# Patient Record
Sex: FEMALE | Race: WHITE | NOT HISPANIC OR LATINO | Employment: UNEMPLOYED | URBAN - METROPOLITAN AREA
[De-identification: names, ages, dates, MRNs, and addresses within clinical notes are randomized per-mention and may not be internally consistent; named-entity substitution may affect disease eponyms.]

---

## 2017-01-17 ENCOUNTER — HOSPITAL ENCOUNTER (OUTPATIENT)
Dept: MAMMOGRAPHY | Facility: CLINIC | Age: 52
Discharge: HOME | End: 2017-01-17
Payer: COMMERCIAL

## 2017-01-17 DIAGNOSIS — Z12.31 ENCOUNTER FOR SCREENING MAMMOGRAM FOR MALIGNANT NEOPLASM OF BREAST: ICD-10-CM

## 2017-01-17 PROCEDURE — G0202 SCR MAMMO BI INCL CAD: HCPCS

## 2017-03-07 ENCOUNTER — HOSPITAL ENCOUNTER (OUTPATIENT)
Dept: RADIOLOGY | Facility: HOSPITAL | Age: 52
Discharge: HOME | End: 2017-03-07
Attending: FAMILY MEDICINE
Payer: COMMERCIAL

## 2017-03-07 ENCOUNTER — ALLSCRIPTS OFFICE VISIT (OUTPATIENT)
Dept: OTHER | Facility: OTHER | Age: 52
End: 2017-03-07

## 2017-03-07 DIAGNOSIS — S93.402A SPRAIN OF LIGAMENT OF LEFT ANKLE: ICD-10-CM

## 2017-03-07 PROCEDURE — 73610 X-RAY EXAM OF ANKLE: CPT

## 2017-03-09 ENCOUNTER — GENERIC CONVERSION - ENCOUNTER (OUTPATIENT)
Dept: OTHER | Facility: OTHER | Age: 52
End: 2017-03-09

## 2017-05-01 ENCOUNTER — ALLSCRIPTS OFFICE VISIT (OUTPATIENT)
Dept: OTHER | Facility: OTHER | Age: 52
End: 2017-05-01

## 2017-05-04 ENCOUNTER — GENERIC CONVERSION - ENCOUNTER (OUTPATIENT)
Dept: OTHER | Facility: OTHER | Age: 52
End: 2017-05-04

## 2017-05-04 LAB
A/G RATIO (HISTORICAL): 1.8 (CALC) (ref 1–2.5)
ALBUMIN SERPL BCP-MCNC: 4.1 G/DL (ref 3.6–5.1)
ALP SERPL-CCNC: 64 U/L (ref 33–130)
ALT SERPL W P-5'-P-CCNC: 15 U/L (ref 6–29)
AST SERPL W P-5'-P-CCNC: 18 U/L (ref 10–35)
BACTERIA UR QL AUTO: NORMAL /HPF
BASOPHILS # BLD AUTO: 0.8 %
BASOPHILS # BLD AUTO: 42 CELLS/UL (ref 0–200)
BILIRUB SERPL-MCNC: 0.6 MG/DL (ref 0.2–1.2)
BUN SERPL-MCNC: 14 MG/DL (ref 7–25)
BUN/CREA RATIO (HISTORICAL): NORMAL (CALC) (ref 6–22)
CALCIUM SERPL-MCNC: 8.9 MG/DL (ref 8.6–10.4)
CHLORIDE SERPL-SCNC: 106 MMOL/L (ref 98–110)
CHOLEST SERPL-MCNC: 182 MG/DL (ref 125–200)
CHOLEST/HDLC SERPL: 3.6 (CALC)
CO2 SERPL-SCNC: 25 MMOL/L (ref 20–31)
CREAT SERPL-MCNC: 0.71 MG/DL (ref 0.5–1.05)
DEPRECATED RDW RBC AUTO: 14.7 % (ref 11–15)
EOSINOPHIL # BLD AUTO: 186 CELLS/UL (ref 15–500)
EOSINOPHIL # BLD AUTO: 3.5 %
EST. AVERAGE GLUCOSE BLD GHB EST-MCNC: 105 (CALC)
EST. AVERAGE GLUCOSE BLD GHB EST-MCNC: 5.8 (CALC)
GAMMA GLOBULIN (HISTORICAL): 2.3 G/DL (CALC) (ref 1.9–3.7)
GLUCOSE (HISTORICAL): 88 MG/DL (ref 65–99)
HBA1C MFR BLD HPLC: 5.3 % OF TOTAL HGB
HCT VFR BLD AUTO: 37.8 % (ref 35–45)
HDLC SERPL-MCNC: 51 MG/DL
HGB BLD-MCNC: 12.6 G/DL (ref 11.7–15.5)
HYALINE CASTS #/AREA URNS LPF: NORMAL /LPF
LDL CHOLESTEROL DIRECT (HISTORICAL): 105 MG/DL
LYMPHOCYTES # BLD AUTO: 2041 CELLS/UL (ref 850–3900)
LYMPHOCYTES # BLD AUTO: 38.5 %
MCH RBC QN AUTO: 29.3 PG (ref 27–33)
MCHC RBC AUTO-ENTMCNC: 33.2 G/DL (ref 32–36)
MCV RBC AUTO: 88.1 FL (ref 80–100)
MONOCYTES # BLD AUTO: 260 CELLS/UL (ref 200–950)
MONOCYTES (HISTORICAL): 4.9 %
NEUTROPHILS # BLD AUTO: 2772 CELLS/UL (ref 1500–7800)
NEUTROPHILS # BLD AUTO: 52.3 %
NON-HDL-CHOL (CHOL-HDL) (HISTORICAL): 131 MG/DL (CALC)
NOTE (HISTORICAL): NORMAL
PLATELET # BLD AUTO: 210 THOUSAND/UL (ref 140–400)
PMV BLD AUTO: 10.7 FL (ref 7.5–12.5)
POTASSIUM SERPL-SCNC: 4.6 MMOL/L (ref 3.5–5.3)
RBC # BLD AUTO: 4.3 MILLION/UL (ref 3.8–5.1)
RBC (HISTORICAL): NORMAL /HPF
SODIUM SERPL-SCNC: 140 MMOL/L (ref 135–146)
SQUAMOUS EPITHELIAL CELLS (HISTORICAL): NORMAL /HPF
TOTAL PROTEIN (HISTORICAL): 6.4 G/DL (ref 6.1–8.1)
TRIGL SERPL-MCNC: 149 MG/DL
WBC # BLD AUTO: 5.3 THOUSAND/UL (ref 3.8–10.8)
WBC # BLD AUTO: NORMAL /HPF

## 2017-08-11 ENCOUNTER — ALLSCRIPTS OFFICE VISIT (OUTPATIENT)
Dept: OTHER | Facility: OTHER | Age: 52
End: 2017-08-11

## 2017-09-25 ENCOUNTER — ALLSCRIPTS OFFICE VISIT (OUTPATIENT)
Dept: OTHER | Facility: OTHER | Age: 52
End: 2017-09-25

## 2017-10-24 ENCOUNTER — APPOINTMENT (OUTPATIENT)
Dept: RADIOLOGY | Facility: CLINIC | Age: 52
End: 2017-10-24
Payer: COMMERCIAL

## 2017-10-24 ENCOUNTER — TRANSCRIBE ORDERS (OUTPATIENT)
Dept: ADMINISTRATIVE | Facility: HOSPITAL | Age: 52
End: 2017-10-24

## 2017-10-24 ENCOUNTER — ALLSCRIPTS OFFICE VISIT (OUTPATIENT)
Dept: OTHER | Facility: OTHER | Age: 52
End: 2017-10-24

## 2017-10-24 DIAGNOSIS — M25.519 PAIN IN SHOULDER: ICD-10-CM

## 2017-10-24 DIAGNOSIS — S49.91XA INJURY OF RIGHT UPPER ARM, INITIAL ENCOUNTER: ICD-10-CM

## 2017-10-24 DIAGNOSIS — R29.898 DEFICIENCIES OF LIMBS: Primary | ICD-10-CM

## 2017-10-24 DIAGNOSIS — R29.898 OTHER SYMPTOMS AND SIGNS INVOLVING THE MUSCULOSKELETAL SYSTEM: ICD-10-CM

## 2017-10-24 DIAGNOSIS — M75.121 COMPLETE TEAR OF RIGHT ROTATOR CUFF: ICD-10-CM

## 2017-10-24 DIAGNOSIS — S49.91XA UNSPECIFIED INJURY OF RIGHT SHOULDER AND UPPER ARM, INITIAL ENCOUNTER: ICD-10-CM

## 2017-10-24 DIAGNOSIS — M25.511 PAIN IN RIGHT SHOULDER: ICD-10-CM

## 2017-10-24 PROCEDURE — 73030 X-RAY EXAM OF SHOULDER: CPT

## 2017-10-25 NOTE — PROGRESS NOTES
Assessment  1  Right shoulder pain (719 41) (M25 511)   2  Shoulder weakness (719 61) (R29 898)   3  Injury of right shoulder, initial encounter (959 2) (S49 91XA)    Plan  Injury of right shoulder, initial encounter, Right shoulder pain, Shoulder weakness    · Follow-up After MRI Evaluation and Treatment  Follow-up with Dr Julio Cesar Abdi,  orthopaedics  Status: Hold For - Scheduling  Requested for: 63CWM7863   · * MRI SHOULDER RIGHT WO CONTRAST; Status:Need Information - Financial  Authorization; Requested JOSUE:[id number];   Right shoulder pain    · *1 - [facility]  45year old female with  Status: Active -  Retrospective Authorization  Requested for: [id number]  Care Summary provided  : Yes  Shoulder pain    · * XR SHOULDER 2+ VIEW RIGHT; Status:Active; Requested [id number];     Discussion/Summary    Given her cuff weakness, significant nighttime symptoms, and likely injury during her daily activities, there is high concern for a cuff tear  We have recommended going for MRI to evaluate and will f/u following this is completed  She is claustrophobic and is requesting medication for anxiety control during the procedure given difficulties tolerating it in the past  Dr Julio Cesar Abdi is agreeable to prescription of anxiolytic for control  Chief Complaint  1  Shoulder Pain    History of Present Illness  Ms Samantha Elizabeth is a 45year old right hand dominant female presenting for right shoulder pain of approximately 2 months duration without any specific trauma to it  Her work does include part time cleaning and describes being a very active person now remember events such as helping her girlfriend move about the time this started and lifting a different friend's washing machine, so she is aware that there may have been a movement that set her pain off  She now experiences an ache at her right lateral shoulder with radiation down the lateral arm not passing her elbow   This will wake her up from sleep 3-4 nights per week  She has begun avoiding use with her right arm to help with the pain as her nighttime symptoms are heavily affected by the activity level from the day  She will also occasionally notice a clicking at her shoulder as well during ROM  She has been using ibuprofen with moderate symptoms relief  Fernando Hank presents with complaints of shoulder pain  Associated symptoms include clicking, but-- no swelling,-- no shoulder bruising,-- no decreased range of motion,-- no instability-- and-- no numbness in the arm  Review of Systems    Constitutional: No fever, no chills, feels well, no tiredness, no recent weight gain or loss  Eyes: No complaints of eyesight problems, no red eyes  ENT: no loss of hearing, no nosebleeds, no sore throat  Cardiovascular: No complaints of chest pain, no palpitations, no leg claudication or lower extremity edema  Respiratory: no compliants of shortness of breath, no wheezing, no cough  Gastrointestinal: no complaints of abdominal pain, no constipation, no nausea or diarrhea, no vomiting, no bloody stools  Genitourinary: no complaints of dysuria, no incontinence  Musculoskeletal: as noted in HPI  Integumentary: no complaints of skin rash or lesion, no itching or dry skin, no skin wounds  Neurological: no complaints of headache, no confusion, no numbness or tingling, no dizziness  Endocrine: No complaints of muscle weakness, no feelings of weakness, no frequent urination, no excessive thirst    Psychiatric: No suicidal thoughts, no anxiety, no feelings of depression  Active Problems  1  Acid reflux disease (530 81) (K21 9)   2  History of Bronchitis, asthmatic (493 90) (J45 909)   3  Carpal tunnel syndrome, unspecified laterality (354 0) (G56 00)   4  Colon polyp (211 3) (K63 5)   5  Depression (311) (F32 9)   6  Disc degeneration, lumbar (722 52) (M51 36)   7  Flu vaccine need (V04 81) (Z23)   8  Herpes labialis (054 9) (B00 1)   9   History of urinary tract infection (V13 02) (Z87 440)   10  Hypercholesterolemia (272 0) (E78 00)   11  Low back pain (724 2) (M54 5)   12  Migraine headache (346 90) (G43 909)   13  Osteoarthritis (715 90) (M19 90)   14  Pap smear abnormality of cervix with ASCUS favoring benign (795 01) (R87 610)   15  Right rotator cuff tendinitis (726 10) (M75 81)   16  Shoulder pain (719 41) (M25 519)   17  Symptomatic menopausal or female climacteric states (627 2) (N95 1)    Past Medical History   · History of Age At First Period 13 Years Old (Menarche)   · History of Anxiety disorder (300 00) (F41 9)   · History of Arthritis (V13 4)   · History of Asthma (493 90) (J45 909)   · History of Bronchitis, asthmatic (493 90) (J45 909)   · History of Depression (311) (F32 9)   · History of Diverticulosis (562 10) (K57 90)   · History of breast lump (V13 89) (P55 966)   · History of heartburn (V12 79) (K70 843)   · History of hemorrhoids (V13 89) (Z87 19)   · History of hidradenitis suppurativa (V13 3) (Z87 2)   · History of hyperglycemia (V12 29) (Z86 39)   · History of lipoma (V13 89) (Z86 018)   · History of migraine (V12 49) (Z86 69)   · History of urinary incontinence (V13 09) (S67 932)   · History of urinary tract infection (V13 02) (Z87 440)   · History of Menopause (627 2) (Z78 0)   · History of Miscarriage (634 90) (O03 9)   · History of Postpartum Depression (648 40)   · History of Sprain of left ankle, unspecified ligament, initial encounter (845 00) (H80 060Z)   · History of Summary Of Previous Pregnancies  4  (Total No )   · History of  (spontaneous vaginal delivery) (650) (O80)   · History of Urinary incontinence (788 30) (R32)   · History of Vaginal candidiasis (112 1) (B37 3)   · History of Varicose Veins Of Lower Extremities (454 9)    The active problems and past medical history were reviewed and updated today        Surgical History   · History of Appendectomy   · History of Arthroscopy Knee Left   · History of Biopsy Breast Open   · History of Biopsy Breast Percutaneous Needle Core   · History of Colonoscopy   · History of Removal Of Lesion   · History of Surgical Treatment Of Missed    · History of Tubal Ligation    The surgical history was reviewed and updated today  Family History  Mother    · Family history of Benign Essential Hypertension   · Family history of Diabetes Mellitus (V18 0)   · Family history of cerebrovascular accident (CVA) (V17 1) (Z82 3)   · Family history of Osteoporosis   · Family history of Stomach cancer  Father    · Family history of Anxiety   · Family history of dementia (V17 2) (Z81 8)  Sister    · Family history of Anxiety  Maternal Grandmother    · Family history of Benign Essential Hypertension   · Family history of Diabetes Mellitus (V18 0)   · Family history of Heart disease  Uncle    · Family history of Kidney disease  Maternal Cousin    · Family history of Breast cancer  Paternal Cousin    · Family history of Breast cancer    The family history was reviewed and updated today  Social History   · Alcohol Use (History)   · CONSUMED RARELY   · Being A Social Drinker   · Daily Coffee Consumption (1  Cups/Day)   · Drinks wine (V49 89) (Z78 9)   · Denied: History of Drug Use   · Former smoker (V15 82) (I54 435)   · QUIT    · Recreational Activities Walking   · Sexual Activity Denied  The social history was reviewed and updated today  The social history was reviewed and is unchanged  Current Meds   1  [employer]S Ibuprofen TABS; Therapy: (Recorded:2014) to Recorded   2  LORazepam 0 5 MG Oral Tablet; TAKE 1/2 to 1 pill every 4-6 hrs as needed  ANXIETY; Last Rx:2017 Ordered   3  Montelukast Sodium 10 MG Oral Tablet; take 1 tablet by mouth daily; Therapy: 08TXX8564 to [phone])  Requested for: 07RLN5977; Last   Rx:2017 Ordered   4  Omeprazole 20 MG Oral Capsule Delayed Release; TAKE 1 CAPSULE DAILY as   needed for stomach acid;    Therapy: 78FGP3675 to (RVNJXAOE:88AAB5594)  Requested for: 18ZPQ3242; Last   NV:68YRZ9085 Ordered   5  Pulmicort Flexhaler 180 MCG/ACT Inhalation Aerosol Powder Breath Activated; Use 1    puff Twice daily as directed -   if increased wheezing, increase to 2   puffs twice a day; Therapy: 05KMJ3729 to (Evaluate:33Qqe6278)  Requested for: 71OUK4931; Last   Rx:05Vfa3153 Ordered   6  Sertraline HCl - 100 MG Oral Tablet; take 1 tablet by mouth daily; Therapy: 10Krv9726 to (Evaluate:76Ssc8437)  Requested for: 68FLY8151; Last   Rx:79Aam0815 Ordered   7  ValACYclovir HCl - 1 GM Oral Tablet; 2 BID x 1 day for cold sores; Therapy: 12WIS9025 to (Last Rx:47Pgt2608)  Requested for: 00Mkx6218 Ordered    The medication list was reviewed and updated today  Allergies  1  Effexor TABS   2  Prempro TABS   3  Wellbutrin TABS    Vitals   Recorded: 64OKX1965 11:34AM   Heart Rate 73   Systolic 291   Diastolic 84   Height 5 ft 6 3 in   Weight 228 lb    BMI Calculated 36 47   BSA Calculated 2 12     Physical Exam    Right Shoulder: Appearance: Normal except  (no swelling, ecchymosis  Mild TTP at [facility] joint and anterior shoulder  No tenderness at posterior shoulder, clavicle, or over subacromial bursa  normal AROM throughout shoulder - flexion, extension, adduction, abduction, IR, and ER  +painful arc, 4/5 strength with ER with arm 90 degrees flexion, + empty can, +hawkin's, +speed's, -yergason's, equiv  cross body adduction, -hornblower's, -anterior and posterior axial loading, -apprehension, -sulcus sign, -obriens)      Constitutional - General appearance: Normal    Cardiovascular - Pulses: Normal  Radial pulse was 2+ on the right  Neurologic - Sensation: Normal light touch intact throughout RUE     Psychiatric - Orientation to person, place, and time: Normal -- Mood and affect: Normal    Eyes   Conjunctiva and lids: Normal        Attending Note  Attending Note [facility]: Attending Note: I interviewed, took the history and examined the patient,-- I discussed the case with the Resident and reviewed the Resident's note-- and-- I agree with the Resident management plan as it was presented to me  Level of Participation: I was present in clinic and examined the patient  I agree with the Resident's note  Future Appointments    Date/Time Provider Specialty Site   11/07/2017 03:40 PM MILTON Sharma   Orthopedic Surgery [facility]     Signatures   Electronically signed by : Favio Michelle DO; Oct 24 2017 12:30PM EST                       (Author)    Electronically signed by : MILTON Howell ; Oct 24 2017  2:14PM EST                       (Author)

## 2017-11-01 ENCOUNTER — APPOINTMENT (OUTPATIENT)
Dept: PHYSICAL THERAPY | Facility: CLINIC | Age: 52
End: 2017-11-01
Payer: COMMERCIAL

## 2017-11-01 DIAGNOSIS — M25.511 PAIN IN RIGHT SHOULDER: ICD-10-CM

## 2017-11-01 PROCEDURE — G8991 OTHER PT/OT GOAL STATUS: HCPCS

## 2017-11-01 PROCEDURE — 97110 THERAPEUTIC EXERCISES: CPT

## 2017-11-01 PROCEDURE — G8990 OTHER PT/OT CURRENT STATUS: HCPCS

## 2017-11-01 PROCEDURE — 97162 PT EVAL MOD COMPLEX 30 MIN: CPT

## 2017-11-03 ENCOUNTER — GENERIC CONVERSION - ENCOUNTER (OUTPATIENT)
Dept: OTHER | Facility: OTHER | Age: 52
End: 2017-11-03

## 2017-11-05 ENCOUNTER — HOSPITAL ENCOUNTER (OUTPATIENT)
Dept: RADIOLOGY | Facility: HOSPITAL | Age: 52
Discharge: HOME | End: 2017-11-05
Attending: ORTHOPAEDIC SURGERY
Payer: COMMERCIAL

## 2017-11-05 DIAGNOSIS — M25.511 PAIN IN RIGHT SHOULDER: ICD-10-CM

## 2017-11-05 DIAGNOSIS — S49.91XA UNSPECIFIED INJURY OF RIGHT SHOULDER AND UPPER ARM, INITIAL ENCOUNTER: ICD-10-CM

## 2017-11-05 DIAGNOSIS — R29.898 OTHER SYMPTOMS AND SIGNS INVOLVING THE MUSCULOSKELETAL SYSTEM: ICD-10-CM

## 2017-11-05 PROCEDURE — 73221 MRI JOINT UPR EXTREM W/O DYE: CPT

## 2017-11-07 ENCOUNTER — ALLSCRIPTS OFFICE VISIT (OUTPATIENT)
Dept: OTHER | Facility: OTHER | Age: 52
End: 2017-11-07

## 2017-11-08 ENCOUNTER — APPOINTMENT (OUTPATIENT)
Dept: PHYSICAL THERAPY | Facility: CLINIC | Age: 52
End: 2017-11-08
Payer: COMMERCIAL

## 2017-11-08 PROCEDURE — 97140 MANUAL THERAPY 1/> REGIONS: CPT

## 2017-11-08 PROCEDURE — 97110 THERAPEUTIC EXERCISES: CPT

## 2017-11-09 ENCOUNTER — APPOINTMENT (OUTPATIENT)
Dept: PHYSICAL THERAPY | Facility: CLINIC | Age: 52
End: 2017-11-09
Payer: COMMERCIAL

## 2017-11-10 ENCOUNTER — APPOINTMENT (OUTPATIENT)
Dept: PHYSICAL THERAPY | Facility: CLINIC | Age: 52
End: 2017-11-10
Payer: COMMERCIAL

## 2017-11-10 PROCEDURE — 97110 THERAPEUTIC EXERCISES: CPT

## 2017-11-10 PROCEDURE — 97140 MANUAL THERAPY 1/> REGIONS: CPT

## 2017-11-15 ENCOUNTER — APPOINTMENT (OUTPATIENT)
Dept: PHYSICAL THERAPY | Facility: CLINIC | Age: 52
End: 2017-11-15
Payer: COMMERCIAL

## 2017-11-15 PROCEDURE — 97112 NEUROMUSCULAR REEDUCATION: CPT

## 2017-11-15 PROCEDURE — 97110 THERAPEUTIC EXERCISES: CPT

## 2017-11-15 PROCEDURE — 97140 MANUAL THERAPY 1/> REGIONS: CPT

## 2017-11-17 ENCOUNTER — APPOINTMENT (OUTPATIENT)
Dept: PHYSICAL THERAPY | Facility: CLINIC | Age: 52
End: 2017-11-17
Payer: COMMERCIAL

## 2017-11-17 PROCEDURE — 97140 MANUAL THERAPY 1/> REGIONS: CPT

## 2017-11-17 PROCEDURE — 97112 NEUROMUSCULAR REEDUCATION: CPT

## 2017-11-20 ENCOUNTER — APPOINTMENT (OUTPATIENT)
Dept: PHYSICAL THERAPY | Facility: CLINIC | Age: 52
End: 2017-11-20
Payer: COMMERCIAL

## 2017-11-20 PROCEDURE — 97140 MANUAL THERAPY 1/> REGIONS: CPT

## 2017-11-20 PROCEDURE — 97112 NEUROMUSCULAR REEDUCATION: CPT

## 2017-11-20 PROCEDURE — 97110 THERAPEUTIC EXERCISES: CPT

## 2017-11-22 ENCOUNTER — APPOINTMENT (OUTPATIENT)
Dept: PHYSICAL THERAPY | Facility: CLINIC | Age: 52
End: 2017-11-22
Payer: COMMERCIAL

## 2017-11-22 PROCEDURE — 97140 MANUAL THERAPY 1/> REGIONS: CPT

## 2017-11-22 PROCEDURE — 97112 NEUROMUSCULAR REEDUCATION: CPT

## 2017-11-22 PROCEDURE — 97110 THERAPEUTIC EXERCISES: CPT

## 2017-11-27 ENCOUNTER — APPOINTMENT (OUTPATIENT)
Dept: PHYSICAL THERAPY | Facility: CLINIC | Age: 52
End: 2017-11-27
Payer: COMMERCIAL

## 2017-11-27 PROCEDURE — 97112 NEUROMUSCULAR REEDUCATION: CPT

## 2017-11-27 PROCEDURE — 97110 THERAPEUTIC EXERCISES: CPT

## 2017-11-27 PROCEDURE — 97140 MANUAL THERAPY 1/> REGIONS: CPT

## 2017-11-27 PROCEDURE — G8991 OTHER PT/OT GOAL STATUS: HCPCS

## 2017-11-27 PROCEDURE — G8990 OTHER PT/OT CURRENT STATUS: HCPCS

## 2017-11-29 ENCOUNTER — APPOINTMENT (OUTPATIENT)
Dept: PHYSICAL THERAPY | Facility: CLINIC | Age: 52
End: 2017-11-29
Payer: COMMERCIAL

## 2017-11-29 ENCOUNTER — GENERIC CONVERSION - ENCOUNTER (OUTPATIENT)
Dept: OTHER | Facility: OTHER | Age: 52
End: 2017-11-29

## 2017-12-04 ENCOUNTER — APPOINTMENT (OUTPATIENT)
Dept: PHYSICAL THERAPY | Facility: CLINIC | Age: 52
End: 2017-12-04
Payer: COMMERCIAL

## 2017-12-04 PROCEDURE — 97140 MANUAL THERAPY 1/> REGIONS: CPT

## 2017-12-04 PROCEDURE — 97110 THERAPEUTIC EXERCISES: CPT

## 2017-12-06 ENCOUNTER — APPOINTMENT (OUTPATIENT)
Dept: PHYSICAL THERAPY | Facility: CLINIC | Age: 52
End: 2017-12-06
Payer: COMMERCIAL

## 2017-12-06 PROCEDURE — 97110 THERAPEUTIC EXERCISES: CPT

## 2017-12-06 PROCEDURE — 97140 MANUAL THERAPY 1/> REGIONS: CPT

## 2017-12-07 ENCOUNTER — GENERIC CONVERSION - ENCOUNTER (OUTPATIENT)
Dept: OBGYN CLINIC | Facility: OTHER | Age: 52
End: 2017-12-07

## 2017-12-08 ENCOUNTER — GENERIC CONVERSION - ENCOUNTER (OUTPATIENT)
Dept: OBGYN CLINIC | Facility: OTHER | Age: 52
End: 2017-12-08

## 2017-12-12 ENCOUNTER — APPOINTMENT (OUTPATIENT)
Dept: PHYSICAL THERAPY | Facility: CLINIC | Age: 52
End: 2017-12-12
Payer: COMMERCIAL

## 2017-12-13 ENCOUNTER — APPOINTMENT (OUTPATIENT)
Dept: PHYSICAL THERAPY | Facility: CLINIC | Age: 52
End: 2017-12-13
Payer: COMMERCIAL

## 2017-12-13 PROCEDURE — 97110 THERAPEUTIC EXERCISES: CPT

## 2017-12-13 PROCEDURE — 97140 MANUAL THERAPY 1/> REGIONS: CPT

## 2017-12-14 ENCOUNTER — APPOINTMENT (OUTPATIENT)
Dept: PHYSICAL THERAPY | Facility: CLINIC | Age: 52
End: 2017-12-14
Payer: COMMERCIAL

## 2017-12-18 ENCOUNTER — APPOINTMENT (OUTPATIENT)
Dept: PHYSICAL THERAPY | Facility: CLINIC | Age: 52
End: 2017-12-18
Payer: COMMERCIAL

## 2017-12-19 ENCOUNTER — APPOINTMENT (OUTPATIENT)
Dept: PHYSICAL THERAPY | Facility: CLINIC | Age: 52
End: 2017-12-19
Payer: COMMERCIAL

## 2017-12-19 PROCEDURE — 97112 NEUROMUSCULAR REEDUCATION: CPT

## 2017-12-19 PROCEDURE — 97140 MANUAL THERAPY 1/> REGIONS: CPT

## 2017-12-19 PROCEDURE — 97110 THERAPEUTIC EXERCISES: CPT

## 2017-12-21 ENCOUNTER — APPOINTMENT (OUTPATIENT)
Dept: PHYSICAL THERAPY | Facility: CLINIC | Age: 52
End: 2017-12-21
Payer: COMMERCIAL

## 2017-12-21 PROCEDURE — 97140 MANUAL THERAPY 1/> REGIONS: CPT

## 2017-12-21 PROCEDURE — 97110 THERAPEUTIC EXERCISES: CPT

## 2017-12-21 PROCEDURE — 97112 NEUROMUSCULAR REEDUCATION: CPT

## 2017-12-26 ENCOUNTER — APPOINTMENT (OUTPATIENT)
Dept: PHYSICAL THERAPY | Facility: CLINIC | Age: 52
End: 2017-12-26
Payer: COMMERCIAL

## 2017-12-27 ENCOUNTER — APPOINTMENT (OUTPATIENT)
Dept: PHYSICAL THERAPY | Facility: CLINIC | Age: 52
End: 2017-12-27
Payer: COMMERCIAL

## 2017-12-27 PROCEDURE — 97140 MANUAL THERAPY 1/> REGIONS: CPT

## 2017-12-27 PROCEDURE — G8991 OTHER PT/OT GOAL STATUS: HCPCS

## 2017-12-27 PROCEDURE — G8990 OTHER PT/OT CURRENT STATUS: HCPCS

## 2017-12-27 PROCEDURE — 97110 THERAPEUTIC EXERCISES: CPT

## 2017-12-28 ENCOUNTER — GENERIC CONVERSION - ENCOUNTER (OUTPATIENT)
Dept: OTHER | Facility: OTHER | Age: 52
End: 2017-12-28

## 2018-01-02 ENCOUNTER — ALLSCRIPTS OFFICE VISIT (OUTPATIENT)
Dept: OTHER | Facility: OTHER | Age: 53
End: 2018-01-02

## 2018-01-02 DIAGNOSIS — M75.121 COMPLETE TEAR OF RIGHT ROTATOR CUFF: ICD-10-CM

## 2018-01-03 NOTE — PROGRESS NOTES
Assessment   1  Complete tear of right rotator cuff (727 61) (M47 121)    Plan   Complete tear of right rotator cuff    · *1 - [facility]  Continue PT for range of motion,    strengthening, and modalities of right shoulder 1 to 2 times a week for 8-12 weeks  Diagnosis:  Right shoulder rotator cuff tear  Status: Active  Requested for: 02Jan2018  Care Summary provided  : Yes  Complete tear of right rotator cuff, Dense breast tissue    · Follow-up PRN Evaluation and Treatment  Follow-up  Status: Complete  Done:    49LGR4444   · Apply an ice pack as needed for pain twice a day for 20 minutes ; Status:Complete;      Done: 72HFT1299  Dense breast tissue, Encounter for screening mammogram for malignant neoplasm of    breast    · MAMMO SCREENING BILATERAL W 3D & CAD; Status:Active; Requested CPT:93HXE3870; Discussion/Summary      Right shoulder rotator cuff tear:   continues to show good improvement with physical therapy and would like to continue conservative treatment due to this at this time  did discuss surgical treatment if necessary in the future if conservative treatment does not improve  with ice and analgesics as needed  as needed in the future  History of Present Illness   49-year-old female with right shoulder for approximately 4 months  Since last visit November 7, 2017 she has been doing physical therapy which she feels has improved her symptoms dramatically  She feels she is back to about 80% of what her original function was  Last visit she did discuss surgical treatment of for financial reasons would rather pursue conservative treatment at this time  Currently she is able sleep at night without much pain and does take Advil as needed  Review of Systems        Constitutional: No fever, no chills, feels well, no tiredness, no recent weight gain or loss  Eyes: No complaints of eyesight problems, no red eyes        ENT: no loss of hearing, no nosebleeds, no sore throat  Cardiovascular: No complaints of chest pain, no palpitations, no leg claudication or lower extremity edema  Respiratory: no compliants of shortness of breath, no wheezing, no cough  Gastrointestinal: no complaints of abdominal pain, no constipation, no nausea or diarrhea, no vomiting, no bloody stools  Genitourinary: no complaints of dysuria, no incontinence  Musculoskeletal: as noted in HPI  Integumentary: no complaints of skin rash or lesion, no itching or dry skin, no skin wounds  Neurological: no complaints of headache, no confusion, no numbness or tingling, no dizziness  Endocrine: No complaints of muscle weakness, no feelings of weakness, no frequent urination, no excessive thirst       Psychiatric: No suicidal thoughts, no anxiety, no feelings of depression  ROS reviewed  Active Problems   1  Acid reflux disease (530 81) (K21 9)   2  Carpal tunnel syndrome, unspecified laterality (354 0) (G56 00)   3  Complete tear of right rotator cuff (727 61) (M75 121)   4  Dense breast tissue (793 82) (R92 2)   5  Depression (311) (F32 9)   6  Disc degeneration, lumbar (722 52) (M51 36)   7  Encounter for gynecological examination without abnormal finding (V72 31) (Z01 419)   8  Encounter for screening mammogram for malignant neoplasm of breast (V76 12)     (Z12 31)   9  Herpes labialis (054 9) (B00 1)   10  Hypercholesterolemia (272 0) (E78 00)   11  Low back pain (724 2) (M54 5)   12  Migraine headache (346 90) (G43 909)   13  Osteoarthritis (715 90) (M19 90)   14  Right rotator cuff tendinitis (726 10) (M75 81)   15  Right shoulder pain (719 41) (M25 511)   16   Symptomatic menopausal or female climacteric states (627 2) (N95 1)    Past Medical History    · History of Age At First Period 13 Years Old (Menarche)   · History of Anxiety disorder (300 00) (F41 9)   · History of Arthritis (V13 4)   · History of Asthma (493 90) (J45 909)   · History of Depression (311) (F32 9)   · History of Diverticulosis (562 10) (K57 90)   · History of breast lump (V13 89) (Y93 333)   · History of heartburn (V12 79) (L47 768)   · History of hemorrhoids (V13 89) (Z87 19)   · History of hidradenitis suppurativa (V13 3) (Z87 2)   · History of hyperglycemia (V12 29) (Z86 39)   · History of lipoma (V13 89) (Z86 018)   · History of migraine (V12 49) (Z86 69)   · History of urinary incontinence (V13 09) (Z87 898)   · History of Menopause (627 2) (Z78 0)   · History of Miscarriage (634 90) (O03 9)   · History of Pap smear abnormality of cervix with ASCUS favoring benign (795 01)    (R87 610)   · History of Sprain of left ankle, unspecified ligament, initial encounter (845 00) (G09 481N)   · History of Summary Of Previous Pregnancies  4  (Total No )   · History of  (spontaneous vaginal delivery) (650) (O80)   · History of Urinary incontinence (788 30) (R32)   · History of Varicose Veins Of Lower Extremities (454 9)     The active problems and past medical history were reviewed and updated today  Surgical History    · History of Appendectomy   · History of Arthroscopy Knee Left   · History of Biopsy Breast Open   · History of Biopsy Breast Percutaneous Needle Core   · History of Colonoscopy   · History of Removal Of Lesion   · History of Surgical Treatment Of Missed    · History of Tubal Ligation     The surgical history was reviewed and updated today         Family History   Mother    · Family history of Benign Essential Hypertension   · Family history of Diabetes Mellitus (V18 0)   · Family history of cerebrovascular accident (CVA) (V17 1) (Z82 3)   · Family history of Osteoporosis   · Family history of Stomach cancer  Father    · Family history of Anxiety   · Family history of dementia (V17 2) (Z81 8)  Sister    · Family history of Anxiety  Maternal Grandmother    · Family history of Benign Essential Hypertension   · Family history of Diabetes Mellitus (V18 0) · Family history of Heart disease  Uncle    · Family history of Kidney disease  Maternal Cousin    · Family history of Breast cancer  Paternal Cousin    · Family history of Breast cancer     The family history was reviewed and updated today  Social History    · Alcohol Use (History)   · CONSUMED RARELY   · Being A Social Drinker   · Daily Coffee Consumption (1  Cups/Day)   · Drinks wine (V49 89) (Z78 9)   · Denied: History of Drug Use   · Former smoker (V15 82) (R04 117)   · QUIT 2007   · Recreational Activities Walking   · Sexual Activity Denied  The social history was reviewed and updated today  Current Meds    1  Ibuprofen 800 MG Oral Tablet; Therapy: (Recorded:10Kvw4803) to Recorded   2  LORazepam 0 5 MG Oral Tablet; TAKE 1/2 to 1 pill every 4-6 hrs as needed  ANXIETY; Last Rx:11Aug2017 Ordered   3  Montelukast Sodium 10 MG Oral Tablet; take 1 tablet by mouth daily; Therapy: 53OUL8550 to (745 685 453)  Requested for: 43MTT7873; Last     Rx:07Mar2017 Ordered   4  Omeprazole 20 MG Oral Capsule Delayed Release; TAKE 1 CAPSULE DAILY as     needed for stomach acid; Therapy: 94CJG9430 to (Sarah Jovel)  Requested for: 14BYK6658; Last     PW:82EVY3311 Ordered   5  Pulmicort Flexhaler 180 MCG/ACT Inhalation Aerosol Powder Breath Activated; Use 1      puff Twice daily as directed -   if increased wheezing, increase to 2     puffs twice a day; Therapy: 53ZYN5419 to (Evaluate:06Xke8336)  Requested for: 12XWL9626; Last     Rx:88Lgk8390 Ordered   6  Sertraline HCl - 100 MG Oral Tablet; take 1 tablet by mouth daily; Therapy: 31Zkt9393 to (Evaluate:74Hvu2076)  Requested for: 61Lel3532; Last     Rx:21Fst9272 Ordered   7  ValACYclovir HCl - 1 GM Oral Tablet; 2 BID x 1 day for cold sores; Therapy: 99QFT2191 to (Last Rx:11Aug2017)  Requested for: 11Aug2017 Ordered     The medication list was reviewed and updated today  Allergies   1  Effexor TABS   2  Prempro TABS   3  Wellbutrin TABS    Vitals    Recorded: 02Jan2018 02:59PM   Heart Rate 85   Systolic 513, LUE, Sitting   Diastolic 85, LUE, Sitting   Weight 230 lb    BMI Calculated 37 12   BSA Calculated 2 12     Physical Exam      Right Shoulder: Appearance: Normal except  (Right shoulder:  No swelling, forward flexion/abduction / internal external rotation intact and symmetric compared to contralateral side,  intact supra spinatus test and infraspinatus test, some pain with resisted forward elevation but strength 5/5 in the rotator cuff)           Constitutional - General appearance: Normal       Musculoskeletal - Gait and station: Normal -- Digits and nails: Normal -- Muscle strength/tone: Normal       Cardiovascular - Pulses: Normal -- Examination of extremities for edema and/or varicosities: Normal -- Heart - RRR, no murmurs, no rubs, no gallops  Respiratory - Lungs - Clear to auscultation bilaterally, no rales, no rhonci, no wheezes  Skin - Skin and subcutaneous tissue: Normal       Neurologic - Sensation: Normal       Psychiatric - Orientation to person, place, and time: Normal -- Mood and affect: Normal       Eyes      Conjunctiva and lids: Normal        Pupils and irises: Normal        Results/Data   Exam description: right shoulder MRI  I personally reviewed the films/images/results in the office today  My interpretation follows  MRI Review Right shoulder MRI 11/5/17: supraspinatus tear with retraction, biceps tendon w increased signal       Attending Note   Collaborating Physician Note: Collaborating Physician: I interviewed and examined the patient,-- I supervised the Advanced Practitioner-- and-- I agree with the Advanced Practitioner note  Future Appointments      Date/Time Provider Specialty Site   01/02/2019 03:00 PM MILTON Donahue   Obstetrics/Gynecology [facility] OB     Signatures    Electronically signed by : Jose Miguel Akins, [facility]; Jan 2 2018  4:29PM EST (Author)     Electronically signed by : MILTON Armenta ; Jan 2 2018  6:42PM EST                       (Author)

## 2018-01-09 NOTE — MISCELLANEOUS
Message   Recorded as Task   Date: 05/20/2016 01:56 PM, Created By: Trudi Jensen   Task Name: Follow Up   [facility]   Regarding Patient: Vee Bui, Status: Active   CommentMaarnie Patel - 20 May 2016 1:56 PM     TASK CREATED    Routine post op excision of lipoma left shoulder call placed to patient  Procedure done on 05/19/2016  Patient had no questions or concerns  Path pending  Trudi Jensen - 24 May 2016 10:41 AM     TASK EDITED  Call placed to patient regarding pathology results  Voicemail received and message left for patient to return call  Eneida Cassidy - 25 May 2016 1:12 PM     TASK EDITED  Message left for patient to call the office for results  The findings were consistent with a lipoma and were negative for malignancy  Eneida Cassidy - 25 May 2016 2:27 PM     TASK EDITED  Patient called the office and informed of the pathology results  She had no questions at the tme of the call  Active Problems    1  Acid reflux disease (530 81) (K21 9)   2  Back pain (724 5) (M54 9)   3  Breast pain (611 71) (N64 4)   4  Bronchitis, asthmatic (493 90) (J45 909)   5  Carpal tunnel syndrome, unspecified laterality (354 0) (G56 00)   6  Colon cancer screening (V76 51) (Z12 11)   7  Depression (311) (F32 9)   8  Disc degeneration, lumbar (722 52) (M51 36)   9  Encounter for gynecological examination without abnormal finding (V72 31) (Z01 419)   10  Encounter for screening mammogram for malignant neoplasm of breast (V76 12)    (Z12 31)   11  Heavy Bleeding Between Periods (Metrorrhagia) (626 6)   12  History of urinary tract infection (V13 02) (Z87 440)   13  Hypercholesterolemia (272 0) (E78 0)   14  Hyperglycemia (790 29) (R73 9)   15  Lipoma (214 9) (D17 9)   16  Migraine headache (346 90) (G43 909)   17  Osteoarthritis (715 90) (M19 90)   18  Symptomatic menopausal or female climacteric states (627 2) (N95 1)   19   Visit for screening mammogram (V76 12) (Z12 31)    Current Meds   1  Albuterol Sulfate (2 5 MG/3ML) 0 083% Inhalation Nebulization Solution; USE 1 UNIT   DOSE EVERY 4-6 HOURS AS NEEDED FOR WHEEZING ; Therapy: 39RPE4999 to (Evaluate:06Jun2015)  Requested for: 737 219 628; Last   Rx:07Apr2015 Ordered   2  [employer]S Ibuprofen TABS; Therapy: (Recorded:14Apr2014) to Recorded   3  LORazepam 0 5 MG Oral Tablet; TAKE 1/2 to 1 pill every 4-6 hrs as needed  ANXIETY; Last Rx:01Apr2016 Ordered   4  Montelukast Sodium 10 MG Oral Tablet (Singulair); TAKE 1 TABLET DAILY; Therapy: 87CSV8598 to (Tone Eunice)  Requested for: 51FXD3128; Last   Rx:83Fxh3349 Ordered   5  Omeprazole 20 MG Oral Capsule Delayed Release; take 1 capsule daily; Therapy: 32ILV0568 to (Last Rx:58Yhu7967)  Requested for: 83NWL1703 Ordered   6  ProAir  (90 Base) MCG/ACT Inhalation Aerosol Solution; INHALE 1 TO 2 PUFFS   EVERY 4 TO 6 HOURS AS NEEDED; Therapy: (Recorded:07Apr2015) to Recorded   7  Pulmicort Flexhaler 180 MCG/ACT Inhalation Aerosol Powder Breath Activated; Use 1    puff Twice daily as directed -   if increased wheezing, increase to 2   puffs twice a day; Therapy: 56TIY9692 to (Evaluate:13Apr2016)  Requested for: 52YHC7615; Last   Rx:08Kpr3926 Ordered   8  Sertraline HCl - 100 MG Oral Tablet (Zoloft); take 1 tablet by mouth daily; Therapy: 87Flw8068 to (Louis Chaudhry)  Requested for: 79EGJ1739; Last   Rx:09Nov2015 Ordered    Allergies    1  Effexor TABS   2  Prempro TABS   3   Wellbutrin TABS    Signatures   Electronically signed by : Maricel Siegel RN; May 25 2016  2:27PM EST                       (Author)

## 2018-01-11 NOTE — RESULT NOTES
Verified Results  (Q) COMPREHENSIVE METABOLIC PANEL W/O eGFR [id number] 08:03AM [facility]     Test Name Result Flag Reference   GLUCOSE 88 mg/dL  65-99   Fasting reference interval   UREA NITROGEN (BUN) 14 mg/dL  7-25   CREATININE 0 71 mg/dL  0 50-1 05   For patients >52years of age, the reference limit  for Creatinine is approximately 13% higher for people  identified as -American  BUN/CREATININE RATIO   6-50   NOT APPLICABLE (calc)   SODIUM 140 mmol/L  135-146   POTASSIUM 4 6 mmol/L  3 5-5 3   CHLORIDE 106 mmol/L     CARBON DIOXIDE 25 mmol/L  20-31   CALCIUM 8 9 mg/dL  8 6-10 4   PROTEIN, TOTAL 6 4 g/dL  6 1-8 1   ALBUMIN 4 1 g/dL  3 6-5 1   GLOBULIN 2 3 g/dL (calc)  1 9-3 7   ALBUMIN/GLOBULIN RATIO 1 8 (calc)  1 0-2 5   BILIRUBIN, TOTAL 0 6 mg/dL  0 2-1 2   ALKALINE PHOSPHATASE 64 U/L     AST 18 U/L  10-35   ALT 15 U/L  6-29     (Q) HEMOGLOBIN A1c WITH eAG [id number] 08:03AM [facility]     Test Name Result Flag Reference   HEMOGLOBIN A1c 5 3 % of total Hgb  <5 7   For the purpose of screening for the presence of  diabetes:     <5 7%       Consistent with the absence of diabetes  5 7-6 4%    Consistent with increased risk for diabetes              (prediabetes)  > or =6 5%  Consistent with diabetes     This assay result is consistent with a decreased risk  of diabetes  Currently, no consensus exists regarding use of  hemoglobin A1c for diagnosis of diabetes in children  According to American Diabetes Association (ADA)  guidelines, hemoglobin A1c <7 0% represents optimal  control in non-pregnant diabetic patients  Different  metrics may apply to specific patient populations  Standards of Medical Care in Diabetes(ADA)     eAG (mg/dL) 105 (calc)     eAG (mmol/L) 5 8 (calc)       (Q) URINALYSIS MICROSCOPIC [id number] 08:03AM [facility]     Test Name Result Flag Reference   WBC NONE SEEN /HPF  < OR = 5   RBC NONE SEEN /HPF  < OR = 2   SQUAMOUS EPITHELIAL CELLS 0-5 /HPF  < OR = 5 BACTERIA NONE SEEN /HPF  NONE SEEN   HYALINE CAST NONE SEEN /LPF  NONE SEEN   NOTE See Below     This urine was analyzed for the presence of WBC,   RBC, bacteria, casts, and other formed elements  Only those elements seen were reported  (Q) LIPID PANEL WITH DIRECT LDL [id number] 08:03AM [facility]     Test Name Result Flag Reference   CHOLESTEROL, TOTAL 182 mg/dL  125-200   HDL CHOLESTEROL 51 mg/dL  > OR = 46   TRIGLICERIDES 716 mg/dL  <150   DIRECT  mg/dL  <130   Desirable range <100 mg/dL for patients with CHD or  diabetes and <70 mg/dL for diabetic patients with  known heart disease  CHOL/HDLC RATIO 3 6 (calc)  < OR = 5 0   NON HDL CHOLESTEROL 131 mg/dL (calc)     Target for non-HDL cholesterol is 30 mg/dL higher than   LDL cholesterol target       (Q) CBC (INCLUDES DIFF/PLT) (REFL) [id number] 08:03AM [facility]     Test Name Result Flag Reference   WHITE BLOOD CELL COUNT 5 3 Thousand/uL  3 8-10 8   RED BLOOD CELL COUNT 4 30 Million/uL  3 80-5 10   HEMOGLOBIN 12 6 g/dL  11 7-15 5   HEMATOCRIT 37 8 %  35 0-45 0   MCV 88 1 fL  80 0-100 0   MCH 29 3 pg  27 0-33 0   MCHC 33 2 g/dL  32 0-36 0   RDW 14 7 %  11 0-15 0   PLATELET COUNT 122 Thousand/uL  140-400   MPV 10 7 fL  7 5-12 5   ABSOLUTE NEUTROPHILS 2772 cells/uL  5913-8247   ABSOLUTE LYMPHOCYTES 2041 cells/uL  850-3900   ABSOLUTE MONOCYTES 260 cells/uL  200-950   ABSOLUTE EOSINOPHILS 186 cells/uL     ABSOLUTE BASOPHILS 42 cells/uL  0-200   NEUTROPHILS 52 3 %     LYMPHOCYTES 38 5 %     MONOCYTES 4 9 %     EOSINOPHILS 3 5 %     BASOPHILS 0 8 %

## 2018-01-11 NOTE — MISCELLANEOUS
Message   Recorded as Task   Date: 12/01/2016 12:04 PM, Created By: Amparo Bridges   Task Name: Med Renewal Request   Assigned To: Gaetano Nava   Regarding Patient: Alvarez Jha, Status: Active   CommentLenise Huy - 01 Dec 2016 12:04 PM     TASK CREATED  Caller: Self; [phone] (Home)  sent 1 refill apt in dec w/ ed        Active Problems    1  Acid reflux disease (530 81) (K21 9)   2  Back pain (724 5) (M54 9)   3  Breast pain (611 71) (N64 4)   4  Bronchitis, asthmatic (493 90) (J45 909)   5  Carpal tunnel syndrome, unspecified laterality (354 0) (G56 00)   6  Colon cancer screening (V76 51) (Z12 11)   7  Depression (311) (F32 9)   8  Disc degeneration, lumbar (722 52) (M51 36)   9  Encounter for gynecological examination without abnormal finding (V72 31) (Z01 419)   10  Encounter for screening mammogram for malignant neoplasm of breast (V76 12)    (Z12 31)   11  Heavy Bleeding Between Periods (Metrorrhagia) (626 6)   12  History of urinary tract infection (V13 02) (Z87 440)   13  Hypercholesterolemia (272 0) (E78 00)   14  Hyperglycemia (790 29) (R73 9)   15  Migraine headache (346 90) (G43 909)   16  Osteoarthritis (715 90) (M19 90)   17  Symptomatic menopausal or female climacteric states (627 2) (N95 1)   18  Visit for screening mammogram (V76 12) (Z12 31)    Current Meds   1  Albuterol Sulfate (2 5 MG/3ML) 0 083% Inhalation Nebulization Solution; USE 1 UNIT   DOSE EVERY 4-6 HOURS AS NEEDED FOR WHEEZING ; Therapy: 75DAE8235 to (Evaluate:06Jun2015)  Requested for: 412 668 628; Last   Rx:07Apr2015 Ordered   2  [facility] Ibuprofen TABS; Therapy: (Recorded:14Apr2014) to Recorded   3  LORazepam 0 5 MG Oral Tablet; TAKE 1/2 to 1 pill every 4-6 hrs as needed  ANXIETY; Last Rx:01Apr2016 Ordered   4  Montelukast Sodium 10 MG Oral Tablet (Singulair); TAKE 1 TABLET DAILY; Therapy: 02MBL2755 to (Evaluate:12Ast6544)  Requested for: 80TEO3519; Last   Rx:40Mdi0762 Ordered   5   Omeprazole 20 MG Oral Capsule Delayed Release; TAKE 1 CAPSULE DAILY as   needed for stomach acid; Therapy: 81UPX9537 to (Last UM:03DAD3009)  Requested for: 56TDK0168 Ordered   6  ProAir  (90 Base) MCG/ACT Inhalation Aerosol Solution; INHALE 1 TO 2 PUFFS   EVERY 4 TO 6 HOURS AS NEEDED; Therapy: (Recorded:07Apr2015) to Recorded   7  Pulmicort Flexhaler 180 MCG/ACT Inhalation Aerosol Powder Breath Activated; Use 1    puff Twice daily as directed -   if increased wheezing, increase to 2   puffs twice a day; Therapy: 18RTK3795 to (Evaluate:13Apr2016)  Requested for: 96FNP3878; Last   Rx:98Kpu0935 Ordered   8  Sertraline HCl - 100 MG Oral Tablet; take 1 tablet by mouth daily; Therapy: 15Apr2014 to (Gisela Gomez)  Requested for: 00ZLI8779; Last   Rx:98Ulz0642 Ordered    Allergies    1  Effexor TABS   2  Prempro TABS   3   Wellbutrin TABS    Plan  Depression, Symptomatic menopausal or female climacteric states    · Sertraline HCl - 100 MG Oral Tablet (Zoloft); take 1 tablet by mouth daily    Signatures   Electronically signed by : Delonte Melvin, ; Dec  1 2016 12:04PM EST                       (Author)

## 2018-01-11 NOTE — MISCELLANEOUS
Message   Recorded as Task   Date: 05/04/2017 09:13 AM, Created By: Ness Lawson   Task Name: Medical Complaint Callback   Assigned To: Ree Orozco   Regarding Patient: Dereck Verde, Status: Active   Comment:    Ree Orozco - 04 May 2017 9:13 AM     TASK CREATED  Caller: Self; [phone] (Home)  Still having breakthrough pain while on meloxicam   Said you discussed giving her a muscle relaxant  She would like to try this  [facility] Meño Lares - 27 May 2017 12:40 PM     TASK REPLIED TO: Previously Assigned To Meño Coffey  I sent to use only at night   Ree Orozco - 04 May 2017 1:27 PM     TASK EDITED  lmovm for pt        Active Problems    1  Acid reflux disease (530 81) (K21 9)   2  Bronchitis, asthmatic (493 90) (J45 909)   3  Carpal tunnel syndrome, unspecified laterality (354 0) (G56 00)   4  Colon polyp (211 3) (K63 5)   5  Depression (311) (F32 9)   6  Disc degeneration, lumbar (722 52) (M51 36)   7  Herpes labialis (054 9) (B00 1)   8  History of urinary tract infection (V13 02) (Z87 440)   9  Hypercholesterolemia (272 0) (E78 00)   10  Hyperglycemia (790 29) (R73 9)   11  Low back pain (724 2) (M54 5)   12  Migraine headache (346 90) (G43 909)   13  Osteoarthritis (715 90) (M19 90)   14  Pap smear abnormality of cervix with ASCUS favoring benign (795 01) (R87 610)   15  Symptomatic menopausal or female climacteric states (627 2) (N95 1)    Current Meds   1  [facility] Ibuprofen TABS; Therapy: (Recorded:14Apr2014) to Recorded   2  Cyclobenzaprine HCl - 10 MG Oral Tablet; TAKE 1 TABLET AT BEDTIME for muscle   spasm; Therapy: 66FDA6864 to (Evaluate:86Mwl0111)  Requested for: 20JBF9741; Last   MW:91XJB7461 Ordered   3  LORazepam 0 5 MG Oral Tablet; TAKE 1/2 to 1 pill every 4-6 hrs as needed  ANXIETY; Last Rx:01Apr2016 Ordered   4  Meloxicam 15 MG Oral Tablet; TAKE 1 TABLET Daily PRN knee pain;    Therapy: 70VXB8815 to (Evaluate:22Hbn2854)  Requested for: 59CTT0355; Last   PO:45SRE4531 Ordered   5  Montelukast Sodium 10 MG Oral Tablet (Singulair); take 1 tablet by mouth daily; Therapy: 18JLD8732 to (03 17 74 30 53)  Requested for: 71TYQ7703; Last   Rx:07Mar2017 Ordered   6  Omeprazole 20 MG Oral Capsule Delayed Release; TAKE 1 CAPSULE DAILY as   needed for stomach acid; Therapy: 96WBL3707 to (0731 40 44 23)  Requested for: 02QPA9693; Last   QE:87VEP0952 Ordered   7  Pulmicort Flexhaler 180 MCG/ACT Inhalation Aerosol Powder Breath Activated; Use 1    puff Twice daily as directed -   if increased wheezing, increase to 2   puffs twice a day; Therapy: 17QZU1639 to (Evaluate:20Kgm6472)  Requested for: 75WKB0564; Last   Rx:64Xvh8988 Ordered   8  Sertraline HCl - 100 MG Oral Tablet (Zoloft); take 1 tablet by mouth daily; Therapy: 42Wwz7803 to (Evaluate:53Vdk0922)  Requested for: 32Maj8859; Last   Rx:27Ynx6107 Ordered   9  ValACYclovir HCl - 1 GM Oral Tablet; 2 BID x 1 day for cold sores; Therapy: 65RXS3948 to (Last Rx:09Mar2017)  Requested for: 11ONX3897 Ordered    Allergies    1  Effexor TABS   2  Prempro TABS   3   Wellbutrin TABS    Signatures   Electronically signed by : Sharona Stanley, ; May  4 2017  1:28PM EST                       (Author)

## 2018-01-13 VITALS
HEART RATE: 73 BPM | BODY MASS INDEX: 36.64 KG/M2 | DIASTOLIC BLOOD PRESSURE: 84 MMHG | SYSTOLIC BLOOD PRESSURE: 131 MMHG | WEIGHT: 228 LBS | HEIGHT: 66 IN

## 2018-01-13 VITALS
HEART RATE: 81 BPM | WEIGHT: 228.25 LBS | HEIGHT: 66 IN | BODY MASS INDEX: 36.68 KG/M2 | DIASTOLIC BLOOD PRESSURE: 83 MMHG | SYSTOLIC BLOOD PRESSURE: 128 MMHG

## 2018-01-13 VITALS
WEIGHT: 229 LBS | TEMPERATURE: 97.2 F | DIASTOLIC BLOOD PRESSURE: 88 MMHG | BODY MASS INDEX: 36.8 KG/M2 | HEART RATE: 72 BPM | HEIGHT: 66 IN | SYSTOLIC BLOOD PRESSURE: 152 MMHG

## 2018-01-13 NOTE — CONSULTS
Therapy  Rehabilitation Services Referral:   Patient Status: routine  Diagnosis: Right rotator cuff tear  Rehabilitation Services: evaluate and treat patient as needed and initiate a home exercise program    Frequency: 1-3 times per week, for 8 weeks  Please send progress report  I hereby certify that the services indicated above are medically necessary        Future Appointments    Signatures   Electronically signed by : Brianna Grace MD; Nov 7 2017  4:31PM EST                       (Author)    Electronically signed by : MILTON Castrejon ; Nov 7 2017  7:09PM EST                       (Author)

## 2018-01-13 NOTE — MISCELLANEOUS
Message   Recorded as Task   Date: 04/11/2016 08:58 AM, Created By: Angelique Corona   Task Name: Follow Up   [facility]   Regarding Patient: Tawnya Bailon, Status: Active   CommentLudie Ore - 11 Apr 2016 8:58 AM     TASK CREATED  Caller: Ana Amaro  Routine post colonoscopy call placed to patient  Procedure date 04/08/2016  Patient answered and had no questions or concerns  Pathology pending  Eneida Cassidy - 14 Apr 2016 10:43 AM     TASK EDITED  Message left for patient to call the office for pathology results  Three polyps removed and all hyperplastic  Eneida Cassidy - 14 Apr 2016 3:24 PM     TASK EDITED  Patient called the office and informed of the pathology findings  I stressed the importance of follow up in 3 years  She verbalized understanding  Active Problems    1  Acid reflux disease (530 81) (K21 9)   2  Back pain (724 5) (M54 9)   3  Breast pain (611 71) (N64 4)   4  Bronchitis, asthmatic (493 90) (J45 909)   5  Carpal tunnel syndrome, unspecified laterality (354 0) (G56 00)   6  Colon cancer screening (V76 51) (Z12 11)   7  Depression (311) (F32 9)   8  Disc degeneration, lumbar (722 52) (M51 36)   9  Encounter for gynecological examination without abnormal finding (V72 31) (Z01 419)   10  Encounter for screening mammogram for malignant neoplasm of breast (V76 12)    (Z12 31)   11  Heavy Bleeding Between Periods (Metrorrhagia) (626 6)   12  History of urinary tract infection (V13 02) (Z87 440)   13  Hypercholesterolemia (272 0) (E78 0)   14  Hyperglycemia (790 29) (R73 9)   15  Lipoma (214 9) (D17 9)   16  Migraine headache (346 90) (G43 909)   17  Osteoarthritis (715 90) (M19 90)   18  Symptomatic menopausal or female climacteric states (627 2) (N95 1)   19  Visit for screening mammogram (V76 12) (Z12 31)    Current Meds   1   Albuterol Sulfate (2 5 MG/3ML) 0 083% Inhalation Nebulization Solution; USE 1 UNIT   DOSE EVERY 4-6 HOURS AS NEEDED FOR WHEEZING ; Therapy: 11TKX1161 to (Evaluate:06Jun2015)  Requested for: 737 219 628; Last   Rx:07Apr2015 Ordered   2  [facility] Ibuprofen TABS; Therapy: (Recorded:14Apr2014) to Recorded   3  LORazepam 0 5 MG Oral Tablet; TAKE 1/2 to 1 pill every 4-6 hrs as needed  ANXIETY; Last Rx:01Apr2016 Ordered   4  Montelukast Sodium 10 MG Oral Tablet (Singulair); TAKE 1 TABLET DAILY; Therapy: 51VPZ1611 to (Elsy Carrera)  Requested for: 03XVE5794; Last   Rx:17Vik1193 Ordered   5  Omeprazole 20 MG Oral Capsule Delayed Release; take 1 capsule daily; Therapy: 06EFC9221 to (Hermann Lyon)  Requested for: 74BFQ8694; Last   Rx:26Iaw8945 Ordered   6  ProAir  (90 Base) MCG/ACT Inhalation Aerosol Solution; INHALE 1 TO 2 PUFFS   EVERY 4 TO 6 HOURS AS NEEDED; Therapy: (Recorded:07Apr2015) to Recorded   7  Pulmicort Flexhaler 180 MCG/ACT Inhalation Aerosol Powder Breath Activated; Use 1    puff Twice daily as directed -   if increased wheezing, increase to 2   puffs twice a day; Therapy: 17UIC7978 to (Evaluate:13Apr2016)  Requested for: 88YCK7182; Last   Rx:16Oct2015 Ordered   8  Sertraline HCl - 100 MG Oral Tablet (Zoloft); take 1 tablet by mouth daily; Therapy: 15Apr2014 to (Fuad Laguna)  Requested for: 02YZC8565; Last   Rx:09Nov2015 Ordered    Allergies    1  Effexor TABS   2  Prempro TABS   3   Wellbutrin TABS    Signatures   Electronically signed by : Suzanne Gomes, ; Apr 14 2016  3:24PM EST                       (Author)

## 2018-01-14 VITALS
DIASTOLIC BLOOD PRESSURE: 78 MMHG | SYSTOLIC BLOOD PRESSURE: 116 MMHG | HEIGHT: 66 IN | BODY MASS INDEX: 35.12 KG/M2 | WEIGHT: 218.5 LBS

## 2018-01-14 VITALS
WEIGHT: 222.38 LBS | SYSTOLIC BLOOD PRESSURE: 144 MMHG | BODY MASS INDEX: 35.74 KG/M2 | HEART RATE: 76 BPM | DIASTOLIC BLOOD PRESSURE: 76 MMHG | HEIGHT: 66 IN

## 2018-01-15 NOTE — MISCELLANEOUS
Message  Mailed colono letter to patients home address  Tasked PCPs office  {Updated pre-visit planning to reflect 3 year follow up  }      Active Problems    1  Acid reflux disease (530 81) (K21 9)   2  Back pain (724 5) (M54 9)   3  Breast pain (611 71) (N64 4)   4  Bronchitis, asthmatic (493 90) (J45 909)   5  Carpal tunnel syndrome, unspecified laterality (354 0) (G56 00)   6  Colon cancer screening (V76 51) (Z12 11)   7  Depression (311) (F32 9)   8  Disc degeneration, lumbar (722 52) (M51 36)   9  Encounter for gynecological examination without abnormal finding (V72 31) (Z01 419)   10  Encounter for screening mammogram for malignant neoplasm of breast (V76 12)    (Z12 31)   11  Heavy Bleeding Between Periods (Metrorrhagia) (626 6)   12  History of urinary tract infection (V13 02) (Z87 440)   13  Hypercholesterolemia (272 0) (E78 0)   14  Hyperglycemia (790 29) (R73 9)   15  Lipoma (214 9) (D17 9)   16  Migraine headache (346 90) (G43 909)   17  Osteoarthritis (715 90) (M19 90)   18  Symptomatic menopausal or female climacteric states (627 2) (N95 1)   19  Visit for screening mammogram (V76 12) (Z12 31)    Current Meds   1  Albuterol Sulfate (2 5 MG/3ML) 0 083% Inhalation Nebulization Solution; USE 1 UNIT   DOSE EVERY 4-6 HOURS AS NEEDED FOR WHEEZING ; Therapy: 82HHE7326 to (Evaluate:06Jun2015)  Requested for: 737 219 628; Last   Rx:07Apr2015 Ordered   2  [facility] Ibuprofen TABS; Therapy: (Recorded:14Apr2014) to Recorded   3  LORazepam 0 5 MG Oral Tablet; TAKE 1/2 to 1 pill every 4-6 hrs as needed  ANXIETY; Last Rx:01Apr2016 Ordered   4  Montelukast Sodium 10 MG Oral Tablet (Singulair); TAKE 1 TABLET DAILY; Therapy: 31PMJ0002 to (Wood Ban)  Requested for: 96ZHU6222; Last   Rx:20Lyq0897 Ordered   5  Omeprazole 20 MG Oral Capsule Delayed Release; take 1 capsule daily; Therapy: 17NYQ6645 to (Lisle Deloris)  Requested for: 09AKW5846; Last   Rx:74Thm2239 Ordered   6   ProAir  (90 Base) MCG/ACT Inhalation Aerosol Solution; INHALE 1 TO 2 PUFFS   EVERY 4 TO 6 HOURS AS NEEDED; Therapy: (Recorded:07Apr2015) to Recorded   7  Pulmicort Flexhaler 180 MCG/ACT Inhalation Aerosol Powder Breath Activated; Use 1    puff Twice daily as directed -   if increased wheezing, increase to 2   puffs twice a day; Therapy: 59DJJ4503 to (Evaluate:13Apr2016)  Requested for: 60OOC4997; Last   Rx:16Oct2015 Ordered   8  Sertraline HCl - 100 MG Oral Tablet (Zoloft); take 1 tablet by mouth daily; Therapy: 15Apr2014 to (Will Andrade)  Requested for: 30SXF3426; Last   Rx:09Nov2015 Ordered    Allergies    1  Effexor TABS   2  Prempro TABS   3  Wellbutrin TABS    Plan  Colon cancer screening    · COLONOSCOPY ; every 10 years; Last 08Apr2016; Status:Discontinued   · COLONOSCOPY ; every 3 years;  Last 08Apr2016; Next 08Apr2019; Status:Active    Signatures   Electronically signed by : Gunnar Sen, ; Apr 20 2016  4:01PM EST                       (Author)

## 2018-01-16 ENCOUNTER — GENERIC CONVERSION - ENCOUNTER (OUTPATIENT)
Dept: OBGYN CLINIC | Facility: OTHER | Age: 53
End: 2018-01-16

## 2018-01-16 NOTE — PROGRESS NOTES
Assessment    1  Encounter for preventive health examination (V70 0) (Z00 00)   2  Flu vaccine need (V04 81) (Z23)    Plan  Flu vaccine need    · Fluzone Quadrivalent 0 5 ML Intramuscular Suspension Prefilled Syringe  Right rotator cuff tendinitis    · *1 - [facility]  *  Status: Active  Requested for:  [id number]  Care Summary provided  : Yes    Discussion/Summary  health maintenance visit cervical cancer screening is current sees Gyn Breast cancer screening: mammogram is current  Colorectal cancer screening: colorectal cancer screening is current  The had Tdap 2017  She was advised to be evaluated by an optometrist and a dentist      She has ongoing right shoulder pain w good ROM - will set up w Ortho - also has CTS  Watch Nsaid use- BP upper normal  BW back in May was fine  Re left ear- observe tinnitus - consider nasal steroid if increased congestion  Off Pulmicort w/out resp issues- Singulair works  Do Flu shot  Use PPI/ SSRI as is - doing well w/ those  See Gyn routinely  Recheck BP/ re-eval 6 mo - try to walk/ watch healthy diet  Chief Complaint  c/o Right shoulder pain/ left ear discomfort/ check up  Flu shot given today      History of Present Illness  HPI: has ongoing right shoulder pain ashtyn at night - overuses arms/ hands w cleaning work - no neck pain Does have b/l CTS she is living with  Uses Ibuprofen 400 at least BID  Prev no benefit Meloxicam     3 weeks humming left ear, occ closed- mild congestion    Otherwise doing ok uses Sertraline rare Loraz use  GERD stable w using PPI      Review of Systems    Constitutional: as noted in HPI, no fever, no recent weight gain, no chills and no recent weight loss  Eyes: no eyesight problems  ENT: as noted in HPI  Cardiovascular: No complaints of slow heart rate, no fast heart rate, no chest pain, no palpitations, no leg claudication, no lower extremity edema     Respiratory: No complaints of shortness of breath, no wheezing, no cough, no SOB on exertion, no orthopnea, no PND  Gastrointestinal: no abdominal pain, no nausea, no vomiting and no blood in stools  Genitourinary: no dysuria  Neurological: no headache, no confusion, no dizziness, no convulsions and no fainting  Psychiatric: stable, but no sleep disturbances  Hematologic/Lymphatic: No complaints of swollen glands, no swollen glands in the neck, does not bleed easily, does not bruise easily  Active Problems    1  Acid reflux disease (530 81) (K21 9)   2  History of Bronchitis, asthmatic (493 90) (J45 909)   3  Carpal tunnel syndrome, unspecified laterality (354 0) (G56 00)   4  Colon polyp (211 3) (K63 5)   5  Depression (311) (F32 9)   6  Disc degeneration, lumbar (722 52) (M51 36)   7  Herpes labialis (054 9) (B00 1)   8  History of urinary tract infection (V13 02) (Z87 440)   9  Hypercholesterolemia (272 0) (E78 00)   10  Low back pain (724 2) (M54 5)   11  Migraine headache (346 90) (G43 909)   12  Osteoarthritis (715 90) (M19 90)   13  Pap smear abnormality of cervix with ASCUS favoring benign (795 01) (R87 610)   14  Right rotator cuff tendinitis (726 10) (M75 81)   15   Symptomatic menopausal or female climacteric states (627 2) (N95 1)    Past Medical History    · History of Age At First Period 13 Years Old (Menarche)   · History of Anxiety disorder (300 00) (F41 9)   · History of Arthritis (V13 4)   · History of Asthma (493 90) (J45 909)   · History of Bronchitis, asthmatic (493 90) (J45 909)   · History of Depression (311) (F32 9)   · History of Diverticulosis (562 10) (K57 90)   · History of breast lump (V13 89) (P67 083)   · History of heartburn (V12 79) (S66 272)   · History of hemorrhoids (V13 89) (Z87 19)   · History of hidradenitis suppurativa (V13 3) (Z87 2)   · History of hyperglycemia (V12 29) (Z86 39)   · History of lipoma (V13 89) (Z86 018)   · History of migraine (V12 49) (Z86 69)   · History of urinary incontinence (V13 09) (Z87 898)   · History of urinary tract infection (V13 02) (Z87 440)   · History of Menopause (627 2) (Z78 0)   · History of Miscarriage (634 90) (O03 9)   · History of Postpartum Depression (648 40)   · History of Sprain of left ankle, unspecified ligament, initial encounter (845 00) (Y42 799G)   · History of Summary Of Previous Pregnancies  4  (Total No )   · History of  (spontaneous vaginal delivery) (650) (O80)   · History of Urinary incontinence (788 30) (R32)   · History of Vaginal candidiasis (112 1) (B37 3)   · History of Varicose Veins Of Lower Extremities (454 9)    Surgical History    · History of Appendectomy   · History of Arthroscopy Knee Left   · History of Biopsy Breast Open   · History of Biopsy Breast Percutaneous Needle Core   · History of Colonoscopy   · History of Removal Of Lesion   · History of Surgical Treatment Of Missed    · History of Tubal Ligation    Family History  Mother    · Family history of Benign Essential Hypertension   · Family history of Diabetes Mellitus (V18 0)   · Family history of cerebrovascular accident (CVA) (V17 1) (Z82 3)   · Family history of Osteoporosis   · Family history of Stomach cancer  Father    · Family history of Anxiety   · Family history of dementia (V17 2) (Z81 8)  Sister    · Family history of Anxiety  Maternal Grandmother    · Family history of Benign Essential Hypertension   · Family history of Diabetes Mellitus (V18 0)   · Family history of Heart disease  Uncle    · Family history of Kidney disease  Maternal Cousin    · Family history of Breast cancer  Paternal Cousin    · Family history of Breast cancer    Social History    · Alcohol Use (History)   · CONSUMED RARELY   · Being A Social Drinker   · Daily Coffee Consumption (1  Cups/Day)   · Drinks wine (V49 89) (Z78 9)   · Denied: History of Drug Use   · Former smoker (J18 53) (J97 622)   · QUIT    · Recreational Activities Walking   · Sexual Activity Denied    Current Meds   1  [facility] Ibuprofen TABS; Therapy: (Recorded:77Gxr5732) to Recorded   2  LORazepam 0 5 MG Oral Tablet; TAKE 1/2 to 1 pill every 4-6 hrs as needed  ANXIETY; Last Rx:11Aug2017 Ordered   3  Montelukast Sodium 10 MG Oral Tablet; take 1 tablet by mouth daily; Therapy: 09UIL1332 to (05 12 73 93 30)  Requested for: 85DCX2534; Last   Rx:07Mar2017 Ordered   4  Omeprazole 20 MG Oral Capsule Delayed Release; TAKE 1 CAPSULE DAILY as   needed for stomach acid; Therapy: 86TPU6994 to (Kayleen Plane)  Requested for: 84PSM9189; Last   CZ:68DAJ3362 Ordered   5  Pulmicort Flexhaler 180 MCG/ACT Inhalation Aerosol Powder Breath Activated; Use 1    puff Twice daily as directed -   if increased wheezing, increase to 2   puffs twice a day; Therapy: 64KWE2197 to (Evaluate:99Lrz4703)  Requested for: 09BYQ2364; Last   Rx:79Ofa5746 Ordered   6  Sertraline HCl - 100 MG Oral Tablet; take 1 tablet by mouth daily; Therapy: 26Wis3629 to (Evaluate:33Nzb5654)  Requested for: 81Bog4346; Last   Rx:02Qhn0638 Ordered   7  ValACYclovir HCl - 1 GM Oral Tablet; 2 BID x 1 day for cold sores; Therapy: 95HSG9701 to (Last Rx:11Aug2017)  Requested for: 11Aug2017 Ordered    Allergies    1  Effexor TABS   2  Prempro TABS   3  Wellbutrin TABS    Vitals   Recorded: 67PPK7218 08:11AM Recorded: 03PGJ0451 07:51AM   Heart Rate  64   Respiration  18   Systolic 594 478   Diastolic 88 88   Height  5 ft 6 3 in   Weight  230 lb    BMI Calculated  36 79   BSA Calculated  2 13     Physical Exam    Constitutional   General appearance: No acute distress, well appearing and well nourished  overweight  Head and Face   Head and face: Normal     Eyes   Conjunctiva and lids: No swelling, erythema or discharge  Ears, Nose, Mouth, and Throat   Otoscopic examination: Tympanic membranes translucent with normal light reflex  Canals patent without erythema  Neck   Neck: Supple, symmetric, trachea midline, no masses  Thyroid: Normal, no thyromegaly  Pulmonary   Auscultation of lungs: Clear to auscultation  Cardiovascular   Auscultation of heart: Normal rate and rhythm, normal S1 and S2, no murmurs  Carotid pulses: 2+ bilaterally  no edema  Lymphatic   Palpation of lymph nodes in neck: No lymphadenopathy  Musculoskeletal   Gait and station: Normal   mil mdecrease ROM right shoulder sl tender outer upper arm CSpine njeg  Neurologic   Cortical function: Normal mental status  Coordination: Normal finger to nose and heel to shin  Psychiatric   Judgment and insight: Normal     Orientation to person, place, and time: Normal     Recent and remote memory: Intact  Mood and affect: Normal        Health Management  History of Colon cancer screening   COLONOSCOPY; every 3 years; Last 08Apr2016; Next Due: 08Apr2019;  Active    Signatures   Electronically signed by : Loli Lacy DO; Sep 25 2017  9:26AM EST                       (Author)

## 2018-01-16 NOTE — MISCELLANEOUS
Message  I spoke with patient about x-ray results  There is no acute fracture but evidence of an old injury  She is feeling a little bit better wearing an ankle brace that she got from the surgical supply which is similar to the Aircast   She has been stressed and developed a large cold sore and requests a prescription for Valtrex which I will phone in for her        Signatures   Electronically signed by : Lizet Diggs MD; Mar  9 2017  5:07PM EST                       (Author)

## 2018-01-17 DIAGNOSIS — Z12.31 ENCOUNTER FOR SCREENING MAMMOGRAM FOR MALIGNANT NEOPLASM OF BREAST: ICD-10-CM

## 2018-01-17 DIAGNOSIS — R92.2 INCONCLUSIVE MAMMOGRAM: ICD-10-CM

## 2018-01-22 VITALS
BODY MASS INDEX: 36.96 KG/M2 | HEIGHT: 66 IN | DIASTOLIC BLOOD PRESSURE: 88 MMHG | HEART RATE: 64 BPM | SYSTOLIC BLOOD PRESSURE: 134 MMHG | RESPIRATION RATE: 18 BRPM | WEIGHT: 230 LBS

## 2018-01-23 VITALS
HEART RATE: 85 BPM | WEIGHT: 230 LBS | DIASTOLIC BLOOD PRESSURE: 85 MMHG | BODY MASS INDEX: 36.79 KG/M2 | SYSTOLIC BLOOD PRESSURE: 130 MMHG

## 2018-01-24 VITALS
WEIGHT: 232 LBS | SYSTOLIC BLOOD PRESSURE: 130 MMHG | DIASTOLIC BLOOD PRESSURE: 70 MMHG | HEIGHT: 66 IN | BODY MASS INDEX: 37.28 KG/M2

## 2018-01-26 ENCOUNTER — EVALUATION (OUTPATIENT)
Dept: PHYSICAL THERAPY | Facility: CLINIC | Age: 53
End: 2018-01-26
Payer: COMMERCIAL

## 2018-01-26 DIAGNOSIS — M75.121 COMPLETE TEAR OF RIGHT ROTATOR CUFF: ICD-10-CM

## 2018-01-26 PROCEDURE — G8990 OTHER PT/OT CURRENT STATUS: HCPCS | Performed by: PHYSICAL THERAPIST

## 2018-01-26 PROCEDURE — 97162 PT EVAL MOD COMPLEX 30 MIN: CPT | Performed by: PHYSICAL THERAPIST

## 2018-01-26 PROCEDURE — G8991 OTHER PT/OT GOAL STATUS: HCPCS | Performed by: PHYSICAL THERAPIST

## 2018-01-26 NOTE — LETTER
2018    Amy Shen PA-C  [address] [place] [address]  [address]    Patient: Taran Jones   YOB: 1965   Date of Visit: 2018       Dear Dr Andrew Rocha:    Please review the attached summary of Eliud Mcmullen's progress and our plan for continued therapy, and verify that you agree therapy should continue by signing the attached document and sending it back to our office  If you have any questions or concerns, please don't hesitate to call  Sincerely,        Sissy Barboza, PT          CC: No Recipients            PT Evaluation     Today's date: 2018  Patient name: Taran Jones  : 1965  MRN: [id number]  Referring provider: Chata Ellsworth PA-C  Dx:   Encounter Diagnosis   Name Primary?  Complete tear of right rotator cuff                   Assessment  Impairments: abnormal or restricted ROM, impaired physical strength and pain with function    Assessment details: Taran Jones is a 45 y o  female who presents to physical therapy with Complete tear of right rotator cuff  Pt had most recent fall 2 days ago and notes increased irritability throughout R upper extremity  Pt has difficulty with reaching, pushing a vacuum for her job as a , lifting heavy objects, and donning her winter coat  Taran Jones would benefit from formal physical therapy to address impairments as detailed, decrease pain, and restore maximal level of function for all home, work, and mobility tasks  Understanding of Dx/Px/POC: good   Prognosis: good    Goals  Short-term goals:  1  Pt will decrease pain by 1-2 points within 4 weeks  2  Pt will improve ROM by 5-10 degrees within 4 weeks  3  Pt will improve strength by 1/2 grade within 4 weeks  4  Pt will improve physical FS score by 10 points by discharge  Long-term goals  1  Pt will reach in all planes without scapular compensation and with pain <3/10 by discharge    2  Pt will report compliance and demonstrate understanding of home exercise program by discharge  3  Pt will push a vacuum for work with pain <3/10 by discharge  Plan  Patient would benefit from: PT eval and skilled PT  Planned modality interventions: cryotherapy and TENS  Planned therapy interventions: joint mobilization, manual therapy, activity modification, neuromuscular re-education, patient education, strengthening, stretching, therapeutic exercise, flexibility, functional ROM exercises, postural training and home exercise program  Frequency: 2x week  Duration in weeks: 4  Treatment plan discussed with: patient        Subjective Evaluation    History of Present Illness  Mechanism of injury: Pt reports 2 falls over the last 2 weeks, one after tripping over a low wall and one after tripping over vacuum cord  Pt landed on R side both times and continues to have ecchymosis over R upper arm  Pt notes increased pain since falls, denies numbness and tingling in upper extremities  Pt reports some popping and clicking in R shoulder  Pt has no follow up scheduled with MD at this time  Pt has been compliant with HEP  Pt applies ice as needed for pain relief  Pain  Current pain ratin  At best pain ratin  At worst pain ratin  Location: R anterior shoulder and biceps  Quality: dull ache, knife-like and burning  Relieving factors: ice, medications and rest  Aggravating factors: lifting    Social Support    Employment status: working ()  Hand dominance: right      Diagnostic Tests  MRI studies: abnormal (R rotator cuff tear)  Treatments  Previous treatment: physical therapy  Patient Goals  Patient goal: regain function to assist with work tasks        Objective     Postural Observations    Additional Postural Observation Details  [facility] [facility], R scap protracted and depressed    Observations     Additional Observation Details  Pt denies hitting her head with fall but states that she pulled muscles    Ecchymosis over R anterior upper arm, R elbow olecranon process    Palpation     Right Tenderness of the biceps, triceps and upper trapezius  Cervical/Thoracic Screen   Cervical range of motion within normal limits  Cervical range of motion within normal limits with the following exceptions: Pulling in R UT with L SB    Neurological Testing     Sensation     Shoulder   Left Shoulder   Intact: light touch    Right Shoulder   Intact: light touch    Active Range of Motion   Left Shoulder   Flexion: 172 degrees   Abduction: 174 degrees   External rotation BTH: T4   Internal rotation BTB: T10     Right Shoulder   Flexion: 157 degrees   Abduction: 135 degrees with pain  External rotation BTH: T1   Internal rotation BTB: L4 (pulling through biceps tendon)     Passive Range of Motion     Right Shoulder   Flexion: 178 degrees with pain  Abduction: 172 degrees   External rotation 90°:  84 degrees   Internal rotation 90°:  62 degrees     Strength/Myotome Testing     Left Shoulder     Planes of Motion   Flexion: 5   Abduction: 5   External rotation at 0°:  5   External rotation at 90°:  5   Internal rotation at 0°:  5   Internal rotation at 90°:  5     Right Shoulder     Planes of Motion   Flexion: 4- (minimal pain and weakness)   Abduction: 4-   External rotation at 0°:  4+ (pain over biceps long head)   External rotation at 90°:  4-   Internal rotation at 0°:  5   Internal rotation at 90°:  4     Left Elbow   Flexion: 5  Extension: 5  Forearm supination: 5  Forearm pronation: 5    Right Elbow   Flexion: 4  Extension: 4- (pain)  Forearm supination: 4+  Forearm pronation: 4+    Left Wrist/Hand   Wrist extension: 5  Wrist flexion: 5    Right Wrist/Hand   Wrist extension: 5  Wrist flexion: 5 (pain)    Tests     Right Shoulder   Positive painful arc  Negative drop arm and Yergason's         Precautions: none    Daily Treatment Diary     Manual  1/26/18            R shoulder PROM np            GH mobs PRN np                                                       Exercise Diary 1/26/18            UBE nv            pulleys nv            Tb shoulder extension nv            TB low rows nv            TB IR/ER nv            TB biceps curl nv            TB triceps press nv            Therabar- pronation/supination/twisting nv            Shoulder flexion painfree nv            Shoulder scaption painfree nv            PNF D1 and D2 nv            Ball circles at wall nv            UE wall slides nv                                                                                                           Modalities  1/26/18            CP 10 min                                                            Based upon review of the patient's progress and continued therapy plan, it is my medical opinion that Ryley Dempsey should continue physical therapy treatment at the Physical Therapy at [address] [place] [address] Drive:

## 2018-01-26 NOTE — PROGRESS NOTES
PT Evaluation     Today's date: 2018  Patient name: Von Nava  : 1965  MRN: [id number]  Referring provider: Shirin Purvis PA-C  Dx:   Encounter Diagnosis   Name Primary?  Complete tear of right rotator cuff                   Assessment  Impairments: abnormal or restricted ROM, impaired physical strength and pain with function    Assessment details: Von Nava is a 45 y o  female who presents to physical therapy with Complete tear of right rotator cuff  Pt had most recent fall 2 days ago and notes increased irritability throughout R upper extremity  Pt has difficulty with reaching, pushing a vacuum for her job as a , lifting heavy objects, and donning her winter coat  Von Nava would benefit from formal physical therapy to address impairments as detailed, decrease pain, and restore maximal level of function for all home, work, and mobility tasks  Understanding of Dx/Px/POC: good   Prognosis: good    Goals  Short-term goals:  1  Pt will decrease pain by 1-2 points within 4 weeks  2  Pt will improve ROM by 5-10 degrees within 4 weeks  3  Pt will improve strength by 1/2 grade within 4 weeks  4  Pt will improve physical FS score by 10 points by discharge  Long-term goals  1  Pt will reach in all planes without scapular compensation and with pain <3/10 by discharge  2  Pt will report compliance and demonstrate understanding of home exercise program by discharge  3  Pt will push a vacuum for work with pain <3/10 by discharge        Plan  Patient would benefit from: PT eval and skilled PT  Planned modality interventions: cryotherapy and TENS  Planned therapy interventions: joint mobilization, manual therapy, activity modification, neuromuscular re-education, patient education, strengthening, stretching, therapeutic exercise, flexibility, functional ROM exercises, postural training and home exercise program  Frequency: 2x week  Duration in weeks: 4  Treatment plan discussed with: patient        Subjective Evaluation    History of Present Illness  Mechanism of injury: Pt reports 2 falls over the last 2 weeks, one after tripping over a low wall and one after tripping over vacuum cord  Pt landed on R side both times and continues to have ecchymosis over R upper arm  Pt notes increased pain since falls, denies numbness and tingling in upper extremities  Pt reports some popping and clicking in R shoulder  Pt has no follow up scheduled with MD at this time  Pt has been compliant with HEP  Pt applies ice as needed for pain relief  Pain  Current pain ratin  At best pain ratin  At worst pain ratin  Location: R anterior shoulder and biceps  Quality: dull ache, knife-like and burning  Relieving factors: ice, medications and rest  Aggravating factors: lifting    Social Support    Employment status: working ()  Hand dominance: right      Diagnostic Tests  MRI studies: abnormal (R rotator cuff tear)  Treatments  Previous treatment: physical therapy  Patient Goals  Patient goal: regain function to assist with work tasks        Objective     Postural Observations    Additional Postural Observation Details  [address] [facility], R scap protracted and depressed    Observations     Additional Observation Details  Pt denies hitting her head with fall but states that she pulled muscles  Ecchymosis over R anterior upper arm, R elbow olecranon process    Palpation     Right Tenderness of the biceps, triceps and upper trapezius       Cervical/Thoracic Screen   Cervical range of motion within normal limits  Cervical range of motion within normal limits with the following exceptions: Pulling in R UT with L SB    Neurological Testing     Sensation     Shoulder   Left Shoulder   Intact: light touch    Right Shoulder   Intact: light touch    Active Range of Motion   Left Shoulder   Flexion: 172 degrees   Abduction: 174 degrees   External rotation BTH: T4   Internal rotation BTB: T10     Right Shoulder Flexion: 157 degrees   Abduction: 135 degrees with pain  External rotation BTH: T1   Internal rotation BTB: L4 (pulling through biceps tendon)     Passive Range of Motion     Right Shoulder   Flexion: 178 degrees with pain  Abduction: 172 degrees   External rotation 90°: 84 degrees   Internal rotation 90°: 62 degrees     Strength/Myotome Testing     Left Shoulder     Planes of Motion   Flexion: 5   Abduction: 5   External rotation at 0°: 5   External rotation at 90°: 5   Internal rotation at 0°: 5   Internal rotation at 90°: 5     Right Shoulder     Planes of Motion   Flexion: 4- (minimal pain and weakness)   Abduction: 4-   External rotation at 0°: 4+ (pain over biceps long head)   External rotation at 90°: 4-   Internal rotation at 0°: 5   Internal rotation at 90°: 4     Left Elbow   Flexion: 5  Extension: 5  Forearm supination: 5  Forearm pronation: 5    Right Elbow   Flexion: 4  Extension: 4- (pain)  Forearm supination: 4+  Forearm pronation: 4+    Left Wrist/Hand   Wrist extension: 5  Wrist flexion: 5    Right Wrist/Hand   Wrist extension: 5  Wrist flexion: 5 (pain)    Tests     Right Shoulder   Positive painful arc  Negative drop arm and Yergason's         Precautions: none    Daily Treatment Diary     Manual  1/26/18            R shoulder PROM np            GH mobs PRN np                                                       Exercise Diary  1/26/18            UBE nv            pulleys nv            Tb shoulder extension nv            TB low rows nv            TB IR/ER nv            TB biceps curl nv            TB triceps press nv            Therabar- pronation/supination/twisting nv            Shoulder flexion painfree nv            Shoulder scaption painfree nv            PNF D1 and D2 nv            Ball circles at wall nv            UE wall slides nv                                                                                                           Modalities  1/26/18            CP 10 min

## 2018-01-30 ENCOUNTER — OFFICE VISIT (OUTPATIENT)
Dept: PHYSICAL THERAPY | Facility: CLINIC | Age: 53
End: 2018-01-30
Payer: COMMERCIAL

## 2018-01-30 DIAGNOSIS — M75.121 COMPLETE TEAR OF RIGHT ROTATOR CUFF: Primary | ICD-10-CM

## 2018-01-30 PROCEDURE — 97140 MANUAL THERAPY 1/> REGIONS: CPT

## 2018-01-30 PROCEDURE — 97150 GROUP THERAPEUTIC PROCEDURES: CPT

## 2018-01-30 PROCEDURE — 97110 THERAPEUTIC EXERCISES: CPT

## 2018-01-30 NOTE — PROGRESS NOTES
PT Evaluation     Today's date:   Patient name: Tano Servin  : 1965  MRN: [id number]  Referring provider: Morales Granger PA-C  Dx:   Encounter Diagnosis   Name Primary?  Complete tear of right rotator cuff Yes                  Assessment  Impairments: abnormal or restricted ROM, impaired physical strength and pain with function    Assessment details: Tano Servin is a 45 y o  female who presents to physical therapy with Complete tear of right rotator cuff  Pt had most recent fall 2 days ago and notes increased irritability throughout R upper extremity  Pt has difficulty with reaching, pushing a vacuum for her job as a , lifting heavy objects, and donning her winter coat  Tano Servin would benefit from formal physical therapy to address impairments as detailed, decrease pain, and restore maximal level of function for all home, work, and mobility tasks  Understanding of Dx/Px/POC: good   Prognosis: good    Goals  Short-term goals:  1  Pt will decrease pain by 1-2 points within 4 weeks  2  Pt will improve ROM by 5-10 degrees within 4 weeks  3  Pt will improve strength by 1/2 grade within 4 weeks  4  Pt will improve physical FS score by 10 points by discharge  Long-term goals  1  Pt will reach in all planes without scapular compensation and with pain <3/10 by discharge  2  Pt will report compliance and demonstrate understanding of home exercise program by discharge  3  Pt will push a vacuum for work with pain <3/10 by discharge        Plan  Patient would benefit from: PT eval and skilled PT  Planned modality interventions: cryotherapy and TENS  Planned therapy interventions: joint mobilization, manual therapy, activity modification, neuromuscular re-education, patient education, strengthening, stretching, therapeutic exercise, flexibility, functional ROM exercises, postural training and home exercise program  Frequency: 2x week  Duration in weeks: 4  Treatment plan discussed with: patient        Subjective Evaluation    History of Present Illness  Mechanism of injury: Pt reports 2 falls over the last 2 weeks, one after tripping over a low wall and one after tripping over vacuum cord  Pt landed on R side both times and continues to have ecchymosis over R upper arm  Pt notes increased pain since falls, denies numbness and tingling in upper extremities  Pt reports some popping and clicking in R shoulder  Pt has no follow up scheduled with MD at this time  Pt has been compliant with HEP  Pt applies ice as needed for pain relief  Pain  Current pain ratin  At best pain ratin  At worst pain ratin  Location: R anterior shoulder and biceps  Quality: dull ache, knife-like and burning  Relieving factors: ice, medications and rest  Aggravating factors: lifting    Social Support    Employment status: working ()  Hand dominance: right      Diagnostic Tests  MRI studies: abnormal (R rotator cuff tear)  Treatments  Previous treatment: physical therapy  Patient Goals  Patient goal: regain function to assist with work tasks        Objective     Postural Observations    Additional Postural Observation Details  [address] [facility], R scap protracted and depressed    Observations     Additional Observation Details  Pt denies hitting her head with fall but states that she pulled muscles  Ecchymosis over R anterior upper arm, R elbow olecranon process    Palpation     Right Tenderness of the biceps, triceps and upper trapezius       Cervical/Thoracic Screen   Cervical range of motion within normal limits  Cervical range of motion within normal limits with the following exceptions: Pulling in R UT with L SB    Neurological Testing     Sensation     Shoulder   Left Shoulder   Intact: light touch    Right Shoulder   Intact: light touch    Active Range of Motion   Left Shoulder   Flexion: 172 degrees   Abduction: 174 degrees   External rotation BTH: T4   Internal rotation BTB: T10     Right Shoulder   Flexion: 157 degrees   Abduction: 135 degrees with pain  External rotation BTH: T1   Internal rotation BTB: L4 (pulling through biceps tendon)     Passive Range of Motion     Right Shoulder   Flexion: 178 degrees with pain  Abduction: 172 degrees   External rotation 90°: 84 degrees   Internal rotation 90°: 62 degrees     Strength/Myotome Testing     Left Shoulder     Planes of Motion   Flexion: 5   Abduction: 5   External rotation at 0°: 5   External rotation at 90°: 5   Internal rotation at 0°: 5   Internal rotation at 90°: 5     Right Shoulder     Planes of Motion   Flexion: 4- (minimal pain and weakness)   Abduction: 4-   External rotation at 0°: 4+ (pain over biceps long head)   External rotation at 90°: 4-   Internal rotation at 0°: 5   Internal rotation at 90°: 4     Left Elbow   Flexion: 5  Extension: 5  Forearm supination: 5  Forearm pronation: 5    Right Elbow   Flexion: 4  Extension: 4- (pain)  Forearm supination: 4+  Forearm pronation: 4+    Left Wrist/Hand   Wrist extension: 5  Wrist flexion: 5    Right Wrist/Hand   Wrist extension: 5  Wrist flexion: 5 (pain)    Tests     Right Shoulder   Positive painful arc  Negative drop arm and Yergason's         Precautions: none    Daily Treatment Diary     Manual  1/26/18            R shoulder PROM np            GH mobs PRN np                                                       Exercise Diary  1/26/18            UBE nv            pulleys nv            Tb shoulder extension nv            TB low rows nv            TB IR/ER nv            TB biceps curl nv            TB triceps press nv            Therabar- pronation/supination/twisting nv            Shoulder flexion painfree nv            Shoulder scaption painfree nv            PNF D1 and D2 nv            Ball circles at wall nv            UE wall slides nv                                                                                                           Modalities  1/26/18            CP 10 min

## 2018-01-30 NOTE — PROGRESS NOTES
Daily Note     Today's date: 2018  Patient name: Lucrecia Romo  : 1965  MRN: [id number]  Referring provider: Dede Bruner PA-C  Dx:   Encounter Diagnosis   Name Primary?  Complete tear of right rotator cuff Yes              Subjective: Pt presents to PT reporting mild pain at rest grading the pain a 3/10  Pt  Reports + response to manual therapy stating "stretch feel good" while moving into flexion  Pt reports most difficulty with IR secondary to pain  Pt denies increased pain post PT session  Objective: See treatment diary below    Precautions: none    Daily Treatment Diary     Manual  18           R shoulder PROM np 12'           [facility] mobs PRN np np                                                      Exercise Diary  18           UBE: forward nv 4'           pulleys nv 5'           Tb shoulder extension nv grn 3" x 15           TB low rows nv grn 3" x 15           TB IR/ER nv org 3" x 15           TB biceps curl nv org 3" x 15           TB triceps press nv grn 3" x 15           Therabar- pronation/supination/twisting nv Red 1' ea           Shoulder flexion painfree nv X 15           Shoulder scaption painfree nv X 15           PNF D1 and D2 nv nv           Ball circles at wall: cw/ccw nv Blue x 15ea            UE wall slides nv 3" x10                                                                                                          Modalities  18           CP 10 min 15 mins                                           Assessment: Tolerated treatment well  Patient would benefit from continued PT      Plan: Continue per plan of care

## 2018-02-01 ENCOUNTER — OFFICE VISIT (OUTPATIENT)
Dept: PHYSICAL THERAPY | Facility: CLINIC | Age: 53
End: 2018-02-01
Payer: COMMERCIAL

## 2018-02-01 DIAGNOSIS — M75.121 COMPLETE TEAR OF RIGHT ROTATOR CUFF: Primary | ICD-10-CM

## 2018-02-01 PROCEDURE — 97110 THERAPEUTIC EXERCISES: CPT

## 2018-02-01 PROCEDURE — 97010 HOT OR COLD PACKS THERAPY: CPT

## 2018-02-01 PROCEDURE — 97140 MANUAL THERAPY 1/> REGIONS: CPT

## 2018-02-01 NOTE — PROGRESS NOTES
Daily Note     Today's date: 2018  Patient name: Jose Cota  : 1965  MRN: [id number]  Referring provider: Jign Mukherjee PA-C  Dx:   Encounter Diagnosis   Name Primary?  Complete tear of right rotator cuff Yes              Subjective: Pt presents to PT reporting mild pain at rest grading the pain a 1/10 and 4-5/10 with activity such as washing her hair  Pt  Reports increased pain t/o session today for unknown reasons  Pt was advised she may have more pain some days  Advised pt to apply CP t/o the day to help decrease any pain  Pt continues to report most difficulty with IR secondary to pain  Pt reporting 3-4/10 pain in anterior aspect of R shoulder and posterior proximal Ue  Objective: See treatment diary below    Precautions: none    Daily Treatment Diary     Manual  18          R shoulder PROM np 12' 10'          [facility] mobs PRN np np np                                                     Exercise Diary  18          UBE: forward nv 4' 4'          pulleys nv 5' 2'          Tb shoulder extension nv grn 3" x 15 grn 3" x 15          TB low rows nv grn 3" x 15 grn 3" x 15          TB IR/ER nv org 3" x 15 org 3" x 15          TB biceps curl nv org 3" x 15 org 3" x 15          TB triceps press nv grn 3" x 15 grn 3" x 15          Therabar- pronation/supination/twisting nv Red 1' ea Red 1' ea          Shoulder flexion painfree nv X 15 x15          Shoulder scaption painfree nv X 15 x15          PNF D1 and D2 nv nv nv          Ball circles at wall: cw/ccw nv Blue x 15ea  np/pn          UE wall slides nv 3" x10 3" x10                                                                                                         Modalities  18          CP 10 min 15 mins 15'                                          Assessment: Pt  Demonstrated increased difficulty with some TE today secondary to pain; therefore some TE held or modified    Patient would benefit from continued PT      Plan: Continue per plan of care

## 2018-02-06 ENCOUNTER — OFFICE VISIT (OUTPATIENT)
Dept: PHYSICAL THERAPY | Facility: CLINIC | Age: 53
End: 2018-02-06
Payer: COMMERCIAL

## 2018-02-06 DIAGNOSIS — M75.121 COMPLETE TEAR OF RIGHT ROTATOR CUFF: Primary | ICD-10-CM

## 2018-02-06 PROCEDURE — 97150 GROUP THERAPEUTIC PROCEDURES: CPT | Performed by: PHYSICAL THERAPIST

## 2018-02-06 PROCEDURE — 97110 THERAPEUTIC EXERCISES: CPT | Performed by: PHYSICAL THERAPIST

## 2018-02-06 NOTE — PROGRESS NOTES
Daily Note     Today's date: 2018  Patient name: Minerva Forbes  : 1965  MRN: [id number]  Referring provider: Coty Beltran MD  Dx:   Encounter Diagnosis   Name Primary?  Complete tear of right rotator cuff Yes       Start Time: 1430  Stop Time: 1540  Total time in clinic (min): 70 minutes    Subjective: Pt comes to therapy reporting increased soreness across anterior right shoulder, attributing symptoms to cold weather and impending snow  States she typically comes to therapy before going to work, but today comes to therapy after work  Therefore, notes this may also be reason for increased soreness  Objective: See treatment diary below      Assessment: Tolerated treatment well  Patient exhibited good technique with therapeutic exercises and would benefit from continued PT      Plan: Progress treatment as tolerated        Precautions: none     Daily Treatment Diary      Manual  18--18               R shoulder PROM np 12' 10'                 GH mobs PRN np np np                                                                                               Exercise Diary  18--18               UBE: forward nv 4' 4'  4'               pulleys nv 5' 2'  5'               Tb shoulder extension nv grn 3" x 15 grn 3" x 15  grn 3" 2x10               TB low rows nv grn 3" x 15 grn 3" x 15  grn 3" 2x10               TB IR/ER nv org 3" x 15 org 3" x 15  grn 3" 2x10               TB biceps curl nv org 3" x 15 org 3" x 15  grn 3" x 15               TB triceps press nv grn 3" x 15 grn 3" x 15  grn 3" 2x10               Therabar- pronation/supination/twisting nv Red 1' ea Red 1' ea  red 1' ea               Shoulder flexion painfree nv X 15 x15  x15               Shoulder scaption painfree nv X 15 x15  x15               PNF D1 and D2 nv nv nv  x10 ea               Ball circles at wall: cw/ccw nv Blue x 15ea  np/pn                 UE wall slides nv 3" x10 3" x10  3" x10                                                                                                                                                                                             Modalities  1/26/18 1/30/18 2/1/18 2/6/18               CP 10 min 15 mins 15' 15'

## 2018-02-08 ENCOUNTER — HOSPITAL ENCOUNTER (OUTPATIENT)
Dept: MAMMOGRAPHY | Facility: MEDICAL CENTER | Age: 53
Discharge: HOME | End: 2018-02-08
Payer: COMMERCIAL

## 2018-02-08 DIAGNOSIS — R92.2 INCONCLUSIVE MAMMOGRAM: ICD-10-CM

## 2018-02-08 DIAGNOSIS — Z12.31 ENCOUNTER FOR SCREENING MAMMOGRAM FOR MALIGNANT NEOPLASM OF BREAST: ICD-10-CM

## 2018-02-08 PROCEDURE — 77063 BREAST TOMOSYNTHESIS BI: CPT

## 2018-02-08 PROCEDURE — 77067 SCR MAMMO BI INCL CAD: CPT

## 2018-02-09 ENCOUNTER — OFFICE VISIT (OUTPATIENT)
Dept: PHYSICAL THERAPY | Facility: CLINIC | Age: 53
End: 2018-02-09
Payer: COMMERCIAL

## 2018-02-09 DIAGNOSIS — R92.8 ABNORMAL MAMMOGRAM OF RIGHT BREAST: Primary | ICD-10-CM

## 2018-02-09 DIAGNOSIS — M75.121 COMPLETE TEAR OF RIGHT ROTATOR CUFF: Primary | ICD-10-CM

## 2018-02-09 PROCEDURE — 97110 THERAPEUTIC EXERCISES: CPT

## 2018-02-09 PROCEDURE — 97140 MANUAL THERAPY 1/> REGIONS: CPT

## 2018-02-09 NOTE — PROGRESS NOTES
Daily Note     Today's date: 2018  Patient name: Catalino Perdomo  : 1965  MRN: [id number]  Referring provider: David Hamilton MD  Dx:   Encounter Diagnosis   Name Primary?  Complete tear of right rotator cuff Yes                  Subjective: Pt  Reports soreness in R shoulder post treatment  Objective: See treatment diary below    Precautions: none     Daily Treatment Diary      Manual  18-18             R shoulder PROM np 12' 10'    10'             GH mobs PRN np np np                                                                                               Exercise Diary  18-18             UBE: forward nv 4' 4'  4'  5'             pulleys nv 5' 2'  5'  5'             Tb shoulder extension nv grn 3" x 15 grn 3" x 15  grn 3" 2x10  grn 3" 2x10             TB low rows nv grn 3" x 15 grn 3" x 15  grn 3" 2x10  grn 3" 2x10             TB IR/ER nv org 3" x 15 org 3" x 15  grn 3" 2x10  grn 3" 2x10             TB biceps curl nv org 3" x 15 org 3" x 15  grn 3" x 15  grn 3" x 20             TB triceps press nv grn 3" x 15 grn 3" x 15  grn 3" 2x10  grn 3" 2x10             Therabar- pronation/supination/twisting nv Red 1' ea Red 1' ea  red 1' ea  red 1' ea             Shoulder flexion painfree nv X 15 x15  x15  x15             Shoulder scaption painfree nv X 15 x15  x15  x15             PNF D1 and D2 nv nv nv  x10 ea  x10ea             Ball circles at wall: cw/ccw nv Blue x 15ea  np/pn                 UE wall slides nv 3" x10 3" x10  3" x10  3" x10                                                                                                                                                                                           Modalities  18             CP 10 min 15 mins 15' 15'  15'                                                                       Assessment: Tolerated treatment well   Patient noted fatigue in R shoulder post exercises  Pt  Was able to perform TB exercises with no UT compensation observed  Patient would benefit from continued PT      Plan: Continue per plan of care

## 2018-02-13 ENCOUNTER — OFFICE VISIT (OUTPATIENT)
Dept: PHYSICAL THERAPY | Facility: CLINIC | Age: 53
End: 2018-02-13
Payer: COMMERCIAL

## 2018-02-13 DIAGNOSIS — M75.121 COMPLETE TEAR OF RIGHT ROTATOR CUFF: Primary | ICD-10-CM

## 2018-02-13 PROCEDURE — 97110 THERAPEUTIC EXERCISES: CPT | Performed by: PHYSICAL THERAPY ASSISTANT

## 2018-02-13 PROCEDURE — 97140 MANUAL THERAPY 1/> REGIONS: CPT | Performed by: PHYSICAL THERAPY ASSISTANT

## 2018-02-13 NOTE — PROGRESS NOTES
Daily Note     Today's date: 2018  Patient name: Zackery Nissen  : 1965  MRN: [id number]  Referring provider: Vilma Ahuja MD  Dx:   Encounter Diagnosis   Name Primary?  Complete tear of right rotator cuff Yes                  Subjective: Pt reports episode in bed this morning where she reached across to move her dog and experienced sharp pain in posterior shoulder extending down into triceps on R   Pt states pain lasted about 10' and then started to subside and now feels like muscle soreness    Pt reports she took ibuprofen and feels better the more she is moving her shoulder      Objective: See treatment diary below    Precautions: none     Daily Treatment Diary      Manual  18--18  18           R shoulder PROM np 12' 10'    10'  10'           GH mobs PRN np np np                                                                                               Exercise Diary  18 2-1-18  18           UBE: forward nv 4' 4'  4'  5'  5'           pulleys nv 5' 2'  5'  5'  5'           Tb shoulder extension nv grn 3" x 15 grn 3" x 15  grn 3" 2x10  grn 3" 2x10  gtb 3'  2x10             TB low rows nv grn 3" x 15 grn 3" x 15  grn 3" 2x10  grn 3" 2x10  grn 3"  2x10           TB IR/ER nv org 3" x 15 org 3" x 15  grn 3" 2x10  grn 3" 2x10  grn 3"  2x10           TB biceps curl nv org 3" x 15 org 3" x 15  grn 3" x 15  grn 3" x 20  grn 3"  x20           TB triceps press nv grn 3" x 15 grn 3" x 15  grn 3" 2x10  grn 3" 2x10  grn 3"  x20           Therabar- pronation/supination/twisting nv Red 1' ea Red 1' ea  red 1' ea  red 1' ea  red 1'  each           Shoulder flexion painfree nv X 15 x15  x15  x15  x15           Shoulder scaption painfree nv X 15 x15  x15  x15  x15           PNF D1 and D2 nv nv nv  x10 ea  x10ea  x10ea           Ball circles at wall: cw/ccw nv Blue x 15ea  np/pn                 UE wall slides nv 3" x10 3" x10  3" x10  3" x10  3"x15                                                                                                                                                                                         Modalities  1/26/18 1/30/18 2/1/18 2/6/18 2/9 2/13           CP 10 min 15 mins 15' 15'  15'  10'                                                                     Assessment: Tolerated treatment well  Patient noted fatigue in R shoulder post exercises  No increase in pain with activity  Pt  Was able to perform TB exercises with no UT compensation observed  Patient would benefit from continued PT      Plan: Continue per plan of care

## 2018-02-15 ENCOUNTER — EVALUATION (OUTPATIENT)
Dept: PHYSICAL THERAPY | Facility: CLINIC | Age: 53
End: 2018-02-15
Payer: COMMERCIAL

## 2018-02-15 DIAGNOSIS — M75.121 COMPLETE TEAR OF RIGHT ROTATOR CUFF: Primary | ICD-10-CM

## 2018-02-15 PROCEDURE — G8990 OTHER PT/OT CURRENT STATUS: HCPCS | Performed by: PHYSICAL THERAPIST

## 2018-02-15 PROCEDURE — 97140 MANUAL THERAPY 1/> REGIONS: CPT | Performed by: PHYSICAL THERAPIST

## 2018-02-15 PROCEDURE — G8991 OTHER PT/OT GOAL STATUS: HCPCS | Performed by: PHYSICAL THERAPIST

## 2018-02-15 PROCEDURE — 97110 THERAPEUTIC EXERCISES: CPT | Performed by: PHYSICAL THERAPIST

## 2018-02-15 NOTE — PROGRESS NOTES
PT Re-Evaluation     Today's date: 2/15/2018  Patient name: Mikayla Torres  : 1965  MRN: [id number]  Referring provider: Analia Ponce MD  Dx:   Encounter Diagnosis   Name Primary?  Complete tear of right rotator cuff Yes                  Assessment  Impairments: abnormal or restricted ROM, impaired physical strength and pain with function    Assessment details: Mikayla Torres is a 45 y o  female who presents to physical therapy with Complete tear of right rotator cuff  Pt continues to have difficulty with brushing and shampooing her hair  Pt has greater ease with reaching forward  Pt reports relief of stiffness following movement and stretching in the morning  Pt has improved ability to don her coat  Pt has difficulty with turning knobs, opening jars, and plugging in an outlet  Pt has some difficulty with turning her steering wheel to drive  Pt has greater ease with folding laundry  Pt has difficulty with lifting greater than 10 lbs  Pt feels that she has improved since starting therapy but continues to have deficits in reaching, lifting, and performing household and work tasks  Mikayla Torres would benefit from formal physical therapy to address impairments as detailed, decrease pain, and restore maximal level of function for all home, work, and mobility tasks  Understanding of Dx/Px/POC: good   Prognosis: good    Goals  Short-term goals:  1  Pt will decrease pain by 1-2 points within 4 weeks  -met  2  Pt will improve ROM by 5-10 degrees within 4 weeks  - partially met  3  Pt will improve strength by 1/2 grade within 4 weeks  - met  4  Pt will improve physical FS score by 10 points by discharge  - partially met  Long-term goals  1  Pt will reach in all planes without scapular compensation and with pain <3/10 by discharge  - partially met  2  Pt will report compliance and demonstrate understanding of home exercise program by discharge  - met  3   Pt will push a vacuum for work with pain <3/10 by discharge  - partially met      Plan  Patient would benefit from: skilled PT  Planned modality interventions: cryotherapy and TENS  Planned therapy interventions: joint mobilization, manual therapy, activity modification, neuromuscular re-education, patient education, strengthening, stretching, therapeutic exercise, flexibility, functional ROM exercises, postural training and home exercise program  Frequency: 2x week  Duration in weeks: 4  Treatment plan discussed with: patient        Subjective Evaluation    History of Present Illness  Mechanism of injury: Pt denies falls since starting therapy  Pt reports that ecchymosis has resolved  Pt reports most pain over anterior R shoulder  Pt reports less popping and clicking in R shoulder since starting therapy  Pt has no follow up scheduled with MD at this time  Pt has been compliant with HEP and therapy attendance  Pt applies ice as needed for pain relief  Pt feels about 35% improved since starting this episode of care  Pain  Current pain rating: 3  At best pain ratin  At worst pain ratin  Location: R anterior shoulder and biceps  Quality: dull ache and knife-like  Relieving factors: ice, medications and rest  Aggravating factors: lifting    Social Support    Employment status: working ()  Hand dominance: right      Diagnostic Tests  MRI studies: abnormal (R rotator cuff tear)  Treatments  Previous treatment: physical therapy  Patient Goals  Patient goal: regain function to assist with work tasks        Objective     Postural Observations    Additional Postural Observation Details  [address], R scap protracted and depressed; improved head posture, scapular posture unchanged    Observations     Additional Observation Details  Ecchymosis over R anterior upper arm, R elbow olecranon process- resolved at re-eval    Palpation     Right Tenderness of the pectoralis major, pectoralis minor and upper trapezius       Cervical/Thoracic Screen   Cervical range of motion within normal limits  Cervical range of motion within normal limits with the following exceptions: Pulling in R UT with L Sb; at re-eval pt has some muscle pulling with L rotation and L sidebending    Neurological Testing     Sensation     Shoulder   Left Shoulder   Intact: light touch    Right Shoulder   Intact: light touch    Active Range of Motion   Left Shoulder   Flexion: 175 degrees   Abduction: 174 degrees   External rotation BTH: T4   Internal rotation BTB: T10     Right Shoulder   Flexion: 155 degrees with pain  Abduction: 148 degrees with pain  External rotation BTH: T3   Internal rotation BTB: T12 (pulling through biceps tendon)     Passive Range of Motion     Right Shoulder   Flexion: 180 degrees with pain  Abduction: 178 degrees   External rotation 90°: 90 degrees with pain  Internal rotation 90°: 77 degrees     Strength/Myotome Testing     Left Shoulder     Planes of Motion   Flexion: 5   Abduction: 5   External rotation at 0°: 5   External rotation at 90°: 5   Internal rotation at 0°: 5   Internal rotation at 90°: 5     Right Shoulder     Planes of Motion   Flexion: 4- (minimal pain and weakness)   Abduction: 4   External rotation at 0°: 4+ (pain over biceps long head)   External rotation at 90°: 4+   Internal rotation at 0°: 5   Internal rotation at 90°: 4+     Left Elbow   Flexion: 5  Extension: 5  Forearm supination: 5  Forearm pronation: 5    Right Elbow   Flexion: 4+  Extension: 4+ (pain)  Forearm supination: 4+  Forearm pronation: 4+ (painful)    Left Wrist/Hand   Wrist extension: 5  Wrist flexion: 5    Right Wrist/Hand   Wrist extension: 5  Wrist flexion: 5 (pain)    Tests     Right Shoulder   Positive painful arc  Negative drop arm and Yergason's         Precautions: none     Daily Treatment Diary      Manual  1/26/18 1/30/18 2-1-18  2/6/18  2/9  2/13  2/15         R shoulder PROM np 12' 10'    10'  10'  8 min         GH mobs PRN np np np        2 min          new measuremensts             20 min                                                               Exercise Diary  1/26/18 1/30/18 2-1-18  2/6/18 2/9 2/13  2/15         UBE: forward nv 4' 4'  4'  5'  5'  5 min         pulleys nv 5' 2'  5'  5'  5'  5 min         Tb shoulder extension nv grn 3" x 15 grn 3" x 15  grn 3" 2x10  grn 3" 2x10  gtb 3'  2x10    nv         TB low rows nv grn 3" x 15 grn 3" x 15  grn 3" 2x10  grn 3" 2x10  grn 3"  2x10  nv         TB IR/ER nv org 3" x 15 org 3" x 15  grn 3" 2x10  grn 3" 2x10  grn 3"  2x10  nv         TB biceps curl nv org 3" x 15 org 3" x 15  grn 3" x 15  grn 3" x 20  grn 3"  x20  nv         TB triceps press nv grn 3" x 15 grn 3" x 15  grn 3" 2x10  grn 3" 2x10  grn 3"  x20  nv         Therabar- pronation/supination/twisting nv Red 1' ea Red 1' ea  red 1' ea  red 1' ea  red 1'  each  red 1 min ea         Shoulder flexion painfree nv X 15 x15  x15  x15  x15  nv         Shoulder scaption painfree nv X 15 x15  x15  x15  x15  nv         PNF D1 and D2 nv nv nv  x10 ea  x10ea  x10ea  nv         Ball circles at wall: cw/ccw nv Blue x 15ea  np/pn        nv         UE wall slides nv 3" x10 3" x10  3" x10  3" x10  3"x15 5"x10          wand flexion supine             nv          serratus punch             nv                                                                                                                                       Modalities  1/26/18 1/30/18 2/1/18 2/6/18 2/9 2/13 2/15         CP 10 min 15 mins 15' 15'  15'  10'  10 min                                                                       Flowsheet Rows    Flowsheet Row Most Recent Value   PT/OT G-Codes   Current Score  57   Projected Score  63   FOTO information reviewed  Yes   Assessment Type  Re-evaluation   G code set  Other PT/OT Primary   Other PT Primary Current Status ()  CK   Other PT Primary Goal Status ()  CJ

## 2018-03-01 ENCOUNTER — HOSPITAL ENCOUNTER (OUTPATIENT)
Dept: MAMMOGRAPHY | Facility: CLINIC | Age: 53
Discharge: HOME | End: 2018-03-01
Payer: COMMERCIAL

## 2018-03-01 ENCOUNTER — OFFICE VISIT (OUTPATIENT)
Dept: PHYSICAL THERAPY | Facility: CLINIC | Age: 53
End: 2018-03-01
Payer: COMMERCIAL

## 2018-03-01 ENCOUNTER — HOSPITAL ENCOUNTER (OUTPATIENT)
Dept: ULTRASOUND IMAGING | Facility: CLINIC | Age: 53
Discharge: HOME | End: 2018-03-01
Payer: COMMERCIAL

## 2018-03-01 DIAGNOSIS — M75.121 COMPLETE TEAR OF RIGHT ROTATOR CUFF: Primary | ICD-10-CM

## 2018-03-01 DIAGNOSIS — R92.8 ABNORMAL MAMMOGRAM OF RIGHT BREAST: ICD-10-CM

## 2018-03-01 PROCEDURE — 97150 GROUP THERAPEUTIC PROCEDURES: CPT | Performed by: PHYSICAL THERAPIST

## 2018-03-01 PROCEDURE — G8992 OTHER PT/OT  D/C STATUS: HCPCS | Performed by: PHYSICAL THERAPIST

## 2018-03-01 PROCEDURE — 97140 MANUAL THERAPY 1/> REGIONS: CPT | Performed by: PHYSICAL THERAPIST

## 2018-03-01 PROCEDURE — G8991 OTHER PT/OT GOAL STATUS: HCPCS | Performed by: PHYSICAL THERAPIST

## 2018-03-01 PROCEDURE — 77065 DX MAMMO INCL CAD UNI: CPT

## 2018-03-01 NOTE — PROGRESS NOTES
PT Re-Evaluation  and PT Discharge    Today's date: 3/1/2018  Patient name: Von Nava  : 1965  MRN: [id number]  Referring provider: Feliciano Carranza MD  Dx:   Encounter Diagnosis   Name Primary?  Complete tear of right rotator cuff Yes       Start Time: 803  Stop Time: 910  Total time in clinic (min): 67 minutes    Assessment  Impairments: abnormal or restricted ROM, impaired physical strength and pain with function    Assessment details: Von Nava is a 45 y o  female who presents to physical therapy with Complete tear of right rotator cuff  Pt continues to have difficulty with brushing and shampooing her hair  Pt has greater ease with reaching forward  Pt reports relief of stiffness following movement and stretching in the morning  Pt has improved ability to don her coat  Pt has difficulty with turning knobs, opening jars, and plugging in an outlet  Pt has some difficulty with turning her steering wheel to drive  Pt has greater ease with folding laundry  Pt has difficulty with lifting greater than 10 lbs  Pt feels that she has improved since starting therapy but continues to have deficits in reaching, lifting, and performing household and work tasks  Pt feels that she has reached maximal level of benefit and will be discharged at this time  Understanding of Dx/Px/POC: good   Prognosis: good    Goals  Short-term goals:  1  Pt will decrease pain by 1-2 points within 4 weeks  -met  2  Pt will improve ROM by 5-10 degrees within 4 weeks  - partially met  3  Pt will improve strength by 1/2 grade within 4 weeks  - met  4  Pt will improve physical FS score by 10 points by discharge  - partially met  Long-term goals  1  Pt will reach in all planes without scapular compensation and with pain <3/10 by discharge  - partially met  2  Pt will report compliance and demonstrate understanding of home exercise program by discharge  - met  3  Pt will push a vacuum for work with pain <3/10 by discharge  - partially met      Plan  Discharge to comprehensive [facility]  Subjective Evaluation    History of Present Illness  Mechanism of injury: Pt denies falls since starting therapy  Pt reports that ecchymosis has resolved  Pt reports most pain over anterior R shoulder  Pt reports less popping and clicking in R shoulder since starting therapy  Pt has no follow up scheduled with MD at this time  Pt has been compliant with HEP and therapy attendance  Pt applies ice as needed for pain relief  Pt now feels about 70% improved since starting this episode of care  Pain  Current pain rating: 3  At best pain ratin  At worst pain ratin  Location: R anterior shoulder and biceps  Quality: dull ache and knife-like  Relieving factors: ice, medications and rest  Aggravating factors: lifting    Social Support    Employment status: working ()  Hand dominance: right      Diagnostic Tests  MRI studies: abnormal (R rotator cuff tear)  Treatments  Previous treatment: physical therapy  Patient Goals  Patient goal: regain function to assist with work tasks        Objective     Postural Observations    Additional Postural Observation Details  [address] [facility], R scap protracted and depressed; improved head posture, scapular posture unchanged    Observations     Additional Observation Details  Ecchymosis over R anterior upper arm, R elbow olecranon process- resolved at re-eval    Palpation     Right Tenderness of the pectoralis major, pectoralis minor and upper trapezius       Cervical/Thoracic Screen   Cervical range of motion within normal limits  Cervical range of motion within normal limits with the following exceptions: Pulling in R UT with L Sb; at re-eval pt has some muscle pulling with L rotation and L sidebending    Neurological Testing     Sensation     Shoulder   Left Shoulder   Intact: light touch    Right Shoulder   Intact: light touch    Active Range of Motion   Left Shoulder   Flexion: 175 degrees   Abduction: 174 degrees   External rotation BTH: T4   Internal rotation BTB: T10     Right Shoulder   Flexion: 155 degrees with pain  Abduction: 148 degrees with pain  External rotation BTH: T3   Internal rotation BTB: T12 (pulling through biceps tendon)     Passive Range of Motion     Right Shoulder   Flexion: 180 degrees with pain  Abduction: 178 degrees   External rotation 90°: 90 degrees with pain  Internal rotation 90°: 77 degrees     Strength/Myotome Testing     Left Shoulder     Planes of Motion   Flexion: 5   Abduction: 5   External rotation at 0°: 5   External rotation at 90°: 5   Internal rotation at 0°: 5   Internal rotation at 90°: 5     Right Shoulder     Planes of Motion   Flexion: 4- (minimal pain and weakness)   Abduction: 4   External rotation at 0°: 4+ (pain over biceps long head)   External rotation at 90°: 4+   Internal rotation at 0°: 5   Internal rotation at 90°: 4+     Left Elbow   Flexion: 5  Extension: 5  Forearm supination: 5  Forearm pronation: 5    Right Elbow   Flexion: 4+  Extension: 4+ (pain)  Forearm supination: 4+  Forearm pronation: 4+ (painful)    Left Wrist/Hand   Wrist extension: 5  Wrist flexion: 5    Right Wrist/Hand   Wrist extension: 5  Wrist flexion: 5 (pain)    Tests     Right Shoulder   Positive painful arc  Negative drop arm and Yergason's  Flowsheet Rows    Flowsheet Row Most Recent Value   PT/OT G-Codes   Current Score  57   Projected Score  63   FOTO information reviewed  Yes   Assessment Type  Discharge   G code set  Other PT/OT Primary   Other PT Primary Goal Status ()  CJ   Other PT Primary Discharge Status ()  CK      Daily Note     Today's date: 3/1/2018  Patient name: Jayy Brownlee  : 1965  MRN: [id number]  Referring provider: Gucci Donaldson MD  Dx:   Encounter Diagnosis     ICD-10-CM    1   Complete tear of right rotator cuff M75 121        Start Time: 803  Stop Time: 910  Total time in clinic (min): 67 minutes    Subjective: Pt returns to therapy following 2 week hiatus  Pt states "I feel pretty good " Pt denies significant pain at start of session        Objective: See treatment diary below      Precautions: none     Daily Treatment Diary      Manual  1/26/18 1/30/18 2-1-18  2/6/18  2/9  2/13  2/15  3/1/18       R shoulder PROM np 12' 10'    10'  10'  8 min  8 min       GH mobs PRN np np np        2 min  2 min        new measuremensts             20 min                                                               Exercise Diary  1/26/18 1/30/18 2-1-18  2/6/18  2/9  2/13  2/15  3/1       UBE: forward nv 4' 4'  4'  5'  5'  5 min  5 min       pulleys nv 5' 2'  5'  5'  5'  5 min 5 min       Tb shoulder extension nv grn 3" x 15 grn 3" x 15  grn 3" 2x10  grn 3" 2x10  gtb 3'  2x10    nv  blue 3"x15       TB low rows nv grn 3" x 15 grn 3" x 15  grn 3" 2x10  grn 3" 2x10  grn 3"  2x10  nv  blue 3"x15       TB IR/ER nv org 3" x 15 org 3" x 15  grn 3" 2x10  grn 3" 2x10  grn 3"  2x10  nv  grn 3"x20       TB biceps curl nv org 3" x 15 org 3" x 15  grn 3" x 15  grn 3" x 20  grn 3"  x20  nv  blue 3"x15       TB triceps press nv grn 3" x 15 grn 3" x 15  grn 3" 2x10  grn 3" 2x10  grn 3"  x20  nv  blue 3"x15       Therabar- pronation/supination/twisting nv Red 1' ea Red 1' ea  red 1' ea  red 1' ea  red 1'  each  red 1 min ea  red 1 min ea       Shoulder flexion painfree nv X 15 x15  x15  x15  x15  nv  20x       Shoulder scaption painfree nv X 15 x15  x15  x15  x15  nv  20x       PNF D1 and D2 nv nv nv  x10 ea  x10ea  x10ea  nv  10x ea       Ball circles at wall: cw/ccw nv Blue x 15ea  np/pn        nv  red 20x ea       UE wall slides nv 3" x10 3" x10  3" x10  3" x10  3"x15 5"x10  np        wand flexion supine             nv  5"x15        serratus punch             nv  20x                                                                                                                                     Modalities  1/26/18 1/30/18 2/1/18 2/6/18  2/9  2/13  2/15  3/1/18       CP 10 min 15 mins 15' 15'  15'  10'  10 min  10 min                                                                 Assessment: Tolerated treatment well  Patient exhibited good technique with therapeutic exercises Pt feels that she is back up to 80% and can effectively manage remaining symptoms independently  Pt has good understanding of [facility]  Pt reports fatigue only at end of session, no increase in pain  Pt will be discharged at this time secondary to goals have been met  Pt will contact office with any additional concerns  Plan: Pt is discharged to [facility] secondary to goals have been reached

## 2018-03-19 DIAGNOSIS — J30.9 ALLERGIC RHINITIS, UNSPECIFIED CHRONICITY, UNSPECIFIED SEASONALITY, UNSPECIFIED TRIGGER: Primary | ICD-10-CM

## 2018-03-19 RX ORDER — MONTELUKAST SODIUM 10 MG/1
TABLET ORAL
Qty: 30 TABLET | Refills: 5 | Status: SHIPPED | OUTPATIENT
Start: 2018-03-19 | End: 2018-07-12 | Stop reason: SDUPTHER

## 2018-05-07 DIAGNOSIS — K21.9 GASTROESOPHAGEAL REFLUX DISEASE WITHOUT ESOPHAGITIS: Primary | ICD-10-CM

## 2018-05-07 RX ORDER — OMEPRAZOLE 20 MG/1
CAPSULE, DELAYED RELEASE ORAL
Qty: 90 CAPSULE | Refills: 3 | Status: SHIPPED | OUTPATIENT
Start: 2018-05-07 | End: 2019-04-29 | Stop reason: SDUPTHER

## 2018-05-25 DIAGNOSIS — B00.1 HERPES LABIALIS: Primary | ICD-10-CM

## 2018-05-25 RX ORDER — VALACYCLOVIR HYDROCHLORIDE 1 G/1
TABLET, FILM COATED ORAL
Qty: 20 TABLET | Refills: 0 | Status: SHIPPED | OUTPATIENT
Start: 2018-05-25 | End: 2019-12-04 | Stop reason: ALTCHOICE

## 2018-05-25 RX ORDER — VALACYCLOVIR HYDROCHLORIDE 1 G/1
2 TABLET, FILM COATED ORAL 2 TIMES DAILY
COMMUNITY
Start: 2017-03-09 | End: 2018-05-25 | Stop reason: SDUPTHER

## 2018-07-12 DIAGNOSIS — J30.9 ALLERGIC RHINITIS: ICD-10-CM

## 2018-07-12 RX ORDER — MONTELUKAST SODIUM 10 MG/1
TABLET ORAL
Qty: 30 TABLET | Refills: 1 | Status: SHIPPED | OUTPATIENT
Start: 2018-07-12 | End: 2018-07-30 | Stop reason: SDUPTHER

## 2018-07-19 ENCOUNTER — TELEPHONE (OUTPATIENT)
Dept: FAMILY MEDICINE CLINIC | Facility: CLINIC | Age: 53
End: 2018-07-19

## 2018-07-19 DIAGNOSIS — F41.9 ANXIETY: Primary | ICD-10-CM

## 2018-07-19 RX ORDER — LORAZEPAM 0.5 MG/1
0.5 TABLET ORAL EVERY 6 HOURS PRN
Qty: 15 TABLET | Refills: 0 | Status: SHIPPED | OUTPATIENT
Start: 2018-07-19 | End: 2020-01-17 | Stop reason: SDUPTHER

## 2018-07-27 ENCOUNTER — OFFICE VISIT (OUTPATIENT)
Dept: FAMILY MEDICINE CLINIC | Facility: CLINIC | Age: 53
End: 2018-07-27
Payer: COMMERCIAL

## 2018-07-27 VITALS
HEIGHT: 67 IN | DIASTOLIC BLOOD PRESSURE: 80 MMHG | SYSTOLIC BLOOD PRESSURE: 146 MMHG | HEART RATE: 69 BPM | OXYGEN SATURATION: 97 % | BODY MASS INDEX: 35.82 KG/M2 | WEIGHT: 228.2 LBS

## 2018-07-27 DIAGNOSIS — F41.8 DEPRESSION WITH ANXIETY: Primary | ICD-10-CM

## 2018-07-27 DIAGNOSIS — K21.9 GASTROESOPHAGEAL REFLUX DISEASE WITHOUT ESOPHAGITIS: ICD-10-CM

## 2018-07-27 DIAGNOSIS — F41.8 SITUATIONAL ANXIETY: ICD-10-CM

## 2018-07-27 PROBLEM — B00.1 HERPES LABIALIS: Status: ACTIVE | Noted: 2017-03-09

## 2018-07-27 PROBLEM — K63.5 COLON POLYP: Status: ACTIVE | Noted: 2017-05-01

## 2018-07-27 PROBLEM — B00.9 HSV-1 (HERPES SIMPLEX VIRUS 1) INFECTION: Status: ACTIVE | Noted: 2017-03-09

## 2018-07-27 PROBLEM — M54.50 LOW BACK PAIN: Status: ACTIVE | Noted: 2017-05-01

## 2018-07-27 PROBLEM — M75.121 COMPLETE TEAR OF RIGHT ROTATOR CUFF: Status: ACTIVE | Noted: 2017-11-07

## 2018-07-27 PROCEDURE — 99214 OFFICE O/P EST MOD 30 MIN: CPT | Performed by: FAMILY MEDICINE

## 2018-07-27 PROCEDURE — 3008F BODY MASS INDEX DOCD: CPT | Performed by: FAMILY MEDICINE

## 2018-07-27 RX ORDER — DIPHENOXYLATE HYDROCHLORIDE AND ATROPINE SULFATE 2.5; .025 MG/1; MG/1
1 TABLET ORAL DAILY
COMMUNITY
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
## 2018-07-27 NOTE — PATIENT INSTRUCTIONS
Do fasting BW ( A1C 1 yr ago 5 3, lipids ok then)    Can use Lorazepam sparingly ( rare use)  Walk for exercise/ take time for self/ look at individual counseling/ marital counseling  Stay w Zoloft   Has not needed rescue txs, SBP sl high here today -  Set up reg PE / recheck next few months    As before uses Ibuprofen daily -watch renal function/ GI ( GERD ok as long as uses PPI daily- should do EGD -she will consider-  Had Colonoscopy 2016

## 2018-07-30 DIAGNOSIS — J45.909 ASTHMATIC BRONCHITIS WITHOUT COMPLICATION, UNSPECIFIED ASTHMA SEVERITY, UNSPECIFIED WHETHER PERSISTENT: Primary | ICD-10-CM

## 2018-07-30 RX ORDER — MONTELUKAST SODIUM 10 MG/1
10 TABLET ORAL DAILY
Qty: 90 TABLET | Refills: 3 | Status: SHIPPED | OUTPATIENT
Start: 2018-07-30 | End: 2019-07-22 | Stop reason: SDUPTHER

## 2018-09-08 LAB
ALBUMIN SERPL-MCNC: 4.3 G/DL (ref 3.6–5.1)
ALBUMIN/GLOB SERPL: 1.8 (CALC) (ref 1–2.5)
ALP SERPL-CCNC: 61 U/L (ref 33–130)
ALT SERPL-CCNC: 18 U/L (ref 6–29)
AST SERPL-CCNC: 20 U/L (ref 10–35)
BASOPHILS # BLD AUTO: 80 CELLS/UL (ref 0–200)
BASOPHILS NFR BLD AUTO: 1.9 %
BILIRUB SERPL-MCNC: 0.7 MG/DL (ref 0.2–1.2)
BUN SERPL-MCNC: 15 MG/DL (ref 7–25)
BUN/CREAT SERPL: NORMAL (CALC) (ref 6–22)
CALCIUM SERPL-MCNC: 9.2 MG/DL (ref 8.6–10.4)
CHLORIDE SERPL-SCNC: 104 MMOL/L (ref 98–110)
CO2 SERPL-SCNC: 27 MMOL/L (ref 20–32)
CREAT SERPL-MCNC: 0.68 MG/DL (ref 0.5–1.05)
EOSINOPHIL # BLD AUTO: 101 CELLS/UL (ref 15–500)
EOSINOPHIL NFR BLD AUTO: 2.4 %
ERYTHROCYTE [DISTWIDTH] IN BLOOD BY AUTOMATED COUNT: 13.3 % (ref 11–15)
GLOBULIN SER CALC-MCNC: 2.4 G/DL (CALC) (ref 1.9–3.7)
GLUCOSE SERPL-MCNC: 96 MG/DL (ref 65–99)
HCT VFR BLD AUTO: 37.7 % (ref 35–45)
HGB BLD-MCNC: 12.3 G/DL (ref 11.7–15.5)
LYMPHOCYTES # BLD AUTO: 1722 CELLS/UL (ref 850–3900)
LYMPHOCYTES NFR BLD AUTO: 41 %
MCH RBC QN AUTO: 29.5 PG (ref 27–33)
MCHC RBC AUTO-ENTMCNC: 32.6 G/DL (ref 32–36)
MCV RBC AUTO: 90.4 FL (ref 80–100)
MONOCYTES # BLD AUTO: 277 CELLS/UL (ref 200–950)
MONOCYTES NFR BLD AUTO: 6.6 %
NEUTROPHILS # BLD AUTO: 2020 CELLS/UL (ref 1500–7800)
NEUTROPHILS NFR BLD AUTO: 48.1 %
PLATELET # BLD AUTO: 240 THOUSAND/UL (ref 140–400)
PMV BLD REES-ECKER: 11.8 FL (ref 7.5–12.5)
POTASSIUM SERPL-SCNC: 4.9 MMOL/L (ref 3.5–5.3)
PROT SERPL-MCNC: 6.7 G/DL (ref 6.1–8.1)
RBC # BLD AUTO: 4.17 MILLION/UL (ref 3.8–5.1)
SL AMB EGFR AFRICAN AMERICAN: 114 ML/MIN/1.73M2
SL AMB EGFR NON AFRICAN AMERICAN: 98 ML/MIN/1.73M2
SODIUM SERPL-SCNC: 139 MMOL/L (ref 135–146)
TSH SERPL-ACNC: 3.12 MIU/L
WBC # BLD AUTO: 4.2 THOUSAND/UL (ref 3.8–10.8)

## 2018-11-09 ENCOUNTER — OFFICE VISIT (OUTPATIENT)
Dept: FAMILY MEDICINE CLINIC | Facility: CLINIC | Age: 53
End: 2018-11-09
Payer: COMMERCIAL

## 2018-11-09 VITALS
DIASTOLIC BLOOD PRESSURE: 84 MMHG | OXYGEN SATURATION: 98 % | WEIGHT: 229 LBS | HEART RATE: 80 BPM | BODY MASS INDEX: 35.94 KG/M2 | HEIGHT: 67 IN | SYSTOLIC BLOOD PRESSURE: 128 MMHG

## 2018-11-09 DIAGNOSIS — M25.50 ARTHRALGIA, UNSPECIFIED JOINT: ICD-10-CM

## 2018-11-09 DIAGNOSIS — Z00.00 WELL ADULT HEALTH CHECK: Primary | ICD-10-CM

## 2018-11-09 DIAGNOSIS — K21.9 GASTROESOPHAGEAL REFLUX DISEASE WITHOUT ESOPHAGITIS: ICD-10-CM

## 2018-11-09 DIAGNOSIS — Z23 NEED FOR INFLUENZA VACCINATION: ICD-10-CM

## 2018-11-09 DIAGNOSIS — Z11.59 ENCOUNTER FOR HEPATITIS C SCREENING TEST FOR LOW RISK PATIENT: ICD-10-CM

## 2018-11-09 DIAGNOSIS — F41.8 DEPRESSION WITH ANXIETY: ICD-10-CM

## 2018-11-09 PROBLEM — H91.92 HEARING LOSS, LEFT: Status: ACTIVE | Noted: 2018-11-09

## 2018-11-09 PROBLEM — E66.9 OBESITY (BMI 35.0-39.9 WITHOUT COMORBIDITY): Status: ACTIVE | Noted: 2018-11-09

## 2018-11-09 PROCEDURE — 90682 RIV4 VACC RECOMBINANT DNA IM: CPT

## 2018-11-09 PROCEDURE — 99396 PREV VISIT EST AGE 40-64: CPT | Performed by: FAMILY MEDICINE

## 2018-11-09 PROCEDURE — 90471 IMMUNIZATION ADMIN: CPT

## 2018-11-09 RX ORDER — FAMOTIDINE 20 MG/1
20 TABLET, FILM COATED ORAL 2 TIMES DAILY
Qty: 60 TABLET | Refills: 3 | Status: SHIPPED | OUTPATIENT
Start: 2018-11-09 | End: 2019-02-25

## 2018-11-09 NOTE — PROGRESS NOTES
FAMILY PRACTICE OFFICE VISIT  [address]  [address], [address]      NAME: Anita Mcmullen  AGE: 52 y o  SEX: female  : 1965   MRN: [id number]    DATE: 2018  TIME: 12:16 PM    Assessment and Plan     Problem List Items Addressed This Visit        Unprioritized    Acid reflux disease    Relevant Medications    famotidine (PEPCID) 20 mg tablet    Arthralgia    Relevant Orders    Urinalysis with microscopic    C-reactive protein    JUANJO Screen w/ Reflex to Titer/Pattern    RF Screen w/ Reflex to Titer    Depression with anxiety      Other Visit Diagnoses     Well adult health check    -  Primary    Need for influenza vaccination        Relevant Orders    influenza vaccine, [id number], quadrivalent, recombinant, PF, 0 5 mL, for patients 18 yr+ (FLUBLOK) (Completed)    Encounter for hepatitis C screening test for low risk patient        Relevant Orders    Hepatitis C antibody          Patient Instructions     She is in today for a routine physical/ check up    Immunization History   Administered Date(s) Administered    Influenza 2007, 10/15/2008, 2009, 2010    Influenza Quadrivalent Preservative Free 3 years and older IM 10/09/2014, 2017    Influenza TIV (IM) 2010    Td (adult), adsorbed 2017     She does do yearly Flu shot  Tdap/tetanus shot is up to date  (done every 10 yrs for superficial cuts, every 5 yrs for deep wounds)   Can also look into coverage for new shingles shot, Shingrix (indicated if over 48years of age ) Can do that at pharmacy  quit smoking early 45s   Was < 1 PPD x < 20yrs  Has not reqd inhalers -   On Singulair  If sx worsen, restart Pulmicort  Use Albuterol as needed      Regarding Colon Cancer screening, we discussed Colonoscopy vs ColoGuard is indicated starting at age 48     Screening is up to date   2016  Has used long term Omeprazole -   She can try Pepcid to replace this -   Use every third day to start ( in place of PPI )  If sx worsen -  Refer to GI for EGD  Does use Ibuprofen re degenerative arthritis -  Discussed risks -  Check inflammatory markers for completeness  She will f/up w OAA  She does  see her Gynecologist routinely  up-to-date  Discussed screening Mammogram, this is up-to-date     She will stay on Sertraline 100 mg daily, uses Lorazepam sparingly  Hepatis C Screening indicated for 'baby boomers' -  after discussion she will do Hepatitis C screen  low risk    We did review previous blood work, recent CBC, CMP, TSH were fine  She is up to date with Lipid screening  2017 cholesterol 182 with HDL 51,   She is up to date with Diabetes screening  Discussed importance of routine exercise, healthy diet  Set up for nutritional consultation with bariatric group  Does not wish to proceed with surgical  ( BMI 36 )    Regarding 1 year or so left-sided hearing loss I would like her to see ENT  No wax on exam here today  We will see her again in 6 months, sooner as needed               Chief Complaint     Chief Complaint   Patient presents with    Physical Exam       History of Present Illness   Kika Son is a 52y o -year-old female who is in today for a physical, overall she has been feeling about the same as at baseline  Frustrated she is not losing weight, requests nutritional consultation, does not want to pursue bariatric surgery at present  Has been off inhalers, no shortness of breath or wheezing  Stressors yet with , is using Zoloft daily, uses lorazepam very sparingly  Does have longstanding issues with back pain, known rotator cuff tear, also now with bilateral wrist pain, knee pains also persisting  Does have about 1 year muffled hearing on left  Not associated with increased congestion, no ear pain    Does have ringing in ear      Review of Systems   Review of Systems Constitutional: Positive for fatigue (Chronic)  Negative for activity change, appetite change, chills, fever and unexpected weight change  HENT: Negative for congestion, mouth sores, nosebleeds, sinus pressure, sore throat and trouble swallowing  Eyes: Negative for visual disturbance  Respiratory: Negative for cough, choking, chest tightness and shortness of breath  Cardiovascular: Negative for chest pain, palpitations and leg swelling  Gastrointestinal: Negative for abdominal pain, blood in stool, constipation, diarrhea, nausea and vomiting  No acid reflux     No change in bowel   Genitourinary: Negative for dysuria and hematuria  Musculoskeletal: Positive for arthralgias and back pain  Neurological: Negative for dizziness, syncope, speech difficulty, weakness, light-headedness and headaches  Hematological: Does not bruise/bleed easily  Psychiatric/Behavioral: Negative for confusion and sleep disturbance          See HPI         Active Problem List     Patient Active Problem List   Diagnosis    Abnormal mammogram of right breast    Acid reflux disease    Carpal tunnel syndrome    Colon polyp    Complete tear of right rotator cuff    Depression with anxiety    Disc degeneration, lumbar    HSV-1 (herpes simplex virus 1) infection    Low back pain    Migraine headache    Osteoarthritis    Pap smear abnormality of cervix with ASCUS favoring benign    Symptomatic menopausal or female climacteric states    Obesity (BMI 35 0-39 9 without comorbidity)    Hearing loss, left    Arthralgia       Past Medical History:  Past Medical History:   Diagnosis Date    Acid reflux     Asthmatic bronchitis     Back pain     Carpal tunnel syndrome     Depression     Hyperglycemia     Lipoma     Migraine     Osteoarthritis     PONV (postoperative nausea and vomiting)     UTI (urinary tract infection)        Past Surgical History:  Past Surgical History:   Procedure Laterality Date    APPENDECTOMY      BREAST BIOPSY      COLONOSCOPY N/A 4/8/2016    Procedure: COLONOSCOPY;  Surgeon: Ayo Mancilla MD;  Location: [facility]; Service:     KNEE ARTHROSCOPY      LA EXC SKIN BENIG <0 5 CM TRUNK,ARM,LEG Left 5/19/2016    Procedure: EXCISION LIPOMA SHOULDER ;  Surgeon: Ayo Mancilla MD;  [facility];  Service: General    TUBAL LIGATION         Family History:  No family history on file  Social History:  Social History     Social History    Marital status: /Civil Union     Spouse name: N/A    Number of children: N/A    Years of education: N/A     Occupational History    Not on file  Social History Main Topics    Smoking status: Former Smoker     Quit date: 5/17/2006    Smokeless tobacco: Never Used    Alcohol use No    Drug use: No    Sexual activity: Not on file     Other Topics Concern    Not on file     Social History Narrative    No narrative on file       Objective     Vitals:    11/09/18 0840   BP: 128/84   BP Location: Left arm   Patient Position: Sitting   Cuff Size: Large   Pulse: 80   SpO2: 98%   Weight: 104 kg (229 lb)   Height: 5' 6 5" (1 689 m)     Body mass index is 36 41 kg/m²  BP Readings from Last 3 Encounters:   11/09/18 128/84   07/27/18 146/80   01/02/18 130/85       Wt Readings from Last 3 Encounters:   11/09/18 104 kg (229 lb)   07/27/18 104 kg (228 lb 3 2 oz)   01/02/18 104 kg (230 lb)       Physical Exam   Constitutional: She is oriented to person, place, and time  She appears well-developed and well-nourished  No distress  Pleasant overweight female seated no acute distress   HENT:   Right Ear: External ear normal    Left Ear: External ear normal    Mouth/Throat: Oropharynx is clear and moist    TM clear b/l   Eyes: Conjunctivae are normal  Right eye exhibits no discharge  Left eye exhibits no discharge  No scleral icterus  Neck: Normal range of motion  Neck supple  Carotid bruit is not present     Soft/fluctuance lump upper sternal, slight prominence left clavicular   no thyroid nodule palpable, no swollen cervical glands   Cardiovascular: Normal rate, regular rhythm and normal heart sounds  No murmur heard  No carotid bruit   Pulmonary/Chest: Effort normal and breath sounds normal  No respiratory distress  She has no wheezes  She has no rales  Musculoskeletal: She exhibits no edema  Upper sternal soft lipomatous lump midline  Lymphadenopathy:     She has no cervical adenopathy  Neurological: She is alert and oriented to person, place, and time  No cranial nerve deficit  Coordination normal    Skin: She is not diaphoretic  Psychiatric: She has a normal mood and affect  Her behavior is normal    Nursing note and vitals reviewed  ALLERGIES:  Allergies   Allergen Reactions    Effexor [Venlafaxine] Other (See Comments)     Hot and sweaty    Wellbutrin [Bupropion] Other (See Comments)     Hot and sweaty    Prempro [Conj Estrog-Medroxyprogest Ace] Rash       Current Medications     Current Outpatient Prescriptions   Medication Sig Dispense Refill    b complex vitamins tablet Take 1 tablet by mouth daily      famotidine (PEPCID) 20 mg tablet Take 1 tablet (20 mg total) by mouth 2 (two) times a day 60 tablet 3    ibuprofen (ADVIL,MOTRIN) 100 MG chewable tablet Chew every 8 (eight) hours as needed for mild pain   LYSINE PO Take 1 capsule by mouth daily      montelukast (SINGULAIR) 10 mg tablet Take 1 tablet (10 mg total) by mouth daily 90 tablet 3    multivitamin (THERAGRAN) TABS Take 1 tablet by mouth daily      omeprazole (PriLOSEC) 20 mg delayed release capsule TAKE 1 CAPSULE DAILY AS NEEDED FOR STOMACH AND ACID 90 capsule 3    sertraline (ZOLOFT) 100 mg tablet Take 100 mg by mouth daily   albuterol (PROVENTIL HFA,VENTOLIN HFA) 90 mcg/act inhaler Inhale 2 puffs every 6 (six) hours as needed for wheezing   budesonide (PULMICORT) 0 25 mg/2 mL nebulizer solution Take 0 25 mg by nebulization as needed   LORazepam (ATIVAN) 0 5 mg tablet Take 1 tablet (0 5 mg total) by mouth every 6 (six) hours as needed for anxiety 15 tablet 0    valACYclovir (VALTREX) 1,000 mg tablet Use 2 pills every 12 hr for 2 doses, repeat as needed 20 tablet 0     No current facility-administered medications for this visit                Most recent labs available from [address] [place] [address]   ( others may be available in [facility] / Media sections)  Lab Results   Component Value Date    WBC 4 2 09/07/2018    HGB 12 3 09/07/2018    HCT 37 7 09/07/2018     09/07/2018    CHOL 182 05/03/2017    TRIG 149 05/03/2017    HDL 51 05/03/2017    LDLDIRECT 105 05/03/2017    ALT 15 05/03/2017    AST 18 05/03/2017     05/03/2017    K 4 9 09/07/2018     09/07/2018    CREATININE 0 71 05/03/2017    BUN 15 09/07/2018    CO2 27 09/07/2018    HGBA1C 5 3 05/03/2017     No results found for: LDLCALC  No results found for: [id number], TSH      Orders Placed This Encounter   Procedures    influenza vaccine, [id number], quadrivalent, recombinant, PF, 0 5 mL, for patients 18 yr+ (FLUBLOK)    Urinalysis with microscopic    C-reactive protein    JUANJO Screen w/ Reflex to Titer/Pattern    RF Screen w/ Reflex to Titer    Hepatitis C antibody         Abbie Bridges DO

## 2018-11-09 NOTE — PATIENT INSTRUCTIONS
She is in today for a routine physical/ check up    Immunization History   Administered Date(s) Administered    Influenza 11/16/2007, 10/15/2008, 09/24/2009, 09/23/2010    Influenza Quadrivalent Preservative Free 3 years and older IM 10/09/2014, 09/25/2017    Influenza TIV (IM) 11/01/2010    Td (adult), adsorbed 06/12/2017     She does do yearly Flu shot  Tdap/tetanus shot is up to date  (done every 10 yrs for superficial cuts, every 5 yrs for deep wounds)   Can also look into coverage for new shingles shot, Shingrix (indicated if over 48years of age ) Can do that at pharmacy  quit smoking early 45s   Was < 1 PPD x < 20yrs  Has not reqd inhalers -   On Singulair  If sx worsen, restart Pulmicort  Use Albuterol as needed      Regarding Colon Cancer screening, we discussed Colonoscopy vs ColoGuard is indicated starting at age 48  Screening is up to date   2016  Has used long term Omeprazole -   She can try Pepcid to replace this -   Use every third day to start ( in place of PPI )  If sx worsen -  Refer to GI for EGD  Does use Ibuprofen re degenerative arthritis -  Discussed risks -  Check inflammatory markers for completeness  She will f/up w OAA  She does  see her Gynecologist routinely  up-to-date  Discussed screening Mammogram, this is up-to-date     She will stay on Sertraline 100 mg daily, uses Lorazepam sparingly  Hepatis C Screening indicated for 'baby boomers' -  after discussion she will do Hepatitis C screen  low risk    We did review previous blood work, recent CBC, CMP, TSH were fine  She is up to date with Lipid screening  2017 cholesterol 182 with HDL 51,   She is up to date with Diabetes screening  Discussed importance of routine exercise, healthy diet  Set up for nutritional consultation with bariatric group  Does not wish to proceed with surgical  ( BMI 36 )    Regarding 1 year or so left-sided hearing loss I would like her to see ENT    No wax on exam here today      We will see her again in 6 months, sooner as needed

## 2018-11-30 LAB
ANA SER QL IF: NEGATIVE
CRP SERPL-MCNC: 3.4 MG/L
HCV AB S/CO SERPL IA: 0.01
HCV AB SERPL QL IA: NORMAL
RHEUMATOID FACT SERPL-ACNC: <14 IU/ML

## 2018-12-14 DIAGNOSIS — J45.909 ASTHMATIC BRONCHITIS WITHOUT COMPLICATION, UNSPECIFIED ASTHMA SEVERITY, UNSPECIFIED WHETHER PERSISTENT: Primary | ICD-10-CM

## 2018-12-14 RX ORDER — ALBUTEROL SULFATE 90 UG/1
2 AEROSOL, METERED RESPIRATORY (INHALATION) EVERY 4 HOURS PRN
Qty: 1 INHALER | Refills: 0 | Status: SHIPPED | OUTPATIENT
Start: 2018-12-14 | End: 2021-12-19 | Stop reason: SDUPTHER

## 2018-12-28 ENCOUNTER — TELEPHONE (OUTPATIENT)
Dept: FAMILY MEDICINE CLINIC | Facility: CLINIC | Age: 53
End: 2018-12-28

## 2018-12-28 DIAGNOSIS — J45.21 MILD INTERMITTENT REACTIVE AIRWAY DISEASE WITH ACUTE EXACERBATION: Primary | ICD-10-CM

## 2018-12-28 PROBLEM — J45.901 REACTIVE AIRWAY DISEASE WITH ACUTE EXACERBATION: Status: ACTIVE | Noted: 2018-12-28

## 2018-12-28 RX ORDER — ALBUTEROL SULFATE 2.5 MG/3ML
2.5 SOLUTION RESPIRATORY (INHALATION) EVERY 4 HOURS PRN
Qty: 25 VIAL | Refills: 1 | Status: SHIPPED | OUTPATIENT
Start: 2018-12-28 | End: 2019-02-25 | Stop reason: SDUPTHER

## 2018-12-28 NOTE — TELEPHONE ENCOUNTER
PT W[facility] TO [facility]  ON 12/27 WAS TOLD THAT SHE HAS BRONCHITIS AND GIVEN MEDS FOR HER NEBULIZER WHICH IS NO GOOD SO NOW SHE NEED'S A SCRIPT FOR A NEW NEB SHE ALSO NEEDS A MED FOR THE NEBULIZER

## 2019-01-02 ENCOUNTER — OFFICE VISIT (OUTPATIENT)
Dept: FAMILY MEDICINE CLINIC | Facility: CLINIC | Age: 54
End: 2019-01-02
Payer: COMMERCIAL

## 2019-01-02 VITALS
BODY MASS INDEX: 36.63 KG/M2 | HEART RATE: 84 BPM | HEIGHT: 67 IN | OXYGEN SATURATION: 96 % | SYSTOLIC BLOOD PRESSURE: 134 MMHG | WEIGHT: 233.4 LBS | DIASTOLIC BLOOD PRESSURE: 86 MMHG | TEMPERATURE: 98.6 F | RESPIRATION RATE: 18 BRPM

## 2019-01-02 DIAGNOSIS — J45.21 MILD INTERMITTENT REACTIVE AIRWAY DISEASE WITH ACUTE EXACERBATION: Primary | ICD-10-CM

## 2019-01-02 PROCEDURE — 99213 OFFICE O/P EST LOW 20 MIN: CPT | Performed by: INTERNAL MEDICINE

## 2019-01-02 RX ORDER — PREDNISONE 10 MG/1
TABLET ORAL
Qty: 18 TABLET | Refills: 0 | Status: SHIPPED | OUTPATIENT
Start: 2019-01-02 | End: 2019-01-08 | Stop reason: ALTCHOICE

## 2019-01-02 NOTE — PROGRESS NOTES
Assessment/Plan:    No problem-specific Assessment & Plan notes found for this encounter  Diagnoses and all orders for this visit:    Mild intermittent reactive airway disease with acute exacerbation  -     predniSONE 10 mg tablet; Take 3 tablets for 3 days, then 2 tablets for 3 days, and then 1 tablet daily for 3 days  With her symptoms and exam, I am going to have her complete a prednisone taper course  I also advised that she continue with her nebulizer 3 times daily for the next 5 days  She will also use the Pulmicort twice daily for 5 days as well  Subjective:      Patient ID: Maine Nicholson is a 52 y o  female  Any Russ is here today for a follow up from a [facility] visit  She reports being diagnosed with pneumonia  She reports symptoms started about 3 weeks ago  She thought it was possibly related to bronchitis  She reports cough seemed to worsen and was seen at [facility]  She was given an antibiotic and also has been doing the nebulizer as well  She reports the cough is not improving  She denies any recent travel  She reports her niece was diagnosed with pneumonia as well  Pneumonia   She complains of cough, hoarse voice and sputum production  There is no chest tightness, difficulty breathing, frequent throat clearing, hemoptysis, shortness of breath or wheezing  This is a new problem  The current episode started 1 to 4 weeks ago  The problem occurs constantly  The cough is productive of sputum and productive  Her symptoms are aggravated by climbing stairs  Her symptoms are alleviated by beta-agonist, OTC cough suppressant and prescription cough suppressant  She reports minimal improvement on treatment  Her past medical history is significant for bronchitis  Cough   Pertinent negatives include no hemoptysis, shortness of breath or wheezing  Her past medical history is significant for bronchitis         The following portions of the patient's history were reviewed and updated as appropriate: allergies, current medications, past family history, past medical history, past social history, past surgical history and problem list     Review of Systems   HENT: Positive for hoarse voice  Respiratory: Positive for cough and sputum production  Negative for hemoptysis, shortness of breath and wheezing  Objective:      /86   Pulse 84   Temp 98 6 °F (37 °C)   Resp 18   Ht 5' 6 5" (1 689 m)   Wt 106 kg (233 lb 6 4 oz)   SpO2 96%   BMI 37 11 kg/m²          Physical Exam   Constitutional: She is oriented to person, place, and time  She appears well-developed and well-nourished  No distress  HENT:   Head: Normocephalic and atraumatic  Eyes: Conjunctivae and EOM are normal  Right eye exhibits no discharge  Left eye exhibits no discharge  No scleral icterus  Neck: Normal range of motion  Cardiovascular: Normal rate, regular rhythm and normal heart sounds  No murmur heard  Pulmonary/Chest: Effort normal  No respiratory distress  She has wheezes  Coarse breath sounds   Musculoskeletal: Normal range of motion  She exhibits no edema  Lymphadenopathy:     She has no cervical adenopathy  Neurological: She is alert and oriented to person, place, and time  Skin: Skin is warm and dry  She is not diaphoretic  Psychiatric: She has a normal mood and affect  Her speech is normal and behavior is normal  Judgment and thought content normal    Vitals reviewed

## 2019-01-04 ENCOUNTER — TELEPHONE (OUTPATIENT)
Dept: FAMILY MEDICINE CLINIC | Facility: CLINIC | Age: 54
End: 2019-01-04

## 2019-01-04 DIAGNOSIS — J45.21 MILD INTERMITTENT REACTIVE AIRWAY DISEASE WITH ACUTE EXACERBATION: Primary | ICD-10-CM

## 2019-01-04 RX ORDER — PROMETHAZINE HYDROCHLORIDE AND CODEINE PHOSPHATE 6.25; 1 MG/5ML; MG/5ML
5 SYRUP ORAL EVERY 4 HOURS PRN
Qty: 120 ML | Refills: 0 | Status: SHIPPED | OUTPATIENT
Start: 2019-01-04 | End: 2019-02-25 | Stop reason: SDUPTHER

## 2019-01-04 NOTE — TELEPHONE ENCOUNTER
PT WAS SEEN ON 01/02 BY DR HARO WAS GIVEN A SCRIPT FOR STEROID WHICH PT STATES IS NOT WORKING PT IS STILL COUGHING REALLY BAD TO THE POINT THAT SHE IS VOMITING PT WOULD LIKE A SCRIPT FOR A COUGH MED OR SOMETHING TO HELP WITH THE COUGH PLEASE ADVISE

## 2019-01-04 NOTE — TELEPHONE ENCOUNTER
I sent in prescription for promethazine with codeine to use only at night, if not improved 3 days or if worsens she needs to notify us, would need re-evaluation

## 2019-01-08 ENCOUNTER — OFFICE VISIT (OUTPATIENT)
Dept: FAMILY MEDICINE CLINIC | Facility: CLINIC | Age: 54
End: 2019-01-08
Payer: COMMERCIAL

## 2019-01-08 VITALS
HEART RATE: 79 BPM | OXYGEN SATURATION: 96 % | TEMPERATURE: 98.7 F | RESPIRATION RATE: 14 BRPM | DIASTOLIC BLOOD PRESSURE: 80 MMHG | SYSTOLIC BLOOD PRESSURE: 126 MMHG

## 2019-01-08 DIAGNOSIS — J18.9 PNEUMONIA OF RIGHT LOWER LOBE DUE TO INFECTIOUS ORGANISM: Primary | ICD-10-CM

## 2019-01-08 DIAGNOSIS — J45.21 MILD INTERMITTENT REACTIVE AIRWAY DISEASE WITH ACUTE EXACERBATION: ICD-10-CM

## 2019-01-08 PROCEDURE — 99213 OFFICE O/P EST LOW 20 MIN: CPT | Performed by: FAMILY MEDICINE

## 2019-01-08 RX ORDER — AMOXICILLIN AND CLAVULANATE POTASSIUM 875; 125 MG/1; MG/1
1 TABLET, FILM COATED ORAL EVERY 12 HOURS SCHEDULED
Qty: 14 TABLET | Refills: 0 | Status: SHIPPED | OUTPATIENT
Start: 2019-01-08 | End: 2019-01-15

## 2019-01-08 RX ORDER — PREDNISONE 10 MG/1
TABLET ORAL
Qty: 40 TABLET | Refills: 0 | Status: SHIPPED | OUTPATIENT
Start: 2019-01-08 | End: 2019-03-08

## 2019-01-08 NOTE — PROGRESS NOTES
FAMILY PRACTICE OFFICE VISIT  Benoit FARIA  Erlinda JACKSON  [address]  [address], [facility]      NAME: Tiara Mcmullen  AGE: 52 y o  SEX: female  : 1965   MRN: [id number]    DATE: 2019  TIME: 9:23 AM    Assessment and Plan     Problem List Items Addressed This Visit        Unprioritized    Reactive airway disease with acute exacerbation    Relevant Medications    predniSONE 10 mg tablet      Other Visit Diagnoses     Pneumonia of right lower lobe due to infectious organism [facility])    -  Primary    Relevant Medications    predniSONE 10 mg tablet    amoxicillin-clavulanate (AUGMENTIN) 875-125 mg per tablet          Patient Instructions   With ongoing symptoms, wheezing along with right lower rhonchi we will cover for pneumonia with Augmentin, use extended course prednisone, continue with every 4 to 6 hr albuterol nebulizer, Pulmicort twice daily  Increase fluids  Update us 2 to 3 days, call sooner if needed  May require redo chest x-ray  Chief Complaint     No chief complaint on file  History of Present Illness   Lucrecia Romo is a 52y o -year-old female who has noted about 4 weeks of congestion with wheezing, [facility]  [facility], chest x-ray showed right lower lobe haziness, she received IM Rocephin along with Zithromax, symptoms persisted, she was seen here , started prednisone 10 mg 30 x 3, 20 x 3, 10 x 3  Has been using Pulmicort inhaler twice daily, nebulizer with albuterol every 4 hr, continues with symptoms  No fever  Also uses codeine cough medicine at night  She relates she has not been this bad in about 3 years or so  Review of Systems   Review of Systems   Constitutional: Negative for chills and fever  HENT: Positive for congestion  Negative for ear pain and sore throat  Respiratory: Positive for cough, shortness of breath and wheezing  Cardiovascular: Negative for chest pain, palpitations and leg swelling  Gastrointestinal: Negative for abdominal pain, blood in stool and diarrhea  Genitourinary: Negative for dysuria and hematuria  Neurological: Negative for dizziness and light-headedness  Psychiatric/Behavioral: Negative for behavioral problems  Active Problem List     Patient Active Problem List   Diagnosis    Abnormal mammogram of right breast    Acid reflux disease    Carpal tunnel syndrome    Colon polyp    Complete tear of right rotator cuff    Depression with anxiety    Disc degeneration, lumbar    HSV-1 (herpes simplex virus 1) infection    Low back pain    Migraine headache    Osteoarthritis    Pap smear abnormality of cervix with ASCUS favoring benign    Symptomatic menopausal or female climacteric states    Obesity (BMI 35 0-39 9 without comorbidity)    Hearing loss, left    Arthralgia    Reactive airway disease with acute exacerbation       Past Medical History:  Past Medical History:   Diagnosis Date    Acid reflux     Asthmatic bronchitis     Back pain     Carpal tunnel syndrome     Depression     Hyperglycemia     Lipoma     Migraine     Osteoarthritis     PONV (postoperative nausea and vomiting)     UTI (urinary tract infection)        Past Surgical History:  Past Surgical History:   Procedure Laterality Date    APPENDECTOMY      BREAST BIOPSY      COLONOSCOPY N/A 4/8/2016    Procedure: COLONOSCOPY;  Surgeon: Salvatore Lake MD;  Location: [facility] GI LAB; Service:     KNEE ARTHROSCOPY      [place] EXC SKIN BENIG <0 5 CM TRUNK,ARM,LEG Left 5/19/2016    Procedure: EXCISION LIPOMA SHOULDER ;  Surgeon: Salvatore Lake MD;  Location: [facility] OR;  Service: General    TUBAL LIGATION         Family History:  No family history on file      Social History:  Social History     Social History    Marital status: /Civil Union     Spouse name: N/A    Number of children: N/A    Years of education: N/A     Occupational History    Not on file  Social History Main Topics    Smoking status: Former Smoker     Quit date: 5/17/2006    Smokeless tobacco: Never Used    Alcohol use No    Drug use: No    Sexual activity: Not on file     Other Topics Concern    Not on file     Social History Narrative    No narrative on file       Objective     Vitals:    01/08/19 0922   BP: 126/80   Pulse: 79   Resp: 14   Temp: 98 7 °F (37 1 °C)   SpO2: 96%     There is no height or weight on file to calculate BMI  BP Readings from Last 3 Encounters:   01/08/19 126/80   01/02/19 134/86   11/09/18 128/84       Wt Readings from Last 3 Encounters:   01/02/19 106 kg (233 lb 6 4 oz)   11/09/18 104 kg (229 lb)   07/27/18 104 kg (228 lb 3 2 oz)       Physical Exam   Constitutional: She is oriented to person, place, and time  She appears well-developed and well-nourished  Occasional harsh  barky cough, afebrile, oxygenating well   HENT:   Right Ear: External ear normal    Left Ear: External ear normal    Mouth/Throat: Oropharynx is clear and moist    Eyes: Conjunctivae are normal  No scleral icterus  Neck: Neck supple  Cardiovascular: Normal rate, regular rhythm and normal heart sounds  No murmur heard  Pulmonary/Chest: Effort normal  She has no rales  Diffuse wheezing with rhonchi right mid lower, some clearing after cough  Abdominal: Soft  There is no tenderness  Musculoskeletal: She exhibits no edema  Lymphadenopathy:     She has no cervical adenopathy  Neurological: She is alert and oriented to person, place, and time  Psychiatric: She has a normal mood and affect   Her behavior is normal        ALLERGIES:  Allergies   Allergen Reactions    Effexor [Venlafaxine] Other (See Comments)     Hot and sweaty    Wellbutrin [Bupropion] Other (See Comments)     Hot and sweaty    Prempro [Conj Estrog-Medroxyprogest Ace] Rash       Current Medications     Current Outpatient Prescriptions   Medication Sig Dispense Refill    albuterol (2 5 mg/3 mL) 0 083 % nebulizer solution Take 1 vial (2 5 mg total) by nebulization every 4 (four) hours as needed for wheezing (via nebulizer) 25 vial 1    albuterol (PROVENTIL HFA,VENTOLIN HFA) 90 mcg/act inhaler Inhale 2 puffs every 4 (four) hours as needed for wheezing 1 Inhaler 0    amoxicillin-clavulanate (AUGMENTIN) 875-125 mg per tablet Take 1 tablet by mouth every 12 (twelve) hours for 7 days 14 tablet 0    b complex vitamins tablet Take 1 tablet by mouth daily      budesonide (PULMICORT) 180 MCG/ACT inhaler Inhale 1 puff 2 (two) times a day 1 Inhaler 3    famotidine (PEPCID) 20 mg tablet Take 1 tablet (20 mg total) by mouth 2 (two) times a day (Patient not taking: Reported on 1/2/2019 ) 60 tablet 3    ibuprofen (ADVIL,MOTRIN) 100 MG chewable tablet Chew every 8 (eight) hours as needed for mild pain   LORazepam (ATIVAN) 0 5 mg tablet Take 1 tablet (0 5 mg total) by mouth every 6 (six) hours as needed for anxiety 15 tablet 0    LYSINE PO Take 1 capsule by mouth daily      montelukast (SINGULAIR) 10 mg tablet Take 1 tablet (10 mg total) by mouth daily 90 tablet 3    multivitamin (THERAGRAN) TABS Take 1 tablet by mouth daily      omeprazole (PriLOSEC) 20 mg delayed release capsule TAKE 1 CAPSULE DAILY AS NEEDED FOR STOMACH AND ACID 90 capsule 3    predniSONE 10 mg tablet Use 6/6/5/5/4/4/4/3/2/1 on successive days as directed 40 tablet 0    promethazine-codeine (PHENERGAN WITH CODEINE) 6 25-10 mg/5 mL syrup Take 5 mL by mouth every 4 (four) hours as needed for cough (at night) 120 mL 0    sertraline (ZOLOFT) 100 mg tablet Take 100 mg by mouth daily   valACYclovir (VALTREX) 1,000 mg tablet Use 2 pills every 12 hr for 2 doses, repeat as needed (Patient not taking: Reported on 1/2/2019 ) 20 tablet 0     No current facility-administered medications for this visit                Most recent labs available from [address] [place] [address]   ( others may be available in [place] / Media sections)  Lab Results   Component Value Date    WBC 4 2 09/07/2018    HGB 12 3 09/07/2018    HCT 37 7 09/07/2018     09/07/2018    CHOL 182 05/03/2017    TRIG 149 05/03/2017    HDL 51 05/03/2017    LDLDIRECT 105 05/03/2017    ALT 15 05/03/2017    AST 18 05/03/2017     05/03/2017    K 4 9 09/07/2018     09/07/2018    CREATININE 0 71 05/03/2017    BUN 15 09/07/2018    CO2 27 09/07/2018    HGBA1C 5 3 05/03/2017     No results found for: LDLCALC  No results found for: JAJ2OYYJBNOG, TSH      No orders of the defined types were placed in this encounter          Nancy Mccrary DO

## 2019-01-08 NOTE — PATIENT INSTRUCTIONS
With ongoing symptoms, wheezing along with right lower rhonchi we will cover for pneumonia with Augmentin, use extended course prednisone, continue with every 4 to 6 hr albuterol nebulizer, Pulmicort twice daily  Increase fluids  Update us 2 to 3 days, call sooner if needed  May require redo chest x-ray

## 2019-02-25 ENCOUNTER — APPOINTMENT (OUTPATIENT)
Dept: RADIOLOGY | Facility: MEDICAL CENTER | Age: 54
End: 2019-02-25
Payer: COMMERCIAL

## 2019-02-25 ENCOUNTER — OFFICE VISIT (OUTPATIENT)
Dept: FAMILY MEDICINE CLINIC | Facility: CLINIC | Age: 54
End: 2019-02-25
Payer: COMMERCIAL

## 2019-02-25 VITALS
DIASTOLIC BLOOD PRESSURE: 80 MMHG | HEART RATE: 88 BPM | WEIGHT: 232.5 LBS | SYSTOLIC BLOOD PRESSURE: 134 MMHG | TEMPERATURE: 98.1 F | OXYGEN SATURATION: 94 % | RESPIRATION RATE: 20 BRPM | HEIGHT: 67 IN | BODY MASS INDEX: 36.49 KG/M2

## 2019-02-25 DIAGNOSIS — J18.9 PNEUMONIA OF RIGHT LOWER LOBE DUE TO INFECTIOUS ORGANISM: Primary | ICD-10-CM

## 2019-02-25 DIAGNOSIS — J18.9 PNEUMONIA OF RIGHT LOWER LOBE DUE TO INFECTIOUS ORGANISM: ICD-10-CM

## 2019-02-25 DIAGNOSIS — N95.1 SYMPTOMATIC MENOPAUSAL OR FEMALE CLIMACTERIC STATES: ICD-10-CM

## 2019-02-25 DIAGNOSIS — J45.909 ASTHMATIC BRONCHITIS WITHOUT COMPLICATION, UNSPECIFIED ASTHMA SEVERITY, UNSPECIFIED WHETHER PERSISTENT: ICD-10-CM

## 2019-02-25 DIAGNOSIS — J45.21 MILD INTERMITTENT REACTIVE AIRWAY DISEASE WITH ACUTE EXACERBATION: ICD-10-CM

## 2019-02-25 PROCEDURE — 71046 X-RAY EXAM CHEST 2 VIEWS: CPT

## 2019-02-25 PROCEDURE — 99213 OFFICE O/P EST LOW 20 MIN: CPT | Performed by: PHYSICIAN ASSISTANT

## 2019-02-25 RX ORDER — SERTRALINE HYDROCHLORIDE 100 MG/1
100 TABLET, FILM COATED ORAL DAILY
Qty: 30 TABLET | Refills: 0 | Status: SHIPPED | OUTPATIENT
Start: 2019-02-25 | End: 2019-03-24 | Stop reason: SDUPTHER

## 2019-02-25 RX ORDER — PROMETHAZINE HYDROCHLORIDE AND CODEINE PHOSPHATE 6.25; 1 MG/5ML; MG/5ML
5 SYRUP ORAL EVERY 4 HOURS PRN
Qty: 120 ML | Refills: 0 | Status: SHIPPED | OUTPATIENT
Start: 2019-02-25 | End: 2019-03-29

## 2019-02-25 RX ORDER — ALBUTEROL SULFATE 2.5 MG/3ML
2.5 SOLUTION RESPIRATORY (INHALATION) EVERY 4 HOURS PRN
Qty: 25 VIAL | Refills: 1 | Status: SHIPPED | OUTPATIENT
Start: 2019-02-25 | End: 2019-11-05 | Stop reason: SDUPTHER

## 2019-02-25 RX ORDER — LEVOFLOXACIN 500 MG/1
500 TABLET, FILM COATED ORAL EVERY 24 HOURS
Qty: 10 TABLET | Refills: 0 | Status: SHIPPED | OUTPATIENT
Start: 2019-02-25 | End: 2019-03-07

## 2019-02-25 NOTE — PROGRESS NOTES
[place] [facility]  FAMILY PRACTICE ACUTE OFFICE VISIT       NAME: Dee Mcmullen  AGE: 52 y o  SEX: female       : 1965        MRN: [id number]    DATE: 2019  TIME: 8:06 PM    Assessment and Plan     Problem List Items Addressed This Visit        Respiratory    Reactive airway disease with acute exacerbation     Possible persistent RLL pneumonia - will check CXR and start Levaquin  Promethazine with codeine cough syrup added for relief at nighttime  Continue Pulmicort twice daily and albuterol as needed  Follow-up in 10-14 days for recheck consideration CT of the chest not significantly improved due to long-term persistence of this cough  Relevant Medications    promethazine-codeine (PHENERGAN WITH CODEINE) 6 25-10 mg/5 mL syrup    albuterol (2 5 mg/3 mL) 0 083 % nebulizer solution       Other    Symptomatic menopausal or female climacteric states    Relevant Medications    sertraline (ZOLOFT) 100 mg tablet      Other Visit Diagnoses     Pneumonia of right lower lobe due to infectious organism ([address] )    -  Primary    Relevant Medications    levofloxacin (LEVAQUIN) 500 mg tablet    promethazine-codeine (PHENERGAN WITH CODEINE) 6 25-10 mg/5 mL syrup    albuterol (2 5 mg/3 mL) 0 083 % nebulizer solution    budesonide (PULMICORT) 180 MCG/ACT inhaler    Other Relevant Orders    XR chest pa & lateral (Completed)    Asthmatic bronchitis without complication, unspecified asthma severity, unspecified whether persistent        Relevant Medications    promethazine-codeine (PHENERGAN WITH CODEINE) 6 25-10 mg/5 mL syrup    albuterol (2 5 mg/3 mL) 0 083 % nebulizer solution    budesonide (PULMICORT) 180 MCG/ACT inhaler          Reactive airway disease with acute exacerbation  Possible persistent RLL pneumonia - will check CXR and start Levaquin  Promethazine with codeine cough syrup added for relief at nighttime  Continue Pulmicort twice daily and albuterol as needed    Follow-up in 10-14 days for recheck consideration CT of the chest not significantly improved due to long-term persistence of this cough  Chief Complaint     Chief Complaint   Patient presents with    Cough    Shortness of Breath       History of Present Illness   Brittni Weaver is a 52y o -year-old female who presents for follow-up on her persistent cold symptoms  Patient presents today evaluation her cough cold symptoms have been present for about 3 months  [facility] and was diagnosed with bronchitis on 12/27/19  She was placed on an antibiotic nebulizing treatments  She was seen in the office about 1 5 months ago (on 1/2/19 with Dr Rosaura Minor) and was felt to an asthma exacerbation  She was given a prednisone taper  She was then seen again in the office and diagnosed with pneumonia about a week later by Dr Carlotta Yanez on 1/8/19  She was placed on another prednisone taper along with Augmentin  She notes that over the last month and a half, she has been worse again after some improvement from the last course of meds  She is concerned that she has cancer since the cough is not resolving  Cough   This is a chronic problem  Episode onset: about 3 months  The problem has been waxing and waning  The cough is productive of purulent sputum (milky to green)  Associated symptoms include postnasal drip, a sore throat (a few days ago but resolved), shortness of breath (with rest and exertion) and wheezing  Pertinent negatives include no chills, ear pain, fever, headaches or rhinorrhea  Treatments tried: Pulmicort BID and albuterol PRN up to 2 times a day as well as  Mucinex DM  Her past medical history is significant for bronchitis and pneumonia  quit 12-13 years ago         Review of Systems   Review of Systems   Constitutional: Negative for chills and fever  HENT: Positive for postnasal drip and sore throat (a few days ago but resolved)   Negative for congestion, ear pain, rhinorrhea and sinus pressure  Respiratory: Positive for cough, shortness of breath (with rest and exertion) and wheezing  Gastrointestinal: Negative for diarrhea, nausea and vomiting  Neurological: Positive for light-headedness (besides when got up from having head back to get washed for haircut)  Negative for dizziness and headaches         Active Problem List     Patient Active Problem List   Diagnosis    Abnormal mammogram of right breast    Acid reflux disease    Carpal tunnel syndrome    Colon polyp    Complete tear of right rotator cuff    Depression with anxiety    Disc degeneration, lumbar    HSV-1 (herpes simplex virus 1) infection    Low back pain    Migraine headache    Osteoarthritis    Pap smear abnormality of cervix with ASCUS favoring benign    Symptomatic menopausal or female climacteric states    Obesity (BMI 35 0-39 9 without comorbidity)    Hearing loss, left    Arthralgia    Reactive airway disease with acute exacerbation         Social History:  Social History     Socioeconomic History    Marital status: /Civil Union     Spouse name: Not on file    Number of children: Not on file    Years of education: Not on file    Highest education level: Not on file   Occupational History    Not on file   Social Needs    Financial resource strain: Not on file    Food insecurity:     Worry: Not on file     Inability: Not on file    Transportation needs:     Medical: Not on file     Non-medical: Not on file   Tobacco Use    Smoking status: Former Smoker     Last attempt to quit: 2006     Years since quittin 7    Smokeless tobacco: Never Used   Substance and Sexual Activity    Alcohol use: No    Drug use: No    Sexual activity: Not on file   Lifestyle    Physical activity:     Days per week: Not on file     Minutes per session: Not on file    Stress: Not on file   Relationships    Social connections:     Talks on phone: Not on file     Gets together: Not on file     Attends Restorationist service: Not on file     Active member of club or organization: Not on file     Attends meetings of clubs or organizations: Not on file     Relationship status: Not on file    Intimate partner violence:     Fear of current or ex partner: Not on file     Emotionally abused: Not on file     Physically abused: Not on file     Forced sexual activity: Not on file   Other Topics Concern    Not on file   Social History Narrative    Not on file       Objective     Vitals:    02/25/19 1515   BP: 134/80   BP Location: Left arm   Patient Position: Sitting   Cuff Size: Standard   Pulse: 88   Resp: 20   Temp: 98 1 °F (36 7 °C)   SpO2: 94%   Weight: 105 kg (232 lb 8 oz)   Height: 5' 6 5" (1 689 m)     Wt Readings from Last 3 Encounters:   02/25/19 105 kg (232 lb 8 oz)   01/02/19 106 kg (233 lb 6 4 oz)   11/09/18 104 kg (229 lb)       Physical Exam   Constitutional: She appears well-developed and well-nourished  HENT:   Head: Normocephalic and atraumatic  Right Ear: Tympanic membrane, external ear and ear canal normal    Left Ear: Tympanic membrane, external ear and ear canal normal    Nose: Nose normal  Right sinus exhibits no maxillary sinus tenderness and no frontal sinus tenderness  Left sinus exhibits no maxillary sinus tenderness and no frontal sinus tenderness  Mouth/Throat: Oropharynx is clear and moist and mucous membranes are normal    Eyes: Pupils are equal, round, and reactive to light  Conjunctivae and lids are normal    Neck: Trachea normal and normal range of motion  Neck supple  No thyromegaly present  Cardiovascular: Normal rate, regular rhythm and normal heart sounds  No murmur heard  Pulses:       Radial pulses are 2+ on the right side, and 2+ on the left side  Pulmonary/Chest: Effort normal  She has no decreased breath sounds  She has no wheezes  She has no rhonchi  She has no rales  Coarse breath sounds in the right lower lung   Lymphadenopathy:     She has no cervical adenopathy  Neurological: She is alert  Skin: No rash noted  Psychiatric: She has a normal mood and affect  Orders Placed This Encounter   Procedures    XR chest pa & lateral       ALLERGIES:  Allergies   Allergen Reactions    Effexor [Venlafaxine] Other (See Comments)     Hot and sweaty    Wellbutrin [Bupropion] Other (See Comments)     Hot and sweaty    Prempro [Conj Estrog-Medroxyprogest Ace] Rash       Current Medications     Current Outpatient Medications   Medication Sig Dispense Refill    albuterol (2 5 mg/3 mL) 0 083 % nebulizer solution Take 1 vial (2 5 mg total) by nebulization every 4 (four) hours as needed for wheezing (via nebulizer) 25 vial 1    albuterol (PROVENTIL HFA,VENTOLIN HFA) 90 mcg/act inhaler Inhale 2 puffs every 4 (four) hours as needed for wheezing 1 Inhaler 0    b complex vitamins tablet Take 1 tablet by mouth daily      budesonide (PULMICORT) 180 MCG/ACT inhaler Inhale 1 puff 2 (two) times a day 1 Inhaler 3    ibuprofen (ADVIL,MOTRIN) 100 MG chewable tablet Chew every 8 (eight) hours as needed for mild pain        LORazepam (ATIVAN) 0 5 mg tablet Take 1 tablet (0 5 mg total) by mouth every 6 (six) hours as needed for anxiety 15 tablet 0    montelukast (SINGULAIR) 10 mg tablet Take 1 tablet (10 mg total) by mouth daily 90 tablet 3    multivitamin (THERAGRAN) TABS Take 1 tablet by mouth daily      omeprazole (PriLOSEC) 20 mg delayed release capsule TAKE 1 CAPSULE DAILY AS NEEDED FOR STOMACH AND ACID 90 capsule 3    sertraline (ZOLOFT) 100 mg tablet Take 1 tablet (100 mg total) by mouth daily 30 tablet 0    valACYclovir (VALTREX) 1,000 mg tablet Use 2 pills every 12 hr for 2 doses, repeat as needed 20 tablet 0    levofloxacin (LEVAQUIN) 500 mg tablet Take 1 tablet (500 mg total) by mouth every 24 hours for 10 days 10 tablet 0    LYSINE PO Take 1 capsule by mouth daily      predniSONE 10 mg tablet Use 6/6/5/5/4/4/4/3/2/1 on successive days as directed (Patient not taking: Reported on 2/25/2019) 40 tablet 0    promethazine-codeine (PHENERGAN WITH CODEINE) 6 25-10 mg/5 mL syrup Take 5 mL by mouth every 4 (four) hours as needed for cough (at night) 120 mL 0     No current facility-administered medications for this visit            Ayden Jones PA-C  2/25/2019 8:06 PM  [facility]

## 2019-02-26 NOTE — ASSESSMENT & PLAN NOTE
Possible persistent RLL pneumonia - will check CXR and start Levaquin  Promethazine with codeine cough syrup added for relief at nighttime  Continue Pulmicort twice daily and albuterol as needed  Follow-up in 10-14 days for recheck consideration CT of the chest not significantly improved due to long-term persistence of this cough

## 2019-03-01 ENCOUNTER — OFFICE VISIT (OUTPATIENT)
Dept: BARIATRICS | Facility: CLINIC | Age: 54
End: 2019-03-01
Payer: COMMERCIAL

## 2019-03-01 VITALS
BODY MASS INDEX: 36.44 KG/M2 | WEIGHT: 232.2 LBS | HEART RATE: 83 BPM | HEIGHT: 67 IN | SYSTOLIC BLOOD PRESSURE: 130 MMHG | TEMPERATURE: 98.1 F | DIASTOLIC BLOOD PRESSURE: 78 MMHG | RESPIRATION RATE: 20 BRPM

## 2019-03-01 DIAGNOSIS — F41.8 DEPRESSION WITH ANXIETY: ICD-10-CM

## 2019-03-01 DIAGNOSIS — M51.36 DISC DEGENERATION, LUMBAR: ICD-10-CM

## 2019-03-01 DIAGNOSIS — E66.9 OBESITY (BMI 35.0-39.9 WITHOUT COMORBIDITY): Primary | ICD-10-CM

## 2019-03-01 PROCEDURE — 99243 OFF/OP CNSLTJ NEW/EST LOW 30: CPT | Performed by: FAMILY MEDICINE

## 2019-03-01 NOTE — PROGRESS NOTES
Assessment/Plan:    No problem-specific Assessment & Plan notes found for this encounter  Diagnoses and all orders for this visit:    Obesity (BMI 35 0-39 9 without comorbidity)    Depression with anxiety    Disc degeneration, lumbar        -Discussed options of HealthyCORE-Intensive Lifestyle Intervention Program, Very Low Calorie Diet-VLCD and Conservative Program and the role of weight loss medications   -Initial weight loss goal of 5-10% weight loss for improved health  -Screening labs  -Patient is interested in pursuing Conservative Program    Goals:  Food log (ie ) www myfitnesspal com,sparkpeople  com,loseit com,calorieking  com,etc  baritastic  No sugary beverages  At least 64oz of water daily  Increase physical activity by 10 minutes daily  Gradually increase physical activity to a goal of 5 days per week for 30 minutes of MODERATE intensity PLUS 2 days per week of FULL BODY resistance training  Goal protein intake of 60-80 grams per day, 5-10 servings of fruits and vegetables per day, 25-35 grams of dietary fiber per day and 7325-3973 calories per day      45 minute visit, >50% face-to-face time spent counseling patient on surgical and nonsurgical interventions for the treatment of excess weight  Discussed in detail nonsurgical options including intensive lifestyle intervention program, very low-calorie diet program and conservative program   Discussed the role of weight loss medications  Counseled patient on diet behavior and exercise modification for weight loss  Follow up in approximately 6 weeks with Non-Surgical Physician/Advanced Practitioner  Subjective:   Chief Complaint   Patient presents with    Consult     MWM consult        Patient ID: Maine Nicholson  is a 52 y o  female with excess weight/obesity here to pursue weight management      Past Medical History:   Diagnosis Date    Acid reflux     Asthmatic bronchitis     Back pain     Carpal tunnel syndrome     Depression     Hyperglycemia     Lipoma     Migraine     Osteoarthritis     PONV (postoperative nausea and vomiting)     UTI (urinary tract infection)        HPI:    Obesity  Severity: class 2  Onset: For 5 years, menopause   Modifiers: Diet and Exercise and Commercial Weight Loss Programs-ie  Weight Watchers, Donnalee Rimes, Nutrisystem, etc   Contributing factors: Menopause  Associated symptoms: comorbid conditions, increased joint pain and decreased self esteem    Goals: 155lbs   Hydration: water- 50-60oz a day  Alcohol: ocasionally  Exercise: no, physically active at work  Sleep:  STOP ban/8    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  , 2 adult girls  High stress in live:  A close friend  recently  2 elderly parents    The following portions of the patient's history were reviewed and updated as appropriate: allergies, current medications, past family history, past medical history, past social history, past surgical history and problem list     Review of Systems   Constitutional: Negative for activity change and appetite change  HENT: Negative  Respiratory: Negative  Cardiovascular: Negative  Gastrointestinal: Negative  Genitourinary: Negative  Musculoskeletal: Positive for back pain  Negative for arthralgias  Skin: Negative for rash  Neurological: Negative  Psychiatric/Behavioral: Negative  Objective:    /78 (BP Location: Left arm, Patient Position: Sitting, Cuff Size: Adult)   Pulse 83   Temp 98 1 °F (36 7 °C) (Tympanic)   Resp 20   Ht 5' 6 75" (1 695 m)   Wt 105 kg (232 lb 3 2 oz)   BMI 36 64 kg/m²     Physical Exam     Constitutional   General appearance: Abnormal   well developed and obese  Eyes No conjunctival pallor  Ears, Nose, Mouth, and Throat Oral mucosa moist    Pulmonary   Respiratory effort: No increased work of breathing or signs of respiratory distress      Auscultation of lungs: Clear to auscultation, equal breath sounds bilaterally, no wheezes, no rales, no rhonci  Cardiovascular   Auscultation of heart: Normal rate and rhythm, normal S1 and S2, without murmurs  Examination of extremities for edema and/or varicosities: Normal   no edema  Abdomen   Abdomen: Abnormal   The abdomen was obese  Bowel sounds were normal  The abdomen was soft and nontender     Musculoskeletal   Gait and station: Normal     Psychiatric   Orientation to person, place and time: Normal     Affect: appropriate

## 2019-03-03 NOTE — PROGRESS NOTES
[place] [facility]  FAM[facility] PRACT[facility] OFFICE VISIT       NAME: Braden Mcmullen  AGE: 52 y o  SEX: female       : 1965        MRN: [id number]    DATE: 3/8/2019  TIME: 6:32 PM    Assessment and Plan     Problem List Items Addressed This Visit        Respiratory    Reactive airway disease with acute exacerbation     Appears persistently flared  Will change Pulmicort to Advair  Continue albuterol as needed  Continue Singulair 10 mg daily  Will reassess in about 3 weeks  Relevant Medications    fluticasone-salmeterol (ADVAIR) 250-50 mcg/dose inhaler       Other    Chronic coughing - Primary     With minimal improvement over the last 3 months  Will check chest CT  Change Pulmicort to Advair and add Flonase  Follow-up in 3 weeks  Relevant Orders    CT chest wo contrast      Other Visit Diagnoses     Nasal congestion        look for improvement with Flonase  Relevant Medications    fluticasone (FLONASE) 50 mcg/act nasal spray    Claustrophobia        given single Valium due to fear of CT scan  Relevant Medications    diazepam (VALIUM) 10 mg tablet              Chief Complaint     Chief Complaint   Patient presents with    Follow-up     chronic cough       History of Present Illness   Von Nava is a 52y o -year-old female who presents for follow-up on chronic cough  Patient presents today for follow-up on her chronic cough  She presented about ten days ago for a cough that she had had for about 3 months  She had noted the as he having symptoms that waxed and waned over that period of time but had a cough that was productive for purulence sputum in addition to postnasal drip, sore throat, shortness of breath, and wheezing  She had tried Pulmicort twice daily and albuterol on average about 2 times per day as well as Mucinex DM    She had been seen 3 times over the course of these symptoms and had initially been diagnosed bronchitis and given a Z-Jimmy and nebulizing treatments  She was then diagnosed with an asthma exacerbation and given a prednisone taper and subsequently diagnosed with pneumonia and treated with Augmentin and another prednisone taper  At her last visit it was felt that she had possible persistent right lower lobe pneumonia  Her chest x-ray was ordered at that visit did not reveal any evidence of pneumonia, but she was directed to continue the Levaquin that she had been started on at her visit  She is finished the Levaquin and has continued with Pulmicort twice daily and albuterol as needed  She notes that since her last visit, she has slightly improved - maybe 25% better since the worst point of this  She has still be bringing up mucus and feeling tight/wheezy  She notes that she continues with Pulmicort twice daily, albuterol neb twice daily, cough medication, and at times the albuterol inhaler in the middle of the day  We discussed that we would consider CT of the chest if not significantly improved  Review of Systems   Review of Systems   Constitutional: Negative for chills and fever  HENT: Positive for ear pain (and popped on the left), postnasal drip and sore throat (sometimes)  Negative for congestion, rhinorrhea and sinus pressure  Respiratory: Positive for cough (productive - slightly better), chest tightness, shortness of breath and wheezing  Gastrointestinal: Negative for diarrhea, nausea and vomiting  Neurological: Positive for headaches (especially the left side of the face when coughing fits occur)  Negative for dizziness         Active Problem List     Patient Active Problem List   Diagnosis    Abnormal mammogram of right breast    Acid reflux disease    Carpal tunnel syndrome    Colon polyp    Complete tear of right rotator cuff    Depression with anxiety    Disc degeneration, lumbar    HSV-1 (herpes simplex virus 1) infection    Low back pain    Migraine headache    Osteoarthritis    Pap smear abnormality of cervix with ASCUS favoring benign    Symptomatic menopausal or female climacteric states    Obesity (BMI 35 0-39 9 without comorbidity)    Hearing loss, left    Arthralgia    Reactive airway disease with acute exacerbation    Chronic coughing         Past Medical History:  Past Medical History:   Diagnosis Date    Acid reflux     Anxiety     Asthmatic bronchitis     Back pain     Carpal tunnel syndrome     Depression     Depression     Hyperglycemia     Hypertension     Lipoma     Migraine     Miscarriage     Osteoarthritis     PONV (postoperative nausea and vomiting)     Stress incontinence     UTI (urinary tract infection)        Past Surgical History:  Past Surgical History:   Procedure Laterality Date    APPENDECTOMY      BREAST BIOPSY      COLONOSCOPY N/A 2016    Procedure: COLONOSCOPY;  Surgeon: Carlos Peacock MD;  Location: [facility];   Service:     KNEE ARTHROSCOPY      AR EXC SKIN BENIG <0 5 CM TRUNK,ARM,LEG Left 2016    Procedure: EXCISION LIPOMA SHOULDER ;  Surgeon: Carlos Peacock MD;  Location: [facility];  Service: General    TUBAL LIGATION         Family History:  Family History   Problem Relation Age of Onset    Stroke Mother     Stomach cancer Mother     Skin cancer Father     Heart disease Brother        Social History:  Social History     Socioeconomic History    Marital status: /Civil Union     Spouse name: Not on file    Number of children: Not on file    Years of education: Not on file    Highest education level: Not on file   Occupational History    Not on file   Social Needs    Financial resource strain: Not on file    Food insecurity:     Worry: Not on file     Inability: Not on file    Transportation needs:     Medical: Not on file     Non-medical: Not on file   Tobacco Use    Smoking status: Former Smoker     Last attempt to quit: 2006     Years since quittin 8    Smokeless tobacco: Never Used   Substance and Sexual Activity    Alcohol use: No    Drug use: No    Sexual activity: Not on file   Lifestyle    Physical activity:     Days per week: Not on file     Minutes per session: Not on file    Stress: Not on file   Relationships    Social connections:     Talks on phone: Not on file     Gets together: Not on file     Attends [employer] service: Not on file     Active member of club or organization: Not on file     Attends meetings of clubs or organizations: Not on file     Relationship status: Not on file    Intimate partner violence:     Fear of current or ex partner: Not on file     Emotionally abused: Not on file     Physically abused: Not on file     Forced sexual activity: Not on file   Other Topics Concern    Not on file   Social History Narrative    Not on file       Objective     Vitals:    03/08/19 0800   BP: 132/80   BP Location: Left arm   Patient Position: Sitting   Cuff Size: Standard   Pulse: 72   Temp: 97 8 °F (36 6 °C)   SpO2: 97%   Weight: 105 kg (232 lb 6 oz)   Height: 5' 6 5" (1 689 m)     Wt Readings from Last 3 Encounters:   03/08/19 105 kg (232 lb 6 oz)   03/01/19 105 kg (232 lb 3 2 oz)   02/25/19 105 kg (232 lb 8 oz)       Physical Exam   Constitutional: She appears well-developed and well-nourished  No distress  HENT:   Head: Normocephalic and atraumatic  Right Ear: External ear normal    Left Ear: External ear normal    Nose: Mucosal edema (bilaterally) present  Right sinus exhibits no maxillary sinus tenderness and no frontal sinus tenderness  Left sinus exhibits no maxillary sinus tenderness and no frontal sinus tenderness  Mouth/Throat: Oropharynx is clear and moist    Postnasal drip noted   Cardiovascular: Normal rate, regular rhythm, normal heart sounds and intact distal pulses  No murmur heard  Pulmonary/Chest: Effort normal and breath sounds normal  She has no wheezes  She has no rales  Skin: Skin is warm and dry  No rash noted  Psychiatric: She has a normal mood and affect  Vitals reviewed        Pertinent Laboratory/Diagnostic Studies:  Lab Results   Component Value Date    BUN 15 09/07/2018    CREATININE 0 71 05/03/2017    CALCIUM 9 2 09/07/2018     05/03/2017    K 4 9 09/07/2018    CO2 27 09/07/2018     09/07/2018     Lab Results   Component Value Date    ALT 18 09/07/2018    AST 20 09/07/2018    ALKPHOS 61 09/07/2018    BILITOT 0 6 05/03/2017       Lab Results   Component Value Date    WBC 4 2 09/07/2018    HGB 12 3 09/07/2018    HCT 37 7 09/07/2018    MCV 90 4 09/07/2018     09/07/2018     Lab Results   Component Value Date    CHOL 182 05/03/2017     Lab Results   Component Value Date    TRIG 149 05/03/2017     Lab Results   Component Value Date    HDL 51 05/03/2017     Lab Results   Component Value Date    HGBA1C 5 3 05/03/2017       Results for orders placed or performed in visit on 11/28/18   JUANJO Screen w/ Reflex to Titer/Pattern   Result Value Ref Range    JUANJO Screen, IFA NEGATIVE NEGATIVE   Rheumatoid Factor   Result Value Ref Range    Rheumatoid Factor <14 <14 IU/mL   C-reactive protein   Result Value Ref Range    C-Reactive Protein, Quant 3 4 <8 0 mg/L   Hepatitis C Ab W/Refl To HCV RNA, Qn, PCR   Result Value Ref Range    HEP C AB NON-REACTIVE NON-REACTIVE    Signal to Cut-Off 0 01 <1 00       Orders Placed This Encounter   Procedures    CT chest wo contrast       ALLERGIES:  Allergies   Allergen Reactions    Effexor [Venlafaxine] Other (See Comments)     Hot and sweaty    Wellbutrin [Bupropion] Other (See Comments)     Hot and sweaty    Prempro [Conj Estrog-Medroxyprogest Ace] Rash       Current Medications     Current Outpatient Medications   Medication Sig Dispense Refill    albuterol (2 5 mg/3 mL) 0 083 % nebulizer solution Take 1 vial (2 5 mg total) by nebulization every 4 (four) hours as needed for wheezing (via nebulizer) 25 vial 1    albuterol (PROVENTIL HFA,VENTOLIN HFA) 90 mcg/act inhaler Inhale 2 puffs every 4 (four) hours as needed for wheezing 1 Inhaler 0    b complex vitamins tablet Take 1 tablet by mouth daily      ibuprofen (ADVIL,MOTRIN) 100 MG chewable tablet Chew every 8 (eight) hours as needed for mild pain   LORazepam (ATIVAN) 0 5 mg tablet Take 1 tablet (0 5 mg total) by mouth every 6 (six) hours as needed for anxiety 15 tablet 0    LYSINE PO Take 1 capsule by mouth daily      montelukast (SINGULAIR) 10 mg tablet Take 1 tablet (10 mg total) by mouth daily 90 tablet 3    multivitamin (THERAGRAN) TABS Take 1 tablet by mouth daily      omeprazole (PriLOSEC) 20 mg delayed release capsule TAKE 1 CAPSULE DAILY AS NEEDED FOR STOMACH AND ACID 90 capsule 3    promethazine-codeine (PHENERGAN WITH CODEINE) 6 25-10 mg/5 mL syrup Take 5 mL by mouth every 4 (four) hours as needed for cough (at night) 120 mL 0    sertraline (ZOLOFT) 100 mg tablet Take 1 tablet (100 mg total) by mouth daily 30 tablet 0    valACYclovir (VALTREX) 1,000 mg tablet Use 2 pills every 12 hr for 2 doses, repeat as needed 20 tablet 0    diazepam (VALIUM) 10 mg tablet Take 1 tablet 30-60 minutes prior to imaging test  1 tablet 0    fluticasone (FLONASE) 50 mcg/act nasal spray 2 sprays into each nostril daily 16 g 0    fluticasone-salmeterol (ADVAIR) 250-50 mcg/dose inhaler Inhale 1 puff 2 (two) times a day Rinse mouth after use  1 Inhaler 5     No current facility-administered medications for this visit            Health Maintenance     Health Maintenance   Topic Date Due    BMI: Followup Plan  03/13/1981    Pneumococcal PPSV23 Medium Risk Adult (1 of 1 - PPSV23) 03/13/1982    DTaP,Tdap,and Td Vaccines (1 - Tdap) 06/13/2017    Depression Screening PHQ  02/15/2019    MAMMOGRAM  03/01/2019    CRC Screening: Colonoscopy  04/08/2019    PAP SMEAR  12/21/2019    BMI: Adult  03/08/2020    Hepatitis C Screening  Completed    INFLUENZA VACCINE  Completed    HEPATITIS B VACCINES  Aged Out     Immunization History   Administered Date(s) Administered    INFLUENZA 11/16/2007, 10/15/2008, 09/24/2009, 09/23/2010, 11/09/2018    Influenza Quadrivalent Preservative Free 3 years and older IM 10/09/2014, 09/25/2017    Influenza TIV (IM) 11/01/2010    Influenza, recombinant, quadrivalent,injectable, preservative free 11/09/2018    Td (adult), adsorbed 06/12/2017       Cesia Peters PA-C  3/8/2019 6:32 PM  [facility]

## 2019-03-08 ENCOUNTER — OFFICE VISIT (OUTPATIENT)
Dept: FAMILY MEDICINE CLINIC | Facility: CLINIC | Age: 54
End: 2019-03-08
Payer: COMMERCIAL

## 2019-03-08 VITALS
SYSTOLIC BLOOD PRESSURE: 132 MMHG | BODY MASS INDEX: 36.47 KG/M2 | HEIGHT: 67 IN | OXYGEN SATURATION: 97 % | TEMPERATURE: 97.8 F | WEIGHT: 232.38 LBS | DIASTOLIC BLOOD PRESSURE: 80 MMHG | HEART RATE: 72 BPM

## 2019-03-08 DIAGNOSIS — R09.81 NASAL CONGESTION: ICD-10-CM

## 2019-03-08 DIAGNOSIS — R05.3 CHRONIC COUGHING: Primary | ICD-10-CM

## 2019-03-08 DIAGNOSIS — J45.901 REACTIVE AIRWAY DISEASE WITH ACUTE EXACERBATION, UNSPECIFIED ASTHMA SEVERITY, UNSPECIFIED WHETHER PERSISTENT: ICD-10-CM

## 2019-03-08 DIAGNOSIS — F40.240 CLAUSTROPHOBIA: ICD-10-CM

## 2019-03-08 PROCEDURE — 1036F TOBACCO NON-USER: CPT | Performed by: PHYSICIAN ASSISTANT

## 2019-03-08 PROCEDURE — 99213 OFFICE O/P EST LOW 20 MIN: CPT | Performed by: PHYSICIAN ASSISTANT

## 2019-03-08 RX ORDER — FLUTICASONE PROPIONATE 50 MCG
2 SPRAY, SUSPENSION (ML) NASAL DAILY
Qty: 16 G | Refills: 0 | Status: SHIPPED | OUTPATIENT
Start: 2019-03-08 | End: 2020-08-17 | Stop reason: ALTCHOICE

## 2019-03-08 RX ORDER — DIAZEPAM 10 MG/1
TABLET ORAL
Qty: 1 TABLET | Refills: 0 | Status: SHIPPED | OUTPATIENT
Start: 2019-03-08 | End: 2019-12-04 | Stop reason: ALTCHOICE

## 2019-03-08 NOTE — ASSESSMENT & PLAN NOTE
With minimal improvement over the last 3 months  Will check chest CT  Change Pulmicort to Advair and add Flonase  Follow-up in 3 weeks

## 2019-03-08 NOTE — ASSESSMENT & PLAN NOTE
Appears persistently flared  Will change Pulmicort to Advair  Continue albuterol as needed  Continue Singulair 10 mg daily  Will reassess in about 3 weeks

## 2019-03-18 ENCOUNTER — HOSPITAL ENCOUNTER (OUTPATIENT)
Dept: CT IMAGING | Facility: HOSPITAL | Age: 54
Discharge: HOME | End: 2019-03-18
Payer: COMMERCIAL

## 2019-03-18 DIAGNOSIS — R05.3 CHRONIC COUGHING: ICD-10-CM

## 2019-03-18 PROCEDURE — 71250 CT THORAX DX C-: CPT

## 2019-03-24 DIAGNOSIS — N95.1 SYMPTOMATIC MENOPAUSAL OR FEMALE CLIMACTERIC STATES: ICD-10-CM

## 2019-03-24 NOTE — PROGRESS NOTES
[facility]  FAMILY PRACTICE OFFICE VISIT       NAME: Komal Mcmullen  AGE: 62 y o  SEX: female       : 1965        MRN: [id number]    DATE: 3/29/2019  TIME: 10:26 AM    Assessment and Plan     Problem List Items Addressed This Visit        Other    Chronic coughing - Primary     Patient has continued to have a chronic cough over the last 4 months or so  She notes that outside of her recent flaring of symptoms with an additional infection that is now resolving, she thinks that she might of had some slight improvement in her symptoms with switching from Pulmicort Advair  She was encouraged to continue with Advair 250/51 puff twice daily as well as singular 10 mg daily and Flonase 2 sprays per nostril daily  She was encouraged to use her albuterol (either inhaler or nebulizer) as needed for any significant wheezing, coughing, or shortness of breath  She was referred to pulmonology for further assistance in determining the cause of her cough as she has not had any explanation determinable with chest x-ray or chest CT and has had minimal improvement despite changing medication  Relevant Orders    Ambulatory referral to Pulmonology          Chronic coughing  Patient has continued to have a chronic cough over the last 4 months or so  She notes that outside of her recent flaring of symptoms with an additional infection that is now resolving, she thinks that she might of had some slight improvement in her symptoms with switching from Pulmicort Advair  She was encouraged to continue with Advair 250/51 puff twice daily as well as singular 10 mg daily and Flonase 2 sprays per nostril daily  She was encouraged to use her albuterol (either inhaler or nebulizer) as needed for any significant wheezing, coughing, or shortness of breath    She was referred to pulmonology for further assistance in determining the cause of her cough as she has not had any explanation determinable with chest x-ray or chest CT and has had minimal improvement despite changing medication  Chief Complaint     Chief Complaint   Patient presents with    Follow-up     Chronic cough       History of Present Illness   Von Nava is a 62y o -year-old female who presents for 3 week follow-up on persistent cough  Patient presented about 3 weeks ago for concern of a persistent cough  She had noted waxing and waning of a cough along with production of purulent sputum, postnasal drip, wheezing, shortness of breath  She had been diagnosed with bronchitis  She had been on a Z-Jimmy, nebulizing treatments, prednisone taper, Augmentin with another prednisone taper when she was felt to have pneumonia  She had a chest x-ray that did not reveal any pneumonia  She was also using Pulmicort plus albuterol as needed  At the time of her last visit she felt that she was still only about 25% better than when her symptoms were at its worst   She was sent for CT of the chest given the prolonged course of this cough  The CT did not find any explanation for her persistent cough  It noted a few scattered nodules the did appear concerning, but she should follow up with another CT in 12 months due to her smoking  At her visit last month, she was also changed from Pulmicort to Advair and Flonase was added due to the postnasal drip  She reports that her symptoms have continued  She notes that it did not make a difference with changing to Advair and using the Flonase  She notes that she thinks that she additionally got sick since last here but that is improving  She had green mucus that she was blowing out and coughing up but now this is returning to a milky color and she is feeling close to baseline again  Review of Systems   Review of Systems   Constitutional: Negative for chills and fever  HENT: Positive for postnasal drip  Negative for congestion and sinus pressure  Respiratory: Positive for cough   Negative for chest tightness, shortness of breath and wheezing  Active Problem List     Patient Active Problem List   Diagnosis    Abnormal mammogram of right breast    Acid reflux disease    Carpal tunnel syndrome    Colon polyp    Complete tear of right rotator cuff    Depression with anxiety    Disc degeneration, lumbar    HSV-1 (herpes simplex virus 1) infection    Low back pain    Migraine headache    Osteoarthritis    Pap smear abnormality of cervix with ASCUS favoring benign    Symptomatic menopausal or female climacteric states    Obesity (BMI 35 0-39 9 without comorbidity)    Hearing loss, left    Arthralgia    Reactive airway disease with acute exacerbation    Chronic coughing         Past Medical History:  Past Medical History:   Diagnosis Date    Acid reflux     Anxiety     Asthmatic bronchitis     Back pain     Carpal tunnel syndrome     Depression     Depression     Hyperglycemia     Hypertension     Lipoma     Migraine     Miscarriage     Osteoarthritis     PONV (postoperative nausea and vomiting)     Stress incontinence     UTI (urinary tract infection)        Past Surgical History:  Past Surgical History:   Procedure Laterality Date    APPENDECTOMY      BREAST BIOPSY      COLONOSCOPY N/A 4/8/2016    Procedure: COLONOSCOPY;  Surgeon: Salome Ramirez MD;  Location: [facility] GI LAB;   Service:     KNEE ARTHROSCOPY      MN EXC SKIN BENIG <0 5 CM TRUNK,ARM,LEG Left 5/19/2016    Procedure: EXCISION LIPOMA SHOULDER ;  Surgeon: Salome Ramirez MD;  Location: [facility] OR;  Service: General    TUBAL LIGATION         Family History:  Family History   Problem Relation Age of Onset    Stroke Mother     Stomach cancer Mother     Skin cancer Father     Heart disease Brother        Social History:  Social History     Socioeconomic History    Marital status: /Civil Union     Spouse name: Not on file    Number of children: Not on file    Years of education: Not on file    Highest education level: Not on file   Occupational History    Not on file   Social Needs    Financial resource strain: Not on file    Food insecurity:     Worry: Not on file     Inability: Not on file    Transportation needs:     Medical: Not on file     Non-medical: Not on file   Tobacco Use    Smoking status: Former Smoker     Last attempt to quit: 2006     Years since quittin 8    Smokeless tobacco: Never Used   Substance and Sexual Activity    Alcohol use: No    Drug use: No    Sexual activity: Not on file   Lifestyle    Physical activity:     Days per week: Not on file     Minutes per session: Not on file    Stress: Not on file   Relationships    Social connections:     Talks on phone: Not on file     Gets together: Not on file     Attends Synagogue service: Not on file     Active member of club or organization: Not on file     Attends meetings of clubs or organizations: Not on file     Relationship status: Not on file    Intimate partner violence:     Fear of current or ex partner: Not on file     Emotionally abused: Not on file     Physically abused: Not on file     Forced sexual activity: Not on file   Other Topics Concern    Not on file   Social History Narrative    Not on file       Objective     Vitals:    19 0933   BP: 120/80   BP Location: Left arm   Patient Position: Sitting   Cuff Size: Large   Pulse: 68   Resp: 16   Temp: 97 8 °F (36 6 °C)   SpO2: 96%   Weight: 103 kg (228 lb 2 oz)   Height: 5' 6 5" (1 689 m)     Wt Readings from Last 3 Encounters:   19 103 kg (228 lb 2 oz)   19 105 kg (232 lb 6 oz)   19 105 kg (232 lb 3 2 oz)       Physical Exam   Constitutional: She appears well-developed and well-nourished  No distress  HENT:   Head: Normocephalic and atraumatic  Right Ear: External ear normal    Left Ear: External ear normal    Nose: Mucosal edema ( of the left nasal turbinates without erythema) present  No rhinorrhea   Right sinus exhibits no maxillary sinus tenderness and no frontal sinus tenderness  Left sinus exhibits no maxillary sinus tenderness and no frontal sinus tenderness  Mouth/Throat: Oropharynx is clear and moist  No oropharyngeal exudate  Eyes: Conjunctivae are normal    Neck: Neck supple  No thyromegaly present  Cardiovascular: Normal rate, regular rhythm, normal heart sounds and intact distal pulses  No murmur heard  Pulmonary/Chest: Effort normal  She has no decreased breath sounds  She has no wheezes  She has rhonchi (Diffusely in the posterior lung fields, clear anteriorly)  She has no rales  Lymphadenopathy:     She has no cervical adenopathy  Psychiatric: She has a normal mood and affect  Vitals reviewed        Pertinent Laboratory/Diagnostic Studies:  Lab Results   Component Value Date    BUN 15 09/07/2018    CREATININE 0 71 05/03/2017    CALCIUM 9 2 09/07/2018     05/03/2017    K 4 9 09/07/2018    CO2 27 09/07/2018     09/07/2018     Lab Results   Component Value Date    ALT 18 09/07/2018    AST 20 09/07/2018    ALKPHOS 61 09/07/2018    BILITOT 0 6 05/03/2017       Lab Results   Component Value Date    WBC 4 2 09/07/2018    HGB 12 3 09/07/2018    HCT 37 7 09/07/2018    MCV 90 4 09/07/2018     09/07/2018     Lab Results   Component Value Date    CHOL 182 05/03/2017     Lab Results   Component Value Date    TRIG 149 05/03/2017     Lab Results   Component Value Date    HDL 51 05/03/2017     Lab Results   Component Value Date    HGBA1C 5 3 05/03/2017       Results for orders placed or performed in visit on 11/28/18   JUANJO Screen w/ Reflex to Titer/Pattern   Result Value Ref Range    JUANJO Screen, IFA NEGATIVE NEGATIVE   Rheumatoid Factor   Result Value Ref Range    Rheumatoid Factor <14 <14 IU/mL   C-reactive protein   Result Value Ref Range    C-Reactive Protein, Quant 3 4 <8 0 mg/L   Hepatitis C Ab W/Refl To HCV RNA, Qn, PCR   Result Value Ref Range    HEP C AB NON-REACTIVE NON-REACTIVE Signal to Cut-Off 0 01 <1 00       Orders Placed This Encounter   Procedures    Ambulatory referral to Pulmonology       ALLERGIES:  Allergies   Allergen Reactions    Effexor [Venlafaxine] Other (See Comments)     Hot and sweaty    Wellbutrin [Bupropion] Other (See Comments)     Hot and sweaty    Prempro [Conj Estrog-Medroxyprogest Ace] Rash       Current Medications     Current Outpatient Medications   Medication Sig Dispense Refill    albuterol (2 5 mg/3 mL) 0 083 % nebulizer solution Take 1 vial (2 5 mg total) by nebulization every 4 (four) hours as needed for wheezing (via nebulizer) 25 vial 1    albuterol (PROVENTIL HFA,VENTOLIN HFA) 90 mcg/act inhaler Inhale 2 puffs every 4 (four) hours as needed for wheezing 1 Inhaler 0    b complex vitamins tablet Take 1 tablet by mouth daily      diazepam (VALIUM) 10 mg tablet Take 1 tablet 30-60 minutes prior to imaging test  1 tablet 0    fluticasone (FLONASE) 50 mcg/act nasal spray 2 sprays into each nostril daily 16 g 0    fluticasone-salmeterol (ADVAIR) 250-50 mcg/dose inhaler Inhale 1 puff 2 (two) times a day Rinse mouth after use  1 Inhaler 5    ibuprofen (ADVIL,MOTRIN) 100 MG chewable tablet Chew every 8 (eight) hours as needed for mild pain        LORazepam (ATIVAN) 0 5 mg tablet Take 1 tablet (0 5 mg total) by mouth every 6 (six) hours as needed for anxiety 15 tablet 0    LYSINE PO Take 1 capsule by mouth daily as needed (for cold sore)       montelukast (SINGULAIR) 10 mg tablet Take 1 tablet (10 mg total) by mouth daily 90 tablet 3    multivitamin (THERAGRAN) TABS Take 1 tablet by mouth daily      omeprazole (PriLOSEC) 20 mg delayed release capsule TAKE 1 CAPSULE DAILY AS NEEDED FOR STOMACH AND ACID 90 capsule 3    sertraline (ZOLOFT) 100 mg tablet TAKE 1 TABLET BY MOUTH EVERY DAY 30 tablet 5    valACYclovir (VALTREX) 1,000 mg tablet Use 2 pills every 12 hr for 2 doses, repeat as needed 20 tablet 0    promethazine-codeine (PHENERGAN WITH CODEINE) 6 25-10 mg/5 mL syrup Take 5 mL by mouth every 4 (four) hours as needed for cough (at night) (Patient not taking: Reported on 3/29/2019) 120 mL 0     No current facility-administered medications for this visit            Health Maintenance     Health Maintenance   Topic Date Due    BMI: Followup Plan  03/13/1981    Pneumococcal PPSV23 Medium Risk Adult (1 of 1 - PPSV23) 03/13/1982    DTaP,Tdap,and Td Vaccines (1 - Tdap) 06/13/2017    Depression Screening PHQ  02/15/2019    MAMMOGRAM  03/01/2019    CRC Screening: Colonoscopy  04/08/2019    PAP SMEAR  12/21/2019    BMI: Adult  03/08/2020    Hepatitis C Screening  Completed    INFLUENZA VACCINE  Completed    HEPATITIS B VACCINES  Aged Out     Immunization History   Administered Date(s) Administered    INFLUENZA 11/16/2007, 10/15/2008, 09/24/2009, 09/23/2010, 11/09/2018    Influenza Quadrivalent Preservative Free 3 years and older IM 10/09/2014, 09/25/2017    Influenza TIV (IM) 11/01/2010    Influenza, recombinant, quadrivalent,injectable, preservative free 11/09/2018    Td (adult), adsorbed 06/12/2017       Joy Bynum PA-C  3/29/2019 10:26 AM  [facility]

## 2019-03-25 RX ORDER — SERTRALINE HYDROCHLORIDE 100 MG/1
TABLET, FILM COATED ORAL
Qty: 30 TABLET | Refills: 5 | Status: SHIPPED | OUTPATIENT
Start: 2019-03-25 | End: 2020-02-07

## 2019-03-29 ENCOUNTER — OFFICE VISIT (OUTPATIENT)
Dept: FAMILY MEDICINE CLINIC | Facility: CLINIC | Age: 54
End: 2019-03-29
Payer: COMMERCIAL

## 2019-03-29 VITALS
RESPIRATION RATE: 16 BRPM | WEIGHT: 228.13 LBS | TEMPERATURE: 97.8 F | DIASTOLIC BLOOD PRESSURE: 80 MMHG | SYSTOLIC BLOOD PRESSURE: 120 MMHG | BODY MASS INDEX: 35.81 KG/M2 | HEART RATE: 68 BPM | OXYGEN SATURATION: 96 % | HEIGHT: 67 IN

## 2019-03-29 DIAGNOSIS — R05.3 CHRONIC COUGHING: Primary | ICD-10-CM

## 2019-03-29 PROCEDURE — 3008F BODY MASS INDEX DOCD: CPT | Performed by: PHYSICIAN ASSISTANT

## 2019-03-29 PROCEDURE — 99213 OFFICE O/P EST LOW 20 MIN: CPT | Performed by: PHYSICIAN ASSISTANT

## 2019-03-29 NOTE — ASSESSMENT & PLAN NOTE
Patient has continued to have a chronic cough over the last 4 months or so  She notes that outside of her recent flaring of symptoms with an additional infection that is now resolving, she thinks that she might of had some slight improvement in her symptoms with switching from Pulmicort Advair  She was encouraged to continue with Advair 250/51 puff twice daily as well as singular 10 mg daily and Flonase 2 sprays per nostril daily  She was encouraged to use her albuterol (either inhaler or nebulizer) as needed for any significant wheezing, coughing, or shortness of breath  She was referred to pulmonology for further assistance in determining the cause of her cough as she has not had any explanation determinable with chest x-ray or chest CT and has had minimal improvement despite changing medication

## 2019-04-29 DIAGNOSIS — K21.9 GASTROESOPHAGEAL REFLUX DISEASE WITHOUT ESOPHAGITIS: ICD-10-CM

## 2019-04-29 RX ORDER — OMEPRAZOLE 20 MG/1
CAPSULE, DELAYED RELEASE ORAL
Qty: 90 CAPSULE | Refills: 3 | Status: ON HOLD | OUTPATIENT
Start: 2019-04-29 | End: 2020-01-16 | Stop reason: SDUPTHER

## 2019-05-17 ENCOUNTER — OFFICE VISIT (OUTPATIENT)
Dept: PULMONOLOGY | Facility: CLINIC | Age: 54
End: 2019-05-17
Payer: COMMERCIAL

## 2019-05-17 VITALS
OXYGEN SATURATION: 96 % | HEIGHT: 67 IN | RESPIRATION RATE: 17 BRPM | SYSTOLIC BLOOD PRESSURE: 130 MMHG | DIASTOLIC BLOOD PRESSURE: 78 MMHG | BODY MASS INDEX: 35.94 KG/M2 | WEIGHT: 229 LBS | TEMPERATURE: 97.7 F | HEART RATE: 74 BPM

## 2019-05-17 DIAGNOSIS — J30.1 ALLERGIC RHINITIS DUE TO POLLEN, UNSPECIFIED SEASONALITY: ICD-10-CM

## 2019-05-17 DIAGNOSIS — R05.3 CHRONIC COUGHING: Primary | ICD-10-CM

## 2019-05-17 DIAGNOSIS — R06.83 SNORINGS: ICD-10-CM

## 2019-05-17 DIAGNOSIS — R40.0 DAYTIME SOMNOLENCE: ICD-10-CM

## 2019-05-17 DIAGNOSIS — J45.40 MODERATE PERSISTENT ASTHMA WITHOUT COMPLICATION: ICD-10-CM

## 2019-05-17 DIAGNOSIS — F17.201 SEVERE TOBACCO DEPENDENCE IN SUSTAINED REMISSION: ICD-10-CM

## 2019-05-17 DIAGNOSIS — R91.8 MULTIPLE PULMONARY NODULES: ICD-10-CM

## 2019-05-17 PROCEDURE — 99245 OFF/OP CONSLTJ NEW/EST HI 55: CPT | Performed by: INTERNAL MEDICINE

## 2019-05-17 RX ORDER — CETIRIZINE HYDROCHLORIDE 10 MG/1
10 TABLET, CHEWABLE ORAL DAILY
Qty: 30 TABLET | Refills: 5 | Status: SHIPPED | OUTPATIENT
Start: 2019-05-17 | End: 2019-07-22 | Stop reason: SDUPTHER

## 2019-05-24 ENCOUNTER — TELEPHONE (OUTPATIENT)
Dept: SLEEP CENTER | Facility: CLINIC | Age: 54
End: 2019-05-24

## 2019-05-28 ENCOUNTER — HOSPITAL ENCOUNTER (OUTPATIENT)
Dept: PULMONOLOGY | Facility: HOSPITAL | Age: 54
Discharge: HOME | End: 2019-05-28
Attending: INTERNAL MEDICINE
Payer: COMMERCIAL

## 2019-05-28 DIAGNOSIS — R05.3 CHRONIC COUGHING: ICD-10-CM

## 2019-05-28 PROCEDURE — 94726 PLETHYSMOGRAPHY LUNG VOLUMES: CPT

## 2019-05-28 PROCEDURE — 94729 DIFFUSING CAPACITY: CPT | Performed by: INTERNAL MEDICINE

## 2019-05-28 PROCEDURE — 94726 PLETHYSMOGRAPHY LUNG VOLUMES: CPT | Performed by: INTERNAL MEDICINE

## 2019-05-28 PROCEDURE — 94060 EVALUATION OF WHEEZING: CPT | Performed by: INTERNAL MEDICINE

## 2019-05-28 PROCEDURE — 94729 DIFFUSING CAPACITY: CPT

## 2019-05-28 PROCEDURE — 94760 N-INVAS EAR/PLS OXIMETRY 1: CPT

## 2019-05-28 PROCEDURE — 94060 EVALUATION OF WHEEZING: CPT

## 2019-05-28 RX ORDER — ALBUTEROL SULFATE 2.5 MG/3ML
2.5 SOLUTION RESPIRATORY (INHALATION) ONCE AS NEEDED
Status: COMPLETED | OUTPATIENT
Start: 2019-05-28 | End: 2019-05-28

## 2019-05-28 RX ADMIN — ALBUTEROL SULFATE 2.5 MG: 2.5 SOLUTION RESPIRATORY (INHALATION) at 08:31

## 2019-07-09 ENCOUNTER — TELEPHONE (OUTPATIENT)
Dept: OBGYN CLINIC | Facility: CLINIC | Age: 54
End: 2019-07-09

## 2019-07-09 NOTE — TELEPHONE ENCOUNTER
Pt needs a Mammo script  Put in her chart so she can schedule her appt and a refill on sertraline (ZOLOFT) 100 mg tablet  Please call when it is completed

## 2019-07-22 DIAGNOSIS — J45.909 ASTHMATIC BRONCHITIS WITHOUT COMPLICATION, UNSPECIFIED ASTHMA SEVERITY, UNSPECIFIED WHETHER PERSISTENT: ICD-10-CM

## 2019-07-22 RX ORDER — MONTELUKAST SODIUM 10 MG/1
TABLET ORAL
Qty: 90 TABLET | Refills: 3 | Status: SHIPPED | OUTPATIENT
Start: 2019-07-22 | End: 2019-12-19 | Stop reason: ALTCHOICE

## 2019-07-22 RX ORDER — CETIRIZINE HYDROCHLORIDE 10 MG/1
1 TABLET ORAL DAILY PRN
COMMUNITY
Start: 2019-07-17 | End: 2019-12-19 | Stop reason: ALTCHOICE

## 2019-08-06 ENCOUNTER — HOSPITAL ENCOUNTER (OUTPATIENT)
Dept: SLEEP CENTER | Facility: CLINIC | Age: 54
Discharge: HOME | End: 2019-08-06
Payer: COMMERCIAL

## 2019-08-06 DIAGNOSIS — R06.83 SNORINGS: ICD-10-CM

## 2019-08-06 PROCEDURE — 95810 POLYSOM 6/> YRS 4/> PARAM: CPT | Performed by: INTERNAL MEDICINE

## 2019-08-06 PROCEDURE — 95810 POLYSOM 6/> YRS 4/> PARAM: CPT

## 2019-08-07 NOTE — PROGRESS NOTES
Sleep Study Documentation    Pre-Sleep Study       Sleep testing procedure explained to patient:YES    Patient napped prior to study:NO    Caffeine:Dayshift worker after 12PM   Caffeine use:YES- ice tea  12 ounces coffee-6 to 18 ounces  Alcohol:Dayshift workers after 5PM: Alcohol use:NO    Typical day for patient:YES       Study Documentation    Sleep Study Indications:  Snoring, EDS, BMI>30, unrefreshing sleep  Sleep Study: Diagnostic   Snore: Moderate  Supplemental O2: no    O2 flow rate (L/min) range 0  O2 flow rate (L/min) final 0  Minimum SaO2  85 %  Baseline SaO2  96 2 %            EKG abnormalities: no     EEG abnormalities: no    Sleep Study Recorded < 2 hours: N/A    Sleep Study Recorded > 2 hours but incomplete study: N/A    Sleep Study Recorded 6 hours but no sleep obtained: NO        Post-Sleep Study    Medication used at bedtime or during sleep study:YES other prescription medications    Patient reports time it took to fall asleep:30 to 60 minutes    Patient reports waking up during study:1 to 2 times  Patient reports returning to sleep in 10 to 30 minutes  Patient reports sleeping 4 to 6 hours with dreaming  Patient reports sleep during study:typical    Patient rated sleepiness: Very sleepy or tired    PAP treatment:no

## 2019-08-12 DIAGNOSIS — G47.33 OBSTRUCTIVE SLEEP APNEA (ADULT) (PEDIATRIC): Primary | ICD-10-CM

## 2019-08-12 PROBLEM — R91.8 PULMONARY NODULES: Status: ACTIVE | Noted: 2019-08-12

## 2019-08-12 PROBLEM — J45.909 REACTIVE AIRWAY DISEASE WITHOUT COMPLICATION: Status: ACTIVE | Noted: 2018-12-28

## 2019-08-12 NOTE — ASSESSMENT & PLAN NOTE
· Suspected PRAVEENA, symptoms include: snoring-mainly in supine position, and daytime sleepiness  · Sleep study ordered: moderate PRAVEENA  · We had a long Discussion office today  Possible side effects obstructive sleep apnea including:   Depression, heart attack, CVA, and hypertension  At this point in time she reports her symptoms are very minimal,  She does not require daily naps and has minimal fatigue in the afternoon  Denies any nighttime awakening  Due to her significant claustrophobia she would like to attempt weight loss and to avoid using CPAP at this time  She plans to increase her activity while using stationary bike as well as keeping a food diary  If at any point time she changes her mind she will contact the office and we will order a CPAP titration study

## 2019-08-12 NOTE — PROGRESS NOTES
Pulmonary Follow Up Note   Amisha Goel 62 y o  female MRN: [id number]  8/13/2019      Assessment:    Reactive airway disease without complication  · History of reactive airway disease  · PFTs without significant restriction or obstruction  · Currently has not used Advair for > 2 months-if symptoms return she will plan to notify our office and start lower dose advair  Has not used her albuterol MDI or nebulizer in >2 months  · Suspect allergy component: continue singulair and zyrtec, flonase  · Denies: bronchospasm or chest tightness/wheezing  · Reports very minimal cough with intermittent congestion-denies significant sputum production or triggers      Acid reflux disease  · History of GERD, symptoms controlled on Prilosec 20mg   · Continue current regimen    Obstructive sleep apnea (adult) (pediatric)  · Suspected PRAVEENA, symptoms include: snoring-mainly in supine position, and daytime sleepiness  · Sleep study ordered: moderate PRAVEENA  · We had a long Discussion office today  Possible side effects obstructive sleep apnea including:   Depression, heart attack, CVA, and hypertension  At this point in time she reports her symptoms are very minimal,  She does not require daily naps and has minimal fatigue in the afternoon  Denies any nighttime awakening  Due to her significant claustrophobia she would like to attempt weight loss and to avoid using CPAP at this time  She plans to increase her activity while using stationary bike as well as keeping a food diary  If at any point time she changes her mind she will contact the office and we will order a CPAP titration study  Chronic coughing  ·   Very minimal mainly nonproductive cough     No further interventions at this time    Pulmonary nodules  · Chest CT with a few scattered pulmonary nodules measuring up to 4 mm  · Repeat chest CT in 1 year      Plan:    Diagnoses and all orders for this visit:    Obstructive sleep apnea (adult) (pediatric)    Chronic coughing    Gastroesophageal reflux disease without esophagitis    Mild intermittent reactive airway disease without complication    Pulmonary nodules  -     CT chest wo contrast; Future        Return in about 6 months (around 2/13/2020), or if symptoms worsen or fail to improve  History of Present Illness   HPI:  Laurie Mina is a 62 y o  female seen in follow up  She has a PMH significant for:   GERD,reactive airway disease, suspected PRAVEENA, obesity,  Tobacco abuse and pulmonary nodules  At the time of today's visit she reports her symptoms are very minimal including a very mild mainly nonproductive cough  She denies any significant triggers for congestion  She has not used her Advair, albuterol MDI or albuterol rescue inhaler greater than 2 months  She does note that her symptoms in the past   Were worse during the winter months as well as her in the fall with allergies  I have instructed her that if her symptoms do return to call the office  And  Will plan to  initiate the low-dose Advair if needed  She will continue with allergy control:  Zyrtec, Singulair and Flonase  She does report moderate postnasal drip  She denies any limitations due to her breathing at this time  Denies: Chest pain, pain inspiration, fevers, chills, night sweats, bronchospasm hemoptysis  No shortness of breath at rest or dyspnea on exertion  concern with her moderate obstructive sleep apnea at this time she would like to avoid using CPAP and will work on weight loss and increased activity  She plans acute a food diary as well as increasing activity was stationary bike  She does report significant family stressors as her mother is ill in the hospital  and her  Father is struggling with  Alzheimer's,  Thus making weight loss more difficult  At any time she decides to  Move forward with CPAP titration study would be happy to perform      She will follow up in March after obtaining chest CT or sooner if needed  Review of Systems   Constitutional: Negative for activity change, appetite change, chills, diaphoresis, fatigue, fever and unexpected weight change  HENT: Positive for postnasal drip  Negative for rhinorrhea, sinus pressure, sinus pain and trouble swallowing  Eyes: Negative for discharge  Respiratory: Negative for apnea, cough (mild), choking, chest tightness, shortness of breath, wheezing and stridor  Cardiovascular: Negative for chest pain, palpitations and leg swelling  Gastrointestinal: Negative for abdominal distention  Endocrine: Negative for cold intolerance and heat intolerance  Genitourinary: Negative for difficulty urinating  Musculoskeletal: Positive for arthralgias  Skin: Negative for color change, pallor, rash and wound  Allergic/Immunologic: Negative for environmental allergies and food allergies  Neurological: Negative for dizziness and light-headedness  Hematological: Negative for adenopathy  Psychiatric/Behavioral: Negative for agitation  All other systems reviewed and are negative  Historical Information   Past Medical History:   Diagnosis Date    Acid reflux     Anxiety     Asthmatic bronchitis     Back pain     Carpal tunnel syndrome     Depression     Depression     Hyperglycemia     Hypertension     Lipoma     Migraine     Miscarriage     Osteoarthritis     PONV (postoperative nausea and vomiting)     Stress incontinence     UTI (urinary tract infection)      Past Surgical History:   Procedure Laterality Date    APPENDECTOMY      BREAST BIOPSY      COLONOSCOPY N/A 4/8/2016    Procedure: COLONOSCOPY;  Surgeon: Santo Mccarthy MD;  Location: [facility];   Service:     KNEE ARTHROSCOPY      OH EXC SKIN BENIG <0 5 CM TRUNK,ARM,LEG Left 5/19/2016    Procedure: EXCISION LIPOMA SHOULDER ;  Surgeon: Santo Mcacrthy MD;  Location[facility];  Service: General    TUBAL LIGATION       Family History   Problem Relation Age of Onset    Stroke Mother     Stomach cancer Mother     Skin cancer Father     Heart disease Brother        Social History     Tobacco Use   Smoking Status Former Smoker    Packs/day: 0 50    Years: 15 00    Pack years: 7 50    Types: Cigarettes    Last attempt to quit: 2006    Years since quittin 2   Smokeless Tobacco Never Used         Meds/Allergies     Current Outpatient Medications:     albuterol (2 5 mg/3 mL) 0 083 % nebulizer solution, Take 1 vial (2 5 mg total) by nebulization every 4 (four) hours as needed for wheezing (via nebulizer), Disp: 25 vial, Rfl: 1    albuterol (PROVENTIL HFA,VENTOLIN HFA) 90 mcg/act inhaler, Inhale 2 puffs every 4 (four) hours as needed for wheezing, Disp: 1 Inhaler, Rfl: 0    b complex vitamins tablet, Take 1 tablet by mouth daily, Disp: , Rfl:     cetirizine (ZyrTEC) 10 mg tablet, 1 tablet daily as needed for allergies, Disp: , Rfl:     fluticasone (FLONASE) 50 mcg/act nasal spray, 2 sprays into each nostril daily, Disp: 16 g, Rfl: 0    fluticasone-salmeterol (ADVAIR DISKUS, WIXELA INHUB) 500-50 mcg/dose inhaler, Inhale 1 puff 2 (two) times a day Rinse mouth after use , Disp: 1 Inhaler, Rfl: 5    ibuprofen (ADVIL,MOTRIN) 100 MG chewable tablet, Chew every 8 (eight) hours as needed for mild pain , Disp: , Rfl:     LORazepam (ATIVAN) 0 5 mg tablet, Take 1 tablet (0 5 mg total) by mouth every 6 (six) hours as needed for anxiety, Disp: 15 tablet, Rfl: 0    LYSINE PO, Take 1 capsule by mouth daily as needed (for cold sore) , Disp: , Rfl:     montelukast (SINGULAIR) 10 mg tablet, TAKE 1 TABLET BY MOUTH EVERY DAY, Disp: 90 tablet, Rfl: 3    multivitamin (THERAGRAN) TABS, Take 1 tablet by mouth daily, Disp: , Rfl:     omeprazole (PriLOSEC) 20 mg delayed release capsule, TAKE 1 CAPSULE DAILY AS NEEDED FOR STOMACH AND ACID, Disp: 90 capsule, Rfl: 3    sertraline (ZOLOFT) 100 mg tablet, TAKE 1 TABLET BY MOUTH EVERY DAY, Disp: 30 tablet, Rfl: 5    diazepam (VALIUM) 10 mg tablet, Take 1 tablet 30-60 minutes prior to imaging test  (Patient not taking: Reported on 5/17/2019), Disp: 1 tablet, Rfl: 0    valACYclovir (VALTREX) 1,000 mg tablet, Use 2 pills every 12 hr for 2 doses, repeat as needed, Disp: 20 tablet, Rfl: 0  Allergies   Allergen Reactions    Effexor [Venlafaxine] Other (See Comments)     Hot and sweaty    Wellbutrin [Bupropion] Other (See Comments)     Hot and sweaty    Prempro [Conj Estrog-Medroxyprogest Ace] Rash       Vitals: Blood pressure 140/78, pulse 81, temperature (!) 97 3 °F (36 3 °C), resp  rate 18, height 5' 6 5" (1 689 m), weight 105 kg (232 lb), SpO2 96 %  Body mass index is 36 88 kg/m²  Oxygen Therapy  SpO2: 96 %  Oxygen Therapy: None (Room air)    Physical Exam  Physical Exam   Constitutional: She is oriented to person, place, and time  She appears well-developed and well-nourished  HENT:   Head: Normocephalic and atraumatic  Eyes: Pupils are equal, round, and reactive to light  EOM are normal    Neck: Normal range of motion  Neck supple  No JVD present  Cardiovascular: Normal rate, regular rhythm, normal heart sounds and intact distal pulses  Exam reveals no gallop and no friction rub  No murmur heard  Pulmonary/Chest: Effort normal and breath sounds normal  No stridor  No respiratory distress  She has no wheezes  She has no rales  She exhibits no tenderness  Abdominal: Soft  Bowel sounds are normal    Musculoskeletal: Normal range of motion  She exhibits no edema  Neurological: She is alert and oriented to person, place, and time  Skin: Skin is warm and dry  No rash noted  Psychiatric: She has a normal mood and affect  Her behavior is normal  Judgment and thought content normal    Vitals reviewed  Labs: I have personally reviewed pertinent lab results    Lab Results   Component Value Date    WBC 4 2 09/07/2018    HGB 12 3 09/07/2018    HCT 37 7 09/07/2018    MCV 90 4 09/07/2018     09/07/2018     Lab Results   Component Value Date    CALCIUM 9 2 09/07/2018     05/03/2017    K 4 9 09/07/2018    CO2 27 09/07/2018     09/07/2018    BUN 15 09/07/2018    CREATININE 0 68 09/07/2018     No results found for: IGE  Lab Results   Component Value Date    ALT 18 09/07/2018    AST 20 09/07/2018    ALKPHOS 61 09/07/2018    BILITOT 0 6 05/03/2017       Imaging and other studies: I have personally reviewed pertinent reports   , I have personally reviewed pertinent films in PACS and chest CT 3/2019     IMPRESSION:  1  No acute findings in the chest   No findings to explain persistent cough  2   Few scattered pulmonary nodules measuring up to 4 mm    Pulmonary function testing:  Performed 5/2019      Results:  Patient gave good effort and cooperation  The testing met ATS Standards for Acceptability and Repeatability  Baseline oxygen saturation was 98% on room air      Spirometry:  FEV1/FVC Ratio is 77  FEV1 is 3 09L which is 107% predicted  FVC is 4  02L which is 110% predicted  There is no significant postbronchodilator improvement in FEV1 or FVC      Flow volume loop:  Obstructive pattern     Lung volumes: Total lung capacity is 6 59L which is 119% predicted  Residual volume is 136% predicted      Diffusing capacity:  83% predicted     IMPRESSION:  1  Normal spirometry, total lung capacity, diffusion capacity without bronchodilator responsiveness  2   Mild increase in residual volume which may represent mild air trapping

## 2019-08-13 ENCOUNTER — OFFICE VISIT (OUTPATIENT)
Dept: PULMONOLOGY | Facility: CLINIC | Age: 54
End: 2019-08-13
Payer: COMMERCIAL

## 2019-08-13 VITALS
WEIGHT: 232 LBS | RESPIRATION RATE: 18 BRPM | TEMPERATURE: 97.3 F | HEART RATE: 81 BPM | SYSTOLIC BLOOD PRESSURE: 140 MMHG | BODY MASS INDEX: 36.41 KG/M2 | DIASTOLIC BLOOD PRESSURE: 78 MMHG | OXYGEN SATURATION: 96 % | HEIGHT: 67 IN

## 2019-08-13 DIAGNOSIS — G47.33 OBSTRUCTIVE SLEEP APNEA (ADULT) (PEDIATRIC): Primary | ICD-10-CM

## 2019-08-13 DIAGNOSIS — R91.8 PULMONARY NODULES: ICD-10-CM

## 2019-08-13 DIAGNOSIS — J45.20 MILD INTERMITTENT REACTIVE AIRWAY DISEASE WITHOUT COMPLICATION: ICD-10-CM

## 2019-08-13 DIAGNOSIS — R05.3 CHRONIC COUGHING: ICD-10-CM

## 2019-08-13 DIAGNOSIS — K21.9 GASTROESOPHAGEAL REFLUX DISEASE WITHOUT ESOPHAGITIS: ICD-10-CM

## 2019-08-13 PROCEDURE — 99214 OFFICE O/P EST MOD 30 MIN: CPT | Performed by: NURSE PRACTITIONER

## 2019-09-19 ENCOUNTER — ANNUAL EXAM (OUTPATIENT)
Dept: OBGYN CLINIC | Facility: CLINIC | Age: 54
End: 2019-09-19
Payer: COMMERCIAL

## 2019-09-19 VITALS
HEIGHT: 66 IN | DIASTOLIC BLOOD PRESSURE: 80 MMHG | WEIGHT: 232 LBS | SYSTOLIC BLOOD PRESSURE: 132 MMHG | BODY MASS INDEX: 37.28 KG/M2

## 2019-09-19 DIAGNOSIS — Z12.31 ENCOUNTER FOR SCREENING MAMMOGRAM FOR MALIGNANT NEOPLASM OF BREAST: ICD-10-CM

## 2019-09-19 DIAGNOSIS — Z01.419 ENCOUNTER FOR GYNECOLOGICAL EXAMINATION WITHOUT ABNORMAL FINDING: Primary | ICD-10-CM

## 2019-09-19 PROCEDURE — S0612 ANNUAL GYNECOLOGICAL EXAMINA: HCPCS | Performed by: OBSTETRICS & GYNECOLOGY

## 2019-10-02 ENCOUNTER — HOSPITAL ENCOUNTER (OUTPATIENT)
Dept: MAMMOGRAPHY | Facility: MEDICAL CENTER | Age: 54
Discharge: HOME | End: 2019-10-02
Payer: COMMERCIAL

## 2019-10-02 VITALS — HEIGHT: 66 IN | BODY MASS INDEX: 36.48 KG/M2 | WEIGHT: 227 LBS

## 2019-10-02 DIAGNOSIS — Z12.31 ENCOUNTER FOR SCREENING MAMMOGRAM FOR MALIGNANT NEOPLASM OF BREAST: ICD-10-CM

## 2019-10-02 PROCEDURE — 77063 BREAST TOMOSYNTHESIS BI: CPT

## 2019-10-02 PROCEDURE — 77067 SCR MAMMO BI INCL CAD: CPT

## 2019-10-08 ENCOUNTER — HOSPITAL ENCOUNTER (OUTPATIENT)
Dept: MAMMOGRAPHY | Facility: CLINIC | Age: 54
Discharge: HOME | End: 2019-10-08
Payer: COMMERCIAL

## 2019-10-08 ENCOUNTER — HOSPITAL ENCOUNTER (OUTPATIENT)
Dept: ULTRASOUND IMAGING | Facility: CLINIC | Age: 54
Discharge: HOME | End: 2019-10-08
Payer: COMMERCIAL

## 2019-10-08 ENCOUNTER — TRANSCRIBE ORDERS (OUTPATIENT)
Dept: ADMINISTRATIVE | Facility: HOSPITAL | Age: 54
End: 2019-10-08

## 2019-10-08 VITALS — BODY MASS INDEX: 36.48 KG/M2 | WEIGHT: 227 LBS | HEIGHT: 66 IN

## 2019-10-08 DIAGNOSIS — R92.8 ABNORMAL SCREENING MAMMOGRAM: ICD-10-CM

## 2019-10-08 DIAGNOSIS — R92.8 ABNORMAL MAMMOGRAM: Primary | ICD-10-CM

## 2019-10-08 PROCEDURE — G0279 TOMOSYNTHESIS, MAMMO: HCPCS

## 2019-10-08 PROCEDURE — 76642 ULTRASOUND BREAST LIMITED: CPT

## 2019-10-08 PROCEDURE — 77065 DX MAMMO INCL CAD UNI: CPT

## 2019-10-29 NOTE — PROGRESS NOTES
Lisa Garza A Reppert   1965    CC:  Yearly exam    S:  62 y o  female here for yearly exam  She is postmenopausal and has had no vaginal bleeding  She denies vaginal discharge, itching, odor or dryness  Sexual activity: She is sexually active without pain, bleeding or dryness  She is having a difficult day as she has been dealing with her parents' aging  Her father has Alzheimer's and she was expecting him to go first   However, her mother is now hospitalized and likely going to be put on hospice  Lisa Garza is understandably tearful but declines therapy  Last Pap: 12/21/2016 - normal/negative HPV  Last Mammo: 2/8/2018 - BIRAD-0; Diagnostic 3/1/18 - BIRAD-3  Last Colonoscopy: 4/8/2016 - polyps; 3-5 year interval    We reviewed ASCCP guidelines for Pap testing         Current Outpatient Medications:     ADVAIR DISKUS 500-50 MCG/DOSE inhaler, Inhale 1 puff 2 (two) times a day Rinse mouth after use, Disp: , Rfl: 5    albuterol (2 5 mg/3 mL) 0 083 % nebulizer solution, Take 1 vial (2 5 mg total) by nebulization every 4 (four) hours as needed for wheezing (via nebulizer), Disp: 25 vial, Rfl: 1    albuterol (PROVENTIL HFA,VENTOLIN HFA) 90 mcg/act inhaler, Inhale 2 puffs every 4 (four) hours as needed for wheezing, Disp: 1 Inhaler, Rfl: 0    b complex vitamins tablet, Take 1 tablet by mouth daily, Disp: , Rfl:     cetirizine (ZyrTEC) 10 mg tablet, 1 tablet daily as needed for allergies, Disp: , Rfl:     fluticasone (FLONASE) 50 mcg/act nasal spray, 2 sprays into each nostril daily, Disp: 16 g, Rfl: 0    ibuprofen (ADVIL,MOTRIN) 100 MG chewable tablet, Chew every 8 (eight) hours as needed for mild pain , Disp: , Rfl:     LORazepam (ATIVAN) 0 5 mg tablet, Take 1 tablet (0 5 mg total) by mouth every 6 (six) hours as needed for anxiety, Disp: 15 tablet, Rfl: 0    LYSINE PO, Take 1 capsule by mouth daily as needed (for cold sore) , Disp: , Rfl:     montelukast (SINGULAIR) 10 mg tablet, TAKE 1 TABLET BY MOUTH EVERY DAY, Disp: 90 tablet, Rfl: 3    multivitamin (THERAGRAN) TABS, Take 1 tablet by mouth daily, Disp: , Rfl:     omeprazole (PriLOSEC) 20 mg delayed release capsule, TAKE 1 CAPSULE DAILY AS NEEDED FOR STOMACH AND ACID, Disp: 90 capsule, Rfl: 3    sertraline (ZOLOFT) 100 mg tablet, TAKE 1 TABLET BY MOUTH EVERY DAY, Disp: 30 tablet, Rfl: 5    valACYclovir (VALTREX) 1,000 mg tablet, Use 2 pills every 12 hr for 2 doses, repeat as needed, Disp: 20 tablet, Rfl: 0    diazepam (VALIUM) 10 mg tablet, Take 1 tablet 30-60 minutes prior to imaging test  (Patient not taking: Reported on 2019), Disp: 1 tablet, Rfl: 0  Social History     Socioeconomic History    Marital status: /Civil Union     Spouse name: Not on file    Number of children: Not on file    Years of education: Not on file    Highest education level: Not on file   Occupational History    Not on file   Social Needs    Financial resource strain: Not on file    Food insecurity:     Worry: Not on file     Inability: Not on file    Transportation needs:     Medical: Not on file     Non-medical: Not on file   Tobacco Use    Smoking status: Former Smoker     Packs/day: 0 50     Years: 15 00     Pack years: 7 50     Types: Cigarettes     Last attempt to quit: 2006     Years since quittin 4    Smokeless tobacco: Never Used   Substance and Sexual Activity    Alcohol use: Yes     Comment: rarely/socially    Drug use: No    Sexual activity: Not Currently     Birth control/protection: Post-menopausal   Lifestyle    Physical activity:     Days per week: Not on file     Minutes per session: Not on file    Stress: Not on file   Relationships    Social connections:     Talks on phone: Not on file     Gets together: Not on file     Attends [employer] service: Not on file     Active member of club or organization: Not on file     Attends meetings of clubs or organizations: Not on file     Relationship status: Not on file    Intimate partner violence:     Fear of current or ex partner: Not on file     Emotionally abused: Not on file     Physically abused: Not on file     Forced sexual activity: Not on file   Other Topics Concern    Not on file   Social History Narrative    Not on file     Family History   Problem Relation Age of Onset    Stroke Mother     Stomach cancer Mother 68    Skin cancer Father     No Known Problems Sister     Heart disease Brother     No Known Problems Daughter     No Known Problems Maternal Grandmother     No Known Problems Maternal Grandfather     Breast cancer Paternal Grandmother 48    No Known Problems Paternal Grandfather     No Known Problems Sister     No Known Problems Daughter     Breast cancer Cousin     Breast cancer Cousin     Breast cancer Cousin     No Known Problems Maternal Aunt     No Known Problems Maternal Aunt     No Known Problems Maternal Aunt     No Known Problems Maternal Aunt     No Known Problems Paternal Aunt     No Known Problems Paternal Aunt     No Known Problems Paternal Aunt     No Known Problems Paternal Aunt     No Known Problems Paternal Aunt     Lung cancer Paternal Uncle      Past Medical History:   Diagnosis Date    Acid reflux     Anxiety     Asthmatic bronchitis     Back pain     Carpal tunnel syndrome     Depression     Depression     Hyperglycemia     Hypertension     Lipoma     Migraine     Miscarriage     Osteoarthritis     PONV (postoperative nausea and vomiting)     Stress incontinence     UTI (urinary tract infection)         Review of Systems   Respiratory: Negative  Cardiovascular: Negative  Gastrointestinal: Negative for constipation and diarrhea  Genitourinary: Negative for difficulty urinating, pelvic pain, vaginal bleeding, vaginal discharge, itching or odor  O:  Blood pressure 132/80, height 5' 6" (1 676 m), weight 105 kg (232 lb)      Patient appears well and is not in distress  Neck is supple without masses  Breasts are symmetrical without mass, tenderness, nipple discharge, skin changes or adenopathy  Abdomen is soft and nontender without masses  External genitals are normal without lesions or rashes  Urethral meatus and urethra are normal  Bladder is normal to palpation  Vagina is normal without discharge or bleeding  Cervix is normal without discharge or lesion  Uterus is normal, mobile, nontender without palpable mass  Adnexa are normal, nontender, without palpable mass  A:  Yearly exam      P:   Pap due 2021  Mammo slip given   Colonoscopy due [id number]   DEXA due age 70 unless clinically indicated sooner    RTO one year for yearly exam or sooner as needed

## 2019-11-05 ENCOUNTER — OFFICE VISIT (OUTPATIENT)
Dept: FAMILY MEDICINE CLINIC | Facility: CLINIC | Age: 54
End: 2019-11-05
Payer: COMMERCIAL

## 2019-11-05 VITALS
BODY MASS INDEX: 37.18 KG/M2 | TEMPERATURE: 98.8 F | HEART RATE: 74 BPM | RESPIRATION RATE: 20 BRPM | DIASTOLIC BLOOD PRESSURE: 90 MMHG | WEIGHT: 231.38 LBS | HEIGHT: 66 IN | OXYGEN SATURATION: 96 % | SYSTOLIC BLOOD PRESSURE: 142 MMHG

## 2019-11-05 DIAGNOSIS — J45.21 MILD INTERMITTENT REACTIVE AIRWAY DISEASE WITH ACUTE EXACERBATION: Primary | ICD-10-CM

## 2019-11-05 DIAGNOSIS — R03.0 ELEVATED BP WITHOUT DIAGNOSIS OF HYPERTENSION: ICD-10-CM

## 2019-11-05 PROCEDURE — 99213 OFFICE O/P EST LOW 20 MIN: CPT | Performed by: PHYSICIAN ASSISTANT

## 2019-11-05 PROCEDURE — 3008F BODY MASS INDEX DOCD: CPT | Performed by: PHYSICIAN ASSISTANT

## 2019-11-05 RX ORDER — LEVOFLOXACIN 500 MG/1
500 TABLET, FILM COATED ORAL EVERY 24 HOURS
Qty: 10 TABLET | Refills: 0 | Status: SHIPPED | OUTPATIENT
Start: 2019-11-05 | End: 2019-11-15

## 2019-11-05 RX ORDER — ALBUTEROL SULFATE 2.5 MG/3ML
2.5 SOLUTION RESPIRATORY (INHALATION) EVERY 4 HOURS PRN
Qty: 25 VIAL | Refills: 1 | Status: SHIPPED | OUTPATIENT
Start: 2019-11-05

## 2019-11-05 NOTE — ASSESSMENT & PLAN NOTE
Possibly elevated due to her current illness and stress of recent passing of her mother  Encouraged to limit caffeine and salt intake  Also discussed benefit of weight loss  Return in 1 month for recheck

## 2019-11-05 NOTE — PROGRESS NOTES
FAMILY PRACTICE ACUTE OFFICE VISIT  [facility] - [facility]       NAME: Shandra Mcmullen  AGE: 62 y o  SEX: female       : 1965        MRN: [id number]    DATE: 2019  TIME: 8:57 AM    Assessment and Plan     Problem List Items Addressed This Visit        Respiratory    Reactive airway disease with acute exacerbation - Primary     Patient appears to have on going bronchitis symptoms  She was started on Levaquin 500 mg daily times 10 days since she improved somewhat with doxycycline but did not have resolution of her symptoms  She was encouraged to continue with Singulair 10 mg daily, Advair 500/5 1 puff twice daily, and albuterol as needed  She should call for symptoms are worsening or failing to improve  Relevant Medications    levofloxacin (LEVAQUIN) 500 mg tablet    albuterol (2 5 mg/3 mL) 0 083 % nebulizer solution       Other    Elevated BP without diagnosis of hypertension     Possibly elevated due to her current illness and stress of recent passing of her mother  Encouraged to limit caffeine and salt intake  Also discussed benefit of weight loss  Return in 1 month for recheck  Elevated BP without diagnosis of hypertension  Possibly elevated due to her current illness and stress of recent passing of her mother  Encouraged to limit caffeine and salt intake  Also discussed benefit of weight loss  Return in 1 month for recheck  Reactive airway disease with acute exacerbation  Patient appears to have on going bronchitis symptoms  She was started on Levaquin 500 mg daily times 10 days since she improved somewhat with doxycycline but did not have resolution of her symptoms  She was encouraged to continue with Singulair 10 mg daily, Advair 500/5 1 puff twice daily, and albuterol as needed  She should call for symptoms are worsening or failing to improve            Chief Complaint     Chief Complaint   Patient presents with   [facility] URI     coughin, wheezing,left era pain for a month        History of Present Illness   Reilly Liu is a 62y o -year-old female who presents for cold symptoms  URI    This is a new problem  Episode onset: over a month ago  The problem has been waxing and waning  There has been no fever  Associated symptoms include coughing (productive for cloudy to green mucus), ear pain (occasional in the left), rhinorrhea (clear ) and wheezing  Pertinent negatives include no congestion, diarrhea, nausea, sore throat or vomiting  Treatments tried: on Adviar, prednisone 60 mg x 1 week, and doxycycline a month ago and improved slightly but worse again - also using Robitussin and albuterol and day/night cold meds  The treatment provided mild relief  Review of Systems   Review of Systems   Constitutional: Negative for chills and fever  HENT: Positive for ear pain (occasional in the left) and rhinorrhea (clear )  Negative for congestion, postnasal drip, sinus pressure and sore throat  Respiratory: Positive for cough (productive for cloudy to green mucus), chest tightness (slight and hears rattle when lays down), shortness of breath (with exertion) and wheezing  Gastrointestinal: Negative for diarrhea, nausea and vomiting  Neurological: Negative for dizziness and light-headedness         Active Problem List     Patient Active Problem List   Diagnosis    Abnormal mammogram of right breast    Acid reflux disease    Carpal tunnel syndrome    Colon polyp    Complete tear of right rotator cuff    Depression with anxiety    Disc degeneration, lumbar    HSV-1 (herpes simplex virus 1) infection    Low back pain    Migraine headache    Osteoarthritis    Pap smear abnormality of cervix with ASCUS favoring benign    Symptomatic menopausal or female climacteric states    Obesity (BMI 35 0-39 9 without comorbidity)    Hearing loss, left    Arthralgia    Reactive airway disease with acute exacerbation    Chronic coughing    Obstructive sleep apnea (adult) (pediatric)    Pulmonary nodules    Snorings    Elevated BP without diagnosis of hypertension         Social History:  Social History     Socioeconomic History    Marital status: /Civil Union     Spouse name: Not on file    Number of children: Not on file    Years of education: Not on file    Highest education level: Not on file   Occupational History    Not on file   Social Needs    Financial resource strain: Not on file    Food insecurity:     Worry: Not on file     Inability: Not on file    Transportation needs:     Medical: Not on file     Non-medical: Not on file   Tobacco Use    Smoking status: Former Smoker     Packs/day: 0 50     Years: 15 00     Pack years: 7 50     Types: Cigarettes     Last attempt to quit: 2006     Years since quittin 4    Smokeless tobacco: Never Used   Substance and Sexual Activity    Alcohol use: Yes     Comment: rarely/socially    Drug use: No    Sexual activity: Not Currently     Birth control/protection: Post-menopausal   Lifestyle    Physical activity:     Days per week: Not on file     Minutes per session: Not on file    Stress: Not on file   Relationships    Social connections:     Talks on phone: Not on file     Gets together: Not on file     Attends [employer]ism service: Not on file     Active member of club or organization: Not on file     Attends meetings of clubs or organizations: Not on file     Relationship status: Not on file    Intimate partner violence:     Fear of current or ex partner: Not on file     Emotionally abused: Not on file     Physically abused: Not on file     Forced sexual activity: Not on file   Other Topics Concern    Not on file   Social History Narrative    Not on file       Objective     Vitals:    19 0827 19 0851   BP: 128/90 142/90   BP Location: Left arm    Patient Position: Sitting    Cuff Size: Large    Pulse: 74    Resp: 20    Temp: 98 8 °F (37 1 °C)    SpO2: 96%    Weight: 105 kg (231 lb 6 oz)    Height: 5' 6" (1 676 m)      Wt Readings from Last 3 Encounters:   11/05/19 105 kg (231 lb 6 oz)   10/08/19 103 kg (227 lb)   10/02/19 103 kg (227 lb)       Physical Exam   Constitutional: She appears well-developed and well-nourished  No distress  HENT:   Head: Normocephalic and atraumatic  Right Ear: Tympanic membrane, external ear and ear canal normal    Left Ear: Tympanic membrane, external ear and ear canal normal    Nose: Mucosal edema (bilaterally) present  Right sinus exhibits no maxillary sinus tenderness and no frontal sinus tenderness  Left sinus exhibits no maxillary sinus tenderness and no frontal sinus tenderness  Mouth/Throat: Oropharynx is clear and moist and mucous membranes are normal    Eyes: Pupils are equal, round, and reactive to light  Conjunctivae and lids are normal    Neck: Trachea normal and normal range of motion  Neck supple  No thyromegaly present  Cardiovascular: Normal rate, regular rhythm and normal heart sounds  No murmur heard  Pulses:       Radial pulses are 2+ on the right side, and 2+ on the left side  Pulmonary/Chest: Effort normal  She has no decreased breath sounds  She has wheezes (scattered bilaterally)  She has no rhonchi  She has no rales  Lymphadenopathy:     She has no cervical adenopathy  Neurological: She is alert  Skin: No rash noted  Psychiatric: She has a normal mood and affect  Vitals reviewed            ALLERGIES:  Allergies   Allergen Reactions    Effexor [Venlafaxine] Other (See Comments)     Hot and sweaty    Wellbutrin [Bupropion] Other (See Comments)     Hot and sweaty    Prempro [Conj Estrog-Medroxyprogest Ace] Rash       Current Medications     Current Outpatient Medications   Medication Sig Dispense Refill    ADVAIR DISKUS 500-50 MCG/DOSE inhaler Inhale 1 puff 2 (two) times a day Rinse mouth after use  5    albuterol (2 5 mg/3 mL) 0 083 % nebulizer solution Take 1 vial (2 5 mg total) by nebulization every 4 (four) hours as needed for wheezing (via nebulizer) 25 vial 1    albuterol (PROVENTIL HFA,VENTOLIN HFA) 90 mcg/act inhaler Inhale 2 puffs every 4 (four) hours as needed for wheezing 1 Inhaler 0    cetirizine (ZyrTEC) 10 mg tablet 1 tablet daily as needed for allergies      fluticasone (FLONASE) 50 mcg/act nasal spray 2 sprays into each nostril daily 16 g 0    ibuprofen (ADVIL,MOTRIN) 100 MG chewable tablet Chew every 8 (eight) hours as needed for mild pain   LORazepam (ATIVAN) 0 5 mg tablet Take 1 tablet (0 5 mg total) by mouth every 6 (six) hours as needed for anxiety 15 tablet 0    LYSINE PO Take 1 capsule by mouth daily as needed (for cold sore)       montelukast (SINGULAIR) 10 mg tablet TAKE 1 TABLET BY MOUTH EVERY DAY 90 tablet 3    multivitamin (THERAGRAN) TABS Take 1 tablet by mouth daily      omeprazole (PriLOSEC) 20 mg delayed release capsule TAKE 1 CAPSULE DAILY AS NEEDED FOR STOMACH AND ACID 90 capsule 3    sertraline (ZOLOFT) 100 mg tablet TAKE 1 TABLET BY MOUTH EVERY DAY 30 tablet 5    b complex vitamins tablet Take 1 tablet by mouth daily      diazepam (VALIUM) 10 mg tablet Take 1 tablet 30-60 minutes prior to imaging test  (Patient not taking: Reported on 5/17/2019) 1 tablet 0    levofloxacin (LEVAQUIN) 500 mg tablet Take 1 tablet (500 mg total) by mouth every 24 hours for 10 days 10 tablet 0    valACYclovir (VALTREX) 1,000 mg tablet Use 2 pills every 12 hr for 2 doses, repeat as needed 20 tablet 0     No current facility-administered medications for this visit            Horace Arias PA-C  11/5/2019 8:57 AM  [facility] [place]

## 2019-11-05 NOTE — ASSESSMENT & PLAN NOTE
Patient appears to have on going bronchitis symptoms  She was started on Levaquin 500 mg daily times 10 days since she improved somewhat with doxycycline but did not have resolution of her symptoms  She was encouraged to continue with Singulair 10 mg daily, Advair 500/5 1 puff twice daily, and albuterol as needed  She should call for symptoms are worsening or failing to improve

## 2019-11-18 DIAGNOSIS — J30.1 ALLERGIC RHINITIS DUE TO POLLEN, UNSPECIFIED SEASONALITY: ICD-10-CM

## 2019-11-18 RX ORDER — CETIRIZINE HYDROCHLORIDE 10 MG/1
TABLET ORAL
Qty: 30 TABLET | Refills: 5 | Status: SHIPPED | OUTPATIENT
Start: 2019-11-18 | End: 2019-12-04 | Stop reason: SDUPTHER

## 2019-12-05 ENCOUNTER — OFFICE VISIT (OUTPATIENT)
Dept: FAMILY MEDICINE CLINIC | Facility: CLINIC | Age: 54
End: 2019-12-05
Payer: COMMERCIAL

## 2019-12-05 VITALS
WEIGHT: 232.4 LBS | BODY MASS INDEX: 37.35 KG/M2 | OXYGEN SATURATION: 97 % | TEMPERATURE: 98.2 F | HEART RATE: 78 BPM | HEIGHT: 66 IN | DIASTOLIC BLOOD PRESSURE: 90 MMHG | SYSTOLIC BLOOD PRESSURE: 132 MMHG | RESPIRATION RATE: 18 BRPM

## 2019-12-05 DIAGNOSIS — Z00.00 WELL ADULT HEALTH CHECK: Primary | ICD-10-CM

## 2019-12-05 DIAGNOSIS — E66.9 OBESITY (BMI 35.0-39.9 WITHOUT COMORBIDITY): ICD-10-CM

## 2019-12-05 DIAGNOSIS — J45.30 MILD PERSISTENT ASTHMA WITHOUT COMPLICATION: ICD-10-CM

## 2019-12-05 DIAGNOSIS — Z23 NEED FOR INFLUENZA VACCINATION: ICD-10-CM

## 2019-12-05 DIAGNOSIS — Z23 NEED FOR PNEUMOCOCCAL VACCINATION: ICD-10-CM

## 2019-12-05 DIAGNOSIS — R05.3 CHRONIC COUGHING: ICD-10-CM

## 2019-12-05 DIAGNOSIS — R03.0 ELEVATED BP WITHOUT DIAGNOSIS OF HYPERTENSION: ICD-10-CM

## 2019-12-05 DIAGNOSIS — G47.33 OBSTRUCTIVE SLEEP APNEA SYNDROME: ICD-10-CM

## 2019-12-05 DIAGNOSIS — K21.9 GASTROESOPHAGEAL REFLUX DISEASE WITHOUT ESOPHAGITIS: ICD-10-CM

## 2019-12-05 DIAGNOSIS — F41.8 DEPRESSION WITH ANXIETY: ICD-10-CM

## 2019-12-05 PROBLEM — J45.901 REACTIVE AIRWAY DISEASE WITH ACUTE EXACERBATION: Status: RESOLVED | Noted: 2018-12-28 | Resolved: 2019-12-05

## 2019-12-05 PROCEDURE — 99396 PREV VISIT EST AGE 40-64: CPT | Performed by: FAMILY MEDICINE

## 2019-12-05 PROCEDURE — 90472 IMMUNIZATION ADMIN EACH ADD: CPT | Performed by: FAMILY MEDICINE

## 2019-12-05 PROCEDURE — 90471 IMMUNIZATION ADMIN: CPT | Performed by: FAMILY MEDICINE

## 2019-12-05 PROCEDURE — 90732 PPSV23 VACC 2 YRS+ SUBQ/IM: CPT | Performed by: FAMILY MEDICINE

## 2019-12-05 PROCEDURE — 90682 RIV4 VACC RECOMBINANT DNA IM: CPT | Performed by: FAMILY MEDICINE

## 2019-12-05 NOTE — PROGRESS NOTES
BMI Counseling: Body mass index is 37 51 kg/m²  The BMI is above normal  Nutrition recommendations include decreasing portion sizes, encouraging healthy choices of fruits and vegetables and moderation in carbohydrate intake  Exercise recommendations include exercising 3-5 times per week  Depression Screening and Follow-up Plan: Patient advised to follow-up with PCP for further management  FAMILY PRACTICE OFFICE VISIT  [facility]  [address] [facility] [facility], [address]      NAME: Celestine Mcmullen  AGE: 62 y o   SEX: female  : 1965   MRN: [id number]    DATE: 2019  TIME: 12:50 PM    Assessment and Plan     Problem List Items Addressed This Visit        Digestive    Acid reflux disease    Relevant Orders    CBC       Respiratory    Mild persistent asthma without complication    Relevant Orders    Ambulatory referral to Allergy    PNEUMOCOCCAL POLYSACCHARIDE VACCINE 23-VALENT =>1YO SQ IM (Completed)    Sleep apnea       Other    Chronic coughing    Relevant Orders    Ambulatory referral to Allergy    CBC    Depression with anxiety    Relevant Orders    TSH, 3rd generation with Free T4 reflex    Elevated BP without diagnosis of hypertension    Relevant Orders    Comprehensive metabolic panel    Urinalysis with microscopic    Obesity (BMI 35 0-39 9 without comorbidity)    Relevant Orders    TSH, 3rd generation with Free T4 reflex    Comprehensive metabolic panel      Other Visit Diagnoses     Well adult health check    -  Primary    Need for influenza vaccination        Relevant Orders    influenza vaccine, [id number], quadrivalent, recombinant, PF, 0 5 mL, for patients 18 yr+ (FLUBLOK) (Completed)    Need for pneumococcal vaccination        Relevant Orders    PNEUMOCOCCAL POLYSACCHARIDE VACCINE 23-VALENT =>1YO SQ IM (Completed)          Patient Instructions      She continues with chronic cough, we reviewed CT scanning which showed nodules, she will be re-doing CT in 2020  We reviewed pulmonary visit, I would like her to see allergist regarding ongoing cough despite using Flonase nasal, Advair 500 b i d , singular 10 mg daily, Zyrtec 10 mg  PFT ok other than mild obstructive finding  She denies acid reflux symptoms, continue on omeprazole 20 mg daily  She will be undergoing CPAP study in January, hopefully treating mild-to-moderate apnea will help  Her blood pressure here today is 132/90 -   Should improve w tx apnea, avoid salt, work at wt loss  Recheck BP w me in 4-6 mo  Her last blood testing was 1 year ago, slip given to redo blood work along with urinalysis  1 year ago TSH 3 12  She is up to date with Lipid screening  Cholesterol 2 years ago 182 with HDL 51,   She is up to date with Diabetes screening  Immunization History   Administered Date(s) Administered    INFLUENZA 11/16/2007, 10/15/2008, 09/24/2009, 09/23/2010, 11/09/2018    Influenza Quadrivalent Preservative Free 3 years and older IM 10/09/2014, 09/25/2017    Influenza TIV (IM) 11/01/2010    Influenza, recombinant, quadrivalent,injectable, preservative free 11/09/2018    Td (adult), adsorbed 06/12/2017    Tuberculin Skin Test-PPD Intradermal 08/27/2001     She does do yearly Flu shot  Flu shot along with Pneumovax given here today  Tdap/tetanus shot is up to date  (done every 10 yrs for superficial cuts, every 5 yrs for deep wounds)     Is a former smoker, quit many years ago ( sporadic/ social smoker in past)     Regarding Colon Cancer screening, past history of polyps, last colonoscopy April 2016  She does see her Gynecologist routinely  Just saw them  They have continued sertraline 100 mg daily  Discussed screening Mammogram, this is up-to-date  ( 10/19)    Continue to try to exercise routinely  We will see her again in 4-6 months, sooner as needed           Chief Complaint     Chief Complaint   Patient presents with    Annual Exam       History of Present Illness   Chris Moreland is a 62y o -year-old female who  Is in for a regular follow-up /physical   I had last seen her with clinical pneumonia back in January, she persisted with cough, ended up seeing Pulmonary back in August, had undergone CT scan showing pulmonary nodules felt to be benign  PFTs unremarkable  She was seen here again 1 month ago by TB -   Received Levaquin, continues on Singulair / Advair  She felt the Levaquin caused tendonitis especially in wrist, she feels she cannot use that  Since that time she relates her cough / wheezing has been  off and on, recently with increased green mucus  No fevers  Uses nebulizer recently once daily  Denies acid reflux      She is planning on doing sleep study with CPAP titration in January  Review of Systems   Review of Systems   Constitutional: Negative for appetite change, fatigue, fever and unexpected weight change  HENT: Negative for sore throat and trouble swallowing  Respiratory: Positive for cough and shortness of breath (Occasional)  Negative for chest tightness  Cardiovascular: Negative for chest pain, palpitations and leg swelling  Gastrointestinal: Negative for abdominal pain, blood in stool, nausea and vomiting  No acid reflux   -continues on PPI  No change in bowel   Genitourinary: Negative for dysuria and hematuria  Neurological: Negative for dizziness, syncope, light-headedness and headaches  Hematological: Does not bruise/bleed easily  Psychiatric/Behavioral:        Longstanding degree anxiety with occasional depression, is on sertraline via Gynecology         Active Problem List     Patient Active Problem List   Diagnosis    Abnormal mammogram of right breast    Acid reflux disease    Carpal tunnel syndrome    Colon polyp    Complete tear of right rotator cuff    Depression with anxiety    Disc degeneration, lumbar    HSV-1 (herpes simplex virus 1) infection    Low back pain    Migraine headache    Osteoarthritis    Pap smear abnormality of cervix with ASCUS favoring benign -  f/up normal    Symptomatic menopausal or female climacteric states    Obesity (BMI 35 0-39 9 without comorbidity)    Hearing loss, left    Arthralgia    Chronic coughing    Sleep apnea    Pulmonary nodules    Snoring    Elevated BP without diagnosis of hypertension    Mild persistent asthma without complication       Past Medical History:  Reviewed    Past Surgical History:  Reviewed    Family History:  Reviewed    Social History:  Reviewed    Objective     Vitals:    12/05/19 0805   BP: 132/90   Pulse: 78   Resp: 18   Temp: 98 2 °F (36 8 °C)   SpO2: 97%   Weight: 105 kg (232 lb 6 4 oz)   Height: 5' 6" (1 676 m)     Body mass index is 37 51 kg/m²  BP Readings from Last 3 Encounters:   12/05/19 132/90   11/05/19 142/90   09/19/19 132/80       Wt Readings from Last 3 Encounters:   12/05/19 105 kg (232 lb 6 4 oz)   11/05/19 105 kg (231 lb 6 oz)   10/08/19 103 kg (227 lb)       Physical Exam   Constitutional: She is oriented to person, place, and time  She appears well-developed and well-nourished  No distress  HENT:   Mouth/Throat: Oropharynx is clear and moist  No oropharyngeal exudate  TM clear   Eyes: Conjunctivae are normal  No scleral icterus  Cardiovascular: Normal rate, regular rhythm and normal heart sounds  No murmur heard  No carotid bruit   Pulmonary/Chest: Effort normal and breath sounds normal  No respiratory distress  Abdominal: Soft  There is no tenderness  Musculoskeletal: She exhibits no edema  Lymphadenopathy:     She has no cervical adenopathy  Neurological: She is alert and oriented to person, place, and time  Psychiatric: She has a normal mood and affect   Her behavior is normal        ALLERGIES:  Allergies   Allergen Reactions    Effexor [Venlafaxine] Other (See Comments)     Hot and sweaty    Levaquin [Levofloxacin] Myalgia Tendonitis     Wellbutrin [Bupropion] Other (See Comments)     Hot and sweaty    Prempro [Conj Estrog-Medroxyprogest Ace] Rash       Current Medications     Current Outpatient Medications   Medication Sig Dispense Refill    ADVAIR DISKUS 500-50 MCG/DOSE inhaler Inhale 1 puff 2 (two) times a day Rinse mouth after use  5    b complex vitamins tablet Take 1 tablet by mouth daily      cetirizine (ZyrTEC) 10 mg tablet 1 tablet daily as needed for allergies      fluticasone (FLONASE) 50 mcg/act nasal spray 2 sprays into each nostril daily 16 g 0    LYSINE PO Take 1 capsule by mouth daily as needed (for cold sore)       montelukast (SINGULAIR) 10 mg tablet TAKE 1 TABLET BY MOUTH EVERY DAY 90 tablet 3    multivitamin (THERAGRAN) TABS Take 1 tablet by mouth daily      omeprazole (PriLOSEC) 20 mg delayed release capsule TAKE 1 CAPSULE DAILY AS NEEDED FOR STOMACH AND ACID 90 capsule 3    sertraline (ZOLOFT) 100 mg tablet TAKE 1 TABLET BY MOUTH EVERY DAY (Patient taking differently: Take 100 mg by mouth daily ) 30 tablet 5    albuterol (2 5 mg/3 mL) 0 083 % nebulizer solution Take 1 vial (2 5 mg total) by nebulization every 4 (four) hours as needed for wheezing (via nebulizer) 25 vial 1    albuterol (PROVENTIL HFA,VENTOLIN HFA) 90 mcg/act inhaler Inhale 2 puffs every 4 (four) hours as needed for wheezing 1 Inhaler 0    ibuprofen (ADVIL,MOTRIN) 100 MG chewable tablet Chew every 8 (eight) hours as needed for mild pain   LORazepam (ATIVAN) 0 5 mg tablet Take 1 tablet (0 5 mg total) by mouth every 6 (six) hours as needed for anxiety 15 tablet 0     No current facility-administered medications for this visit               Orders Placed This Encounter   Procedures    influenza vaccine, [id number], quadrivalent, recombinant, PF, 0 5 mL, for patients 18 yr+ (FLUBLOK)    PNEUMOCOCCAL POLYSACCHARIDE VACCINE 23-VALENT =>3YO SQ IM    TSH, 3rd generation with Free T4 reflex    Comprehensive metabolic panel    CBC    Urinalysis with microscopic    Ambulatory referral to [address] [place] [address], DO

## 2019-12-05 NOTE — PATIENT INSTRUCTIONS
She continues with chronic cough, we reviewed CT scanning which showed nodules, she will be re-doing CT in 2020  We reviewed pulmonary visit, I would like her to see allergist regarding ongoing cough despite using Flonase nasal, Advair 500 b i d , singular 10 mg daily, Zyrtec 10 mg  PFT ok other than mild obstructive finding  She denies acid reflux symptoms, continue on omeprazole 20 mg daily  She will be undergoing CPAP study in January, hopefully treating mild-to-moderate apnea will help  Her blood pressure here today is 132/90 -   Should improve w tx apnea, avoid salt, work at wt loss  Recheck BP w me in 4-6 mo  Her last blood testing was 1 year ago, slip given to redo blood work along with urinalysis  1 year ago TSH 3 12  She is up to date with Lipid screening  Cholesterol 2 years ago 182 with HDL 51,   She is up to date with Diabetes screening  Immunization History   Administered Date(s) Administered    INFLUENZA 11/16/2007, 10/15/2008, 09/24/2009, 09/23/2010, 11/09/2018    Influenza Quadrivalent Preservative Free 3 years and older IM 10/09/2014, 09/25/2017    Influenza TIV (IM) 11/01/2010    Influenza, recombinant, quadrivalent,injectable, preservative free 11/09/2018    Td (adult), adsorbed 06/12/2017    Tuberculin Skin Test-PPD Intradermal 08/27/2001     She does do yearly Flu shot  Flu shot along with Pneumovax given here today  Tdap/tetanus shot is up to date  (done every 10 yrs for superficial cuts, every 5 yrs for deep wounds)     Is a former smoker, quit many years ago ( sporadic/ social smoker in past)     Regarding Colon Cancer screening, past history of polyps, last colonoscopy April 2016  She does see her Gynecologist routinely  Just saw them  They have continued sertraline 100 mg daily  Discussed screening Mammogram, this is up-to-date  ( 10/19)    Continue to try to exercise routinely  We will see her again in 4-6 months, sooner as needed

## 2019-12-20 ENCOUNTER — TELEPHONE (OUTPATIENT)
Dept: SLEEP CENTER | Facility: CLINIC | Age: 54
End: 2019-12-20

## 2019-12-20 NOTE — TELEPHONE ENCOUNTER
Left message for patient to call back with updated address for sleep study paperwork to be sent to her

## 2019-12-21 LAB
ALBUMIN SERPL-MCNC: 4.1 G/DL (ref 3.6–5.1)
ALBUMIN/GLOB SERPL: 1.6 (CALC) (ref 1–2.5)
ALP SERPL-CCNC: 65 U/L (ref 33–130)
ALT SERPL-CCNC: 15 U/L (ref 6–29)
APPEARANCE UR: CLEAR
AST SERPL-CCNC: 17 U/L (ref 10–35)
BASOPHILS # BLD AUTO: 78 CELLS/UL (ref 0–200)
BASOPHILS NFR BLD AUTO: 1.2 %
BILIRUB SERPL-MCNC: 0.6 MG/DL (ref 0.2–1.2)
BILIRUB UR QL STRIP: NEGATIVE
BUN SERPL-MCNC: 11 MG/DL (ref 7–25)
BUN/CREAT SERPL: ABNORMAL (CALC) (ref 6–22)
CALCIUM SERPL-MCNC: 9.1 MG/DL (ref 8.6–10.4)
CHLORIDE SERPL-SCNC: 105 MMOL/L (ref 98–110)
CO2 SERPL-SCNC: 27 MMOL/L (ref 20–32)
COLOR UR: YELLOW
CREAT SERPL-MCNC: 0.63 MG/DL (ref 0.5–1.05)
EOSINOPHIL # BLD AUTO: 143 CELLS/UL (ref 15–500)
EOSINOPHIL NFR BLD AUTO: 2.2 %
ERYTHROCYTE [DISTWIDTH] IN BLOOD BY AUTOMATED COUNT: 13.2 % (ref 11–15)
GLOBULIN SER CALC-MCNC: 2.6 G/DL (CALC) (ref 1.9–3.7)
GLUCOSE SERPL-MCNC: 105 MG/DL (ref 65–99)
GLUCOSE UR QL STRIP: NEGATIVE
HCT VFR BLD AUTO: 37 % (ref 35–45)
HGB BLD-MCNC: 12.1 G/DL (ref 11.7–15.5)
HGB UR QL STRIP: NEGATIVE
KETONES UR QL STRIP: NEGATIVE
LEUKOCYTE ESTERASE UR QL STRIP: NEGATIVE
LYMPHOCYTES # BLD AUTO: 2041 CELLS/UL (ref 850–3900)
LYMPHOCYTES NFR BLD AUTO: 31.4 %
MCH RBC QN AUTO: 28.7 PG (ref 27–33)
MCHC RBC AUTO-ENTMCNC: 32.7 G/DL (ref 32–36)
MCV RBC AUTO: 87.7 FL (ref 80–100)
MONOCYTES # BLD AUTO: 312 CELLS/UL (ref 200–950)
MONOCYTES NFR BLD AUTO: 4.8 %
NEUTROPHILS # BLD AUTO: 3926 CELLS/UL (ref 1500–7800)
NEUTROPHILS NFR BLD AUTO: 60.4 %
NITRITE UR QL STRIP: NEGATIVE
PH UR STRIP: 5.5 [PH] (ref 5–8)
PLATELET # BLD AUTO: 275 THOUSAND/UL (ref 140–400)
PMV BLD REES-ECKER: 11.5 FL (ref 7.5–12.5)
POTASSIUM SERPL-SCNC: 4.1 MMOL/L (ref 3.5–5.3)
PROT SERPL-MCNC: 6.7 G/DL (ref 6.1–8.1)
PROT UR QL STRIP: NEGATIVE
RBC # BLD AUTO: 4.22 MILLION/UL (ref 3.8–5.1)
SL AMB EGFR AFRICAN AMERICAN: 116 ML/MIN/1.73M2
SL AMB EGFR NON AFRICAN AMERICAN: 100 ML/MIN/1.73M2
SODIUM SERPL-SCNC: 139 MMOL/L (ref 135–146)
SP GR UR STRIP: 1.02 (ref 1–1.03)
TSH SERPL-ACNC: 2.34 MIU/L (ref 0.4–4.5)
WBC # BLD AUTO: 6.5 THOUSAND/UL (ref 3.8–10.8)

## 2019-12-23 ENCOUNTER — OFFICE VISIT (OUTPATIENT)
Dept: FAMILY MEDICINE CLINIC | Facility: CLINIC | Age: 54
End: 2019-12-23
Payer: COMMERCIAL

## 2019-12-23 VITALS
BODY MASS INDEX: 37.61 KG/M2 | DIASTOLIC BLOOD PRESSURE: 88 MMHG | TEMPERATURE: 99.1 F | HEART RATE: 76 BPM | SYSTOLIC BLOOD PRESSURE: 132 MMHG | RESPIRATION RATE: 18 BRPM | OXYGEN SATURATION: 97 % | HEIGHT: 66 IN | WEIGHT: 234 LBS

## 2019-12-23 DIAGNOSIS — H66.92 LEFT OTITIS MEDIA, UNSPECIFIED OTITIS MEDIA TYPE: Primary | ICD-10-CM

## 2019-12-23 DIAGNOSIS — R05.3 CHRONIC COUGHING: ICD-10-CM

## 2019-12-23 DIAGNOSIS — K21.9 GASTROESOPHAGEAL REFLUX DISEASE WITHOUT ESOPHAGITIS: ICD-10-CM

## 2019-12-23 DIAGNOSIS — J01.90 ACUTE SINUSITIS, RECURRENCE NOT SPECIFIED, UNSPECIFIED LOCATION: ICD-10-CM

## 2019-12-23 PROCEDURE — 3008F BODY MASS INDEX DOCD: CPT | Performed by: FAMILY MEDICINE

## 2019-12-23 PROCEDURE — 1036F TOBACCO NON-USER: CPT | Performed by: FAMILY MEDICINE

## 2019-12-23 PROCEDURE — 99213 OFFICE O/P EST LOW 20 MIN: CPT | Performed by: FAMILY MEDICINE

## 2019-12-23 RX ORDER — CHLORPHENIRAMINE MALEATE 4 MG/1
4 TABLET ORAL EVERY 6 HOURS PRN
COMMUNITY
End: 2020-01-16 | Stop reason: HOSPADM

## 2019-12-23 RX ORDER — AMOXICILLIN AND CLAVULANATE POTASSIUM 875; 125 MG/1; MG/1
1 TABLET, FILM COATED ORAL EVERY 12 HOURS SCHEDULED
Qty: 20 TABLET | Refills: 0 | Status: SHIPPED | OUTPATIENT
Start: 2019-12-23 | End: 2021-10-04 | Stop reason: SDUPTHER

## 2019-12-23 NOTE — PROGRESS NOTES
FAMILY PRACTICE OFFICE VISIT  Cait KEBEDE O  [facility]  [id number]   [address] [place], [address]      NAME: Burnard Runner Reppert  AGE: 62 y o  SEX: female  : 1965   MRN: [id number]    DATE: 2019  TIME: 5:49 PM    Assessment and Plan     Problem List Items Addressed This Visit        Digestive    Acid reflux disease    Relevant Orders    Ambulatory referral to Gastroenterology       Other    Chronic coughing    Relevant Orders    Ambulatory referral to Gastroenterology      Other Visit Diagnoses     Left otitis media, unspecified otitis media type    -  Primary    Relevant Medications    amoxicillin-clavulanate (AUGMENTIN) 875-125 mg per tablet    Acute sinusitis, recurrence not specified, unspecified location        Relevant Medications    amoxicillin-clavulanate (AUGMENTIN) 875-125 mg per tablet          Patient Instructions   Reviewed Allergist Consult -  Chronic cough x 1 yr -   Now w lowgrade temp/ otitis left/ sinus pressure-    Use Augmentin x 10 days ( took in past w/out issue)    Use nebulizer as needed  Plan from Lynn FLORES  38  =        Chief Complaint     Chief Complaint   Patient presents with    Sore Throat       History of Present Illness   Lulu Avila is a 62y o -year-old female who has noted increased upper respiratory symptoms with cough, wheezing, left ear pain for the past 3 to 4 days, she just saw allergist the other day, has been dealing with a chronic cough for the past year at times  Has had low-grade temperature  Review of Systems   Review of Systems   Constitutional: Positive for fatigue and fever  Negative for chills and diaphoresis  HENT: Positive for congestion, ear pain, postnasal drip, rhinorrhea, sinus pressure and sore throat  Negative for facial swelling, hearing loss, mouth sores, nosebleeds and trouble swallowing  Eyes: Negative for pain and discharge     Respiratory: Positive for cough and wheezing  Negative for shortness of breath  Cardiovascular: Negative for chest pain  Gastrointestinal: Negative for abdominal pain, diarrhea, nausea and vomiting  Genitourinary: Negative for difficulty urinating  Skin: Negative for rash  Neurological: Negative for dizziness, syncope, weakness, light-headedness and headaches  Hematological: Negative for adenopathy  Does not bruise/bleed easily  Active Problem List     Patient Active Problem List   Diagnosis    Abnormal mammogram of right breast    Acid reflux disease    Carpal tunnel syndrome    Colon polyp    Complete tear of right rotator cuff    Depression with anxiety    Disc degeneration, lumbar    HSV-1 (herpes simplex virus 1) infection    Low back pain    Migraine headache    Osteoarthritis    Pap smear abnormality of cervix with ASCUS favoring benign -  f/up normal    Symptomatic menopausal or female climacteric states    Obesity (BMI 35 0-39 9 without comorbidity)    Hearing loss, left    Arthralgia    Chronic coughing    Sleep apnea    Pulmonary nodules    Snoring    Elevated BP without diagnosis of hypertension    Mild persistent asthma without complication       Past Medical History:  Reviewed    Past Surgical History:  Reviewed    Family History:  Reviewed    Social History:  Reviewed    Objective     Vitals:    12/23/19 1512   BP: 132/88   Pulse: 76   Resp: 18   Temp: 99 1 °F (37 3 °C)   SpO2: 97%   Weight: 106 kg (234 lb)   Height: 5' 6" (1 676 m)     Body mass index is 37 77 kg/m²  BP Readings from Last 3 Encounters:   12/23/19 132/88   12/05/19 132/90   11/05/19 142/90       Wt Readings from Last 3 Encounters:   12/23/19 106 kg (234 lb)   12/05/19 105 kg (232 lb 6 4 oz)   11/05/19 105 kg (231 lb 6 oz)       Physical Exam   Constitutional: She is oriented to person, place, and time  Pleasant overweight female, low-grade temp, complaining of left ear pain  HENT:   Fluid left TM, mild redness, TM intact  The right TM clear, pharynx clear, does have some tenderness submandibular glands, no rigidity of neck  Eyes: Conjunctivae are normal  No scleral icterus  Cardiovascular: Normal rate, regular rhythm and normal heart sounds  No murmur heard  Pulmonary/Chest:   Few scattered rhonchi  Musculoskeletal: She exhibits no edema  Neurological: She is alert and oriented to person, place, and time         ALLERGIES:  Allergies   Allergen Reactions    Effexor [Venlafaxine] Other (See Comments)     Hot and sweaty    Levaquin [Levofloxacin] Myalgia     Tendonitis     Wellbutrin [Bupropion] Other (See Comments)     Hot and sweaty    Prempro [Conj Estrog-Medroxyprogest Ace] Rash       Current Medications     Current Outpatient Medications   Medication Sig Dispense Refill    ADVAIR DISKUS 500-50 MCG/DOSE inhaler Inhale 1 puff 2 (two) times a day Rinse mouth after use  5    b complex vitamins tablet Take 1 tablet by mouth daily      chlorpheniramine (CHLOR-TRIMETON) 4 MG tablet Take 4 mg by mouth every 6 (six) hours as needed for allergies      fluticasone (FLONASE) 50 mcg/act nasal spray 2 sprays into each nostril daily 16 g 0    LYSINE PO Take 1 capsule by mouth daily as needed (for cold sore)       multivitamin (THERAGRAN) TABS Take 1 tablet by mouth daily      omeprazole (PriLOSEC) 20 mg delayed release capsule TAKE 1 CAPSULE DAILY AS NEEDED FOR STOMACH AND ACID 90 capsule 3    sertraline (ZOLOFT) 100 mg tablet TAKE 1 TABLET BY MOUTH EVERY DAY (Patient taking differently: Take 100 mg by mouth daily ) 30 tablet 5    tiotropium (SPIRIVA RESPIMAT) 1 25 MCG/ACT AERS inhaler Inhale 2 puffs daily      albuterol (2 5 mg/3 mL) 0 083 % nebulizer solution Take 1 vial (2 5 mg total) by nebulization every 4 (four) hours as needed for wheezing (via nebulizer) 25 vial 1    albuterol (PROVENTIL HFA,VENTOLIN HFA) 90 mcg/act inhaler Inhale 2 puffs every 4 (four) hours as needed for wheezing 1 Inhaler 0    amoxicillin-clavulanate (AUGMENTIN) 875-125 mg per tablet Take 1 tablet by mouth every 12 (twelve) hours for 10 days 20 tablet 0    ibuprofen (ADVIL,MOTRIN) 100 MG chewable tablet Chew every 8 (eight) hours as needed for mild pain   LORazepam (ATIVAN) 0 5 mg tablet Take 1 tablet (0 5 mg total) by mouth every 6 (six) hours as needed for anxiety 15 tablet 0     No current facility-administered medications for this visit               Orders Placed This Encounter   Procedures    Ambulatory referral to Gastroenterology         Taylor Umaña DO

## 2019-12-23 NOTE — PATIENT INSTRUCTIONS
Reviewed Allergist Consult -  Chronic cough x 1 yr -   Now w lowgrade temp/ otitis left/ sinus pressure-    Use Augmentin x 10 days ( took in past w/out issue)    Use nebulizer as needed      Plan from Lynn FLORES  38  =

## 2019-12-27 ENCOUNTER — TRANSCRIBE ORDERS (OUTPATIENT)
Dept: ADMINISTRATIVE | Facility: HOSPITAL | Age: 54
End: 2019-12-27

## 2019-12-27 DIAGNOSIS — R05.9 COUGH: Primary | ICD-10-CM

## 2019-12-30 ENCOUNTER — TELEPHONE (OUTPATIENT)
Dept: PULMONOLOGY | Facility: CLINIC | Age: 54
End: 2019-12-30

## 2019-12-30 NOTE — TELEPHONE ENCOUNTER
Patient needs a 6m f/u//ct prior in march after the 2nd with dr Rachid Walker in [place]  Reminder card sent

## 2020-01-02 ENCOUNTER — HOSPITAL ENCOUNTER (OUTPATIENT)
Dept: RADIOLOGY | Facility: HOSPITAL | Age: 55
Discharge: HOME | End: 2020-01-02
Payer: COMMERCIAL

## 2020-01-02 DIAGNOSIS — R05.9 COUGH: ICD-10-CM

## 2020-01-02 PROCEDURE — 70486 CT MAXILLOFACIAL W/O DYE: CPT

## 2020-01-15 ENCOUNTER — HOSPITAL ENCOUNTER (OUTPATIENT)
Facility: HOSPITAL | Age: 55
Setting detail: OBSERVATION
Discharge: HOME | End: 2020-01-16
Attending: EMERGENCY MEDICINE | Admitting: INTERNAL MEDICINE
Payer: COMMERCIAL

## 2020-01-15 ENCOUNTER — APPOINTMENT (EMERGENCY)
Dept: CT IMAGING | Facility: HOSPITAL | Age: 55
End: 2020-01-15
Payer: COMMERCIAL

## 2020-01-15 DIAGNOSIS — K59.00 CONSTIPATION: ICD-10-CM

## 2020-01-15 DIAGNOSIS — J04.30 SUPRAGLOTTITIS: Primary | ICD-10-CM

## 2020-01-15 DIAGNOSIS — R59.1 LYMPHADENOPATHY: ICD-10-CM

## 2020-01-15 DIAGNOSIS — R93.89 ABNORMAL COMPUTED TOMOGRAPHY OF SOFT TISSUE OF NECK: ICD-10-CM

## 2020-01-15 DIAGNOSIS — K21.9 GASTROESOPHAGEAL REFLUX DISEASE WITHOUT ESOPHAGITIS: ICD-10-CM

## 2020-01-15 DIAGNOSIS — R05.3 CHRONIC COUGHING: ICD-10-CM

## 2020-01-15 DIAGNOSIS — R13.19 OTHER DYSPHAGIA: ICD-10-CM

## 2020-01-15 DIAGNOSIS — J02.9 PHARYNGITIS, UNSPECIFIED ETIOLOGY: ICD-10-CM

## 2020-01-15 DIAGNOSIS — R91.8 PULMONARY NODULES: ICD-10-CM

## 2020-01-15 LAB
ALBUMIN SERPL BCP-MCNC: 3.6 G/DL (ref 3.5–5)
ALP SERPL-CCNC: 98 U/L (ref 46–116)
ALT SERPL W P-5'-P-CCNC: 19 U/L (ref 12–78)
ANION GAP SERPL CALCULATED.3IONS-SCNC: 9 MMOL/L (ref 4–13)
AST SERPL W P-5'-P-CCNC: 20 U/L (ref 5–45)
BASOPHILS # BLD AUTO: 0.09 THOUSANDS/ΜL (ref 0–0.1)
BASOPHILS NFR BLD AUTO: 1 % (ref 0–1)
BILIRUB SERPL-MCNC: 0.28 MG/DL (ref 0.2–1)
BUN SERPL-MCNC: 19 MG/DL (ref 5–25)
CALCIUM SERPL-MCNC: 8.7 MG/DL (ref 8.3–10.1)
CHLORIDE SERPL-SCNC: 104 MMOL/L (ref 100–108)
CO2 SERPL-SCNC: 27 MMOL/L (ref 21–32)
CREAT SERPL-MCNC: 0.8 MG/DL (ref 0.6–1.3)
EOSINOPHIL # BLD AUTO: 0.26 THOUSAND/ΜL (ref 0–0.61)
EOSINOPHIL NFR BLD AUTO: 3 % (ref 0–6)
ERYTHROCYTE [DISTWIDTH] IN BLOOD BY AUTOMATED COUNT: 14.5 % (ref 11.6–15.1)
GFR SERPL CREATININE-BSD FRML MDRD: 83 ML/MIN/1.73SQ M
GLUCOSE SERPL-MCNC: 125 MG/DL (ref 65–140)
HCT VFR BLD AUTO: 39.1 % (ref 34.8–46.1)
HGB BLD-MCNC: 12.1 G/DL (ref 11.5–15.4)
IMM GRANULOCYTES # BLD AUTO: 0.03 THOUSAND/UL (ref 0–0.2)
IMM GRANULOCYTES NFR BLD AUTO: 0 % (ref 0–2)
LYMPHOCYTES # BLD AUTO: 3.15 THOUSANDS/ΜL (ref 0.6–4.47)
LYMPHOCYTES NFR BLD AUTO: 35 % (ref 14–44)
MCH RBC QN AUTO: 28.9 PG (ref 26.8–34.3)
MCHC RBC AUTO-ENTMCNC: 30.9 G/DL (ref 31.4–37.4)
MCV RBC AUTO: 93 FL (ref 82–98)
MONOCYTES # BLD AUTO: 0.52 THOUSAND/ΜL (ref 0.17–1.22)
MONOCYTES NFR BLD AUTO: 6 % (ref 4–12)
NEUTROPHILS # BLD AUTO: 4.9 THOUSANDS/ΜL (ref 1.85–7.62)
NEUTS SEG NFR BLD AUTO: 55 % (ref 43–75)
NRBC BLD AUTO-RTO: 0 /100 WBCS
PLATELET # BLD AUTO: 293 THOUSANDS/UL (ref 149–390)
PMV BLD AUTO: 11.9 FL (ref 8.9–12.7)
POTASSIUM SERPL-SCNC: 3.8 MMOL/L (ref 3.5–5.3)
PROT SERPL-MCNC: 7.1 G/DL (ref 6.4–8.2)
RBC # BLD AUTO: 4.19 MILLION/UL (ref 3.81–5.12)
SODIUM SERPL-SCNC: 140 MMOL/L (ref 136–145)
WBC # BLD AUTO: 8.95 THOUSAND/UL (ref 4.31–10.16)

## 2020-01-15 PROCEDURE — 85025 COMPLETE CBC W/AUTO DIFF WBC: CPT | Performed by: NURSE PRACTITIONER

## 2020-01-15 PROCEDURE — 96374 THER/PROPH/DIAG INJ IV PUSH: CPT

## 2020-01-15 PROCEDURE — 71260 CT THORAX DX C+: CPT

## 2020-01-15 PROCEDURE — 99285 EMERGENCY DEPT VISIT HI MDM: CPT | Performed by: NURSE PRACTITIONER

## 2020-01-15 PROCEDURE — 80053 COMPREHEN METABOLIC PANEL: CPT | Performed by: NURSE PRACTITIONER

## 2020-01-15 PROCEDURE — 36415 COLL VENOUS BLD VENIPUNCTURE: CPT | Performed by: NURSE PRACTITIONER

## 2020-01-15 PROCEDURE — 96375 TX/PRO/DX INJ NEW DRUG ADDON: CPT

## 2020-01-15 PROCEDURE — 99284 EMERGENCY DEPT VISIT MOD MDM: CPT

## 2020-01-15 PROCEDURE — 99220 PR INITIAL OBSERVATION CARE/DAY 70 MINUTES: CPT | Performed by: INTERNAL MEDICINE

## 2020-01-15 PROCEDURE — 70491 CT SOFT TISSUE NECK W/DYE: CPT

## 2020-01-15 PROCEDURE — 96361 HYDRATE IV INFUSION ADD-ON: CPT

## 2020-01-15 RX ORDER — CLINDAMYCIN PHOSPHATE 600 MG/50ML
600 INJECTION INTRAVENOUS EVERY 8 HOURS
Status: DISCONTINUED | OUTPATIENT
Start: 2020-01-15 | End: 2020-01-16 | Stop reason: HOSPADM

## 2020-01-15 RX ORDER — PREDNISONE 20 MG/1
40 TABLET ORAL DAILY
Status: DISCONTINUED | OUTPATIENT
Start: 2020-01-16 | End: 2020-01-16 | Stop reason: HOSPADM

## 2020-01-15 RX ORDER — BENZONATATE 100 MG/1
100 CAPSULE ORAL 3 TIMES DAILY PRN
Status: DISCONTINUED | OUTPATIENT
Start: 2020-01-15 | End: 2020-01-16 | Stop reason: HOSPADM

## 2020-01-15 RX ORDER — LORAZEPAM 2 MG/ML
0.5 INJECTION INTRAMUSCULAR EVERY 6 HOURS PRN
Status: DISCONTINUED | OUTPATIENT
Start: 2020-01-15 | End: 2020-01-16 | Stop reason: HOSPADM

## 2020-01-15 RX ORDER — FLUTICASONE FUROATE AND VILANTEROL 200; 25 UG/1; UG/1
1 POWDER RESPIRATORY (INHALATION)
Status: DISCONTINUED | OUTPATIENT
Start: 2020-01-15 | End: 2020-01-16 | Stop reason: HOSPADM

## 2020-01-15 RX ORDER — ALBUTEROL SULFATE 2.5 MG/3ML
2.5 SOLUTION RESPIRATORY (INHALATION) EVERY 4 HOURS PRN
Status: DISCONTINUED | OUTPATIENT
Start: 2020-01-15 | End: 2020-01-16 | Stop reason: HOSPADM

## 2020-01-15 RX ORDER — ONDANSETRON 2 MG/ML
4 INJECTION INTRAMUSCULAR; INTRAVENOUS EVERY 6 HOURS PRN
Status: DISCONTINUED | OUTPATIENT
Start: 2020-01-15 | End: 2020-01-16 | Stop reason: HOSPADM

## 2020-01-15 RX ORDER — KETOROLAC TROMETHAMINE 30 MG/ML
15 INJECTION, SOLUTION INTRAMUSCULAR; INTRAVENOUS ONCE
Status: COMPLETED | OUTPATIENT
Start: 2020-01-15 | End: 2020-01-15

## 2020-01-15 RX ORDER — CHLORPHENIRAMINE MALEATE 4 MG/1
4 TABLET ORAL EVERY 6 HOURS PRN
Status: DISCONTINUED | OUTPATIENT
Start: 2020-01-15 | End: 2020-01-15 | Stop reason: RX

## 2020-01-15 RX ORDER — SODIUM CHLORIDE 9 MG/ML
100 INJECTION, SOLUTION INTRAVENOUS CONTINUOUS
Status: DISCONTINUED | OUTPATIENT
Start: 2020-01-15 | End: 2020-01-15

## 2020-01-15 RX ORDER — KETOROLAC TROMETHAMINE 30 MG/ML
15 INJECTION, SOLUTION INTRAMUSCULAR; INTRAVENOUS EVERY 6 HOURS PRN
Status: DISCONTINUED | OUTPATIENT
Start: 2020-01-15 | End: 2020-01-16 | Stop reason: HOSPADM

## 2020-01-15 RX ORDER — DEXAMETHASONE SODIUM PHOSPHATE 4 MG/ML
10 INJECTION, SOLUTION INTRA-ARTICULAR; INTRALESIONAL; INTRAMUSCULAR; INTRAVENOUS; SOFT TISSUE ONCE
Status: COMPLETED | OUTPATIENT
Start: 2020-01-15 | End: 2020-01-15

## 2020-01-15 RX ORDER — UMECLIDINIUM 62.5 UG/1
1 AEROSOL, POWDER ORAL DAILY
COMMUNITY
Start: 2019-12-30 | End: 2020-01-17 | Stop reason: SINTOL

## 2020-01-15 RX ORDER — GUAIFENESIN 600 MG
600 TABLET, EXTENDED RELEASE 12 HR ORAL EVERY 12 HOURS SCHEDULED
Status: DISCONTINUED | OUTPATIENT
Start: 2020-01-15 | End: 2020-01-16 | Stop reason: HOSPADM

## 2020-01-15 RX ORDER — CLINDAMYCIN PHOSPHATE 600 MG/50ML
600 INJECTION INTRAVENOUS ONCE
Status: COMPLETED | OUTPATIENT
Start: 2020-01-15 | End: 2020-01-15

## 2020-01-15 RX ADMIN — CLINDAMYCIN PHOSPHATE 600 MG: 600 INJECTION, SOLUTION INTRAVENOUS at 21:03

## 2020-01-15 RX ADMIN — IOHEXOL 100 ML: 350 INJECTION, SOLUTION INTRAVENOUS at 03:37

## 2020-01-15 RX ADMIN — BENZONATATE 100 MG: 100 CAPSULE ORAL at 21:03

## 2020-01-15 RX ADMIN — SODIUM CHLORIDE 1000 ML: 0.9 INJECTION, SOLUTION INTRAVENOUS at 02:32

## 2020-01-15 RX ADMIN — DEXAMETHASONE SODIUM PHOSPHATE 10 MG: 4 INJECTION, SOLUTION INTRAMUSCULAR; INTRAVENOUS at 04:39

## 2020-01-15 RX ADMIN — KETOROLAC TROMETHAMINE 15 MG: 30 INJECTION, SOLUTION INTRAMUSCULAR at 13:28

## 2020-01-15 RX ADMIN — CLINDAMYCIN PHOSPHATE 600 MG: 600 INJECTION, SOLUTION INTRAVENOUS at 04:39

## 2020-01-15 RX ADMIN — ENOXAPARIN SODIUM 40 MG: 40 INJECTION SUBCUTANEOUS at 09:01

## 2020-01-15 RX ADMIN — GUAIFENESIN 600 MG: 600 TABLET ORAL at 21:03

## 2020-01-15 RX ADMIN — KETOROLAC TROMETHAMINE 15 MG: 30 INJECTION, SOLUTION INTRAMUSCULAR at 21:19

## 2020-01-15 RX ADMIN — CLINDAMYCIN PHOSPHATE 600 MG: 600 INJECTION, SOLUTION INTRAVENOUS at 13:19

## 2020-01-15 RX ADMIN — SODIUM CHLORIDE 100 ML/HR: 0.9 INJECTION, SOLUTION INTRAVENOUS at 09:01

## 2020-01-15 RX ADMIN — KETOROLAC TROMETHAMINE 15 MG: 30 INJECTION, SOLUTION INTRAMUSCULAR at 04:39

## 2020-01-15 NOTE — ASSESSMENT & PLAN NOTE
Patient presents with 1 month of worsening sore throat  Found to have CT evidence of asymmetric soft tissue thickening at the left supraglottis, findings may be infectious, however underlying neoplastic process cannot be excluded  Inpatient ENT consult  Started on IV clindamycin in ED, will continue  Given IV Decadron in ED  NPO until seen by ENT  IV fluid hydration

## 2020-01-15 NOTE — APP STUDENT NOTE
[facility] STUDENT  Inpatient Progress Note for TRAINING ONLY  Not Part of Legal Medical Record       Progress Note - Real Veronica Mcmullen 62 y o  female MRN: [id number]    Unit/Bed#: [facility] 2 -02 Encounter: [id number]      Assessment/plan:  1  Abnormal computed tomography of soft tissue of neck  -1 month of worsening cough + CT evidence of asymmetric soft tissue thickening at the left supraglottis,   -Pt seen by ENT: Flexible laryngoscopy performed at the bedside which revealed mild thickening and edema overlying the left aryepiglottic fold  -Recommend 24-48hrs IV clindamycin  Followed by oral antibiotic therapy x 10 days  Follow up with outpatient ENT after d/c for chronic cough  -Currently tolerating dysphagia 3-dental soft; thin liquid diet with no difficulties     2  Acid reflux disease   Continue daily protonix    3  Depression with anxiety  Continue home sertraline    4  Elevate Blood pressure  - BP was 216/105 upon admission and ranged from 113/84- 12/83 during her admission   -Pt reports her PCP has been following her high BP for the past year, but she is not on any medication  -Follow up with PCP upon d/c      Subjective:   Patient was seen and examined  She reports that she still has a productive cough but her throat feels "so much better" after treatment with oral prednisone  She is eating and drinking without difficulty  She denies fevers, chills, difficulty breathing, difficulty swallowing, dizziness, abdominal pain, N/V  Objective:     Vitals: Blood pressure 112/84, pulse 64, temperature 98 1 °F (36 7 °C), resp  rate 16, weight 100 kg (221 lb 1 9 oz), SpO2 99 %  ,Body mass index is 35 69 kg/m²        Intake/Output Summary (Last 24 hours) at 1/15/2020 1543  Last data filed at 1/15/2020 0518  Gross per 24 hour   Intake 1050 ml   Output    Net 1050 ml       Physical Exam: /84   Pulse 64   Temp 98 1 °F (36 7 °C)   Resp 16   Wt 100 kg (221 lb 1 9 oz)   SpO2 99%   BMI 35 69 kg/m²   General appearance: alert and oriented, in no acute distress  Head: Normocephalic, without obvious abnormality, atraumatic  Throat: normal findings: lips normal without lesions, buccal mucosa normal, soft palate, uvula, and tonsils normal and oropharynx pink & moist without lesions or evidence of thrush  Neck: no adenopathy, no carotid bruit, no JVD, supple, symmetrical, trachea midline and thyroid not enlarged, symmetric, no tenderness/mass/nodules  Lungs: clear to auscultation bilaterally  Heart: regular rate and rhythm, S1, S2 normal, no murmur, click, rub or gallop  Abdomen: soft, non-tender; bowel sounds normal; no masses,  no organomegaly     Invasive Devices     Peripheral Intravenous Line            Peripheral IV 01/15/20 Left Antecubital less than 1 day                Lab, Imaging and other studies:  CT soft tissue neck with contrast   Final Result by Rex Galeas MD (01/15 0796)      Asymmetric soft tissue thickening at the left supraglottis as detailed above  Findings may be infectious, however underlying neoplastic process cannot be excluded  Follow-up with ENT is recommended  Scattered left cervical subcentimeter lymphadenopathy      The study was marked in EPIC for significant notification               Workstation performed: [id number]         CT chest with contrast   Final Result by Rex Galeas MD (01/15 1434)      No evidence of acute intrathoracic pathology        Stable sub-4 mm pulmonary nodules when comparing to CT examination of 3/18/19         Workstation performed: [id number]           Results Reviewed     Procedure Component Value Units Date/Time    Comprehensive metabolic panel [[id number]] Collected:  01/15/20 0230    Lab Status:  Final result Specimen:  Blood from Arm, Left Updated:  01/15/20 0308     Sodium 140 mmol/L      Potassium 3 8 mmol/L      Chloride 104 mmol/L      CO2 27 mmol/L      ANION GAP 9 mmol/L      BUN 19 mg/dL      Creatinine 0 80 mg/dL      Glucose 125 mg/dL Calcium 8 7 mg/dL      AST 20 U/L      ALT 19 U/L      Alkaline Phosphatase 98 U/L      Total Protein 7 1 g/dL      Albumin 3 6 g/dL      Total Bilirubin 0 28 mg/dL      eGFR 83 ml/min/1 73sq m     Narrative:       [place] guidelines for Chronic Kidney Disease (CKD):     Stage 1 with normal or high GFR (GFR > 90 mL/min/1 73 square meters)    Stage 2 Mild CKD (GFR = 60-89 mL/min/1 73 square meters)    Stage 3A Moderate CKD (GFR = 45-59 mL/min/1 73 square meters)    Stage 3B Moderate CKD (GFR = 30-44 mL/min/1 73 square meters)    Stage 4 Severe CKD (GFR = 15-29 mL/min/1 73 square meters)    Stage 5 End Stage CKD (GFR <15 mL/min/1 73 square meters)  Note: GFR calculation is accurate only with a steady state creatinine    CBC and differential [[id number]]  (Abnormal) Collected:  01/15/20 0230    Lab Status:  Final result Specimen:  Blood from Arm, Left Updated:  01/15/20 0245     WBC 8 95 Thousand/uL      RBC 4 19 Million/uL      Hemoglobin 12 1 g/dL      Hematocrit 39 1 %      MCV 93 fL      MCH 28 9 pg      MCHC 30 9 g/dL      RDW 14 5 %      MPV 11 9 fL      Platelets 376 Thousands/uL      nRBC 0 /100 WBCs      Neutrophils Relative 55 %      Immat GRANS % 0 %      Lymphocytes Relative 35 %      Monocytes Relative 6 %      Eosinophils Relative 3 %      Basophils Relative 1 %      Neutrophils Absolute 4 90 Thousands/µL      Immature Grans Absolute 0 03 Thousand/uL      Lymphocytes Absolute 3 15 Thousands/µL      Monocytes Absolute 0 52 Thousand/µL      Eosinophils Absolute 0 26 Thousand/µL      Basophils Absolute 0 09 Thousands/µL

## 2020-01-15 NOTE — H&P
[facility] Internal Medicine  H&P- Kurtis Islas 1965, 62 y o  female MRN: [id number]    [facility] [address] [address] [id number] Encounter: [id number]    Primary Care Provider: Vanna lFores DO   Date and time admitted to hospital: 1/15/2020  2:02 AM        * Abnormal computed tomography of soft tissue of neck  Assessment & Plan  Patient presents with 1 month of worsening sore throat  Found to have CT evidence of asymmetric soft tissue thickening at the left supraglottis, findings may be infectious, however underlying neoplastic process cannot be excluded  Inpatient ENT consult  Started on IV clindamycin in ED, will continue  Given IV Decadron in ED  NPO until seen by ENT  IV fluid hydration    Chronic coughing  Assessment & Plan  Patient reports approximately 1 year chronic cough  Patient reports she has seen multiple specialists regarding this cough without cause or improvement  Continue home inhalers  CT chest showing no acute pathology, stable 4 mm pulmonary nodule  Continue outpatient follow-up    Acid reflux disease  Assessment & Plan  Continue daily Protonix once tolerating oral    Depression with anxiety  Assessment & Plan  Continue home sertraline once tolerating oral  P r n  IV Ativan while NPO      VTE Prophylaxis: Enoxaparin (Lovenox)     Code Status:  Full code  POLST: POLST form is not discussed and not completed at this time  Discussion with family:  None    Anticipated Length of Stay:  Patient will be admitted on an Observation basis with an anticipated length of stay of  less than 2 midnights  Justification for Hospital Stay:  Abnormal CT of neck    Total Time for Visit, including Counseling / Coordination of Care: 1 hour  Greater than 50% of this total time spent on direct patient counseling and coordination of care      Chief Complaint:   Sore throat    History of Present Illness:    Kurtis Islas is a 62 y o  female with past medical history of chronic cough, depression, acid reflux who presents with sore throat  Patient reports she began noticing a sore throat that began approximately 1 month ago  Patient reports she was started on 10 days of Augmentin for sinusitis in December  Patient reports she began feeling slightly better, but reports following nose 10 days of antibiotics she began feeling worse  Patient reports significant pain with swallowing  Patient reports inability to sleep secondary to throat pain  Patient reports inability to swallow secondary to pain and is not a physical inability  Patient reports minimal associated congestion  Patient denies any difficulty breathing  Patient does report she has been losing her voice for the past few days  Patient does report chronic cough  Patient reports has been occurring for over 1 year  Patient reports she has seen multiple specialists in regard to her chronic cough without a diagnosis or cure  Patient reports the cough is intermittent and dry  Patient denies any recent fever chills  Patient denies sick contacts  Review of Systems:    Review of Systems   Constitutional: Negative for fatigue and fever  HENT: Positive for sinus pressure, sore throat and trouble swallowing  Eyes: Negative for visual disturbance  Respiratory: Positive for cough and shortness of breath  Negative for wheezing  Cardiovascular: Negative for chest pain, palpitations and leg swelling  Gastrointestinal: Negative for abdominal pain, nausea and vomiting  Endocrine: Negative  Genitourinary: Negative for difficulty urinating  Musculoskeletal: Negative for back pain, gait problem and neck stiffness  Skin: Negative for rash  Allergic/Immunologic: Negative  Neurological: Positive for speech difficulty  Negative for syncope, weakness and light-headedness  Hematological: Negative  Psychiatric/Behavioral: Negative for confusion  All other systems reviewed and are negative        Past Medical and Surgical History:     Past Medical History: Diagnosis Date    Acid reflux     Anxiety     Asthmatic bronchitis     Back pain     Carpal tunnel syndrome     Depression     Depression     Hyperglycemia     Hypertension     Lipoma     Migraine     Miscarriage     Osteoarthritis     PONV (postoperative nausea and vomiting)     Stress incontinence     UTI (urinary tract infection)        Past Surgical History:   Procedure Laterality Date    APPENDECTOMY      BREAST EXCISIONAL BIOPSY Left     benign    COLONOSCOPY N/A 4/8/2016    Procedure: COLONOSCOPY;  Surgeon: Sheryl Yancey MD;  Location: [facility] GI LAB; Service:     KNEE ARTHROSCOPY      ME EXC SKIN BENIG <0 5 CM TRUNK,ARM,LEG Left 5/19/2016    Procedure: EXCISION LIPOMA SHOULDER ;  Surgeon: Sheryl Yancey MD;  Location: [facility] OR;  Service: General    TUBAL LIGATION         Meds/Allergies:    Prior to Admission medications    Medication Sig Start Date End Date Taking?  Authorizing Provider   ADVAIR DISKUS 500-50 MCG/DOSE inhaler Inhale 1 puff 2 (two) times a day Rinse mouth after use 8/19/19  Yes Historical Provider, MD   albuterol (2 5 mg/3 mL) 0 083 % nebulizer solution Take 1 vial (2 5 mg total) by nebulization every 4 (four) hours as needed for wheezing (via nebulizer) 11/5/19  Yes Estefania Salinas PA-C   albuterol (PROVENTIL HFA,VENTOLIN HFA) 90 mcg/act inhaler Inhale 2 puffs every 4 (four) hours as needed for wheezing 12/14/18  Yes Francisca Kulkarni DO   b complex vitamins tablet Take 1 tablet by mouth daily   Yes Historical Provider, MD   chlorpheniramine (CHLOR-TRIMETON) 4 MG tablet Take 4 mg by mouth every 6 (six) hours as needed for allergies   Yes Historical Provider, MD   LYSINE PO Take 1 capsule by mouth daily as needed (for cold sore)    Yes Historical Provider, MD   multivitamin (THERAGRAN) TABS Take 1 tablet by mouth daily   Yes Historical Provider, MD   omeprazole (PriLOSEC) 20 mg delayed release capsule TAKE 1 CAPSULE DAILY AS NEEDED FOR STOMACH AND ACID 4/29/19  Yes Williams Zuniga DO   sertraline (ZOLOFT) 100 mg tablet TAKE 1 TABLET BY MOUTH EVERY DAY  Patient taking differently: Take 100 mg by mouth daily  3/25/19  Yes Marjan Paredes PA-C   tiotropium (SPIRIVA RESPIMAT) 1 25 MCG/ACT AERS inhaler Inhale 2 puffs daily   Yes Historical Provider, MD   fluticasone (FLONASE) 50 mcg/act nasal spray 2 sprays into each nostril daily  Patient not taking: Reported on 1/15/2020 3/8/19   Marjan Paredes PA-C   ibuprofen (ADVIL,MOTRIN) 100 MG chewable tablet Chew every 8 (eight) hours as needed for mild pain  Historical Provider, MD   INCRUSE ELLIPTA 62 5 MCG/INH AEPB inhaler Inhale 1 Inhaler daily 19   Historical Provider, MD   LORazepam (ATIVAN) 0 5 mg tablet Take 1 tablet (0 5 mg total) by mouth every 6 (six) hours as needed for anxiety  Patient not taking: Reported on 1/15/2020 7/19/18   Williams Zuniga DO     I have reviewed home medications with patient personally  Allergies:    Allergies   Allergen Reactions    Effexor [Venlafaxine] Other (See Comments)     Hot and sweaty    Levaquin [Levofloxacin] Myalgia     Tendonitis     Wellbutrin [Bupropion] Other (See Comments)     Hot and sweaty    Prempro [Conj Estrog-Medroxyprogest Ace] Rash       Social History:     Marital Status: /Civil Union   Occupation:  [employer]  Patient Pre-hospital Living Situation:  Home  Patient Pre-hospital Level of Mobility:  Ambulatory  Patient Pre-hospital Diet Restrictions:  None  Substance Use History:   Social History     Substance and Sexual Activity   Alcohol Use Yes    Comment: rarely/socially     Social History     Tobacco Use   Smoking Status Former Smoker    Packs/day: 0 50    Years: 15 00    Pack years: 7 50    Types: Cigarettes    Last attempt to quit: 2006    Years since quittin 6   Smokeless Tobacco Never Used     Social History     Substance and Sexual Activity   Drug Use No       Family History:    Family History   Problem Relation Age of Onset    Stroke Mother     Stomach cancer Mother 68    Skin cancer Father     No Known Problems Sister     Heart disease Brother     No Known Problems Daughter     No Known Problems Maternal Grandmother     No Known Problems Maternal Grandfather     Breast cancer Paternal Grandmother 48    No Known Problems Paternal Grandfather     No Known Problems Sister     No Known Problems Daughter     Breast cancer Cousin     Breast cancer Cousin     Breast cancer Cousin     No Known Problems Maternal Aunt     No Known Problems Maternal Aunt     No Known Problems Maternal Aunt     No Known Problems Maternal Aunt     No Known Problems Paternal Aunt     No Known Problems Paternal Aunt     No Known Problems Paternal Aunt     No Known Problems Paternal Aunt     No Known Problems Paternal Aunt     Lung cancer Paternal Uncle        Physical Exam:     Vitals:   Blood Pressure: 152/83 (01/15/20 0702)  Pulse: 68 (01/15/20 0553)  Temperature: 98 2 °F (36 8 °C) (01/15/20 0702)  Temp Source: Oral (01/15/20 0206)  Respirations: 18 (01/15/20 0702)  Weight - Scale: 100 kg (221 lb 1 9 oz) (01/15/20 0206)  SpO2: 95 % (01/15/20 0553)    Physical Exam   Constitutional: She is oriented to person, place, and time  She appears well-nourished  Tearful, hoarse voice   HENT:   Head: Normocephalic and atraumatic  Mouth/Throat: Oropharynx is clear and moist  No oropharyngeal exudate  Eyes: Conjunctivae and EOM are normal  No scleral icterus  Neck: Normal range of motion  Cardiovascular: Normal rate, regular rhythm and normal heart sounds  No murmur heard  Pulmonary/Chest: Effort normal and breath sounds normal  No respiratory distress  She has no wheezes  She has no rales  Abdominal: Soft  Bowel sounds are normal  She exhibits no distension  There is no tenderness  There is no guarding  Musculoskeletal: Normal range of motion  She exhibits no edema  Neurological: She is alert and oriented to person, place, and time     Skin: Skin is warm and dry  Psychiatric: She has a normal mood and affect  Her behavior is normal  Thought content normal    Vitals reviewed  Additional Data:     Lab Results: I have personally reviewed pertinent reports  Results from last 7 days   Lab Units 01/15/20  0230   WBC Thousand/uL 8 95   HEMOGLOBIN g/dL 12 1   HEMATOCRIT % 39 1   PLATELETS Thousands/uL 293   NEUTROS PCT % 55   LYMPHS PCT % 35   MONOS PCT % 6   EOS PCT % 3     Results from last 7 days   Lab Units 01/15/20  0230   SODIUM mmol/L 140   POTASSIUM mmol/L 3 8   CHLORIDE mmol/L 104   CO2 mmol/L 27   BUN mg/dL 19   CREATININE mg/dL 0 80   ANION GAP mmol/L 9   CALCIUM mg/dL 8 7   ALBUMIN g/dL 3 6   TOTAL BILIRUBIN mg/dL 0 28   ALK PHOS U/L 98   ALT U/L 19   AST U/L 20   GLUCOSE RANDOM mg/dL 125                       Imaging: I have personally reviewed pertinent reports  CT soft tissue neck with contrast   Final Result by Te Alvarez MD (01/15 0654)      Asymmetric soft tissue thickening at the left supraglottis as detailed above  Findings may be infectious, however underlying neoplastic process cannot be excluded  Follow-up with ENT is recommended  Scattered left cervical subcentimeter lymphadenopathy      The study was marked in EPIC for significant notification               Workstation performed: [id number]         CT chest with contrast   Final Result by Te Alvarez MD (01/15 1498)      No evidence of acute intrathoracic pathology  Stable sub-4 mm pulmonary nodules when comparing to CT examination of 3/18/19         Workstation performed: [id number]               Allscripts / Epic Records Reviewed: Yes     ** Please Note: This note has been constructed using a voice recognition system   **

## 2020-01-15 NOTE — ED PROVIDER NOTES
History  Chief Complaint   Patient presents with    Sore Throat     sore throat for two weeks, pt reports "it feels like there is a whole in the left side of my throat", pt tearful in triage, pt reports being evaluated at PCP for chronic cough x1 year     This is a 62year old female who states has had a sorethroat x 2 weeks and feels like there is an ulceration on the left side of her throat  She states it feels like it is in the left neck runs up her throat into her ear  She states she is hoarse and is having trouble swallowing her own saliva  She states she has been seeing many physicians for cough x 1 year with mucous production  She denies fevers, chest pain  She states she is occasionally SOB  She states she has seen GI, Allergists but has not seen ENT  She states she has been on multiple abx as well  Pt states she coughs continuously and spits up green phlegm  Post menopausal       History provided by:  Patient and medical records   used: No    Sore Throat   Location:  Left  Quality:  Burning and sharp  Onset quality:  Gradual  Duration:  2 weeks  Timing:  Constant  Progression:  Worsening  Chronicity:  New  Relieved by:  Nothing  Associated symptoms: cough, ear pain, postnasal drip, shortness of breath and trouble swallowing        Prior to Admission Medications   Prescriptions Last Dose Informant Patient Reported? Taking?    ADVAIR DISKUS 500-50 MCG/DOSE inhaler  Self Yes Yes   Sig: Inhale 1 puff 2 (two) times a day Rinse mouth after use   INCRUSE ELLIPTA 62 5 MCG/INH AEPB inhaler   Yes No   Sig: Inhale 1 Inhaler daily   LORazepam (ATIVAN) 0 5 mg tablet Not Taking at Unknown time Self No No   Sig: Take 1 tablet (0 5 mg total) by mouth every 6 (six) hours as needed for anxiety   Patient not taking: Reported on 1/15/2020   LYSINE PO  Self Yes Yes   Sig: Take 1 capsule by mouth daily as needed (for cold sore)    albuterol (2 5 mg/3 mL) 0 083 % nebulizer solution  Self No Yes Sig: Take 1 vial (2 5 mg total) by nebulization every 4 (four) hours as needed for wheezing (via nebulizer)   albuterol (PROVENTIL HFA,VENTOLIN HFA) 90 mcg/act inhaler  Self No Yes   Sig: Inhale 2 puffs every 4 (four) hours as needed for wheezing   b complex vitamins tablet  Self Yes Yes   Sig: Take 1 tablet by mouth daily   chlorpheniramine (CHLOR-TRIMETON) 4 MG tablet  Self Yes Yes   Sig: Take 4 mg by mouth every 6 (six) hours as needed for allergies   fluticasone (FLONASE) 50 mcg/act nasal spray Not Taking at Unknown time Self No No   Si sprays into each nostril daily   Patient not taking: Reported on 1/15/2020   ibuprofen (ADVIL,MOTRIN) 100 MG chewable tablet Not Taking at Unknown time Self Yes No   Sig: Chew every 8 (eight) hours as needed for mild pain  multivitamin (THERAGRAN) TABS  Self Yes Yes   Sig: Take 1 tablet by mouth daily   omeprazole (PriLOSEC) 20 mg delayed release capsule  Self No Yes   Sig: TAKE 1 CAPSULE DAILY AS NEEDED FOR STOMACH AND ACID   sertraline (ZOLOFT) 100 mg tablet  Self No Yes   Sig: TAKE 1 TABLET BY MOUTH EVERY DAY   Patient taking differently: Take 100 mg by mouth daily    tiotropium (SPIRIVA RESPIMAT) 1 25 MCG/ACT AERS inhaler  Self Yes Yes   Sig: Inhale 2 puffs daily      Facility-Administered Medications: None       Past Medical History:   Diagnosis Date    Acid reflux     Anxiety     Asthmatic bronchitis     Back pain     Carpal tunnel syndrome     Depression     Depression     Hyperglycemia     Hypertension     Lipoma     Migraine     Miscarriage     Osteoarthritis     PONV (postoperative nausea and vomiting)     Stress incontinence     UTI (urinary tract infection)        Past Surgical History:   Procedure Laterality Date    APPENDECTOMY      BREAST EXCISIONAL BIOPSY Left     benign    COLONOSCOPY N/A 2016    Procedure: COLONOSCOPY;  Surgeon: Johnathon Louie MD;  Location: [facility];   Service:     KNEE ARTHROSCOPY      AK EXC SKIN BENIG <0 5 CM TRUNK,ARM,LEG Left 2016    Procedure: EXCISION LIPOMA SHOULDER ;  Surgeon: Missael Almeida MD;  Location: [facility];  Service: General    TUBAL LIGATION         Family History   Problem Relation Age of Onset    Stroke Mother     Stomach cancer Mother 68    Skin cancer Father     No Known Problems Sister     Heart disease Brother     No Known Problems Daughter     No Known Problems Maternal Grandmother     No Known Problems Maternal Grandfather     Breast cancer Paternal Grandmother 48    No Known Problems Paternal Grandfather     No Known Problems Sister     No Known Problems Daughter     Breast cancer Cousin     Breast cancer Cousin     Breast cancer Cousin     No Known Problems Maternal Aunt     No Known Problems Maternal Aunt     No Known Problems Maternal Aunt     No Known Problems Maternal Aunt     No Known Problems Paternal Aunt     No Known Problems Paternal Aunt     No Known Problems Paternal Aunt     No Known Problems Paternal Aunt     No Known Problems Paternal Aunt     Lung cancer Paternal Uncle      I have reviewed and agree with the history as documented  Social History     Tobacco Use    Smoking status: Former Smoker     Packs/day: 0 50     Years: 15 00     Pack years: 7 50     Types: Cigarettes     Last attempt to quit: 2006     Years since quittin 6    Smokeless tobacco: Never Used   Substance Use Topics    Alcohol use: Yes     Comment: rarely/socially    Drug use: No        Review of Systems   Constitutional: Negative  HENT: Positive for ear pain, postnasal drip, sore throat and trouble swallowing  Eyes: Negative  Respiratory: Positive for cough and shortness of breath  Cardiovascular: Negative  Gastrointestinal: Negative  Endocrine: Negative  Genitourinary: Negative  Musculoskeletal: Negative  Skin: Negative  Allergic/Immunologic: Negative  Neurological: Negative  Hematological: Negative  Psychiatric/Behavioral: Negative  Physical Exam  Physical Exam   Constitutional: She is oriented to person, place, and time  She appears well-developed  Non-toxic appearance  She does not appear ill  She appears distressed  HENT:   Head: Normocephalic and atraumatic  Right Ear: Hearing, tympanic membrane and ear canal normal    Left Ear: Hearing, tympanic membrane and ear canal normal    Mouth/Throat: Uvula is midline, oropharynx is clear and moist and mucous membranes are normal  No oral lesions  No uvula swelling  No oropharyngeal exudate, posterior oropharyngeal edema, posterior oropharyngeal erythema or tonsillar abscesses  Tonsils are 0 on the right  Tonsils are 0 on the left  No tonsillar exudate  Hoarse voice  Spitting out saliva     Eyes: Pupils are equal, round, and reactive to light  EOM are normal    Neck: Normal range of motion  Neck supple  +left posterior cervical lymphadenopathy    Cardiovascular: Normal rate, regular rhythm and normal heart sounds  Pulmonary/Chest: Effort normal and breath sounds normal    Abdominal: Soft  Bowel sounds are normal    Lymphadenopathy:     She has cervical adenopathy  Neurological: She is alert and oriented to person, place, and time  Skin: Skin is warm and dry  Capillary refill takes less than 2 seconds  Psychiatric: She has a normal mood and affect  Her behavior is normal    Nursing note and vitals reviewed        Vital Signs  ED Triage Vitals [01/15/20 0206]   Temperature Pulse Respirations Blood Pressure SpO2   98 1 °F (36 7 °C) 83 22 (!) 216/105 90 %      Temp Source Heart Rate Source Patient Position - Orthostatic VS BP Location FiO2 (%)   Oral Monitor Sitting Right arm --      Pain Score       6           Vitals:    01/15/20 0206 01/15/20 0220 01/15/20 0345 01/15/20 0553   BP: (!) 216/105 164/81 139/75 148/93   Pulse: 83 81 65 68   Patient Position - Orthostatic VS: Sitting Lying  Lying         Visual Acuity      ED Medications  Medications   sodium chloride 0 9 % bolus 1,000 mL (0 mL Intravenous Stopped 1/15/20 0439)   iohexol (OMNIPAQUE) 350 MG/ML injection (MULTI-DOSE) 100 mL (100 mL Intravenous Given 1/15/20 0337)   dexamethasone (DECADRON) injection 10 mg (10 mg Intravenous Given 1/15/20 0439)   ketorolac (TORADOL) injection 15 mg (15 mg Intravenous Given 1/15/20 0439)   clindamycin (CLEOCIN) IVPB (premix) 600 mg (0 mg Intravenous Stopped 1/15/20 0518)       Diagnostic Studies  Results Reviewed     Procedure Component Value Units Date/Time    Comprehensive metabolic panel [[id number]] Collected:  01/15/20 0230    Lab Status:  Final result Specimen:  Blood from Arm, Left Updated:  01/15/20 0308     Sodium 140 mmol/L      Potassium 3 8 mmol/L      Chloride 104 mmol/L      CO2 27 mmol/L      ANION GAP 9 mmol/L      BUN 19 mg/dL      Creatinine 0 80 mg/dL      Glucose 125 mg/dL      Calcium 8 7 mg/dL      AST 20 U/L      ALT 19 U/L      Alkaline Phosphatase 98 U/L      Total Protein 7 1 g/dL      Albumin 3 6 g/dL      Total Bilirubin 0 28 mg/dL      eGFR 83 ml/min/1 73sq m     Narrative:       [place] guidelines for Chronic Kidney Disease (CKD):     Stage 1 with normal or high GFR (GFR > 90 mL/min/1 73 square meters)    Stage 2 Mild CKD (GFR = 60-89 mL/min/1 73 square meters)    Stage 3A Moderate CKD (GFR = 45-59 mL/min/1 73 square meters)    Stage 3B Moderate CKD (GFR = 30-44 mL/min/1 73 square meters)    Stage 4 Severe CKD (GFR = 15-29 mL/min/1 73 square meters)    Stage 5 End Stage CKD (GFR <15 mL/min/1 73 square meters)  Note: GFR calculation is accurate only with a steady state creatinine    CBC and differential [[id number]]  (Abnormal) Collected:  01/15/20 0230    Lab Status:  Final result Specimen:  Blood from Arm, Left Updated:  01/15/20 0245     WBC 8 95 Thousand/uL      RBC 4 19 Million/uL      Hemoglobin 12 1 g/dL      Hematocrit 39 1 %      MCV 93 fL      MCH 28 9 pg      MCHC 30 9 g/dL      RDW 14 5 %      MPV 11 9 fL      Platelets 515 Thousands/uL      nRBC 0 /100 WBCs      Neutrophils Relative 55 %      Immat GRANS % 0 %      Lymphocytes Relative 35 %      Monocytes Relative 6 %      Eosinophils Relative 3 %      Basophils Relative 1 %      Neutrophils Absolute 4 90 Thousands/µL      Immature Grans Absolute 0 03 Thousand/uL      Lymphocytes Absolute 3 15 Thousands/µL      Monocytes Absolute 0 52 Thousand/µL      Eosinophils Absolute 0 26 Thousand/µL      Basophils Absolute 0 09 Thousands/µL                  CT soft tissue neck with contrast   Final Result by Kirsten Denton MD (01/15 4569)      Asymmetric soft tissue thickening at the left supraglottis as detailed above  Findings may be infectious, however underlying neoplastic process cannot be excluded  Follow-up with ENT is recommended  Scattered left cervical subcentimeter lymphadenopathy      The study was marked in EPIC for significant notification               Workstation performed: [id number]         CT chest with contrast   Final Result by Kirsten Denton MD (01/15 3368)      No evidence of acute intrathoracic pathology  Stable sub-4 mm pulmonary nodules when comparing to CT examination of 3/18/19         Workstation performed: [id number]                    Procedures  Procedures         ED Course  ED Course as of Graeme 15 0619   Wed Graeme 15, 2020   0228 BP much improved  0234 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 and that she is concerned this could be related to her symptoms  Pt had abnormal CT 3/2019 with nodules and mild fibrosis  Since pt is having continuous cough and states spitting up green sputum - will do CT chest to eval        0327 Labs reviewed and are unremarkable  2312 IMPRESSION:     Asymmetric soft tissue thickening at the left supraglottis as detailed above  Findings may be infectious, however underlying neoplastic process cannot be excluded    Follow-up with ENT is recommended      Scattered left cervical subcentimeter lymphadenopathy         0430 IMPRESSION:     No evidence of acute intrathoracic pathology      Stable sub-4 mm pulmonary nodules when comparing to CT examination of 3/18/19      0441 All labs and radiology results reviewed and discussed with pt  Pt agrees with observation and ENT referral  [address] [place] [address] will accept for observation  0441 Labs unremarkable  9844 Pt admitted to [address] [place] [address]  Bed ready  MDM  Number of Diagnoses or Management Options  Diagnosis management comments: Hoarseness, sorethroat, dysphagia     Plan  Labs  CT ST Neck  IV  IVF         Amount and/or Complexity of Data Reviewed  Clinical lab tests: ordered and reviewed  Tests in the radiology section of CPT®: ordered and reviewed  Review and summarize past medical records: yes          Disposition  Final diagnoses:   Supraglottitis - thickening   Other dysphagia   Pharyngitis, unspecified etiology   Lymphadenopathy   Pulmonary nodules     Time reflects when diagnosis was documented in both MDM as applicable and the Disposition within this note     Time User Action Codes Description Comment    1/15/2020  4:31 AM Kaci Shorts Add [J04 30] Supraglottitis     1/15/2020  4:31 AM Kaci Shorts Modify [J04 30] Supraglottitis thickening    1/15/2020  4:33 AM Kaci Shorts Add [R13 19] Other dysphagia     1/15/2020  4:33 AM Kaci Shorts Add [J02 9] Pharyngitis, unspecified etiology     1/15/2020  4:33 AM Kaci Shorts Add [R59 1] Lymphadenopathy     1/15/2020  4:34 AM Kaci Shorts Add [R91 8] Pulmonary nodules       ED Disposition     ED Disposition Condition Date/Time Comment    Admit Stable Wed Graeme 15, 2020  4:40 AM Case was discussed with LUCAS and the patient's admission status was agreed to be Admission Status: observation status to the service of Dr Deven Martinez           Follow-up Information    None         Patient's Medications   Discharge Prescriptions    No medications on file     No discharge procedures on file      ED Provider  Electronically Signed by           Jenni Hobson  01/15/20 0546

## 2020-01-15 NOTE — ASSESSMENT & PLAN NOTE
Patient reports approximately 1 year chronic cough    Patient reports she has seen multiple specialists regarding this cough without cause or improvement  Continue home inhalers  CT chest showing no acute pathology, stable 4 mm pulmonary nodule  Continue outpatient follow-up

## 2020-01-15 NOTE — PLAN OF CARE
Problem: Nutrition/Hydration-ADULT  Goal: Nutrient/Hydration intake appropriate for improving, restoring or maintaining nutritional needs  Description  Monitor and assess patient's nutrition/hydration status for malnutrition  Collaborate with interdisciplinary team and initiate plan and interventions as ordered  Monitor patient's weight and dietary intake as ordered or per policy  Utilize nutrition screening tool and intervene as necessary  Determine patient's food preferences and provide high-protein, high-caloric foods as appropriate  INTERVENTIONS:  - Monitor oral intake, urinary output, labs, and treatment plans  - Assess nutrition and hydration status and recommend course of action  - Evaluate amount of meals eaten  - Assist patient with eating if necessary   - Allow adequate time for meals  - Recommend/ encourage appropriate diets, oral nutritional supplements, and vitamin/mineral supplements  - Order, calculate, and assess calorie counts as needed  - Recommend, monitor, and adjust tube feedings and TPN/PPN based on assessed needs  - Assess need for intravenous fluids  - Provide specific nutrition/hydration education as appropriate  - Include patient/family/caregiver in decisions related to nutrition  1/15/2020 1052 by Bandar Ordaz RN  Outcome: Progressing  1/15/2020 0748 by Bandar Ordaz RN  Outcome: Progressing     Problem: Potential for Falls  Goal: Patient will remain free of falls  Description  INTERVENTIONS:  - Assess patient frequently for physical needs  -  Identify cognitive and physical deficits and behaviors that affect risk of falls    -  [place] fall precautions as indicated by assessment   - Educate patient/family on patient safety including physical limitations  - Instruct patient to call for assistance with activity based on assessment  - Modify environment to reduce risk of injury  - Consider OT/PT consult to assist with strengthening/mobility  1/15/2020 1052 by Bandar Ordaz RN  Outcome: Progressing  1/15/2020 0748 by Joceline Limon RN  Outcome: Progressing     Problem: RESPIRATORY - ADULT  Goal: Achieves optimal ventilation and oxygenation  Description  INTERVENTIONS:  - Assess for changes in respiratory status  - Assess for changes in mentation and behavior  - Position to facilitate oxygenation and minimize respiratory effort  - Oxygen administered by appropriate delivery if ordered  - Initiate smoking cessation education as indicated  - Encourage broncho-pulmonary hygiene including cough, deep breathe, Incentive Spirometry  - Assess the need for suctioning and aspirate as needed  - Assess and instruct to report SOB or any respiratory difficulty  - Respiratory Therapy support as indicated  1/15/2020 1052 by Joceline Limon RN  Outcome: Progressing  1/15/2020 0748 by Joceline Limon RN  Outcome: Progressing     Problem: GASTROINTESTINAL - ADULT  Goal: Minimal or absence of nausea and/or vomiting  Description  INTERVENTIONS:  - Administer IV fluids if ordered to ensure adequate hydration  - Maintain NPO status until nausea and vomiting are resolved  - Nasogastric tube if ordered  - Administer ordered antiemetic medications as needed  - Provide nonpharmacologic comfort measures as appropriate  - Advance diet as tolerated, if ordered  - Consider nutrition services referral to assist patient with adequate nutrition and appropriate food choices  1/15/2020 1052 by Joceline Limon RN  Outcome: Progressing  1/15/2020 0748 by Jocleine Limon RN  Outcome: Progressing  Goal: Maintains or returns to baseline bowel function  Description  INTERVENTIONS:  - Assess bowel function  - Encourage oral fluids to ensure adequate hydration  - Administer IV fluids if ordered to ensure adequate hydration  - Administer ordered medications as needed  - Encourage mobilization and activity  - Consider nutritional services referral to assist patient with adequate nutrition and appropriate food choices  1/15/2020 1052 by Chanel Ema West RN  Outcome: Progressing  1/15/2020 0748 by Caren Ordonez RN  Outcome: Progressing  Goal: Maintains adequate nutritional intake  Description  INTERVENTIONS:  - Monitor percentage of each meal consumed  - Identify factors contributing to decreased intake, treat as appropriate  - Assist with meals as needed  - Monitor I&O, weight, and lab values if indicated  - Obtain nutrition services referral as needed  1/15/2020 1052 by Caren Ordonez RN  Outcome: Progressing  1/15/2020 0748 by Caren Ordonez RN  Outcome: Progressing     Problem: METABOLIC, FLUID AND ELECTROLYTES - ADULT  Goal: Electrolytes maintained within normal limits  Description  INTERVENTIONS:  - Monitor labs and assess patient for signs and symptoms of electrolyte imbalances  - Administer electrolyte replacement as ordered  - Monitor response to electrolyte replacements, including repeat lab results as appropriate  - Instruct patient on fluid and nutrition as appropriate  1/15/2020 1052 by Caren Ordonez RN  Outcome: Progressing  1/15/2020 0748 by Caren Ordonez RN  Outcome: Progressing  Goal: Fluid balance maintained  Description  INTERVENTIONS:  - Monitor labs   - Monitor I/O and WT  - Instruct patient on fluid and nutrition as appropriate  - Assess for signs & symptoms of volume excess or deficit  1/15/2020 1052 by Caren Ordonez RN  Outcome: Progressing  1/15/2020 0748 by Caren Ordonez RN  Outcome: Progressing     Problem: MUSCULOSKELETAL - ADULT  Goal: Maintain or return mobility to safest level of function  Description  INTERVENTIONS:  - Assess patient's ability to carry out ADLs; assess patient's baseline for ADL function and identify physical deficits which impact ability to perform ADLs (bathing, care of mouth/teeth, toileting, grooming, dressing, etc )  - Assess/evaluate cause of self-care deficits   - Assess range of motion  - Assess patient's mobility  - Assess patient's need for assistive devices and provide as appropriate  - Encourage maximum independence but intervene and supervise when necessary  - Involve family in performance of ADLs  - Assess for home care needs following discharge   - Consider OT consult to assist with ADL evaluation and planning for discharge  - Provide patient education as appropriate  1/15/2020 1052 by Arley Smith RN  Outcome: Progressing  1/15/2020 0748 by Arley Smith RN  Outcome: Progressing     Problem: PAIN - ADULT  Goal: Verbalizes/displays adequate comfort level or baseline comfort level  Description  Interventions:  - Encourage patient to monitor pain and request assistance  - Assess pain using appropriate pain scale  - Administer analgesics based on type and severity of pain and evaluate response  - Implement non-pharmacological measures as appropriate and evaluate response  - Consider cultural and social influences on pain and pain management  - Notify physician/advanced practitioner if interventions unsuccessful or patient reports new pain  1/15/2020 1052 by Arley Smith RN  Outcome: Progressing  1/15/2020 0748 by Arley Smith RN  Outcome: Progressing     Problem: INFECTION - ADULT  Goal: Absence or prevention of progression during hospitalization  Description  INTERVENTIONS:  - Assess and monitor for signs and symptoms of infection  - Monitor lab/diagnostic results  - Monitor all insertion sites, i e  indwelling lines, tubes, and drains  - Monitor endotracheal if appropriate and nasal secretions for changes in amount and color  - [place] appropriate cooling/warming therapies per order  - Administer medications as ordered  - Instruct and encourage patient and family to use good hand hygiene technique  - Identify and instruct in appropriate isolation precautions for identified infection/condition  1/15/2020 1052 by Arley Smith RN  Outcome: Progressing  1/15/2020 0748 by Arley Smith RN  Outcome: Progressing     Problem: SAFETY ADULT  Goal: Maintain or return to baseline ADL function  Description  INTERVENTIONS:  - Assess patient's ability to carry out ADLs; assess patient's baseline for ADL function and identify physical deficits which impact ability to perform ADLs (bathing, care of mouth/teeth, toileting, grooming, dressing, etc )  - Assess/evaluate cause of self-care deficits   - Assess range of motion  - Assess patient's mobility; develop plan if impaired  - Assess patient's need for assistive devices and provide as appropriate  - Encourage maximum independence but intervene and supervise when necessary  - Involve family in performance of ADLs  - Assess for home care needs following discharge   - Consider OT consult to assist with ADL evaluation and planning for discharge  - Provide patient education as appropriate  1/15/2020 1052 by Dharmesh Sanchez RN  Outcome: Progressing  1/15/2020 0748 by Dharmesh Sanchez RN  Outcome: Progressing  Goal: Maintain or return mobility status to optimal level  Description  INTERVENTIONS:  - Assess patient's baseline mobility status (ambulation, transfers, stairs, etc )    - Identify cognitive and physical deficits and behaviors that affect mobility  - Identify mobility aids required to assist with transfers and/or ambulation (gait belt, sit-to-stand, lift, walker, cane, etc )  - [place] fall precautions as indicated by assessment  - Record patient progress and toleration of activity level on Mobility SBAR; progress patient to next Phase/Stage  - Instruct patient to call for assistance with activity based on assessment  - Consider rehabilitation consult to assist with strengthening/weightbearing, etc   1/15/2020 1052 by Dharmesh Sanchez RN  Outcome: Progressing  1/15/2020 0748 by Dharmesh Sanchez RN  Outcome: Progressing     Problem: DISCHARGE PLANNING  Goal: Discharge to home or other facility with appropriate resources  Description  INTERVENTIONS:  - Identify barriers to discharge w/patient and caregiver  - Arrange for needed discharge resources and transportation as appropriate  - Identify discharge learning needs (meds, wound care, etc )  - Arrange for interpretive services to assist at discharge as needed  - Refer to Case Management Department for coordinating discharge planning if the patient needs post-hospital services based on physician/advanced practitioner order or complex needs related to functional status, cognitive ability, or social support system  1/15/2020 1052 by Jose Miguel Chang RN  Outcome: Progressing  1/15/2020 0748 by Jose Miguel Chang RN  Outcome: Progressing     Problem: Knowledge Deficit  Goal: Patient/family/caregiver demonstrates understanding of disease process, treatment plan, medications, and discharge instructions  Description  Complete learning assessment and assess knowledge base    Interventions:  - Provide teaching at level of understanding  - Provide teaching via preferred learning methods  1/15/2020 1052 by Jose Miguel Chang RN  Outcome: Progressing  1/15/2020 0748 by Jose Miguel Chang RN  Outcome: Progressing

## 2020-01-15 NOTE — NUTRITION
01/15/20 1629   Recommendations/Interventions   Nutrition Recommendations Other (specify)  (Consider liberalizing diet to regular and allowing pt to make soft food selections from menu in order to provide more options than what is available on dysphagia diets )

## 2020-01-15 NOTE — CONSULTS
Assessment/Plan:         [unfilled]        Yusuf Hooks has an acute sore throat in the setting of asymmetric thickening of the left supraglottis consistent with infection  CT scan imaging of the neck does not show any underlying mass lesions or abscess  She does admit to recent new oral inhaler medication including Spiriva, but I do not see any current evidence of candidiasis  Flexible laryngoscopy was performed at the bedside which she tolerated very well showing no sign of exudates or white coating and no mass lesions  There is mild thickening and edema overlying the left aryepiglottic fold  She is responding to IV antibiotic and steroid therapy  I discussed the case with her medical attending  We recommend 24-48 hours of IV antibiotic therapy prior to discharge  She would benefit from oral antibiotic therapy for approximately 10 days duration post discharge  She should follow up in our office in about 1 week for re-evaluation  Patient ID: Vickey Malik is a 62 y o  female  Yusuf Hooks presents to the ER with a 1 week history of sore throat  A CT scan of the neck was obtained showing asymmetric thickening of the left supraglottis without any underlying mass lesions  She states her sore throat has improved on the antibiotic and steroid therapy  Her voice has improved as well  She denies any difficulty breathing, difficulty swallowing, or hemoptysis  She has a history of a cough for 1 year duration  She was seen by pulmonology who obtained a CT scan of the chest showing some pulmonary nodules which are being observed  She also had a sleep study showing mild to moderate obstructive sleep apnea which has not yet been initiated as she is scheduled to be fitted for a mask  She saw her PCP in September due to feeling ill at that time and Levaquin was prescribed, but discontinued due to development of wrist pain    She saw an allergist, Dr Rc Limon very comment had allergy testing performed which was positive to cats, dogs, and cocklebur  She was not started on immunotherapy  A CT scan of the sinuses was obtained and was clear  She has a history of GERD and is on chronic reflux medication for this  She quit smoking approximately 16 years ago  She did receive a course of Augmentin for 10 days duration due to his sore throat in the end of December which subsided the sore throat at that time  The following portions of the patient's history were reviewed and updated as appropriate: allergies, current medications, past family history, past medical history, past social history, past surgical history and problem list       Past Medical History:   Diagnosis Date    Acid reflux     Anxiety     Asthmatic bronchitis     Back pain     Carpal tunnel syndrome     Depression     Depression     Hyperglycemia     Hypertension     Lipoma     Migraine     Miscarriage     Osteoarthritis     PONV (postoperative nausea and vomiting)     Stress incontinence     UTI (urinary tract infection)        Past Surgical History:   Procedure Laterality Date    APPENDECTOMY      BREAST EXCISIONAL BIOPSY Left     benign    COLONOSCOPY N/A 2016    Procedure: COLONOSCOPY;  Surgeon: Lidya Callejas MD;  Location: [facility] LAB; Service:     KNEE ARTHROSCOPY      MS EXC SKIN BENIG <0 5 CM TRUNK,ARM,LEG Left 2016    Procedure: EXCISION LIPOMA SHOULDER ;  Surgeon: Lidya Callejas MD;  Location: [facility] OR;  Service: General    TUBAL LIGATION         Social History     Tobacco Use    Smoking status: Former Smoker     Packs/day: 0 50     Years: 15 00     Pack years: 7 50     Types: Cigarettes     Last attempt to quit: 2006     Years since quittin 6    Smokeless tobacco: Never Used   Substance Use Topics    Alcohol use: Yes     Comment: rarely/socially    Drug use: No       No current facility-administered medications on file prior to encounter        Current Outpatient Medications on File Prior to Encounter Medication Sig Dispense Refill    ADVAIR DISKUS 500-50 MCG/DOSE inhaler Inhale 1 puff 2 (two) times a day Rinse mouth after use  5    albuterol (2 5 mg/3 mL) 0 083 % nebulizer solution Take 1 vial (2 5 mg total) by nebulization every 4 (four) hours as needed for wheezing (via nebulizer) 25 vial 1    albuterol (PROVENTIL HFA,VENTOLIN HFA) 90 mcg/act inhaler Inhale 2 puffs every 4 (four) hours as needed for wheezing 1 Inhaler 0    b complex vitamins tablet Take 1 tablet by mouth daily      chlorpheniramine (CHLOR-TRIMETON) 4 MG tablet Take 4 mg by mouth every 6 (six) hours as needed for allergies      LYSINE PO Take 1 capsule by mouth daily as needed (for cold sore)       multivitamin (THERAGRAN) TABS Take 1 tablet by mouth daily      omeprazole (PriLOSEC) 20 mg delayed release capsule TAKE 1 CAPSULE DAILY AS NEEDED FOR STOMACH AND ACID 90 capsule 3    sertraline (ZOLOFT) 100 mg tablet TAKE 1 TABLET BY MOUTH EVERY DAY (Patient taking differently: Take 100 mg by mouth daily ) 30 tablet 5    tiotropium (SPIRIVA RESPIMAT) 1 25 MCG/ACT AERS inhaler Inhale 2 puffs daily      fluticasone (FLONASE) 50 mcg/act nasal spray 2 sprays into each nostril daily (Patient not taking: Reported on 1/15/2020) 16 g 0    ibuprofen (ADVIL,MOTRIN) 100 MG chewable tablet Chew every 8 (eight) hours as needed for mild pain        INCRUSE ELLIPTA 62 5 MCG/INH AEPB inhaler Inhale 1 Inhaler daily      LORazepam (ATIVAN) 0 5 mg tablet Take 1 tablet (0 5 mg total) by mouth every 6 (six) hours as needed for anxiety (Patient not taking: Reported on 1/15/2020) 15 tablet 0       Allergies   Allergen Reactions    Effexor [Venlafaxine] Other (See Comments)     Hot and sweaty    Levaquin [Levofloxacin] Myalgia     Tendonitis     Wellbutrin [Bupropion] Other (See Comments)     Hot and sweaty    Prempro [Conj Estrog-Medroxyprogest Ace] Rash           Review of Systems   Constitutional: Negative for activity change, appetite change, fatigue, fever and unexpected weight change  HENT: Positive for sore throat  Negative for congestion, ear discharge, ear pain, hearing loss, nosebleeds, postnasal drip, rhinorrhea, sinus pressure, sinus pain, sneezing, tinnitus, trouble swallowing and voice change  Eyes: Negative  Negative for photophobia, pain, itching and visual disturbance  Respiratory: Negative  Negative for cough, chest tightness and shortness of breath  Cardiovascular: Negative  Negative for chest pain  Gastrointestinal: Negative  Negative for abdominal pain  Endocrine: Negative  Musculoskeletal: Negative  Negative for gait problem, neck pain and neck stiffness  Skin: Negative  Allergic/Immunologic: Negative  Negative for environmental allergies  Neurological: Negative  Negative for dizziness, speech difficulty, light-headedness and headaches  Hematological: Negative  Negative for adenopathy  Psychiatric/Behavioral: Negative  Negative for sleep disturbance  The patient is not nervous/anxious  /83 (BP Location: Left arm)   Pulse 64   Temp 98 2 °F (36 8 °C) (Oral)   Resp 18   Wt 100 kg (221 lb 1 9 oz)   SpO2 99%   BMI 35 69 kg/m²       PHYSICAL  EXAMINATION    CONSTITUTION:    Appears appropriate for age  No evidence of any acute distress  Communicates normally  Voice quality is clear  Alert and oriented  HEAD/FACE:    Atraumatic, normocephalic on inspection  No scars present  Salivary glands are normal in texture and size without any asymmetry  Facial nerve function is symmetric and normal     EYES:    Extraocular muscles intact in both eyes, normal gaze bilaterally and no evidence of nystagmus  Pupils equal, round, and accommodate to light bilaterally  EARS:    External ears normal     External canals are clear and dry  Tympanic membranes intact with normal mobility, no effusion, no retraction, no perforation      Post auricular area is normal    NOSE: External nose without deformity  Internal mucosa pink and moist     Septum midline  Inferior nasal turbinates normal in color and size bilaterally    ORAL CAVITY:    Lips normal and healthy in appearance  Dentition normal     Gums healthy, pink and moist     Tongue appears pink and moist with no lesions  Floor of mouth pink, moist, and smooth  Submandibular ducts patent with clear saliva  Parotid ducts patent with clear saliva  Oral mucosa pink and moist     Hard palate normal in appearance without any lesions  OROPHARYNX:    Soft palate pink and moist without any lesions  No erythema, edema, exudates, or lesions  Uvula midline without any lesions  Tonsils grade 0 bilaterally  Posterior pharynx pink and moist without any lesions  NECK:    Supple and symmetric  Nontender bilaterally without any acute swelling  No masses noted  Trachea midline  No thyromegaly or nodules noted  LYMPH:    No palpable adenopathy in left or right neck    SKIN:    No rashes  No lesions noted  Nasopharyngolaryngoscopy: The patient was placed in the upright position in the examination chair  The bilateral nasal cavity was sprayed with a solution of phenylephrine: lidocaine (1:1)  Exam was delayed five minutes to allow for topical decongestion and anesthetic effect  The scope was passed through the nasal cavity into the posterior nasopharynx  Evaluation of the pharynx and larynx was carried out with the following findings:    Eustachian Tube Orifice: Patent bilaterally without edema or obstruction  Nasopharynx: Smooth mucosa without adenoid bed or mass  Oropharynx: No masses or lesions present  Valecullae: No abnormalities, pink moist mucosa  Tongue Base: Normal without masses or asymmetry  Epiglottis: Normal mucosa on vallecular and laryngeal surfaces  Pyriform Aperture: Mucosa appears pink and moist without masses      Arytenoid Mucosa/Aryepiglottic Folds: Normal mucosa without evidence of edema, hyperemia, mass or ulcer on the right side; Asymmetric soft tissue thickening on the left Supraglottis over the left aryepiglottic fold  There is no sign erythema, exudate, white coating, or fungating lesions  False Vocal cords Normal mucosa, no evidence of mass, lesion or edema  True Vocal cords White in color, no masses, nodules, polyps, edema, paresis or paralysis of either cord  Subglottic Space The airway is patent and there are no lesions  After careful evaluation of the above structures, the scope was removed from the nasal cavity  The patient tolerated the procedure well

## 2020-01-15 NOTE — PLAN OF CARE
Problem: Nutrition/Hydration-ADULT  Goal: Nutrient/Hydration intake appropriate for improving, restoring or maintaining nutritional needs  Description  Monitor and assess patient's nutrition/hydration status for malnutrition  Collaborate with interdisciplinary team and initiate plan and interventions as ordered  Monitor patient's weight and dietary intake as ordered or per policy  Utilize nutrition screening tool and intervene as necessary  Determine patient's food preferences and provide high-protein, high-caloric foods as appropriate  INTERVENTIONS:  - Monitor oral intake, urinary output, labs, and treatment plans  - Assess nutrition and hydration status and recommend course of action  - Evaluate amount of meals eaten  - Assist patient with eating if necessary   - Allow adequate time for meals  - Recommend/ encourage appropriate diets, oral nutritional supplements, and vitamin/mineral supplements  - Order, calculate, and assess calorie counts as needed  - Recommend, monitor, and adjust tube feedings and TPN/PPN based on assessed needs  - Assess need for intravenous fluids  - Provide specific nutrition/hydration education as appropriate  - Include patient/family/caregiver in decisions related to nutrition  Outcome: Progressing     Problem: Potential for Falls  Goal: Patient will remain free of falls  Description  INTERVENTIONS:  - Assess patient frequently for physical needs  -  Identify cognitive and physical deficits and behaviors that affect risk of falls    -  [place] fall precautions as indicated by assessment   - Educate patient/family on patient safety including physical limitations  - Instruct patient to call for assistance with activity based on assessment  - Modify environment to reduce risk of injury  - Consider OT/PT consult to assist with strengthening/mobility  Outcome: Progressing     Problem: RESPIRATORY - ADULT  Goal: Achieves optimal ventilation and oxygenation  Description  INTERVENTIONS:  - Assess for changes in respiratory status  - Assess for changes in mentation and behavior  - Position to facilitate oxygenation and minimize respiratory effort  - Oxygen administered by appropriate delivery if ordered  - Initiate smoking cessation education as indicated  - Encourage broncho-pulmonary hygiene including cough, deep breathe, Incentive Spirometry  - Assess the need for suctioning and aspirate as needed  - Assess and instruct to report SOB or any respiratory difficulty  - Respiratory Therapy support as indicated  Outcome: Progressing     Problem: GASTROINTESTINAL - ADULT  Goal: Minimal or absence of nausea and/or vomiting  Description  INTERVENTIONS:  - Administer IV fluids if ordered to ensure adequate hydration  - Maintain NPO status until nausea and vomiting are resolved  - Nasogastric tube if ordered  - Administer ordered antiemetic medications as needed  - Provide nonpharmacologic comfort measures as appropriate  - Advance diet as tolerated, if ordered  - Consider nutrition services referral to assist patient with adequate nutrition and appropriate food choices  Outcome: Progressing  Goal: Maintains or returns to baseline bowel function  Description  INTERVENTIONS:  - Assess bowel function  - Encourage oral fluids to ensure adequate hydration  - Administer IV fluids if ordered to ensure adequate hydration  - Administer ordered medications as needed  - Encourage mobilization and activity  - Consider nutritional services referral to assist patient with adequate nutrition and appropriate food choices  Outcome: Progressing  Goal: Maintains adequate nutritional intake  Description  INTERVENTIONS:  - Monitor percentage of each meal consumed  - Identify factors contributing to decreased intake, treat as appropriate  - Assist with meals as needed  - Monitor I&O, weight, and lab values if indicated  - Obtain nutrition services referral as needed  Outcome: Progressing     Problem: METABOLIC, FLUID AND ELECTROLYTES - ADULT  Goal: Electrolytes maintained within normal limits  Description  INTERVENTIONS:  - Monitor labs and assess patient for signs and symptoms of electrolyte imbalances  - Administer electrolyte replacement as ordered  - Monitor response to electrolyte replacements, including repeat lab results as appropriate  - Instruct patient on fluid and nutrition as appropriate  Outcome: Progressing  Goal: Fluid balance maintained  Description  INTERVENTIONS:  - Monitor labs   - Monitor I/O and WT  - Instruct patient on fluid and nutrition as appropriate  - Assess for signs & symptoms of volume excess or deficit  Outcome: Progressing     Problem: MUSCULOSKELETAL - ADULT  Goal: Maintain or return mobility to safest level of function  Description  INTERVENTIONS:  - Assess patient's ability to carry out ADLs; assess patient's baseline for ADL function and identify physical deficits which impact ability to perform ADLs (bathing, care of mouth/teeth, toileting, grooming, dressing, etc )  - Assess/evaluate cause of self-care deficits   - Assess range of motion  - Assess patient's mobility  - Assess patient's need for assistive devices and provide as appropriate  - Encourage maximum independence but intervene and supervise when necessary  - Involve family in performance of ADLs  - Assess for home care needs following discharge   - Consider OT consult to assist with ADL evaluation and planning for discharge  - Provide patient education as appropriate  Outcome: Progressing     Problem: PAIN - ADULT  Goal: Verbalizes/displays adequate comfort level or baseline comfort level  Description  Interventions:  - Encourage patient to monitor pain and request assistance  - Assess pain using appropriate pain scale  - Administer analgesics based on type and severity of pain and evaluate response  - Implement non-pharmacological measures as appropriate and evaluate response  - Consider cultural and social influences on pain and pain management  - Notify physician/advanced practitioner if interventions unsuccessful or patient reports new pain  Outcome: Progressing     Problem: INFECTION - ADULT  Goal: Absence or prevention of progression during hospitalization  Description  INTERVENTIONS:  - Assess and monitor for signs and symptoms of infection  - Monitor lab/diagnostic results  - Monitor all insertion sites, i e  indwelling lines, tubes, and drains  - Monitor endotracheal if appropriate and nasal secretions for changes in amount and color  - [place] appropriate cooling/warming therapies per order  - Administer medications as ordered  - Instruct and encourage patient and family to use good hand hygiene technique  - Identify and instruct in appropriate isolation precautions for identified infection/condition  Outcome: Progressing     Problem: SAFETY ADULT  Goal: Maintain or return to baseline ADL function  Description  INTERVENTIONS:  -  Assess patient's ability to carry out ADLs; assess patient's baseline for ADL function and identify physical deficits which impact ability to perform ADLs (bathing, care of mouth/teeth, toileting, grooming, dressing, etc )  - Assess/evaluate cause of self-care deficits   - Assess range of motion  - Assess patient's mobility; develop plan if impaired  - Assess patient's need for assistive devices and provide as appropriate  - Encourage maximum independence but intervene and supervise when necessary  - Involve family in performance of ADLs  - Assess for home care needs following discharge   - Consider OT consult to assist with ADL evaluation and planning for discharge  - Provide patient education as appropriate  Outcome: Progressing  Goal: Maintain or return mobility status to optimal level  Description  INTERVENTIONS:  - Assess patient's baseline mobility status (ambulation, transfers, stairs, etc )    - Identify cognitive and physical deficits and behaviors that affect mobility  - Identify mobility aids required to assist with transfers and/or ambulation (gait belt, sit-to-stand, lift, walker, cane, etc )  - [place] fall precautions as indicated by assessment  - Record patient progress and toleration of activity level on Mobility SBAR; progress patient to next Phase/Stage  - Instruct patient to call for assistance with activity based on assessment  - Consider rehabilitation consult to assist with strengthening/weightbearing, etc   Outcome: Progressing     Problem: DISCHARGE PLANNING  Goal: Discharge to home or other facility with appropriate resources  Description  INTERVENTIONS:  - Identify barriers to discharge w/patient and caregiver  - Arrange for needed discharge resources and transportation as appropriate  - Identify discharge learning needs (meds, wound care, etc )  - Arrange for interpretive services to assist at discharge as needed  - Refer to Case Management Department for coordinating discharge planning if the patient needs post-hospital services based on physician/advanced practitioner order or complex needs related to functional status, cognitive ability, or social support system  Outcome: Progressing     Problem: Knowledge Deficit  Goal: Patient/family/caregiver demonstrates understanding of disease process, treatment plan, medications, and discharge instructions  Description  Complete learning assessment and assess knowledge base    Interventions:  - Provide teaching at level of understanding  - Provide teaching via preferred learning methods  Outcome: Progressing

## 2020-01-16 ENCOUNTER — TRANSITIONAL CARE MANAGEMENT (OUTPATIENT)
Dept: FAMILY MEDICINE CLINIC | Facility: CLINIC | Age: 55
End: 2020-01-16

## 2020-01-16 VITALS
RESPIRATION RATE: 18 BRPM | DIASTOLIC BLOOD PRESSURE: 88 MMHG | SYSTOLIC BLOOD PRESSURE: 146 MMHG | HEART RATE: 71 BPM | BODY MASS INDEX: 35.69 KG/M2 | TEMPERATURE: 97.9 F | WEIGHT: 221.12 LBS | OXYGEN SATURATION: 98 %

## 2020-01-16 PROCEDURE — 99217 PR OBSERVATION CARE DISCHARGE MANAGEMENT: CPT | Performed by: INTERNAL MEDICINE

## 2020-01-16 RX ORDER — PREDNISONE 20 MG/1
40 TABLET ORAL DAILY
Qty: 8 TABLET | Refills: 0 | Status: SHIPPED | OUTPATIENT
Start: 2020-01-16 | End: 2020-02-17 | Stop reason: ALTCHOICE

## 2020-01-16 RX ORDER — BENZONATATE 100 MG/1
100 CAPSULE ORAL 3 TIMES DAILY PRN
Qty: 20 CAPSULE | Refills: 0 | Status: SHIPPED | OUTPATIENT
Start: 2020-01-16 | End: 2020-02-17 | Stop reason: ALTCHOICE

## 2020-01-16 RX ORDER — OMEPRAZOLE 20 MG/1
20 CAPSULE, DELAYED RELEASE ORAL 2 TIMES DAILY
Qty: 90 CAPSULE | Refills: 0 | Status: SHIPPED | OUTPATIENT
Start: 2020-01-16 | End: 2020-04-23

## 2020-01-16 RX ORDER — CLINDAMYCIN HYDROCHLORIDE 300 MG/1
300 CAPSULE ORAL 4 TIMES DAILY
Qty: 36 CAPSULE | Refills: 0 | Status: SHIPPED | OUTPATIENT
Start: 2020-01-16 | End: 2020-01-25

## 2020-01-16 RX ORDER — PANTOPRAZOLE SODIUM 40 MG/1
40 TABLET, DELAYED RELEASE ORAL
Status: DISCONTINUED | OUTPATIENT
Start: 2020-01-16 | End: 2020-01-16 | Stop reason: HOSPADM

## 2020-01-16 RX ORDER — GUAIFENESIN 600 MG
600 TABLET, EXTENDED RELEASE 12 HR ORAL EVERY 12 HOURS SCHEDULED
Qty: 60 TABLET | Refills: 0 | Status: SHIPPED | OUTPATIENT
Start: 2020-01-16 | End: 2020-08-17 | Stop reason: ALTCHOICE

## 2020-01-16 RX ORDER — SERTRALINE HYDROCHLORIDE 100 MG/1
100 TABLET, FILM COATED ORAL DAILY
Status: DISCONTINUED | OUTPATIENT
Start: 2020-01-16 | End: 2020-01-16 | Stop reason: HOSPADM

## 2020-01-16 RX ORDER — DOCUSATE SODIUM 100 MG/1
100 CAPSULE, LIQUID FILLED ORAL 2 TIMES DAILY
Qty: 10 CAPSULE | Refills: 0 | Status: SHIPPED | OUTPATIENT
Start: 2020-01-16 | End: 2020-01-17

## 2020-01-16 RX ORDER — DOCUSATE SODIUM 100 MG/1
100 CAPSULE, LIQUID FILLED ORAL 2 TIMES DAILY
Status: DISCONTINUED | OUTPATIENT
Start: 2020-01-16 | End: 2020-01-16 | Stop reason: HOSPADM

## 2020-01-16 RX ADMIN — SERTRALINE HYDROCHLORIDE 100 MG: 100 TABLET ORAL at 09:44

## 2020-01-16 RX ADMIN — BENZONATATE 100 MG: 100 CAPSULE ORAL at 09:44

## 2020-01-16 RX ADMIN — CLINDAMYCIN PHOSPHATE 600 MG: 600 INJECTION, SOLUTION INTRAVENOUS at 05:18

## 2020-01-16 RX ADMIN — KETOROLAC TROMETHAMINE 15 MG: 30 INJECTION, SOLUTION INTRAMUSCULAR at 06:05

## 2020-01-16 RX ADMIN — CLINDAMYCIN PHOSPHATE 600 MG: 600 INJECTION, SOLUTION INTRAVENOUS at 12:30

## 2020-01-16 RX ADMIN — PREDNISONE 40 MG: 20 TABLET ORAL at 09:44

## 2020-01-16 RX ADMIN — DOCUSATE SODIUM 100 MG: 100 CAPSULE, LIQUID FILLED ORAL at 09:44

## 2020-01-16 RX ADMIN — PANTOPRAZOLE SODIUM 40 MG: 40 TABLET, DELAYED RELEASE ORAL at 09:45

## 2020-01-16 RX ADMIN — GUAIFENESIN 600 MG: 600 TABLET ORAL at 09:44

## 2020-01-16 NOTE — PROGRESS NOTES
[facility] Progress Note  Patient: Reilly Liu 62 y o  female   MRN: [id number]  PCP: Tonia Marshall DO  Unit/Bed#: [facility] 2 -02 Encounter: [id number]  Date Of Visit: 01/16/20      Assessment/plan  1  Asymmetric thiening of left supraglottis- appreciate ent eval this is consistended with infection  No masses or abscess were identified  No signs of thrush  She will continue with clindamycin for 10 days today  She is currently on day 2/10  She will continue prednisone taper  She will follow up with ent in 1 week     2  Chronic cough- pt states this has improved with tessalon and mucinex  She will continue this  She will follow up with pulmonary and allergy outpt    3  gerd- continue protonix daily  She can increase her omeprazole to bid dosing while on steroids    4  Depression/anxiety- continue sertraline  5  Headache- due to withdrawal from sertraline  Will restart    6  Constipation- start stool softener    Dispo- d/c to home  Subjective:   Pt seen and examined  Pt reports she slept a little better last night with the addition of mucinex and tessalon pearles  Her cough was improved  She reports though her thoughts were wondering and did keep her up a little later then usual  She states her voice is still hoarse at times  She reports she feels bloated and is constipated but she declined a laxative  She is swallowing okay  No f/c no cp no sob no n/v/d no abd pain  Objective:     Vitals: Blood pressure 146/88, pulse 71, temperature 97 9 °F (36 6 °C), resp  rate 18, weight 100 kg (221 lb 1 9 oz), SpO2 98 %  ,Body mass index is 35 69 kg/m²      Lab, Imaging and other studies:  Results from last 7 days   Lab Units 01/15/20  0230   WBC Thousand/uL 8 95   HEMOGLOBIN g/dL 12 1   HEMATOCRIT % 39 1   PLATELETS Thousands/uL 293     Results from last 7 days   Lab Units 01/15/20  0230   POTASSIUM mmol/L 3 8   CHLORIDE mmol/L 104   CO2 mmol/L 27   BUN mg/dL 19   CREATININE mg/dL 0 80   CALCIUM mg/dL 8 7   ALK PHOS U/L 98   ALT U/L 19   AST U/L 20         No results found for: Jung Wan SPUTUMCULTUR      Ct Soft Tissue Neck With Contrast    Result Date: 1/15/2020  Narrative: CT NECK WITH CONTRAST INDICATION:   sorethroat, left neck pain, drooling, trouble swalling  COMPARISON:  None  TECHNIQUE:  Axial, sagittal, and coronal 2D reformatted images were created from the axial source data and submitted for interpretation  Radiation dose length product (DLP) for this visit:  779 mGy-cm   This examination, like all CT scans performed in the [facility], was performed utilizing techniques to minimize radiation dose exposure, including the use of iterative reconstruction and automated exposure control  IV Contrast:  100 mL of iohexol (OMNIPAQUE) IMAGE QUALITY:  Diagnostic  FINDINGS: VISUALIZED BRAIN PARENCHYMA:  No acute intracranial pathology of the visualized brain parenchyma  VISUALIZED ORBITS AND PARANASAL SINUSES:  Normal  NASAL CAVITY AND NASOPHARYNX:  Normal  SUPRAHYOID NECK:  Normal oral cavity, tongue base, tonsillar fossa and epiglottis  INFRAHYOID NECK:  Soft tissue thickening along the left aryepiglottic fold with effacement of the left piriform sinus  Soft tissue haziness also seen within the left paraglottic space    Mucosal thickening along the left hypopharynx  THYROID GLAND:  Unremarkable  PAROTID AND SUBMANDIBULAR GLANDS:  Normal  LYMPH NODES:  Scattered mildly prominent left cervical levels 2, 3, 4 and 5 lymph nodes, likely reactive  Elijah Chaim VASCULAR STRUCTURES:  Normal enhancement of the cervical vasculature  THORACIC INLET:  Lung apices and upper mediastinum are unremarkable  BONY STRUCTURES: No acute fracture or destructive osseous lesion  Impression: Asymmetric soft tissue thickening at the left supraglottis as detailed above  Findings may be infectious, however underlying neoplastic process cannot be excluded  Follow-up with ENT is recommended   Scattered left cervical subcentimeter lymphadenopathy The study was marked in EPIC for significant notification    Workstation performed: [id number]     Ct Chest With Contrast    Result Date: 1/15/2020  Narrative: CT CHEST WITH IV CONTRAST INDICATION:   Cough abn CT 3/2019 with nodules and fibrosis  COMPARISON:  CT chest 3/18/2019  TECHNIQUE: CT examination of the chest was performed  Axial, sagittal, and coronal 2D reformatted images were created from the source data and submitted for interpretation  Radiation dose length product (DLP) for this visit:  865 mGy-cm   This examination, like all CT scans performed in the [facility], was performed utilizing techniques to minimize radiation dose exposure, including the use of iterative reconstruction and automated exposure control  IV Contrast:  100 mL of iohexol (OMNIPAQUE) FINDINGS: LUNGS:  2 mm subpleural nodule at the right lower lobe (series 5, image 80) and  2 mm subpleural nodule at the left upper lobe (series 5, image 30), stable from 3/18/2019  There is no tracheal or endobronchial lesion  PLEURA:  Unremarkable  HEART/GREAT VESSELS:  Unremarkable for patient's age  MEDIASTINUM AND FAITH:  Unremarkable  CHEST WALL AND LOWER NECK:   Unremarkable  VISUALIZED STRUCTURES IN THE UPPER ABDOMEN:  Unremarkable  OSSEOUS STRUCTURES:  No acute fracture or destructive osseous lesion  Impression: No evidence of acute intrathoracic pathology  Stable sub-4 mm pulmonary nodules when comparing to CT examination of 3/18/19 Workstation performed: [id number]     Ct Sinus Wo Contrast    Result Date: 1/3/2020  Narrative: CT SINUSES INDICATION:   R05: Cough  COMPARISON:  None  TECHNIQUE:  Axial CT imaging through the sinuses was performed  In addition, sagittal and coronal reformatted images were submitted for interpretation  Radiation dose length product (DLP) for this visit:  253 mGy-cm     This examination, like all CT scans performed in the [facility], was performed utilizing techniques to minimize radiation dose exposure, including the use of iterative reconstruction and automated exposure control  IMAGE QUALITY:  Diagnostic  FINDINGS: FRONTAL SINUSES:  Trace mucosal thickening at the medial base of the anterior left frontal sinus  Both frontal sinuses ETHMOID AIR CELLS:  Trace mucosal thickening and consolidation of the anterior two thirds of the left ethmoid labyrinth  MAXILLARY SINUSES:  Trace mucosal thickening along the perimeter both maxillary sinuses    SPHENOID SINUSES:  Minor mucosal thickening along the nondominant right sphenoid sinus  NASAL SEPTUM AND CAVITY:  Mild rightward deviation of the septum with prominent posterior rightward directed horizontal spur  Normal nasal cavity  ANTERIOR OSTEOMEATAL COMPLEX:  Soft tissue along the perimeter of the maxillary sinus on the left extends into the ethmoid infundibulum with apparent occlusion  Sphenoethmoidal recesses are patent  OSSEOUS STRUCTURES:  No bony erosion or destruction  Normal cribriform plate and planum sphenoidale  Visualized mastoid temporal bones are clear  Marked thinning of the bone which separates the cavernous carotid and posterior lateral right sphenoid sinus  SOFT TISSUES:  Punctate calcifications within the posterior vitreous body bilaterally, nonspecific  Impression: No acute sinusitis or bone destruction  Scattered mucosal thickening most significantly affecting the left anterior ethmoid labyrinth, left frontal, left greater than right maxillary and right sphenoid sinuses  No fluid level   Workstation performed: [id number]       Scheduled Meds:   Current Facility-Administered Medications:  albuterol 2 5 mg Nebulization Q4H PRN SONIYA Velasquez-BUTCH    benzonatate 100 mg Oral TID PRN Demetra Oar, DO    clindamycin 600 mg Intravenous Q8H Mili Baird PA-C Last Rate: 600 mg (01/16/20 0518)   docusate sodium 100 mg Oral BID Estefanía Ambron, DO    enoxaparin 40 mg Subcutaneous Daily Mili Smudde, PA-C    fluticasone-vilanterol 1 puff Inhalation Daily Mili Smudde, PA-C    guaiFENesin 600 mg Oral Q12H [facility] [place] YANDY Crump,     ketorolac 15 mg Intravenous Q6H PRN Mili Smudde, PA-C    LORazepam 0 5 mg Intravenous Q6H PRN Mili Smudde, PA-C    morphine injection 2 mg Intravenous Q4H PRN Mili Smudde, PA-C    ondansetron 4 mg Intravenous Q6H PRN Mili Smudde, PA-C    pantoprazole 40 mg Oral Early Morning Estefanía Ambron, DO    predniSONE 40 mg Oral Daily Estefanía Ambron, DO    sertraline 100 mg Oral Daily Estefanía Ambron, DO    tiotropium 18 mcg Inhalation Daily Mili Katde, PA-C      Continuous Infusions:    PRN Meds:   albuterol    benzonatate    ketorolac    LORazepam    morphine injection    ondansetron      Physical exam:  Physical Exam  General appearance: alert and oriented, in no acute distress  Head: Normocephalic, without obvious abnormality, atraumatic  Eyes: conjunctivae/corneas clear  PERRL, EOM's intact  Fundi benign    Neck: no adenopathy, no carotid bruit, no JVD, supple, symmetrical, trachea midline and thyroid not enlarged, symmetric, no tenderness/mass/nodules  Lungs: very minimal exp  wheeze greatest in upper lobees bilateral  Heart: regular rate and rhythm, S1, S2 normal, no murmur, click, rub or gallop  Abdomen: soft, non-tender; bowel sounds normal; no masses,  no organomegaly  Extremities: extremities normal, warm and well-perfused; no cyanosis, clubbing, or edema  Pulses: 2+ and symmetric  Skin: Skin color, texture, turgor normal  No rashes or lesions  Neurologic: Mental status: Alert, oriented, thought content appropriate

## 2020-01-16 NOTE — DISCHARGE SUMMARY
Discharge Summary - [facility] Internal Medicine    Patient Information: Justa Da Silva 62 y o  female MRN: [id number]  Unit/Bed#: [facility] 87 211-02 Encounter: [id number]    Discharging Physician / Practitioner: Tess Carrion DO  PCP: Stephanie Gregorio DO  Admission Date: 1/15/2020  Discharge Date: 01/16/20    Disposition:     Home     Reason for Admission: abnormal ct of neck    Discharge Diagnoses:     Principal Problem:    Abnormal computed tomography of soft tissue of neck  Active Problems:    Acid reflux disease    Depression with anxiety    Chronic coughing  Resolved Problems:    * No resolved hospital problems  *      Consultations During Hospital Stay:  · ent    Procedures Performed:     · none    Significant Findings / Test Results:   Ct Soft Tissue Neck With Contrast  Result Date: 1/15/2020  Impression: Asymmetric soft tissue thickening at the left supraglottis as detailed above  Findings may be infectious, however underlying neoplastic process cannot be excluded  Follow-up with ENT is recommended  Scattered left cervical subcentimeter lymphadenopathy     Ct Chest With Contrast  Result Date: 1/15/2020  Impression: No evidence of acute intrathoracic pathology  Stable sub-4 mm pulmonary nodules when comparing to CT examination of 3/18/19     Incidental Findings:   · none    Test Results Pending at Discharge (will require follow up):   · none     Outpatient Tests Requested:  None    Hospital Course:     Justa Da Silva is a 62 y o  female patient who originally presented to the hospital on 1/15/2020 due to Asymmetric thickening of left supraglottis  She was evaluated by ent who stated that this is consistended with infection  No masses or abscess were identified  No signs of thrush  She was treated with IV steroids and clindamycin  She will continue clinda for 10 days  She is currently on day 2/10  She will continue prednisone taper   She will follow up with ent in 1 week     Pt has a Chronic cough and her current medications were not working  pt states this has improved with tessalon and mucinex  She will continue this  She will follow up with pulmonary and allergy outpt    For her gerd, She can increase her omeprazole to bid dosing while on steroids   Pt was discharged to home and was tolerating a diet       Discharge Day Visit / Exam:     * Please refer to separate progress note for these details *    Discharge instructions/Information to patient and family:   See after visit summary for information provided to patient and family  Provisions for Follow-Up Care:  See after visit summary for information related to follow-up care and any pertinent home health orders  Discharge Medications:  See after visit summary for reconciled discharge medications provided to patient and family

## 2020-01-16 NOTE — NURSING NOTE
IV removed  Patient provided with avs/ RN reviewed and patient verbalized understanding  Patient picked up rx from [address]   Patient discharged to home with all belongings

## 2020-01-16 NOTE — PLAN OF CARE
Problem: Nutrition/Hydration-ADULT  Goal: Nutrient/Hydration intake appropriate for improving, restoring or maintaining nutritional needs  Description  Monitor and assess patient's nutrition/hydration status for malnutrition  Collaborate with interdisciplinary team and initiate plan and interventions as ordered  Monitor patient's weight and dietary intake as ordered or per policy  Utilize nutrition screening tool and intervene as necessary  Determine patient's food preferences and provide high-protein, high-caloric foods as appropriate  INTERVENTIONS:  - Monitor oral intake, urinary output, labs, and treatment plans  - Assess nutrition and hydration status and recommend course of action  - Evaluate amount of meals eaten  - Assist patient with eating if necessary   - Allow adequate time for meals  - Recommend/ encourage appropriate diets, oral nutritional supplements, and vitamin/mineral supplements  - Order, calculate, and assess calorie counts as needed  - Recommend, monitor, and adjust tube feedings and TPN/PPN based on assessed needs  - Assess need for intravenous fluids  - Provide specific nutrition/hydration education as appropriate  - Include patient/family/caregiver in decisions related to nutrition  Outcome: Adequate for Discharge     Problem: Potential for Falls  Goal: Patient will remain free of falls  Description  INTERVENTIONS:  - Assess patient frequently for physical needs  -  Identify cognitive and physical deficits and behaviors that affect risk of falls    -  [place] fall precautions as indicated by assessment   - Educate patient/family on patient safety including physical limitations  - Instruct patient to call for assistance with activity based on assessment  - Modify environment to reduce risk of injury  - Consider OT/PT consult to assist with strengthening/mobility  Outcome: Adequate for Discharge     Problem: RESPIRATORY - ADULT  Goal: Achieves optimal ventilation and oxygenation  Description  INTERVENTIONS:  - Assess for changes in respiratory status  - Assess for changes in mentation and behavior  - Position to facilitate oxygenation and minimize respiratory effort  - Oxygen administered by appropriate delivery if ordered  - Initiate smoking cessation education as indicated  - Encourage broncho-pulmonary hygiene including cough, deep breathe, Incentive Spirometry  - Assess the need for suctioning and aspirate as needed  - Assess and instruct to report SOB or any respiratory difficulty  - Respiratory Therapy support as indicated  Outcome: Adequate for Discharge     Problem: GASTROINTESTINAL - ADULT  Goal: Minimal or absence of nausea and/or vomiting  Description  INTERVENTIONS:  - Administer IV fluids if ordered to ensure adequate hydration  - Maintain NPO status until nausea and vomiting are resolved  - Nasogastric tube if ordered  - Administer ordered antiemetic medications as needed  - Provide nonpharmacologic comfort measures as appropriate  - Advance diet as tolerated, if ordered  - Consider nutrition services referral to assist patient with adequate nutrition and appropriate food choices  Outcome: Adequate for Discharge  Goal: Maintains or returns to baseline bowel function  Description  INTERVENTIONS:  - Assess bowel function  - Encourage oral fluids to ensure adequate hydration  - Administer IV fluids if ordered to ensure adequate hydration  - Administer ordered medications as needed  - Encourage mobilization and activity  - Consider nutritional services referral to assist patient with adequate nutrition and appropriate food choices  Outcome: Adequate for Discharge  Goal: Maintains adequate nutritional intake  Description  INTERVENTIONS:  - Monitor percentage of each meal consumed  - Identify factors contributing to decreased intake, treat as appropriate  - Assist with meals as needed  - Monitor I&O, weight, and lab values if indicated  - Obtain nutrition services referral as needed  Outcome: Adequate for Discharge     Problem: METABOLIC, FLUID AND ELECTROLYTES - ADULT  Goal: Electrolytes maintained within normal limits  Description  INTERVENTIONS:  - Monitor labs and assess patient for signs and symptoms of electrolyte imbalances  - Administer electrolyte replacement as ordered  - Monitor response to electrolyte replacements, including repeat lab results as appropriate  - Instruct patient on fluid and nutrition as appropriate  Outcome: Adequate for Discharge  Goal: Fluid balance maintained  Description  INTERVENTIONS:  - Monitor labs   - Monitor I/O and WT  - Instruct patient on fluid and nutrition as appropriate  - Assess for signs & symptoms of volume excess or deficit  Outcome: Adequate for Discharge     Problem: MUSCULOSKELETAL - ADULT  Goal: Maintain or return mobility to safest level of function  Description  INTERVENTIONS:  - Assess patient's ability to carry out ADLs; assess patient's baseline for ADL function and identify physical deficits which impact ability to perform ADLs (bathing, care of mouth/teeth, toileting, grooming, dressing, etc )  - Assess/evaluate cause of self-care deficits   - Assess range of motion  - Assess patient's mobility  - Assess patient's need for assistive devices and provide as appropriate  - Encourage maximum independence but intervene and supervise when necessary  - Involve family in performance of ADLs  - Assess for home care needs following discharge   - Consider OT consult to assist with ADL evaluation and planning for discharge  - Provide patient education as appropriate  Outcome: Adequate for Discharge     Problem: PAIN - ADULT  Goal: Verbalizes/displays adequate comfort level or baseline comfort level  Description  Interventions:  - Encourage patient to monitor pain and request assistance  - Assess pain using appropriate pain scale  - Administer analgesics based on type and severity of pain and evaluate response  - Implement non-pharmacological measures as appropriate and evaluate response  - Consider cultural and social influences on pain and pain management  - Notify physician/advanced practitioner if interventions unsuccessful or patient reports new pain  Outcome: Adequate for Discharge     Problem: INFECTION - ADULT  Goal: Absence or prevention of progression during hospitalization  Description  INTERVENTIONS:  - Assess and monitor for signs and symptoms of infection  - Monitor lab/diagnostic results  - Monitor all insertion sites, i e  indwelling lines, tubes, and drains  - Monitor endotracheal if appropriate and nasal secretions for changes in amount and color  - [place] appropriate cooling/warming therapies per order  - Administer medications as ordered  - Instruct and encourage patient and family to use good hand hygiene technique  - Identify and instruct in appropriate isolation precautions for identified infection/condition  Outcome: Adequate for Discharge     Problem: SAFETY ADULT  Goal: Maintain or return to baseline ADL function  Description  INTERVENTIONS:  -  Assess patient's ability to carry out ADLs; assess patient's baseline for ADL function and identify physical deficits which impact ability to perform ADLs (bathing, care of mouth/teeth, toileting, grooming, dressing, etc )  - Assess/evaluate cause of self-care deficits   - Assess range of motion  - Assess patient's mobility; develop plan if impaired  - Assess patient's need for assistive devices and provide as appropriate  - Encourage maximum independence but intervene and supervise when necessary  - Involve family in performance of ADLs  - Assess for home care needs following discharge   - Consider OT consult to assist with ADL evaluation and planning for discharge  - Provide patient education as appropriate  Outcome: Adequate for Discharge  Goal: Maintain or return mobility status to optimal level  Description  INTERVENTIONS:  - Assess patient's baseline mobility status (ambulation, transfers, stairs, etc )    - Identify cognitive and physical deficits and behaviors that affect mobility  - Identify mobility aids required to assist with transfers and/or ambulation (gait belt, sit-to-stand, lift, walker, cane, etc )  - [place] fall precautions as indicated by assessment  - Record patient progress and toleration of activity level on Mobility SBAR; progress patient to next Phase/Stage  - Instruct patient to call for assistance with activity based on assessment  - Consider rehabilitation consult to assist with strengthening/weightbearing, etc   Outcome: Adequate for Discharge     Problem: DISCHARGE PLANNING  Goal: Discharge to home or other facility with appropriate resources  Description  INTERVENTIONS:  - Identify barriers to discharge w/patient and caregiver  - Arrange for needed discharge resources and transportation as appropriate  - Identify discharge learning needs (meds, wound care, etc )  - Arrange for interpretive services to assist at discharge as needed  - Refer to Case Management Department for coordinating discharge planning if the patient needs post-hospital services based on physician/advanced practitioner order or complex needs related to functional status, cognitive ability, or social support system  Outcome: Adequate for Discharge     Problem: Knowledge Deficit  Goal: Patient/family/caregiver demonstrates understanding of disease process, treatment plan, medications, and discharge instructions  Description  Complete learning assessment and assess knowledge base    Interventions:  - Provide teaching at level of understanding  - Provide teaching via preferred learning methods  Outcome: Adequate for Discharge

## 2020-01-16 NOTE — PLAN OF CARE
Problem: Nutrition/Hydration-ADULT  Goal: Nutrient/Hydration intake appropriate for improving, restoring or maintaining nutritional needs  Description  Monitor and assess patient's nutrition/hydration status for malnutrition  Collaborate with interdisciplinary team and initiate plan and interventions as ordered  Monitor patient's weight and dietary intake as ordered or per policy  Utilize nutrition screening tool and intervene as necessary  Determine patient's food preferences and provide high-protein, high-caloric foods as appropriate  INTERVENTIONS:  - Monitor oral intake, urinary output, labs, and treatment plans  - Assess nutrition and hydration status and recommend course of action  - Evaluate amount of meals eaten  - Assist patient with eating if necessary   - Allow adequate time for meals  - Recommend/ encourage appropriate diets, oral nutritional supplements, and vitamin/mineral supplements  - Order, calculate, and assess calorie counts as needed  - Recommend, monitor, and adjust tube feedings and TPN/PPN based on assessed needs  - Assess need for intravenous fluids  - Provide specific nutrition/hydration education as appropriate  - Include patient/family/caregiver in decisions related to nutrition  Outcome: Progressing     Problem: Potential for Falls  Goal: Patient will remain free of falls  Description  INTERVENTIONS:  - Assess patient frequently for physical needs  -  Identify cognitive and physical deficits and behaviors that affect risk of falls    -  [place] fall precautions as indicated by assessment   - Educate patient/family on patient safety including physical limitations  - Instruct patient to call for assistance with activity based on assessment  - Modify environment to reduce risk of injury  - Consider OT/PT consult to assist with strengthening/mobility  Outcome: Progressing     Problem: RESPIRATORY - ADULT  Goal: Achieves optimal ventilation and oxygenation  Description  INTERVENTIONS:  - Assess for changes in respiratory status  - Assess for changes in mentation and behavior  - Position to facilitate oxygenation and minimize respiratory effort  - Oxygen administered by appropriate delivery if ordered  - Initiate smoking cessation education as indicated  - Encourage broncho-pulmonary hygiene including cough, deep breathe, Incentive Spirometry  - Assess the need for suctioning and aspirate as needed  - Assess and instruct to report SOB or any respiratory difficulty  - Respiratory Therapy support as indicated  Outcome: Progressing     Problem: GASTROINTESTINAL - ADULT  Goal: Minimal or absence of nausea and/or vomiting  Description  INTERVENTIONS:  - Administer IV fluids if ordered to ensure adequate hydration  - Maintain NPO status until nausea and vomiting are resolved  - Nasogastric tube if ordered  - Administer ordered antiemetic medications as needed  - Provide nonpharmacologic comfort measures as appropriate  - Advance diet as tolerated, if ordered  - Consider nutrition services referral to assist patient with adequate nutrition and appropriate food choices  Outcome: Progressing  Goal: Maintains or returns to baseline bowel function  Description  INTERVENTIONS:  - Assess bowel function  - Encourage oral fluids to ensure adequate hydration  - Administer IV fluids if ordered to ensure adequate hydration  - Administer ordered medications as needed  - Encourage mobilization and activity  - Consider nutritional services referral to assist patient with adequate nutrition and appropriate food choices  Outcome: Progressing  Goal: Maintains adequate nutritional intake  Description  INTERVENTIONS:  - Monitor percentage of each meal consumed  - Identify factors contributing to decreased intake, treat as appropriate  - Assist with meals as needed  - Monitor I&O, weight, and lab values if indicated  - Obtain nutrition services referral as needed  Outcome: Progressing     Problem: METABOLIC, FLUID AND ELECTROLYTES - ADULT  Goal: Electrolytes maintained within normal limits  Description  INTERVENTIONS:  - Monitor labs and assess patient for signs and symptoms of electrolyte imbalances  - Administer electrolyte replacement as ordered  - Monitor response to electrolyte replacements, including repeat lab results as appropriate  - Instruct patient on fluid and nutrition as appropriate  Outcome: Progressing  Goal: Fluid balance maintained  Description  INTERVENTIONS:  - Monitor labs   - Monitor I/O and WT  - Instruct patient on fluid and nutrition as appropriate  - Assess for signs & symptoms of volume excess or deficit  Outcome: Progressing     Problem: MUSCULOSKELETAL - ADULT  Goal: Maintain or return mobility to safest level of function  Description  INTERVENTIONS:  - Assess patient's ability to carry out ADLs; assess patient's baseline for ADL function and identify physical deficits which impact ability to perform ADLs (bathing, care of mouth/teeth, toileting, grooming, dressing, etc )  - Assess/evaluate cause of self-care deficits   - Assess range of motion  - Assess patient's mobility  - Assess patient's need for assistive devices and provide as appropriate  - Encourage maximum independence but intervene and supervise when necessary  - Involve family in performance of ADLs  - Assess for home care needs following discharge   - Consider OT consult to assist with ADL evaluation and planning for discharge  - Provide patient education as appropriate  Outcome: Progressing     Problem: PAIN - ADULT  Goal: Verbalizes/displays adequate comfort level or baseline comfort level  Description  Interventions:  - Encourage patient to monitor pain and request assistance  - Assess pain using appropriate pain scale  - Administer analgesics based on type and severity of pain and evaluate response  - Implement non-pharmacological measures as appropriate and evaluate response  - Consider cultural and social influences on pain and pain management  - Notify physician/advanced practitioner if interventions unsuccessful or patient reports new pain  Outcome: Progressing     Problem: INFECTION - ADULT  Goal: Absence or prevention of progression during hospitalization  Description  INTERVENTIONS:  - Assess and monitor for signs and symptoms of infection  - Monitor lab/diagnostic results  - Monitor all insertion sites, i e  indwelling lines, tubes, and drains  - Monitor endotracheal if appropriate and nasal secretions for changes in amount and color  - [place] appropriate cooling/warming therapies per order  - Administer medications as ordered  - Instruct and encourage patient and family to use good hand hygiene technique  - Identify and instruct in appropriate isolation precautions for identified infection/condition  Outcome: Progressing     Problem: SAFETY ADULT  Goal: Maintain or return to baseline ADL function  Description  INTERVENTIONS:  -  Assess patient's ability to carry out ADLs; assess patient's baseline for ADL function and identify physical deficits which impact ability to perform ADLs (bathing, care of mouth/teeth, toileting, grooming, dressing, etc )  - Assess/evaluate cause of self-care deficits   - Assess range of motion  - Assess patient's mobility; develop plan if impaired  - Assess patient's need for assistive devices and provide as appropriate  - Encourage maximum independence but intervene and supervise when necessary  - Involve family in performance of ADLs  - Assess for home care needs following discharge   - Consider OT consult to assist with ADL evaluation and planning for discharge  - Provide patient education as appropriate  Outcome: Progressing  Goal: Maintain or return mobility status to optimal level  Description  INTERVENTIONS:  - Assess patient's baseline mobility status (ambulation, transfers, stairs, etc )    - Identify cognitive and physical deficits and behaviors that affect mobility  - Identify mobility aids required to assist with transfers and/or ambulation (gait belt, sit-to-stand, lift, walker, cane, etc )  - [place] fall precautions as indicated by assessment  - Record patient progress and toleration of activity level on Mobility SBAR; progress patient to next Phase/Stage  - Instruct patient to call for assistance with activity based on assessment  - Consider rehabilitation consult to assist with strengthening/weightbearing, etc   Outcome: Progressing     Problem: DISCHARGE PLANNING  Goal: Discharge to home or other facility with appropriate resources  Description  INTERVENTIONS:  - Identify barriers to discharge w/patient and caregiver  - Arrange for needed discharge resources and transportation as appropriate  - Identify discharge learning needs (meds, wound care, etc )  - Arrange for interpretive services to assist at discharge as needed  - Refer to Case Management Department for coordinating discharge planning if the patient needs post-hospital services based on physician/advanced practitioner order or complex needs related to functional status, cognitive ability, or social support system  Outcome: Progressing     Problem: Knowledge Deficit  Goal: Patient/family/caregiver demonstrates understanding of disease process, treatment plan, medications, and discharge instructions  Description  Complete learning assessment and assess knowledge base    Interventions:  - Provide teaching at level of understanding  - Provide teaching via preferred learning methods  Outcome: Progressing

## 2020-01-17 ENCOUNTER — OFFICE VISIT (OUTPATIENT)
Dept: FAMILY MEDICINE CLINIC | Facility: CLINIC | Age: 55
End: 2020-01-17
Payer: COMMERCIAL

## 2020-01-17 VITALS
OXYGEN SATURATION: 97 % | SYSTOLIC BLOOD PRESSURE: 148 MMHG | WEIGHT: 232 LBS | HEART RATE: 74 BPM | BODY MASS INDEX: 37.28 KG/M2 | HEIGHT: 66 IN | DIASTOLIC BLOOD PRESSURE: 92 MMHG | TEMPERATURE: 97.6 F

## 2020-01-17 DIAGNOSIS — R49.0 HOARSENESS OF VOICE: Primary | ICD-10-CM

## 2020-01-17 DIAGNOSIS — R19.8 CHANGE IN BOWEL MOVEMENT: ICD-10-CM

## 2020-01-17 DIAGNOSIS — R93.89 ABNORMAL COMPUTED TOMOGRAPHY OF SOFT TISSUE OF NECK: ICD-10-CM

## 2020-01-17 DIAGNOSIS — G47.33 OBSTRUCTIVE SLEEP APNEA SYNDROME: ICD-10-CM

## 2020-01-17 DIAGNOSIS — R05.3 CHRONIC COUGHING: ICD-10-CM

## 2020-01-17 DIAGNOSIS — F41.9 ANXIETY: ICD-10-CM

## 2020-01-17 PROBLEM — R73.9 BORDERLINE HYPERGLYCEMIA: Status: ACTIVE | Noted: 2020-01-17

## 2020-01-17 PROCEDURE — 1111F DSCHRG MED/CURRENT MED MERGE: CPT | Performed by: FAMILY MEDICINE

## 2020-01-17 PROCEDURE — 99495 TRANSJ CARE MGMT MOD F2F 14D: CPT | Performed by: FAMILY MEDICINE

## 2020-01-17 RX ORDER — LORAZEPAM 0.5 MG/1
0.5 TABLET ORAL EVERY 6 HOURS PRN
Qty: 20 TABLET | Refills: 0 | Status: SHIPPED | OUTPATIENT
Start: 2020-01-17 | End: 2020-04-09 | Stop reason: SDUPTHER

## 2020-01-17 NOTE — PROGRESS NOTES
FAMILY PRACTICE OFFICE VISIT  Cait KEBEDE O  [facility]  [address]   [address], [address]      NAME: Burnard Runner Reppert  AGE: 62 y o  SEX: female  : 1965   MRN: [id number]    DATE: 2020  TIME: 12:41 PM    Assessment and Plan     Problem List Items Addressed This Visit        Respiratory    Sleep apnea       Other    Abnormal computed tomography of soft tissue of neck    Relevant Orders    Ambulatory Referral to Otolaryngology    Chronic coughing      Other Visit Diagnoses     Hoarseness of voice    -  Primary    Relevant Orders    Ambulatory Referral to Otolaryngology    Change in bowel movement        Anxiety -worsened by Prednisone use        Relevant Medications    LORazepam (ATIVAN) 0 5 mg tablet          Patient Instructions   We reviewed hospital evaluation  through   She does continue with some throat pain, can use Tylenol, stay on Tessalon  Along with guaifenesin  Use voice rest, lozenges  We did review ENT consult, did review CT scan - she is status post Augmentin for 10 days from back in December, is now on Clindamycin 500 4 times daily, was to use for 9 additional days after discharge,  ---  IF she does develop loose watery stools we need to do C diff testing and stop antibiotic  She is on prednisone taper, she will complete that as directed  Does have some increased anxiety with prednisone use, I did refill lorazepam to use as needed, can use at night  Stay w/ Sertraline 100 mg daily as is  Allergist had tried Spiriva/Incruse inhaler, she felt this worsened her symptoms and stopped it  She does have follow-up with them in February  She has no acid reflux symptoms, omeprazole was increased to twice daily inpatient, stay on this twice daily        She did have to cancel her CPAP study due to being in the hospital, does have apnea, she plans to reschedule testing, had seen Pulmonary in the past, she will be seeing them in follow-up  CT scanning inpatient of chest was stable regarding tiny nodules  Had recent CT of sinuses which showed thickening, otherwise unremarkable  Can stay w Advair as is  BP sl high-   Re-eval w me in 6 weeks  Chief Complaint     Chief Complaint   Patient presents with    Transition of Care Management     TCM Call (since 12/17/2019)     Date and time call was made  1/16/2020  [facility] reviewed  Records reviewed    Patient was hospitialized at  [address]    Date of Admission  01/15/20    Date of discharge  01/16/20    Diagnosis  supraglottitis    Disposition  Home    Were the patients medications reviewed and updated  No    Current Symptoms  None      TCM Call (since 12/17/2019)     Post hospital issues  None    Should patient be enrolled in anticoag monitoring? No    Scheduled for follow up? Yes    Did you obtain your prescribed medications  Yes    Do you need help managing your prescriptions or medications  No    Is transportation to your appointment needed  No    I have advised the patient to call PCP with any new or worsening symptoms  Dante Schaumann, MA    Living Arrangements  Spouse or Significiant other    Comments  scheduled with dr Dakota Ramirez 1/17          History of Present Illness   Vadim Riggs is a 62y o -year-old female who I had just seen 12/23 with chronic cough, history chronic sinus issues  She presented to the emergency room January 15th with sore throat, CT scanning had shown some abnormalities-asymmetric soft tissue thickening left supraglottis felt to be Infectious however underlying neoplastic process not excluded  Inpatient she had seen ENT, had direct flexible laryngoscopy, received prescription for clindamycin 300 mg 4 times daily along with prednisone  She had used Augmentin after seeing me in December for 10 days      She has chronic constipation, had developed some loose stools, used Imodium once, again developed constipation, did have softer stool recent days  Is using Activia  Is aware of possibility of C diff as her  had C diff in the past       She relates sore throat / throat pain had improved, did awaken with left-sided pain which radiates to left ear this morning  No fevers  See prior visit with me, she had seen allergist prior to that time, was  given Spiriva sample, as that was not coverage she was then given Incruse, she stopped this recently as she felt she may be having side effects/hoarseness with it  She does continue on Advair long-term, does rinse after use  Did receive Tessalon along with Mucinex inpatient, is using those -       She is having some issues with sleep, prednisone seems to worsen anxiety, has used lorazepam in the past   Appears she did not receive sertraline to her short hospital stay  She is back on this  Has ongoing mucus w PND  Has no acid reflux-   PPI was increased to BID  See prior visit regarding other history related to cough/other plan related to cough  Had to cancel sleep study w Cpap due to being in hospital       Review of Systems   Review of Systems   Constitutional: Negative for appetite change, fatigue, fever and unexpected weight change  HENT: Positive for dental problem (right sided), ear pain, sore throat and voice change  Negative for mouth sores  Respiratory: Positive for cough  Negative for chest tightness and shortness of breath  Cardiovascular: Negative for chest pain, palpitations and leg swelling  Gastrointestinal: Negative for abdominal pain and blood in stool  No acid reflux     No change in bowel   Genitourinary: Negative for dysuria and hematuria  Neurological: Negative for dizziness, syncope, light-headedness and headaches  Psychiatric/Behavioral: The patient is nervous/anxious           Life stressors-  Still grieving loss of Mom -   Cares for her Dad       Active Problem List     Patient Active Problem List   Diagnosis    Abnormal mammogram of right breast    Acid reflux disease    Carpal tunnel syndrome    Colon polyp    Complete tear of right rotator cuff    Depression with anxiety    Disc degeneration, lumbar    HSV-1 (herpes simplex virus 1) infection    Low back pain    Migraine headache    Osteoarthritis    Pap smear abnormality of cervix with ASCUS favoring benign -  f/up normal    Symptomatic menopausal or female climacteric states    Obesity (BMI 35 0-39 9 without comorbidity)    Hearing loss, left    Arthralgia    Chronic coughing    Sleep apnea    Pulmonary nodules    Snoring    Elevated BP without diagnosis of hypertension    Mild persistent asthma without complication    Abnormal computed tomography of soft tissue of neck    Borderline hyperglycemia       Past Medical History:  Reviewed    Past Surgical History:  Reviewed    Family History:  Reviewed    Social History:  Reviewed    Objective     Vitals:    01/17/20 0957   BP: 148/92   BP Location: Left arm   Patient Position: Sitting   Cuff Size: Large   Pulse: 74   Temp: 97 6 °F (36 4 °C)   TempSrc: Tympanic   SpO2: 97%   Weight: 105 kg (232 lb)   Height: 5' 6" (1 676 m)     Body mass index is 37 45 kg/m²  BP Readings from Last 3 Encounters:   01/17/20 148/92   01/16/20 146/88   12/23/19 132/88       Wt Readings from Last 3 Encounters:   01/17/20 105 kg (232 lb)   01/15/20 100 kg (221 lb 1 9 oz)   12/23/19 106 kg (234 lb)       Physical Exam   Constitutional: She is oriented to person, place, and time  She appears well-developed and well-nourished  No distress  HENT:   Right Ear: External ear normal    Left Ear: External ear normal    Mouth/Throat: Oropharynx is clear and moist    TM clear   Cardiovascular: Normal rate, regular rhythm and normal heart sounds  No murmur heard  Pulmonary/Chest: Effort normal and breath sounds normal  No respiratory distress  She has no wheezes  She has no rales  Abdominal: Soft   There is no tenderness  There is no guarding  Musculoskeletal: She exhibits no edema  Neurological: She is alert and oriented to person, place, and time  Psychiatric:   Tearful at times, somewhat anxious       ALLERGIES:  Allergies   Allergen Reactions    Effexor [Venlafaxine] Other (See Comments)     Hot and sweaty    Levaquin [Levofloxacin] Myalgia     Tendonitis     Wellbutrin [Bupropion] Other (See Comments)     Hot and sweaty    Prempro [Conj Estrog-Medroxyprogest Ace] Rash       Current Medications     Current Outpatient Medications   Medication Sig Dispense Refill    ADVAIR DISKUS 500-50 MCG/DOSE inhaler Inhale 1 puff 2 (two) times a day Rinse mouth after use  5    albuterol (2 5 mg/3 mL) 0 083 % nebulizer solution Take 1 vial (2 5 mg total) by nebulization every 4 (four) hours as needed for wheezing (via nebulizer) 25 vial 1    albuterol (PROVENTIL HFA,VENTOLIN HFA) 90 mcg/act inhaler Inhale 2 puffs every 4 (four) hours as needed for wheezing 1 Inhaler 0    b complex vitamins tablet Take 1 tablet by mouth daily      benzonatate (TESSALON PERLES) 100 mg capsule Take 1 capsule (100 mg total) by mouth 3 (three) times a day as needed for cough 20 capsule 0    clindamycin (CLEOCIN) 300 MG capsule Take 1 capsule (300 mg total) by mouth 4 (four) times a day for 9 days 36 capsule 0    fluticasone (FLONASE) 50 mcg/act nasal spray 2 sprays into each nostril daily 16 g 0    guaiFENesin (MUCINEX) 600 mg 12 hr tablet Take 1 tablet (600 mg total) by mouth every 12 (twelve) hours 60 tablet 0    ibuprofen (ADVIL,MOTRIN) 100 MG chewable tablet Chew every 8 (eight) hours as needed for mild pain        LORazepam (ATIVAN) 0 5 mg tablet Take 1 tablet (0 5 mg total) by mouth every 6 (six) hours as needed for anxiety 20 tablet 0    LYSINE PO Take 1 capsule by mouth daily as needed (for cold sore)       multivitamin (THERAGRAN) TABS Take 1 tablet by mouth daily      omeprazole (PriLOSEC) 20 mg delayed release capsule Take 1 capsule (20 mg total) by mouth 2 (two) times a day 90 capsule 0    predniSONE 20 mg tablet Take 2 tablets (40 mg total) by mouth daily Take 2 pills daily for 1 more day, decrease to 1 1/2 pill daily for 2 days, decrease to 1 pill daily for 2 days, decrease to 1/2 pill daily for 2 days and stop 8 tablet 0    sertraline (ZOLOFT) 100 mg tablet TAKE 1 TABLET BY MOUTH EVERY DAY (Patient taking differently: Take 100 mg by mouth daily ) 30 tablet 5     No current facility-administered medications for this visit               Orders Placed This Encounter   Procedures    Ambulatory Referral to Otolaryngology         Bambi Torres DO

## 2020-02-04 ENCOUNTER — TELEPHONE (OUTPATIENT)
Dept: FAMILY MEDICINE CLINIC | Facility: CLINIC | Age: 55
End: 2020-02-04

## 2020-02-04 DIAGNOSIS — G47.33 OBSTRUCTIVE SLEEP APNEA SYNDROME: Primary | ICD-10-CM

## 2020-02-04 NOTE — TELEPHONE ENCOUNTER
Referral entered   (pt's insurance requires a referral from PCP in addition to the study order already placed by the specialist)

## 2020-02-04 NOTE — TELEPHONE ENCOUNTER
There is already an order for CPAP study from sleep specialist, this was ordered in August and has not     We can place whenever order she needs done in addition to this

## 2020-02-04 NOTE — TELEPHONE ENCOUNTER
Pt called stating she is having a sleep study with CPAP and needs a referral from her PCP  Okay to enter referral to sleep medicine?

## 2020-02-06 ENCOUNTER — TELEPHONE (OUTPATIENT)
Dept: SLEEP CENTER | Facility: CLINIC | Age: 55
End: 2020-02-06

## 2020-02-06 ENCOUNTER — HOSPITAL ENCOUNTER (OUTPATIENT)
Dept: SLEEP CENTER | Facility: CLINIC | Age: 55
Discharge: HOME | End: 2020-02-06
Payer: COMMERCIAL

## 2020-02-06 DIAGNOSIS — G47.33 OBSTRUCTIVE SLEEP APNEA (ADULT) (PEDIATRIC): ICD-10-CM

## 2020-02-06 PROCEDURE — 95811 POLYSOM 6/>YRS CPAP 4/> PARM: CPT | Performed by: INTERNAL MEDICINE

## 2020-02-06 PROCEDURE — 95811 POLYSOM 6/>YRS CPAP 4/> PARM: CPT

## 2020-02-07 DIAGNOSIS — N95.1 SYMPTOMATIC MENOPAUSAL OR FEMALE CLIMACTERIC STATES: ICD-10-CM

## 2020-02-07 RX ORDER — SERTRALINE HYDROCHLORIDE 100 MG/1
TABLET, FILM COATED ORAL
Qty: 90 TABLET | Refills: 3 | Status: SHIPPED | OUTPATIENT
Start: 2020-02-07 | End: 2021-01-21

## 2020-02-07 NOTE — PROGRESS NOTES
Sleep Study Documentation    Pre-Sleep Study       Sleep testing procedure explained to patient:YES    Patient napped prior to study:NO    Caffeine:Dayshift worker after 12PM   Caffeine use:YES- soda  12 ounces and ice tea  12 ounces    Alcohol:Dayshift workers after 5PM: Alcohol use:NO    Typical day for patient:YES       Study Documentation    Sleep Study Indications: acid reflux disease, depression, low back pain, migraine, obesity, hearing loss, coughing, snoring, PRAVEENA,     Sleep Study: Treatment   Optimal PAP pressure: 20/16cm  Leak:Small  Snore:Eliminated  REM Obtained:yes  Supplemental O2: no    Minimum SaO2 82%  Baseline SaO2 96%  PAP mask tried (list all) Medium Resmrd Airfit F20  PAP mask choice (final) Medium Resmed Airfit F20  PAP mask type:full face  PAP pressure at which snoring was eliminated 11CM  Minimum SaO2 at final PAP pressure 94%  Mode of Therapy:BiPAP  ETCO2:No  CPAP changed to BiPAP:Yes  If yes why DUE TO PERSISTENT OF SUPINE RELATED  OBSTRUCTIVE EVENTS    Mode of Therapy:BiPAP    EKG abnormalities: no     EEG abnormalities: no    Sleep Study Recorded < 2 hours: N/A    Sleep Study Recorded > 2 hours but incomplete study: N/A    Sleep Study Recorded 6 hours but no sleep obtained: NO    Patient classification: employed       Post-Sleep Study    Medication used at bedtime or during sleep study:YES other prescription medications    Patient reports time it took to fall asleep:20 to 30 minutes    Patient reports waking up during study:1 to 2 times  Patient reports returning to sleep without difficulty  Patient reports sleeping 4 to 6 hours with dreaming  Patient reports sleep during study:better than usual    Patient rated sleepiness: Somewhat sleepy or tired    PAP treatment:yes: Post PAP treatment patient reports feeling better and  would wear PAP mask at home

## 2020-02-10 DIAGNOSIS — G47.33 OSA (OBSTRUCTIVE SLEEP APNEA): Primary | ICD-10-CM

## 2020-02-11 ENCOUNTER — TELEPHONE (OUTPATIENT)
Dept: PULMONOLOGY | Facility: CLINIC | Age: 55
End: 2020-02-11

## 2020-02-11 NOTE — TELEPHONE ENCOUNTER
I called patient and left a detailed message explaining that during the study she was started on CPAPbut failed due to persistent apneas so they then switched her to a BiPAP machine and she did very well  I then explained that the recommendation is to move forward with Auto BiPAP  I asked that she call me back so we can discuss further  Patient called back and we discussed the results of her study more in depth  Pt is agreeable to move forward with Auto BiPAP machine  Script with supplies and all needed documents have been faxed to [place] [id number]  at [phone] and [phone]  Pt is scheduled for a follow up with Dr Prachi Patrick on 2/20/20  I did inform pt that she will need to come back within 30-90 days after she gets the machine for a BiPAP compliance appt   Patient will call me in 2 weeks if she does not hear from [place] [id number]

## 2020-02-17 ENCOUNTER — OFFICE VISIT (OUTPATIENT)
Dept: FAMILY MEDICINE CLINIC | Facility: CLINIC | Age: 55
End: 2020-02-17
Payer: COMMERCIAL

## 2020-02-17 VITALS
WEIGHT: 227.38 LBS | DIASTOLIC BLOOD PRESSURE: 88 MMHG | HEART RATE: 82 BPM | HEIGHT: 66 IN | SYSTOLIC BLOOD PRESSURE: 132 MMHG | OXYGEN SATURATION: 97 % | BODY MASS INDEX: 36.54 KG/M2

## 2020-02-17 DIAGNOSIS — R03.0 ELEVATED BP WITHOUT DIAGNOSIS OF HYPERTENSION: Primary | ICD-10-CM

## 2020-02-17 DIAGNOSIS — B00.1 COLD SORE: Primary | ICD-10-CM

## 2020-02-17 DIAGNOSIS — F41.8 DEPRESSION WITH ANXIETY: ICD-10-CM

## 2020-02-17 PROBLEM — R93.89 ABNORMAL COMPUTED TOMOGRAPHY OF SOFT TISSUE OF NECK: Status: RESOLVED | Noted: 2020-01-15 | Resolved: 2020-02-17

## 2020-02-17 PROCEDURE — 99213 OFFICE O/P EST LOW 20 MIN: CPT | Performed by: FAMILY MEDICINE

## 2020-02-17 PROCEDURE — 1036F TOBACCO NON-USER: CPT | Performed by: FAMILY MEDICINE

## 2020-02-17 PROCEDURE — 3008F BODY MASS INDEX DOCD: CPT | Performed by: FAMILY MEDICINE

## 2020-02-17 RX ORDER — VALACYCLOVIR HYDROCHLORIDE 1 G/1
TABLET, FILM COATED ORAL
Qty: 20 TABLET | Refills: 0 | Status: SHIPPED | OUTPATIENT
Start: 2020-02-17 | End: 2021-01-29 | Stop reason: SDUPTHER

## 2020-02-17 NOTE — PATIENT INSTRUCTIONS
Blood pressure here today after sitting is 132/88, since I last saw her in January she has dropped 5 lb, she is watching healthier diet  She will be starting BiPAP for obstructive sleep apnea  We will continue to monitor blood pressure readings, we will see her again in 6 months  She does have multiple upcoming specialty appointments, fortunately her cough has resolved  She is off Advair/ Incruse, has not needed albuterol  She did see ENT in follow-up, follow-up laryngoscopy was unremarkable, she will be following up with them as needed  She will be seeing Pulmonary in follow-up  Also has an appointment with allergist     She does continue on omeprazole, has no acid reflux, does have upcoming EGD  Continues with life stressors, is on sertraline 100 mg daily, uses lorazepam sparingly  She will start talk therapy  /counseling

## 2020-02-17 NOTE — PROGRESS NOTES
FAMILY PRACTICE OFFICE VISIT  Estela KEBEDE O  [facility]  [address]  [address], [address]      NAME: Jeanie Mcmullen  AGE: 62 y o  SEX: female  : 1965   MRN: [id number]    DATE: 2020  TIME: 4:25 PM    Assessment and Plan     Problem List Items Addressed This Visit        Other    Depression with anxiety    Relevant Orders    Ambulatory referral to Psychology    Elevated BP without diagnosis of hypertension - Primary          Patient Instructions   Blood pressure here today after sitting is 132/88, since I last saw her in January she has dropped 5 lb, she is watching healthier diet  She will be starting BiPAP for obstructive sleep apnea  We will continue to monitor blood pressure readings, we will see her again in 6 months  She does have multiple upcoming specialty appointments, fortunately her cough has resolved  She is off Advair/ Incruse, has not needed albuterol  She did see ENT in follow-up, follow-up laryngoscopy was unremarkable, she will be following up with them as needed  She will be seeing Pulmonary in follow-up  Also has an appointment with allergist     She does continue on omeprazole, has no acid reflux, does have upcoming EGD  Continues with life stressors, is on sertraline 100 mg daily, uses lorazepam sparingly  She will start talk therapy  /counseling  Chief Complaint     Chief Complaint   Patient presents with    Follow-up       History of Present Illness   Avtar Keith is a 62y o -year-old female who is in today for a blood pressure check  Blood pressure has been running in the 140s over 80 range  She has dropped 5 lb with watching a healthier diet, her cough has resolved, she stopped all inhalers  Denies any acid reflux  Saw ENT, follow-up laryngoscopy was okay, was released by them    Has significant life stressors, dad with dementia, still grieving her mom's passing from last year, requests counselor  Does continue on sertraline via gyn, uses benzo only rarely  Planning to start BiPAP, previously intolerant CPAP  Review of Systems   Review of Systems   Constitutional:        See HPI, no fevers or chills, congestion minimal recently, uses Mucinex as needed  No cough, no chest pain or palpitations, no abdominal complaints, no new  complaints  Active Problem List     Patient Active Problem List   Diagnosis    Abnormal mammogram of right breast    Acid reflux disease    Carpal tunnel syndrome    Colon polyp    Complete tear of right rotator cuff    Depression with anxiety    Disc degeneration, lumbar    HSV-1 (herpes simplex virus 1) infection    Low back pain    Migraine headache    Osteoarthritis    Pap smear abnormality of cervix with ASCUS favoring benign -  f/up normal    Symptomatic menopausal or female climacteric states    Obesity (BMI 35 0-39 9 without comorbidity)    Hearing loss, left    Arthralgia    Chronic coughing    Obstructive sleep apnea treated with BiPAP    Pulmonary nodules    Snoring    Elevated BP without diagnosis of hypertension    Mild persistent asthma without complication    Borderline hyperglycemia       Past Medical History:  Reviewed    Past Surgical History:  Reviewed    Family History:  Reviewed    Social History:  Reviewed    Objective     Vitals:    02/17/20 1456   BP: 132/88   BP Location: Left arm   Patient Position: Sitting   Cuff Size: Large   Pulse: 82   SpO2: 97%   Weight: 103 kg (227 lb 6 oz)   Height: 5' 6" (1 676 m)     Body mass index is 36 7 kg/m²  BP Readings from Last 3 Encounters:   02/17/20 132/88   01/27/20 140/76   01/17/20 148/92       Wt Readings from Last 3 Encounters:   02/17/20 103 kg (227 lb 6 oz)   01/27/20 107 kg (235 lb)   01/17/20 105 kg (232 lb)       Physical Exam   Constitutional: She is oriented to person, place, and time     Pleasant overweight female seated no acute distress,   Eyes: Conjunctivae are normal  No scleral icterus  Cardiovascular: Normal rate, regular rhythm and normal heart sounds  No murmur heard  Pulmonary/Chest: Effort normal and breath sounds normal  No respiratory distress  Musculoskeletal: She exhibits no edema  Neurological: She is alert and oriented to person, place, and time  Psychiatric: She has a normal mood and affect  Her behavior is normal        ALLERGIES:  Allergies   Allergen Reactions    Effexor [Venlafaxine] Other (See Comments)     Hot and sweaty    Levaquin [Levofloxacin] Myalgia     Tendonitis     Wellbutrin [Bupropion] Other (See Comments)     Hot and sweaty    Prempro [Conj Estrog-Medroxyprogest Ace] Rash       Current Medications     Current Outpatient Medications   Medication Sig Dispense Refill    b complex vitamins tablet Take 1 tablet by mouth daily      LYSINE PO Take 1 capsule by mouth daily as needed (for cold sore)       multivitamin (THERAGRAN) TABS Take 1 tablet by mouth daily      omeprazole (PriLOSEC) 20 mg delayed release capsule Take 1 capsule (20 mg total) by mouth 2 (two) times a day 90 capsule 0    sertraline (ZOLOFT) 100 mg tablet TAKE 1 TABLET BY MOUTH EVERY DAY 90 tablet 3    albuterol (2 5 mg/3 mL) 0 083 % nebulizer solution Take 1 vial (2 5 mg total) by nebulization every 4 (four) hours as needed for wheezing (via nebulizer) 25 vial 1    albuterol (PROVENTIL HFA,VENTOLIN HFA) 90 mcg/act inhaler Inhale 2 puffs every 4 (four) hours as needed for wheezing 1 Inhaler 0    fluticasone (FLONASE) 50 mcg/act nasal spray 2 sprays into each nostril daily (Patient not taking: Reported on 1/27/2020) 16 g 0    guaiFENesin (MUCINEX) 600 mg 12 hr tablet Take 1 tablet (600 mg total) by mouth every 12 (twelve) hours 60 tablet 0    ibuprofen (ADVIL,MOTRIN) 100 MG chewable tablet Chew every 8 (eight) hours as needed for mild pain        LORazepam (ATIVAN) 0 5 mg tablet Take 1 tablet (0 5 mg total) by mouth every 6 (six) hours as needed for anxiety 20 tablet 0    valACYclovir (VALTREX) 1,000 mg tablet Use 2 pills at onset cold sore, repeat 2 pills 12 hrs later 20 tablet 0     No current facility-administered medications for this visit               Orders Placed This Encounter   Procedures    Ambulatory referral to Psychology         Rodrigo Heredia DO

## 2020-02-20 ENCOUNTER — OFFICE VISIT (OUTPATIENT)
Dept: PULMONOLOGY | Facility: CLINIC | Age: 55
End: 2020-02-20
Payer: COMMERCIAL

## 2020-02-20 VITALS
TEMPERATURE: 98.2 F | BODY MASS INDEX: 36.16 KG/M2 | WEIGHT: 225 LBS | SYSTOLIC BLOOD PRESSURE: 128 MMHG | OXYGEN SATURATION: 97 % | HEIGHT: 66 IN | HEART RATE: 78 BPM | DIASTOLIC BLOOD PRESSURE: 70 MMHG | RESPIRATION RATE: 18 BRPM

## 2020-02-20 DIAGNOSIS — R09.82 POST-NASAL DRIP: ICD-10-CM

## 2020-02-20 DIAGNOSIS — J45.20 MILD INTERMITTENT REACTIVE AIRWAY DISEASE WITHOUT COMPLICATION: ICD-10-CM

## 2020-02-20 DIAGNOSIS — R05.3 CHRONIC COUGHING: Primary | ICD-10-CM

## 2020-02-20 DIAGNOSIS — R91.8 PULMONARY NODULES: ICD-10-CM

## 2020-02-20 DIAGNOSIS — G47.33 OBSTRUCTIVE SLEEP APNEA TREATED WITH BIPAP: ICD-10-CM

## 2020-02-20 PROCEDURE — 1036F TOBACCO NON-USER: CPT | Performed by: INTERNAL MEDICINE

## 2020-02-20 PROCEDURE — 99215 OFFICE O/P EST HI 40 MIN: CPT | Performed by: INTERNAL MEDICINE

## 2020-02-20 PROCEDURE — 3008F BODY MASS INDEX DOCD: CPT | Performed by: INTERNAL MEDICINE

## 2020-02-20 RX ORDER — IPRATROPIUM BROMIDE 21 UG/1
2 SPRAY, METERED NASAL EVERY 12 HOURS
Qty: 30 ML | Refills: 1 | Status: SHIPPED | OUTPATIENT
Start: 2020-02-20 | End: 2020-08-17 | Stop reason: ALTCHOICE

## 2020-02-20 NOTE — PROGRESS NOTES
Pulmonary Follow Up Note   Nayeli Roberts 62 y o  female MRN: [id number]  2/20/2020      Referring provider: Dr Amena Limon and Plan:    Obstructive sleep apnea treated with BiPAP  - she will  her machine as scheduled next week  - she will follow-up with me in 40 days for compliance report  Chronic coughing  - Significantly improved  Currently off all inhalers  - continue to monitor for now  If cough returns, may require reinstitution of therapy  Pulmonary nodules  - nodules are stable on imaging after 1 year  No further imaging indicated per Fleischner criteria for high risk patients with nodules < 6 mm  Post-nasal drip  - Triggers her cough  - Start Iptratropium nasal spray    Reactive airway disease without complication  - Currently stable  Triggered by scents such as cleaning agents  - use albuterol as needed  Diagnoses and all orders for this visit:    Post-nasal drip  -     ipratropium (ATROVENT) 0 03 % nasal spray; 2 sprays into each nostril every 12 (twelve) hours    Obstructive sleep apnea treated with BiPAP    Chronic coughing    Pulmonary nodules    Mild intermittent reactive airway disease without complication        History of Present Illness   HPI:  Nayeli Roberts is a 62 y o  female who presents for follow-up  She was last seen by me in May 2019 for evaluation of chronic cough  She was hospitalized in January with pharyngitis  She had thickening and inflammation of her left supraglottis  She was discharged after one night stay  She was treated with IV steroids and Clindamycin   She was seen by ENT and underwent flexible laryngoscopy, which shoed thickening and edema of the left aryepiglottic fold  Her cough has significantly improved  She states that she only coughs when she has phlegm  It occurs with laughing and talking  She has post nasal drip that triggers her cough      She was seen by an allergist  She was tested for allergies and was found to be allergic to cats and dogs  Her allergist told her they are not likely the cause of her cough  He started her on a LAMA (Incruse)  She felt worse when this was started  She had multiple side effects and could not tolerate the medication  She stopped using Advair at the beginning of January  She feels like she no longer needs it  She has an Albuterol inhaler to use as needed  She rarely uses it  Avtar Barreto has symptoms of GERD  She is planning to follow to follow up with GI  She had a sleep study in February and was found to have sleep apnea  She is scheduled to get her BIPAP machine next week  Her mom passed away in 2019  Her father is alive with Alzheimer's dementia  Her dog recently   Avtar Barreto is trying to lose weight  She is using the [employer] and has already lost 5-9 pounds  Review of Systems  GEN: no fever or chills  HENT: no sinus congestion, +postnasal drip, no rhinorrhea  EYES: no visual changes  RESP: no shortness of breath, +cough, no wheezing  CARDIO: no chest pain, no leg edema  GI: no abdominal pain, no diarrhea  ENDO:  no polydipsia  : no urgency, no dysuria  MSK: no back pain  ALLERGY: not immunocompromised  NEURO: no dizziness, no seizures  HEME: does not bruise easily  PSYCH: no hallucinations    Historical Information   Past Medical History:   Diagnosis Date    Acid reflux     Anxiety     Asthmatic bronchitis     Back pain     Carpal tunnel syndrome     Depression     Depression     Hyperglycemia     Hypertension     Lipoma     Migraine     Miscarriage     Osteoarthritis     PONV (postoperative nausea and vomiting)     Stress incontinence     UTI (urinary tract infection)      Past Surgical History:   Procedure Laterality Date    APPENDECTOMY      BREAST EXCISIONAL BIOPSY Left     benign    COLONOSCOPY N/A 2016    Procedure: COLONOSCOPY;  Surgeon: Bravo Gonzalez MD;  Location: [facility];   Service:     KNEE ARTHROSCOPY      PA EXC SKIN BENIG <0 5 CM TRUNK,ARM,LEG Left 5/19/2016    Procedure: EXCISION LIPOMA SHOULDER ;  Surgeon: Bravo Gonzalez MD;  Location: [facility];  Service: General    TUBAL LIGATION      WISDOM TOOTH EXTRACTION       Family History   Problem Relation Age of Onset    Stroke Mother     Stomach cancer Mother 68    Skin cancer Father     No Known Problems Sister     Heart disease Brother     No Known Problems Daughter     No Known Problems Maternal Grandmother     No Known Problems Maternal Grandfather     Breast cancer Paternal Grandmother 48    No Known Problems Paternal Grandfather     No Known Problems Sister     No Known Problems Daughter     Breast cancer Cousin     Breast cancer Cousin     Breast cancer Cousin     No Known Problems Maternal Aunt     No Known Problems Maternal Aunt     No Known Problems Maternal Aunt     No Known Problems Maternal Aunt     No Known Problems Paternal Aunt     No Known Problems Paternal Aunt     No Known Problems Paternal Aunt     No Known Problems Paternal Aunt     No Known Problems Paternal Aunt     Lung cancer Paternal Uncle          Meds/Allergies     Current Outpatient Medications:     albuterol (2 5 mg/3 mL) 0 083 % nebulizer solution, Take 1 vial (2 5 mg total) by nebulization every 4 (four) hours as needed for wheezing (via nebulizer), Disp: 25 vial, Rfl: 1    albuterol (PROVENTIL HFA,VENTOLIN HFA) 90 mcg/act inhaler, Inhale 2 puffs every 4 (four) hours as needed for wheezing, Disp: 1 Inhaler, Rfl: 0    b complex vitamins tablet, Take 1 tablet by mouth daily, Disp: , Rfl:     fluticasone (FLONASE) 50 mcg/act nasal spray, 2 sprays into each nostril daily (Patient not taking: Reported on 1/27/2020), Disp: 16 g, Rfl: 0    guaiFENesin (MUCINEX) 600 mg 12 hr tablet, Take 1 tablet (600 mg total) by mouth every 12 (twelve) hours, Disp: 60 tablet, Rfl: 0    ibuprofen (ADVIL,MOTRIN) 100 MG chewable tablet, Chew every 8 (eight) hours as needed for mild pain , Disp: , Rfl:     LORazepam (ATIVAN) 0 5 mg tablet, Take 1 tablet (0 5 mg total) by mouth every 6 (six) hours as needed for anxiety, Disp: 20 tablet, Rfl: 0    LYSINE PO, Take 1 capsule by mouth daily as needed (for cold sore) , Disp: , Rfl:     multivitamin (THERAGRAN) TABS, Take 1 tablet by mouth daily, Disp: , Rfl:     omeprazole (PriLOSEC) 20 mg delayed release capsule, Take 1 capsule (20 mg total) by mouth 2 (two) times a day, Disp: 90 capsule, Rfl: 0    sertraline (ZOLOFT) 100 mg tablet, TAKE 1 TABLET BY MOUTH EVERY DAY, Disp: 90 tablet, Rfl: 3    valACYclovir (VALTREX) 1,000 mg tablet, Use 2 pills at onset cold sore, repeat 2 pills 12 hrs later, Disp: 20 tablet, Rfl: 0  Allergies   Allergen Reactions    Effexor [Venlafaxine] Other (See Comments)     Hot and sweaty    Levaquin [Levofloxacin] Myalgia     Tendonitis     Wellbutrin [Bupropion] Other (See Comments)     Hot and sweaty    Prempro [Conj Estrog-Medroxyprogest Ace] Rash       Vitals: Blood pressure 128/70, pulse 78, temperature 98 2 °F (36 8 °C), resp  rate 18, height 5' 6" (1 676 m), weight 102 kg (225 lb), SpO2 97 %  Body mass index is 36 32 kg/m²  Oxygen Therapy  SpO2: 97 %  Oxygen Therapy: None (Room air)      Physical Exam  GEN: WDWN, nad, comfortable  HEENT: NCAT, EOMI  CVS: Regular  LUNGS: CTA b/l  ABD: soft  EXT: No c/c/e  NEURO: No focal deficits  MS: Moving all extremities  SKIN: warm, dry  PSYCH: calm, cooperative      Labs: I have personally reviewed pertinent lab results    Lab Results   Component Value Date    WBC 8 95 01/15/2020    HGB 12 1 01/15/2020    HCT 39 1 01/15/2020    MCV 93 01/15/2020     01/15/2020     Lab Results   Component Value Date    CALCIUM 8 7 01/15/2020     05/03/2017    K 3 8 01/15/2020    CO2 27 01/15/2020     01/15/2020    BUN 19 01/15/2020    CREATININE 0 80 01/15/2020     No results found for: IGE  Lab Results   Component Value Date    ALT 19 01/15/2020    AST 20 01/15/2020 ALKPHOS 98 01/15/2020    BILITOT 0 6 05/03/2017       Imaging and other studies: I have personally reviewed pertinent films in PACS  CT chest January 2020 reviewed by me personally shows no acute abnormalities, stable sub 4 mm pulmonary nodules compared to previous CT scan  Pulmonary function testing:  May 2019:  Normal spirometry, normal lung volumes, normal diffusion, no bronchodilator response  YANDY Aldana [facility]

## 2020-02-20 NOTE — ASSESSMENT & PLAN NOTE
- she will  her machine as scheduled next week  - she will follow-up with me in 40 days for compliance report

## 2020-02-20 NOTE — ASSESSMENT & PLAN NOTE
- Significantly improved  Currently off all inhalers  - continue to monitor for now  If cough returns, may require reinstitution of therapy

## 2020-02-20 NOTE — ASSESSMENT & PLAN NOTE
- nodules are stable on imaging after 1 year  No further imaging indicated per Fleischner criteria for high risk patients with nodules < 6 mm

## 2020-03-06 ENCOUNTER — TELEPHONE (OUTPATIENT)
Dept: PULMONOLOGY | Facility: CLINIC | Age: 55
End: 2020-03-06

## 2020-03-06 NOTE — TELEPHONE ENCOUNTER
Patient called in today stating that she has been using her BiPAP for the past three days and is waking up very gassy  She states that during the night she is filling up with too much air and has to take the mask off  She also states that throughout the day she is constantly burping and passing gas  She would like to know if the pressures need to be changed  A copy of her latest compliance has been scanned into her chart under the media tab  Please advise

## 2020-03-09 NOTE — TELEPHONE ENCOUNTER
Can you set her up with an appointment with Esperanza D eLeon so he can help her address? It sounds like the pressures should be adjusted      Thanks  [place]

## 2020-03-11 ENCOUNTER — CONSULT (OUTPATIENT)
Dept: GASTROENTEROLOGY | Facility: MEDICAL CENTER | Age: 55
End: 2020-03-11
Payer: COMMERCIAL

## 2020-03-11 VITALS
SYSTOLIC BLOOD PRESSURE: 126 MMHG | HEIGHT: 66 IN | BODY MASS INDEX: 35.52 KG/M2 | TEMPERATURE: 97.7 F | HEART RATE: 69 BPM | DIASTOLIC BLOOD PRESSURE: 85 MMHG | WEIGHT: 221 LBS

## 2020-03-11 DIAGNOSIS — K21.9 GASTROESOPHAGEAL REFLUX DISEASE WITHOUT ESOPHAGITIS: Primary | ICD-10-CM

## 2020-03-11 DIAGNOSIS — K63.5 POLYP OF COLON, UNSPECIFIED PART OF COLON, UNSPECIFIED TYPE: ICD-10-CM

## 2020-03-11 DIAGNOSIS — R05.3 CHRONIC COUGHING: ICD-10-CM

## 2020-03-11 DIAGNOSIS — Z86.010 PERSONAL HISTORY OF COLONIC POLYPS: ICD-10-CM

## 2020-03-11 PROCEDURE — 99244 OFF/OP CNSLTJ NEW/EST MOD 40: CPT | Performed by: INTERNAL MEDICINE

## 2020-03-11 NOTE — PATIENT INSTRUCTIONS
Today we discussed:  -- Plan for upper endoscopy given the long-standing reflux  -- Plan for screening colonoscopy as you are due  -- Continue omeprazole twice daily  -- Changes that can help reflux include: avoidance of trigger foods (potential foods include coffee, caffeine, chocolate, mint, tomato-based products, spicy foods, fatty foods), avoid tight fitting clothing, elevated head of bed 30 degrees, avoid eating 2-3 hours prior to bedtime, weight loss, avoid alcohol, avoid tobacco use  The patient is scheduled at [facility] with Dr Karen Morrison on 4/29/20  For a colonoscopy and EGD  Suprep instructions have been gone over in the office ,with the patient ,by the MA  The patient is aware that they will receive a call with the arrival time the day prior and that they will need a  the day of the procedure  I have asked the patient to call with any questions they may have prior to procedure

## 2020-03-26 ENCOUNTER — HOSPITAL ENCOUNTER (OUTPATIENT)
Dept: MAMMOGRAPHY | Facility: CLINIC | Age: 55
Discharge: HOME | End: 2020-03-26
Payer: COMMERCIAL

## 2020-03-26 ENCOUNTER — HOSPITAL ENCOUNTER (OUTPATIENT)
Dept: ULTRASOUND IMAGING | Facility: CLINIC | Age: 55
Discharge: HOME | End: 2020-03-26
Payer: COMMERCIAL

## 2020-03-26 VITALS — BODY MASS INDEX: 35.52 KG/M2 | WEIGHT: 221 LBS | HEIGHT: 66 IN

## 2020-03-26 DIAGNOSIS — R92.8 ABNORMAL MAMMOGRAM: ICD-10-CM

## 2020-03-26 PROCEDURE — G0279 TOMOSYNTHESIS, MAMMO: HCPCS

## 2020-03-26 PROCEDURE — 77065 DX MAMMO INCL CAD UNI: CPT

## 2020-03-26 PROCEDURE — 76642 ULTRASOUND BREAST LIMITED: CPT

## 2020-03-27 ENCOUNTER — TELEPHONE (OUTPATIENT)
Dept: GASTROENTEROLOGY | Facility: MEDICAL CENTER | Age: 55
End: 2020-03-27

## 2020-03-27 NOTE — TELEPHONE ENCOUNTER
Called pt to reschedule at [facility] with dr Sandra Pascual due to covid, pt would like to call back to reschedule   She states it is not urgent at this time

## 2020-04-08 ENCOUNTER — TELEPHONE (OUTPATIENT)
Dept: PULMONOLOGY | Facility: CLINIC | Age: 55
End: 2020-04-08

## 2020-04-09 DIAGNOSIS — F41.9 ANXIETY: ICD-10-CM

## 2020-04-09 RX ORDER — LORAZEPAM 0.5 MG/1
0.5 TABLET ORAL EVERY 6 HOURS PRN
Qty: 20 TABLET | Refills: 0 | Status: SHIPPED | OUTPATIENT
Start: 2020-04-09 | End: 2020-08-17 | Stop reason: ALTCHOICE

## 2020-04-17 ENCOUNTER — TELEMEDICINE (OUTPATIENT)
Dept: PULMONOLOGY | Facility: CLINIC | Age: 55
End: 2020-04-17

## 2020-04-17 DIAGNOSIS — R05.9 COUGH: ICD-10-CM

## 2020-04-17 DIAGNOSIS — J45.20 MILD INTERMITTENT REACTIVE AIRWAY DISEASE WITHOUT COMPLICATION: ICD-10-CM

## 2020-04-17 DIAGNOSIS — G47.33 OBSTRUCTIVE SLEEP APNEA TREATED WITH BIPAP: Primary | ICD-10-CM

## 2020-04-17 DIAGNOSIS — K21.9 GASTROESOPHAGEAL REFLUX DISEASE WITHOUT ESOPHAGITIS: ICD-10-CM

## 2020-04-17 DIAGNOSIS — R09.82 POST-NASAL DRIP: ICD-10-CM

## 2020-04-17 PROCEDURE — 99214 OFFICE O/P EST MOD 30 MIN: CPT | Performed by: NURSE PRACTITIONER

## 2020-04-20 DIAGNOSIS — G47.33 OSA (OBSTRUCTIVE SLEEP APNEA): Primary | ICD-10-CM

## 2020-04-23 DIAGNOSIS — K21.9 GASTROESOPHAGEAL REFLUX DISEASE WITHOUT ESOPHAGITIS: ICD-10-CM

## 2020-04-23 RX ORDER — OMEPRAZOLE 20 MG/1
CAPSULE, DELAYED RELEASE ORAL
Qty: 90 CAPSULE | Refills: 3 | Status: SHIPPED | OUTPATIENT
Start: 2020-04-23 | End: 2021-04-18

## 2020-05-21 NOTE — TELEPHONE ENCOUNTER
Called patient to reschedule her EGD/Colon  She stated she does not want to reschedule as she has a lot of things going on a family member  She stated she will call when she is ready to schedule

## 2020-08-13 ENCOUNTER — TELEPHONE (OUTPATIENT)
Dept: PULMONOLOGY | Facility: CLINIC | Age: 55
End: 2020-08-13

## 2020-08-17 ENCOUNTER — OFFICE VISIT (OUTPATIENT)
Dept: FAMILY MEDICINE CLINIC | Facility: CLINIC | Age: 55
End: 2020-08-17
Payer: COMMERCIAL

## 2020-08-17 VITALS
DIASTOLIC BLOOD PRESSURE: 80 MMHG | TEMPERATURE: 97.9 F | BODY MASS INDEX: 37.12 KG/M2 | HEART RATE: 67 BPM | OXYGEN SATURATION: 97 % | SYSTOLIC BLOOD PRESSURE: 130 MMHG | HEIGHT: 66 IN | WEIGHT: 231 LBS

## 2020-08-17 DIAGNOSIS — F41.8 DEPRESSION WITH ANXIETY: ICD-10-CM

## 2020-08-17 DIAGNOSIS — E66.9 OBESITY (BMI 35.0-39.9 WITHOUT COMORBIDITY): ICD-10-CM

## 2020-08-17 DIAGNOSIS — K21.9 GASTROESOPHAGEAL REFLUX DISEASE WITHOUT ESOPHAGITIS: ICD-10-CM

## 2020-08-17 DIAGNOSIS — R73.9 BORDERLINE HYPERGLYCEMIA: Primary | ICD-10-CM

## 2020-08-17 PROCEDURE — 3008F BODY MASS INDEX DOCD: CPT | Performed by: FAMILY MEDICINE

## 2020-08-17 PROCEDURE — 99214 OFFICE O/P EST MOD 30 MIN: CPT | Performed by: FAMILY MEDICINE

## 2020-08-17 PROCEDURE — 1036F TOBACCO NON-USER: CPT | Performed by: FAMILY MEDICINE

## 2020-08-17 RX ORDER — LORAZEPAM 0.5 MG/1
0.5 TABLET ORAL EVERY 6 HOURS PRN
Qty: 20 TABLET | Refills: 0 | Status: SHIPPED | OUTPATIENT
Start: 2020-08-17 | End: 2021-02-18 | Stop reason: SDUPTHER

## 2020-08-17 NOTE — PROGRESS NOTES
BMI Counseling: Body mass index is 37 28 kg/m²  The BMI is above normal  Nutrition recommendations include encouraging healthy choices of fruits and vegetables and moderation in carbohydrate intake  Exercise recommendations include exercising 3-5 times per week  FAMILY PRACTICE OFFICE VISIT  [facility]  [id number]   [address] [place], [address]      NAME: Dotty Mcmullen  AGE: 60 y o  SEX: female  : 1965   MRN: [id number]    DATE: 2020  TIME: 8:43 AM    Assessment and Plan     Problem List Items Addressed This Visit        Digestive    Acid reflux disease       Other    Depression with anxiety    Relevant Medications    LORazepam (ATIVAN) 0 5 mg tablet    Obesity (BMI 35 0-39 9 without comorbidity)    Borderline hyperglycemia - Primary    Relevant Orders    Basic metabolic panel    Hemoglobin A1C          Patient Instructions   Her blood pressure is 130/80 today  Acceptable  I would like her to try to increase her walking, she is very active with work  Would like her to redo fasting BMP, A1c, borderline hyperglycemia  She has gained weight back, usually runs around 230 lb long-term  Continue to work at [employer], limit carbohydrates  Her cough has resolved, she will be seeing Pulmonary in follow-up, having difficulty tolerating BiPAP  She will be doing EGD/colonoscopy, is on omeprazole 20 mg daily, if she misses this he does have breakthrough  Does use Ibuprofen re chronic low back pain  Continue on sertraline 100 mg daily as is, is using lorazepam 4-5 x a month  I refilled that  Back in  CBC/ CMP ok other than Glucose     Dec 2019 2 34    Neg UA  Chol 2017 = 182/51/105    She will be seeing Gyn -  We will see her in 6 mo w PE      Chief Complaint     Chief Complaint   Patient presents with    Follow-up       History of Present Illness   Taran Jones is a 60y o -year-old female who is in today for a 6 month check, she relates her cough has totally resolved, had seen multiple specialists with this in the past   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, is off any inhalers  Does note acid reflux if she skips her omeprazole  Had to reschedule EGD/colonoscopy  Continues with chronic low back pain, does try to walk the dog 1 to 1 5 mi daily  Has gained some weight back, usually runs around 230 lb, where she is at today  Has had issues tolerating BiPAP, feels cannot use it, tried multiple masks, notes air swallowing with it  Significant ongoing anxiety, stress with , James, mom dying last year  Continues on sertraline 100 mg daily, uses lorazepam 4 to 5 times a month  Has not yet set up talk therapy, talks to friends, family  Good relationship with her 2 daughters  Review of Systems   Review of Systems   Constitutional: Negative for activity change, appetite change, chills, fatigue, fever and unexpected weight change  HENT: Positive for postnasal drip  Negative for mouth sores, sore throat and trouble swallowing  Eyes: Negative for visual disturbance  Respiratory: Negative for cough, choking, chest tightness and shortness of breath  Cardiovascular: Negative for chest pain, palpitations and leg swelling  Gastrointestinal: Negative for abdominal pain, blood in stool, constipation, diarrhea, nausea and vomiting  See HPI   No change in bowel   Genitourinary: Negative for dysuria and hematuria  Musculoskeletal: Positive for back pain  Neurological: Negative for dizziness, syncope, light-headedness and headaches (Okay recently)  Hematological: Does not bruise/bleed easily  Psychiatric/Behavioral: Negative for confusion (Mild cognitive impairment as before) and sleep disturbance  The patient is nervous/anxious          Active Problem List     Patient Active Problem List   Diagnosis    Abnormal mammogram of right breast    Acid reflux disease    Carpal tunnel syndrome    Colon polyp    Complete tear of right rotator cuff    Depression with anxiety    Disc degeneration, lumbar    HSV-1 (herpes simplex virus 1) infection    Low back pain    Migraine headache    Osteoarthritis    Pap smear abnormality of cervix with ASCUS favoring benign -  f/up normal    Symptomatic menopausal or female climacteric states    Obesity (BMI 35 0-39 9 without comorbidity)    Hearing loss, left    Arthralgia    Reactive airway disease without complication    Chronic cough    Obstructive sleep apnea treated with BiPAP -  felt difficulty tolerating and stopped    Pulmonary nodules    Snoring    Elevated BP without diagnosis of hypertension    Borderline hyperglycemia    Post-nasal drip       Past Medical History:  Reviewed    Past Surgical History:  Reviewed    Family History:  Reviewed    Social History:  Reviewed    Objective     Vitals:    08/17/20 0739   BP: 130/80   BP Location: Left arm   Patient Position: Sitting   Cuff Size: Large   Pulse: 67   Temp: 97 9 °F (36 6 °C)   SpO2: 97%   Weight: 105 kg (231 lb)   Height: 5' 6" (1 676 m)     Body mass index is 37 28 kg/m²  BP Readings from Last 3 Encounters:   08/17/20 130/80   04/17/20 121/84   03/11/20 126/85       Wt Readings from Last 3 Encounters:   08/17/20 105 kg (231 lb)   04/17/20 102 kg (225 lb)   03/26/20 100 kg (221 lb)       Physical Exam  Constitutional:       Appearance: She is well-developed  Comments: Pleasant overweight female, no acute distress other than becomes tearful at times discussing stress  Eyes:      General: No scleral icterus  Cardiovascular:      Rate and Rhythm: Normal rate and regular rhythm  Heart sounds: Normal heart sounds  No murmur  Pulmonary:      Effort: Pulmonary effort is normal  No respiratory distress  Breath sounds: Normal breath sounds  Musculoskeletal:      Right lower leg: No edema  Left lower leg: No edema  Lymphadenopathy:      Cervical: No cervical adenopathy  Neurological:      General: No focal deficit present  Mental Status: She is oriented to person, place, and time  Psychiatric:         Mood and Affect: Mood normal          Behavior: Behavior normal          ALLERGIES:  Allergies   Allergen Reactions    Effexor [Venlafaxine] Other (See Comments)     Hot and sweaty    Levaquin [Levofloxacin] Myalgia     Tendonitis     Wellbutrin [Bupropion] Other (See Comments)     Hot and sweaty    Prempro [Conj Estrog-Medroxyprogest Ace] Rash       Current Medications     Current Outpatient Medications   Medication Sig Dispense Refill    ibuprofen (ADVIL,MOTRIN) 100 MG chewable tablet Chew every 8 (eight) hours as needed for mild pain        LYSINE PO Take 1 capsule by mouth daily as needed (for cold sore)       multivitamin (THERAGRAN) TABS Take 1 tablet by mouth daily      omeprazole (PriLOSEC) 20 mg delayed release capsule TAKE 1 CAPSULE DAILY AS NEEDED FOR STOMACH AND ACID 90 capsule 3    sertraline (ZOLOFT) 100 mg tablet TAKE 1 TABLET BY MOUTH EVERY DAY 90 tablet 3    albuterol (2 5 mg/3 mL) 0 083 % nebulizer solution Take 1 vial (2 5 mg total) by nebulization every 4 (four) hours as needed for wheezing (via nebulizer) (Patient not taking: Reported on 8/17/2020) 25 vial 1    albuterol (PROVENTIL HFA,VENTOLIN HFA) 90 mcg/act inhaler Inhale 2 puffs every 4 (four) hours as needed for wheezing (Patient not taking: Reported on 8/17/2020) 1 Inhaler 0    b complex vitamins tablet Take 1 tablet by mouth daily      LORazepam (ATIVAN) 0 5 mg tablet Take 1 tablet (0 5 mg total) by mouth every 6 (six) hours as needed for anxiety 20 tablet 0    Na Sulfate-K Sulfate-Mg Sulf (Suprep Bowel Prep Kit) 17 5-3 13-1 6 GM/177ML SOLN Take 1 Dose by mouth once for 1 dose 1 Bottle 0    valACYclovir (VALTREX) 1,000 mg tablet Use 2 pills at onset cold sore, repeat 2 pills 12 hrs later 20 tablet 0     No current facility-administered medications for this visit               Orders Placed This Encounter   Procedures    Basic metabolic panel    Hemoglobin A1C         Kimmie Minors, DO

## 2020-08-17 NOTE — PATIENT INSTRUCTIONS
Her blood pressure is 130/80 today  Acceptable  I would like her to try to increase her walking, she is very active with work  Would like her to redo fasting BMP, A1c, borderline hyperglycemia  She has gained weight back, usually runs around 230 lb long-term  Continue to work at portions, limit carbohydrates  Her cough has resolved, she will be seeing Pulmonary in follow-up, having difficulty tolerating BiPAP  She will be doing EGD/colonoscopy, is on omeprazole 20 mg daily, if she misses this he does have breakthrough  Does use Ibuprofen re chronic low back pain  Continue on sertraline 100 mg daily as is, is using lorazepam 4-5 x a month  I refilled that  Back in Jan CBC/ CMP ok other than Glucose     Dec 2019 2 34    Neg UA  Chol 2017 = 182/51/105    She will be seeing Gyn -  We will see her in 6 mo w PE

## 2020-08-20 ENCOUNTER — PREP FOR PROCEDURE (OUTPATIENT)
Dept: GASTROENTEROLOGY | Facility: MEDICAL CENTER | Age: 55
End: 2020-08-20

## 2020-08-20 ENCOUNTER — TELEPHONE (OUTPATIENT)
Dept: GASTROENTEROLOGY | Facility: MEDICAL CENTER | Age: 55
End: 2020-08-20

## 2020-08-20 DIAGNOSIS — K63.5 POLYP OF COLON, UNSPECIFIED PART OF COLON, UNSPECIFIED TYPE: ICD-10-CM

## 2020-08-20 DIAGNOSIS — K21.9 GASTROESOPHAGEAL REFLUX DISEASE WITHOUT ESOPHAGITIS: Primary | ICD-10-CM

## 2020-08-20 DIAGNOSIS — Z86.010 PERSONAL HISTORY OF COLONIC POLYPS: ICD-10-CM

## 2020-08-20 DIAGNOSIS — R05.3 CHRONIC COUGHING: ICD-10-CM

## 2020-08-20 NOTE — TELEPHONE ENCOUNTER
Pt is scheduled with dr Shandra Turcios for an egd/colon at [facility]  Pt has suprep and instructions from cancelled procedure  Patient is aware she will need a  to and from   She will get a call the day before with an exact time for arrival

## 2020-08-25 LAB
BUN SERPL-MCNC: 13 MG/DL (ref 7–25)
BUN/CREAT SERPL: NORMAL (CALC) (ref 6–22)
CALCIUM SERPL-MCNC: 9.3 MG/DL (ref 8.6–10.4)
CHLORIDE SERPL-SCNC: 106 MMOL/L (ref 98–110)
CO2 SERPL-SCNC: 28 MMOL/L (ref 20–32)
CREAT SERPL-MCNC: 0.62 MG/DL (ref 0.5–1.05)
GLUCOSE SERPL-MCNC: 96 MG/DL (ref 65–99)
HBA1C MFR BLD: 5.3 % OF TOTAL HGB
POTASSIUM SERPL-SCNC: 4.9 MMOL/L (ref 3.5–5.3)
SL AMB EGFR AFRICAN AMERICAN: 116 ML/MIN/1.73M2
SL AMB EGFR NON AFRICAN AMERICAN: 100 ML/MIN/1.73M2
SODIUM SERPL-SCNC: 142 MMOL/L (ref 135–146)

## 2020-08-31 ENCOUNTER — ANESTHESIA EVENT (OUTPATIENT)
Dept: GASTROENTEROLOGY | Facility: MEDICAL CENTER | Age: 55
End: 2020-08-31

## 2020-09-01 ENCOUNTER — HOSPITAL ENCOUNTER (OUTPATIENT)
Dept: GASTROENTEROLOGY | Facility: MEDICAL CENTER | Age: 55
Setting detail: OUTPATIENT SURGERY
Discharge: HOME | End: 2020-09-01
Attending: INTERNAL MEDICINE | Admitting: INTERNAL MEDICINE
Payer: COMMERCIAL

## 2020-09-01 ENCOUNTER — ANESTHESIA (OUTPATIENT)
Dept: GASTROENTEROLOGY | Facility: MEDICAL CENTER | Age: 55
End: 2020-09-01

## 2020-09-01 VITALS
BODY MASS INDEX: 37.12 KG/M2 | HEIGHT: 66 IN | OXYGEN SATURATION: 100 % | TEMPERATURE: 97.5 F | WEIGHT: 231 LBS | DIASTOLIC BLOOD PRESSURE: 93 MMHG | SYSTOLIC BLOOD PRESSURE: 140 MMHG | HEART RATE: 72 BPM | RESPIRATION RATE: 18 BRPM

## 2020-09-01 DIAGNOSIS — Z86.010 PERSONAL HISTORY OF COLONIC POLYPS: ICD-10-CM

## 2020-09-01 DIAGNOSIS — R05.3 CHRONIC COUGHING: ICD-10-CM

## 2020-09-01 DIAGNOSIS — K21.9 GASTROESOPHAGEAL REFLUX DISEASE WITHOUT ESOPHAGITIS: ICD-10-CM

## 2020-09-01 DIAGNOSIS — K63.5 POLYP OF COLON, UNSPECIFIED PART OF COLON, UNSPECIFIED TYPE: ICD-10-CM

## 2020-09-01 PROCEDURE — 88305 TISSUE EXAM BY PATHOLOGIST: CPT | Performed by: PATHOLOGY

## 2020-09-01 PROCEDURE — 88312 SPECIAL STAINS GROUP 1: CPT | Performed by: PATHOLOGY

## 2020-09-01 PROCEDURE — 88341 IMHCHEM/IMCYTCHM EA ADD ANTB: CPT | Performed by: PATHOLOGY

## 2020-09-01 PROCEDURE — 88313 SPECIAL STAINS GROUP 2: CPT | Performed by: PATHOLOGY

## 2020-09-01 PROCEDURE — 45380 COLONOSCOPY AND BIOPSY: CPT | Performed by: INTERNAL MEDICINE

## 2020-09-01 PROCEDURE — 88342 IMHCHEM/IMCYTCHM 1ST ANTB: CPT | Performed by: PATHOLOGY

## 2020-09-01 PROCEDURE — NC001 PR NO CHARGE: Performed by: INTERNAL MEDICINE

## 2020-09-01 PROCEDURE — 43239 EGD BIOPSY SINGLE/MULTIPLE: CPT | Performed by: INTERNAL MEDICINE

## 2020-09-01 RX ORDER — PROPOFOL 10 MG/ML
INJECTION, EMULSION INTRAVENOUS AS NEEDED
Status: DISCONTINUED | OUTPATIENT
Start: 2020-09-01 | End: 2020-09-01

## 2020-09-01 RX ORDER — LIDOCAINE HYDROCHLORIDE 20 MG/ML
INJECTION, SOLUTION EPIDURAL; INFILTRATION; INTRACAUDAL; PERINEURAL AS NEEDED
Status: DISCONTINUED | OUTPATIENT
Start: 2020-09-01 | End: 2020-09-01

## 2020-09-01 RX ORDER — SODIUM CHLORIDE 9 MG/ML
125 INJECTION, SOLUTION INTRAVENOUS CONTINUOUS
Status: DISCONTINUED | OUTPATIENT
Start: 2020-09-01 | End: 2020-09-01

## 2020-09-01 RX ORDER — ONDANSETRON 2 MG/ML
INJECTION INTRAMUSCULAR; INTRAVENOUS AS NEEDED
Status: DISCONTINUED | OUTPATIENT
Start: 2020-09-01 | End: 2020-09-01

## 2020-09-01 RX ORDER — PROPOFOL 10 MG/ML
INJECTION, EMULSION INTRAVENOUS CONTINUOUS PRN
Status: DISCONTINUED | OUTPATIENT
Start: 2020-09-01 | End: 2020-09-01

## 2020-09-01 RX ADMIN — ONDANSETRON 4 MG: 2 INJECTION INTRAMUSCULAR; INTRAVENOUS at 13:39

## 2020-09-01 RX ADMIN — SODIUM CHLORIDE 125 ML/HR: 0.9 INJECTION, SOLUTION INTRAVENOUS at 13:22

## 2020-09-01 RX ADMIN — LIDOCAINE HYDROCHLORIDE 5 ML: 20 INJECTION, SOLUTION EPIDURAL; INFILTRATION; INTRACAUDAL at 13:31

## 2020-09-01 RX ADMIN — PROPOFOL 170 MCG/KG/MIN: 10 INJECTION, EMULSION INTRAVENOUS at 13:31

## 2020-09-01 RX ADMIN — SODIUM CHLORIDE: 0.9 INJECTION, SOLUTION INTRAVENOUS at 14:01

## 2020-09-01 RX ADMIN — PROPOFOL 150 MG: 10 INJECTION, EMULSION INTRAVENOUS at 13:31

## 2020-09-01 NOTE — DISCHARGE INSTRUCTIONS
Upper Endoscopy   WHAT YOU NEED TO KNOW:   An upper endoscopy is also called an upper gastrointestinal (GI) endoscopy, or an esophagogastroduodenoscopy (EGD)  You may feel bloated, gassy, or have some abdominal discomfort after your procedure  Your throat may be sore for 24 to 36 hours  You may burp or pass gas from air that is still inside your body  DISCHARGE INSTRUCTIONS:   Call 911 for any of the following:   · You have sudden chest pain or trouble breathing  Seek care immediately if:   · You feel dizzy or faint  · You have trouble swallowing  · Your bowel movements are very dark or black  · Your abdomen is hard and firm and you have severe pain  · You vomit blood  Contact your healthcare provider if:   · You feel full or bloated and cannot burp or pass gas  · You have not had a bowel movement for 3 days after your procedure  · You have neck pain  · You have a fever or chills  · You have nausea or are vomiting  · You have a rash or hives  · You have questions or concerns about your endoscopy  Relieve a sore throat:  Suck on throat lozenges or crushed ice  Gargle with a small amount of warm salt water  Mix 1 teaspoon of salt and 1 cup of warm water to make salt water  Relieve gas and discomfort from bloating:  Lie on your right side with a heating pad on your abdomen  Take short walks to help pass gas  Eat small meals until bloating is relieved  Rest after your procedure: You have been given medicine to relax you  Do not  drive or make important decisions until the day after your procedure  Return to your normal activity as directed  You can usually return to work the day after your procedure  Follow up with your healthcare provider as directed:  Write down your questions so you remember to ask them during your visits     © 2017 [address] is for End User's use only and may not be sold, redistributed or otherwise used for commercial purposes  All illustrations and images included in CareNotes® are the copyrighted property of [employer]  or [employer] Eduardo Beat[employer]  The above information is an  only  It is not intended as medical advice for individual conditions or treatments  Talk to your doctor, nurse or pharmacist before following any medical regimen to see if it is safe and effective for you  Hiatal Hernia   WHAT YOU NEED TO KNOW:   A hiatal hernia is a condition that causes part of your stomach to bulge through the hiatus (small opening) in your diaphragm  The part of the stomach may move up and down, or it may get trapped above the diaphragm  DISCHARGE INSTRUCTIONS:   Seek care immediately if:   · You have severe abdominal pain  · You try to vomit but nothing comes out (retching)  · You have severe chest pain and sudden trouble breathing  · Your bowel movements are black or bloody  · Your vomit looks like coffee grounds or has blood in it  Contact your healthcare provider if:   · Your symptoms are getting worse  · You have nausea, and you are vomiting  · You are losing weight without trying  · You have questions or concerns about your condition or care  Medicines:   · Medicines  may be given to relieve heartburn symptoms  These medicines help to decrease or block stomach acid  You may also be given medicines that help to tighten the esophageal sphincter  · Take your medicine as directed  Contact your healthcare provider if you think your medicine is not helping or if you have side effects  Tell him or her if you are allergic to any medicine  Keep a list of the medicines, vitamins, and herbs you take  Include the amounts, and when and why you take them  Bring the list or the pill bottles to follow-up visits  Carry your medicine list with you in case of an emergency    Follow up with your healthcare provider as directed:  Write down your questions so you remember to ask them during your visits  Self care:   · Avoid foods that make your symptoms worse  These may include spicy foods, fruit juices, alcohol, caffeine, chocolate, and mint  · Eat several small meals during the day  Small meals give your stomach less food to digest     · Avoid lying down and bending forward after you eat  Do not eat meals 2 to 3 hours before bedtime  This decreases your risk for reflux  · Maintain a healthy weight  If you are overweight, weight loss may help relieve your symptoms  · Sleep with your head elevated  at least 6 inches  · Do not smoke  Smoking can increase your symptoms of heartburn  © 2017 [address]  Information is for End User's use only and may not be sold, redistributed or otherwise used for commercial purposes  All illustrations and images included in CareNotes® are the copyrighted property of [employer]  or [employer] Eduardo Beatty  The above information is an  only  It is not intended as medical advice for individual conditions or treatments  Talk to your doctor, nurse or pharmacist before following any medical regimen to see if it is safe and effective for you  Colonoscopy   WHAT YOU NEED TO KNOW:   A colonoscopy is a procedure to examine the inside of your colon (intestine) with a scope  Polyps or tissue growths may have been removed during your colonoscopy  It is normal to feel bloated and to have some abdominal discomfort  You should be passing gas  If you have hemorrhoids or you had polyps removed, you may have a small amount of bleeding  DISCHARGE INSTRUCTIONS:   Seek care immediately if:   · You have a large amount of bright red blood in your bowel movements  · Your abdomen is hard and firm and you have severe pain  · You have sudden trouble breathing  Contact your healthcare provider if:   · You develop a rash or hives  · You have a fever within 24 hours of your procedure      · You have not had a bowel movement for 3 days after your procedure  · You have questions or concerns about your condition or care  Activity:   · Do not lift, strain, or run  for 3 days after your procedure  · Rest after your procedure  You have been given medicine to relax you  Do not  drive or make important decisions until the day after your procedure  Return to your normal activity as directed  · Relieve gas and discomfort from bloating  by lying on your right side with a heating pad on your abdomen  You may need to take short walks to help the gas move out  Eat small meals until bloating is relieved  If you had polyps removed: For 7 days after your procedure:  · Do not  take aspirin  · Do not  go on long car rides  Help prevent constipation:   · Eat a variety of healthy foods  Healthy foods include fruit, vegetables, whole-grain breads, low-fat dairy products, beans, lean meat, and fish  Ask if you need to be on a special diet  Your healthcare provider may recommend that you eat high-fiber foods such as cooked beans  Fiber helps you have regular bowel movements  · Drink liquids as directed  Adults should drink between 9 and 13 eight-ounce cups of liquid every day  Ask what amount is best for you  For most people, good liquids to drink are water, juice, and milk  · Exercise as directed  Talk to your healthcare provider about the best exercise plan for you  Exercise can help prevent constipation, decrease your blood pressure and improve your health  Follow up with your healthcare provider as directed:  Write down your questions so you remember to ask them during your visits  © 2017 [address] Information is for End User's use only and may not be sold, redistributed or otherwise used for commercial purposes  All illustrations and images included in CareNotes® are the copyrighted property of [employer]  or [employer] Eduardo Beatty  The above information is an  only   It is not intended as medical advice for individual conditions or treatments  Talk to your doctor, nurse or pharmacist before following any medical regimen to see if it is safe and effective for you  Colorectal Polyps   WHAT YOU NEED TO KNOW:   Colorectal polyps are small growths of tissue in the lining of the colon and rectum  Most polyps are hyperplastic polyps and are usually benign (noncancerous)  Certain types of polyps, called adenomatous polyps, may turn into cancer  DISCHARGE INSTRUCTIONS:   Follow up with your healthcare provider or gastroenterologist as directed: You may need to return for more tests, such as another colonoscopy  Write down your questions so you remember to ask them during your visits  Reduce your risk for colorectal polyps:   · Eat a variety of healthy foods:  Healthy foods include fruit, vegetables, whole-grain breads, low-fat dairy products, beans, lean meat, and fish  Ask if you need to be on a special diet  · Maintain a healthy weight:  Ask your healthcare provider if you need to lose weight and how much you need to lose  Ask for help with a weight loss program     · Exercise:  Begin to exercise slowly and do more as you get stronger  Talk with your healthcare provider before you start an exercise program      · Limit alcohol:  Your risk for polyps increases the more you drink  · Do not smoke: If you smoke, it is never too late to quit  Ask for information about how to stop  For support and more information:   · Petey Nichols ([facility]) [address], [place] [address]  Phone: [phone]  Web Address: www digestive  niddk nih gov  Contact your healthcare provider or gastroenterologist if:   · You have a fever  · You have chills, a cough, or feel weak and achy  · You have abdominal pain that does not go away or gets worse after you take medicine  · Your abdomen is swollen       · You are losing weight without trying  · You have questions or concerns about your condition or care  Seek care immediately or call 911 if:   · You have sudden shortness of breath  · You have a fast heart rate, fast breathing, or are too dizzy to stand up  · You have severe abdominal pain  · You see blood in your bowel movement  © 2017 [address] Information is for End User's use only and may not be sold, redistributed or otherwise used for commercial purposes  All illustrations and images included in CareNotes® are the copyrighted property of [employer]  or [employer] Eduardo Beatty  The above information is an  only  It is not intended as medical advice for individual conditions or treatments  Talk to your doctor, nurse or pharmacist before following any medical regimen to see if it is safe and effective for you

## 2020-09-01 NOTE — ANESTHESIA PREPROCEDURE EVALUATION
Procedure:  COLONOSCOPY  EGD    Relevant Problems   ANESTHESIA   (+) PONV (postoperative nausea and vomiting)      CARDIO   (+) Hypertension      GI/HEPATIC   (+) Acid reflux disease      MUSCULOSKELETAL   (+) Disc degeneration, lumbar   (+) Low back pain   (+) Osteoarthritis      NEURO/PSYCH   (+) Depression with anxiety      PULMONARY   (+) Obstructive sleep apnea treated with BiPAP -  felt difficulty tolerating and stopped      Other   (+) Carpal tunnel syndrome   (+) Reactive airway disease without complication        Physical Exam    Airway    Mallampati score: I  TM Distance: <3 FB  Neck ROM: full     Dental       Cardiovascular  Rhythm: regular, Rate: normal, Cardiovascular exam normal    Pulmonary  Pulmonary exam normal     Other Findings        Anesthesia Plan  ASA Score- 2     Anesthesia Type- IV sedation with anesthesia with ASA Monitors  Additional Monitors:   Airway Plan:           Plan Factors-Exercise tolerance (METS): >4 METS  Chart reviewed  Patient summary reviewed  Patient is not a current smoker  Patient instructed to abstain from smoking on day of procedure  Patient did not smoke on day of surgery  Induction- intravenous  Postoperative Plan-     Informed Consent- Anesthetic plan and risks discussed with patient

## 2020-09-01 NOTE — ANESTHESIA POSTPROCEDURE EVALUATION
Post-Op Assessment Note    CV Status:  Stable    Pain management: adequate     Mental Status:  Alert and awake   Hydration Status:  Euvolemic   PONV Controlled:  Controlled   Airway Patency:  Patent      Post Op Vitals Reviewed: Yes      Staff: Anesthesiologist         No complications documented      /83 (09/01/20 1407)    Temp      Pulse 62 (09/01/20 1407)   Resp 16 (09/01/20 1407)    SpO2 97 % (09/01/20 1407)

## 2020-09-01 NOTE — H&P
History and Physical - [facility]  Dave Chavarria Reppert 60 y o  female MRN: [id number]                  HPI: Rod Vasquez is a 60y o  year old female who presents for GERD, cough and CRC screening  REVIEW OF SYSTEMS: Per the HPI, and otherwise unremarkable  Historical Information   Past Medical History:   Diagnosis Date    Acid reflux     Anxiety     Asthmatic bronchitis     Back pain     Carpal tunnel syndrome     Depression     Depression     Hyperglycemia     Hypertension     Lipoma     Migraine     Miscarriage     Osteoarthritis     PONV (postoperative nausea and vomiting)     Stress incontinence     UTI (urinary tract infection)      Past Surgical History:   Procedure Laterality Date    APPENDECTOMY      BREAST EXCISIONAL BIOPSY Left 2001    benign    COLONOSCOPY N/A 2016    Procedure: COLONOSCOPY;  Surgeon: Eva Olmos MD;  Location: [facility];   Service:     KNEE ARTHROSCOPY      UT EXC SKIN BENIG <0 5 CM TRUNK,ARM,LEG Left 2016    Procedure: EXCISION LIPOMA SHOULDER ;  Surgeon: Eva Olmos MD;  Location: [facility] OR;  Service: General    TUBAL LIGATION      WISDOM TOOTH EXTRACTION       Social History   Social History     Substance and Sexual Activity   Alcohol Use Yes    Comment: rarely/socially 1-2x/year     Social History     Substance and Sexual Activity   Drug Use No     Social History     Tobacco Use   Smoking Status Former Smoker    Packs/day: 0 50    Years: 15 00    Pack years: 7 50    Types: Cigarettes    Start date: 12    Last attempt to quit: 2006    Years since quittin 3   Smokeless Tobacco Never Used     Family History   Problem Relation Age of Onset    Stroke Mother     Stomach cancer Mother 68    Skin cancer Father     No Known Problems Sister     Heart disease Brother     No Known Problems Daughter     No Known Problems Maternal Grandmother     No Known Problems Maternal Grandfather     Breast cancer Paternal Grandmother 48    No Known Problems Paternal Grandfather     No Known Problems Sister     No Known Problems Daughter     Breast cancer Cousin 39    Breast cancer Cousin 39    Breast cancer Cousin 48    No Known Problems Maternal Aunt     No Known Problems Maternal Aunt     No Known Problems Maternal Aunt     No Known Problems Maternal Aunt     No Known Problems Paternal Aunt     No Known Problems Paternal Aunt     No Known Problems Paternal Aunt     No Known Problems Paternal Aunt     No Known Problems Paternal Aunt     Lung cancer Paternal Uncle     Breast cancer Cousin 48       Meds/Allergies     (Not in a hospital admission)      Allergies   Allergen Reactions    Effexor [Venlafaxine] Other (See Comments)     Hot and sweaty    Levaquin [Levofloxacin] Myalgia     Tendonitis     Wellbutrin [Bupropion] Other (See Comments)     Hot and sweaty    Prempro [Conj Estrog-Medroxyprogest Ace] Rash       Objective     Blood pressure 130/80, pulse 71, temperature 97 5 °F (36 4 °C), temperature source Temporal, resp  rate 18, height 5' 6" (1 676 m), weight 105 kg (231 lb), SpO2 96 %  PHYSICAL EXAMINATION:    General Appearance:   Alert, cooperative, no distress   HEENT:  Normocephalic, atraumatic, anicteric  Neck supple, symmetrical, trachea midline  Lungs:   Equal chest rise and unlabored breathing, normal effort, no coughing  Cardiovascular:   No visualized JVD  Abdomen:   No abdominal distension  Skin:   No jaundice, rashes, or lesions  Musculoskeletal:   Normal range of motion visualized  Psych:  Normal affect and normal insight  Neuro:  Alert and appropriate  ASSESSMENT/PLAN:  This is a 60y o  year old female here for EGD and colonsocopy, and she is stable and optimized for her procedure

## 2020-09-08 ENCOUNTER — OFFICE VISIT (OUTPATIENT)
Dept: PULMONOLOGY | Facility: CLINIC | Age: 55
End: 2020-09-08
Payer: COMMERCIAL

## 2020-09-08 VITALS
HEART RATE: 71 BPM | DIASTOLIC BLOOD PRESSURE: 90 MMHG | WEIGHT: 234 LBS | BODY MASS INDEX: 37.61 KG/M2 | SYSTOLIC BLOOD PRESSURE: 128 MMHG | HEIGHT: 66 IN | OXYGEN SATURATION: 99 % | TEMPERATURE: 97.3 F

## 2020-09-08 DIAGNOSIS — R05.3 CHRONIC COUGH: ICD-10-CM

## 2020-09-08 DIAGNOSIS — E66.09 CLASS 2 OBESITY DUE TO EXCESS CALORIES WITHOUT SERIOUS COMORBIDITY WITH BODY MASS INDEX (BMI) OF 37.0 TO 37.9 IN ADULT: ICD-10-CM

## 2020-09-08 DIAGNOSIS — J45.20 MILD INTERMITTENT REACTIVE AIRWAY DISEASE WITHOUT COMPLICATION: Primary | ICD-10-CM

## 2020-09-08 DIAGNOSIS — R91.8 PULMONARY NODULES: ICD-10-CM

## 2020-09-08 DIAGNOSIS — R09.82 POST-NASAL DRIP: ICD-10-CM

## 2020-09-08 DIAGNOSIS — F17.201 SEVERE TOBACCO DEPENDENCE IN SUSTAINED REMISSION IN CONTROLLED ENVIRONMENT: ICD-10-CM

## 2020-09-08 DIAGNOSIS — G47.33 OBSTRUCTIVE SLEEP APNEA TREATED WITH BIPAP: ICD-10-CM

## 2020-09-08 PROBLEM — E66.812 CLASS 2 OBESITY DUE TO EXCESS CALORIES WITHOUT SERIOUS COMORBIDITY WITH BODY MASS INDEX (BMI) OF 37.0 TO 37.9 IN ADULT: Status: ACTIVE | Noted: 2018-11-09

## 2020-09-08 PROCEDURE — 99214 OFFICE O/P EST MOD 30 MIN: CPT | Performed by: INTERNAL MEDICINE

## 2020-09-08 RX ORDER — FLUTICASONE PROPIONATE 50 MCG
1 SPRAY, SUSPENSION (ML) NASAL DAILY
COMMUNITY
End: 2022-03-29 | Stop reason: SDUPTHER

## 2020-09-08 NOTE — PROGRESS NOTES
Pulmonary Follow Up Note   Tano Servin 60 y o  female MRN: [id number]  9/8/2020      Referring provider: Dr Gasper Andre and Plan:    Obstructive sleep apnea treated with BiPAP -  felt difficulty tolerating and stopped  - She is unable to tolerate CPAP despite multiple masks and positional devices  - We reviewed the risks of untreated sleep apnea  She accepts the risks and would like to discontinue the machine   - encouraged weight loss  - we will fax in order to her DME to discontinue CPAP    Reactive airway disease without complication  Triggered by chemical smells, being outdoors  - Ok off of inhalers  - Avoid triggers as able  - Albuterol as needed  Pulmonary nodules  - Multiple subcentimeter pulmonary nodules  Stable on repeat imaging January 2020  No further imaging  Severe tobacco dependence in sustained remission in controlled environment  - check low dose screening CT to look for early lung cancer in January 2021  Reviewed risks, benefits, and limitations of the study  Class 2 obesity due to excess calories without serious comorbidity with body mass index (BMI) of 37 0 to 37 9 in adult  - this is a difficult time for Glen Luna to lose weight given her multiple life stressors  - I did review diet modifications and the need to change the Psychology around her eating  Consideration for an susana such as [employer]  Encouraged her to use a hunger scale and to avoid mindless eating   - encouraged her to consider intermittent fasting and to adopt a low carb diet  Chronic cough  - improving with use of fluticasone nasal spray and avoidance of triggers  - continue treatment for GERD with omeprazole    Post-nasal drip  - continue fluticasone nasal spray        Diagnoses and all orders for this visit:    Obstructive sleep apnea treated with BiPAP -  felt difficulty tolerating and stopped  -     Discontinue CPAP    Mild intermittent reactive airway disease without complication    Chronic cough    Pulmonary nodules    Post-nasal drip    Class 2 obesity due to excess calories without serious comorbidity with body mass index (BMI) of 37 0 to 37 9 in adult    Severe tobacco dependence in sustained remission in controlled environment  -     CT lung screening program; Future    Other orders  -     fluticasone (FLONASE) 50 mcg/act nasal spray; 1 spray into each nostril daily        History of Present Illness   HPI:  Anton Zapata is a 60 y o  female who presents for follow-up  She was last seen by our office in April for her sleep apnea and reactive airways disease  Since then, she is doing okay  She continues to have a lot of life stressors including her mom staff about 1 year ago and her ailing father  Her cough is improved  She still coughs in response to irritants like cleaning products and being outside  She recently restarted her Fluticasone nasal spray  She remains off of inhalers  She denies breathlessness  She is unable to tolerate her CPAP with sleep  She has tried multiple masks and she "fills up with gas"  She tried sleeping on her side but her hips hurt  Her  sleeps in a separate room because of her snoring  She bought a positional pillow to keep her on her side  She states that she had lost ten pounds and regained it with COVID pandemic  She is frustrated that she can not lose weight  She thinks she would feel better if she could lose weight  Her mother  about one year ago  She is helping to take care of her father who has advanced Alzheimer's       Review of Systems  GEN: no fever or chills; + weight gain  HENT: no sinus congestion, +postnasal drip, no rhinorrhea  EYES: no visual changes  RESP: no shortness of breath, +cough, no wheezing  CARDIO: no chest pain, no leg edema  GI: no abdominal pain, no diarrhea  ENDO:  no polydipsia  : no urgency, no dysuria  MSK: no back pain  ALLERGY: not immunocompromised  NEURO: no dizziness, no seizures  HEME: does not bruise easily  PSYCH: no hallucinations    Historical Information   Past Medical History:   Diagnosis Date    Acid reflux     Anxiety     Asthmatic bronchitis     Back pain     Carpal tunnel syndrome     Depression     Depression     Hyperglycemia     Hypertension     Lipoma     Migraine     Miscarriage     Osteoarthritis     PONV (postoperative nausea and vomiting)     Stress incontinence     UTI (urinary tract infection)      Past Surgical History:   Procedure Laterality Date    APPENDECTOMY      BREAST EXCISIONAL BIOPSY Left 03/2001    benign    COLONOSCOPY N/A 4/8/2016    Procedure: COLONOSCOPY;  Surgeon: Leatha Muniz MD;  Location: [facility] GI LAB;   Service:     KNEE ARTHROSCOPY      AK EXC SKIN BENIG <0 5 CM TRUNK,ARM,LEG Left 5/19/2016    Procedure: EXCISION LIPOMA SHOULDER ;  Surgeon: Leatha Muniz MD;  Location: [facility] OR;  Service: General    TUBAL LIGATION      WISDOM TOOTH EXTRACTION       Family History   Problem Relation Age of Onset    Stroke Mother     Stomach cancer Mother 68    Skin cancer Father     No Known Problems Sister     Heart disease Brother     No Known Problems Daughter     No Known Problems Maternal Grandmother     No Known Problems Maternal Grandfather     Breast cancer Paternal Grandmother 48    No Known Problems Paternal Grandfather     No Known Problems Sister     No Known Problems Daughter     Breast cancer Cousin 39    Breast cancer Cousin 39    Breast cancer Cousin 48    No Known Problems Maternal Aunt     No Known Problems Maternal Aunt     No Known Problems Maternal Aunt     No Known Problems Maternal Aunt     No Known Problems Paternal Aunt     No Known Problems Paternal Aunt     No Known Problems Paternal Aunt     No Known Problems Paternal Aunt     No Known Problems Paternal Aunt     Lung cancer Paternal Uncle     Breast cancer Cousin 48         Meds/Allergies     Current Outpatient Medications:     albuterol (2 5 mg/3 mL) 0 083 % nebulizer solution, Take 1 vial (2 5 mg total) by nebulization every 4 (four) hours as needed for wheezing (via nebulizer), Disp: 25 vial, Rfl: 1    albuterol (PROVENTIL HFA,VENTOLIN HFA) 90 mcg/act inhaler, Inhale 2 puffs every 4 (four) hours as needed for wheezing, Disp: 1 Inhaler, Rfl: 0    fluticasone (FLONASE) 50 mcg/act nasal spray, 1 spray into each nostril daily, Disp: , Rfl:     ibuprofen (ADVIL,MOTRIN) 100 MG chewable tablet, Chew every 8 (eight) hours as needed for mild pain , Disp: , Rfl:     LORazepam (ATIVAN) 0 5 mg tablet, Take 1 tablet (0 5 mg total) by mouth every 6 (six) hours as needed for anxiety, Disp: 20 tablet, Rfl: 0    LYSINE PO, Take 1 capsule by mouth daily as needed (for cold sore) , Disp: , Rfl:     omeprazole (PriLOSEC) 20 mg delayed release capsule, TAKE 1 CAPSULE DAILY AS NEEDED FOR STOMACH AND ACID, Disp: 90 capsule, Rfl: 3    sertraline (ZOLOFT) 100 mg tablet, TAKE 1 TABLET BY MOUTH EVERY DAY, Disp: 90 tablet, Rfl: 3    valACYclovir (VALTREX) 1,000 mg tablet, Use 2 pills at onset cold sore, repeat 2 pills 12 hrs later, Disp: 20 tablet, Rfl: 0    b complex vitamins tablet, Take 1 tablet by mouth daily, Disp: , Rfl:     multivitamin (THERAGRAN) TABS, Take 1 tablet by mouth daily, Disp: , Rfl:   Allergies   Allergen Reactions    Effexor [Venlafaxine] Other (See Comments)     Hot and sweaty    Levaquin [Levofloxacin] Myalgia     Tendonitis     Wellbutrin [Bupropion] Other (See Comments)     Hot and sweaty    Prempro [Conj Estrog-Medroxyprogest Ace] Rash       Vitals: Blood pressure 128/90, pulse 71, temperature (!) 97 3 °F (36 3 °C), temperature source Temporal, height 5' 6" (1 676 m), weight 106 kg (234 lb), SpO2 99 %  Body mass index is 37 77 kg/m²   Oxygen Therapy  SpO2: 99 %  Oxygen Therapy: None (Room air)      Physical Exam  GEN: WDWN, nad, comfortable  HEENT: NCAT, EOMI  CVS: Regular, no m/r/g  LUNGS: CTA b/l, no w/r/r  ABD: soft, nd, nt  EXT: No c/c/e  NEURO: No focal deficits  MS: Moving all extremities  SKIN: warm, dry  PSYCH: calm, cooperative      Labs: I have personally reviewed pertinent lab results  Lab Results   Component Value Date    WBC 8 95 01/15/2020    HGB 12 1 01/15/2020    HCT 39 1 01/15/2020    MCV 93 01/15/2020     01/15/2020     Lab Results   Component Value Date    CALCIUM 9 3 08/24/2020     05/03/2017    K 4 9 08/24/2020    CO2 28 08/24/2020     08/24/2020    BUN 13 08/24/2020    CREATININE 0 62 08/24/2020     No results found for: IGE  Lab Results   Component Value Date    ALT 19 01/15/2020    AST 20 01/15/2020    ALKPHOS 98 01/15/2020    BILITOT 0 6 05/03/2017       Imaging and other studies: I have personally reviewed pertinent films in PACS  CT chest January 2020 reviewed by me personally shows no acute intrathoracic pathology  Pulmonary function testing:  May 2019:  Normal spirometry, normal lung volumes, no bronchodilator response  [facility] Ald[facility]

## 2020-09-08 NOTE — ASSESSMENT & PLAN NOTE
- improving with use of fluticasone nasal spray and avoidance of triggers    - continue treatment for GERD with omeprazole

## 2020-09-08 NOTE — ASSESSMENT & PLAN NOTE
Triggered by chemical smells, being outdoors  - Ok off of inhalers  - Avoid triggers as able  - Albuterol as needed

## 2020-09-08 NOTE — ASSESSMENT & PLAN NOTE
- this is a difficult time for Chris Pete to lose weight given her multiple life stressors  - I did review diet modifications and the need to change the Psychology around her eating  Consideration for an susana such as [employer]  Encouraged her to use a hunger scale and to avoid mindless eating   - encouraged her to consider intermittent fasting and to adopt a low carb diet

## 2020-09-08 NOTE — ASSESSMENT & PLAN NOTE
- Multiple subcentimeter pulmonary nodules  Stable on repeat imaging January 2020  No further imaging

## 2020-09-08 NOTE — ASSESSMENT & PLAN NOTE
- check low dose screening CT to look for early lung cancer in January 2021  Reviewed risks, benefits, and limitations of the study

## 2020-09-24 ENCOUNTER — ANNUAL EXAM (OUTPATIENT)
Dept: OBGYN CLINIC | Facility: CLINIC | Age: 55
End: 2020-09-24
Payer: COMMERCIAL

## 2020-09-24 VITALS
WEIGHT: 233.2 LBS | TEMPERATURE: 96.4 F | SYSTOLIC BLOOD PRESSURE: 134 MMHG | HEIGHT: 66 IN | DIASTOLIC BLOOD PRESSURE: 99 MMHG | BODY MASS INDEX: 37.48 KG/M2

## 2020-09-24 DIAGNOSIS — R92.8 ABNORMAL MAMMOGRAM: ICD-10-CM

## 2020-09-24 DIAGNOSIS — Z12.31 ENCOUNTER FOR SCREENING MAMMOGRAM FOR MALIGNANT NEOPLASM OF BREAST: ICD-10-CM

## 2020-09-24 DIAGNOSIS — Z01.419 ENCOUNTER FOR GYNECOLOGICAL EXAMINATION WITHOUT ABNORMAL FINDING: Primary | ICD-10-CM

## 2020-09-24 PROCEDURE — S0612 ANNUAL GYNECOLOGICAL EXAMINA: HCPCS | Performed by: OBSTETRICS & GYNECOLOGY

## 2020-09-24 RX ORDER — VALACYCLOVIR HYDROCHLORIDE 1 G/1
1000 TABLET, FILM COATED ORAL 2 TIMES DAILY
COMMUNITY

## 2020-09-24 NOTE — PROGRESS NOTES
Ashu FARIA Reppert   1965    CC:  Yearly exam    S:  60 y o  female here for yearly exam  She is postmenopausal and has had no vaginal bleeding  She denies vaginal discharge, itching, odor or dryness  Sexual activity: She is sexually active without pain, bleeding or dryness  Last Pap: 2016 - normal/negative HPV  Last Mammo: 10/2/2019 - BIRAD-3; 3/26/2020 ultrasound, recommended 6 month follow-up; has follow up scheduled  Last Colonoscopy: 2020 - polyp; 5 years  Last DEXA: never    We reviewed ASCCP guidelines for Pap testing  Her mother  of a stroke last year shortly after I saw her  Her brother also had a stroke  Discussed her BP elevation here today  She was at her family doctor's office last month and said her BP was not this elevated    She will go back to him to discuss as it was elevated at the pulmonologist and       Current Outpatient Medications:     albuterol (2 5 mg/3 mL) 0 083 % nebulizer solution, Take 1 vial (2 5 mg total) by nebulization every 4 (four) hours as needed for wheezing (via nebulizer), Disp: 25 vial, Rfl: 1    albuterol (PROVENTIL HFA,VENTOLIN HFA) 90 mcg/act inhaler, Inhale 2 puffs every 4 (four) hours as needed for wheezing, Disp: 1 Inhaler, Rfl: 0    b complex vitamins tablet, Take 1 tablet by mouth daily, Disp: , Rfl:     fluticasone (FLONASE) 50 mcg/act nasal spray, 1 spray into each nostril daily, Disp: , Rfl:     ibuprofen (ADVIL,MOTRIN) 100 MG chewable tablet, Chew every 8 (eight) hours as needed for mild pain , Disp: , Rfl:     LORazepam (ATIVAN) 0 5 mg tablet, Take 1 tablet (0 5 mg total) by mouth every 6 (six) hours as needed for anxiety, Disp: 20 tablet, Rfl: 0    LYSINE PO, Take 1 capsule by mouth daily as needed (for cold sore) , Disp: , Rfl:     multivitamin (THERAGRAN) TABS, Take 1 tablet by mouth daily, Disp: , Rfl:     omeprazole (PriLOSEC) 20 mg delayed release capsule, TAKE 1 CAPSULE DAILY AS NEEDED FOR STOMACH AND ACID, Disp: 90 capsule, Rfl: 3    sertraline (ZOLOFT) 100 mg tablet, TAKE 1 TABLET BY MOUTH EVERY DAY, Disp: 90 tablet, Rfl: 3    valACYclovir (VALTREX) 1,000 mg tablet, Take 1,000 mg by mouth 2 (two) times a day, Disp: , Rfl:     valACYclovir (VALTREX) 1,000 mg tablet, Use 2 pills at onset cold sore, repeat 2 pills 12 hrs later, Disp: 20 tablet, Rfl: 0  Social History     Socioeconomic History    Marital status: /Civil Union     Spouse name: Not on file    Number of children: Not on file    Years of education: Not on file    Highest education level: Not on file   Occupational History    Not on file   Social Needs    Financial resource strain: Not on file    Food insecurity     Worry: Not on file     Inability: Not on file   [employer] needs     Medical: Not on file     Non-medical: Not on file   Tobacco Use    Smoking status: Former Smoker     Packs/day: 0 50     Years: 15 00     Pack years: 7 50     Types: Cigarettes     Start date:      Last attempt to quit: 2006     Years since quittin 3    Smokeless tobacco: Never Used   Substance and Sexual Activity    Alcohol use: Yes     Comment: rarely/socially 1-2x/year    Drug use: No    Sexual activity: Not Currently     Birth control/protection: Post-menopausal   Lifestyle    Physical activity     Days per week: Not on file     Minutes per session: Not on file    Stress: Not on file   Relationships    Social connections     Talks on phone: Not on file     Gets together: Not on file     Attends Rastafarian service: Not on file     Active member of club or organization: Not on file     Attends meetings of clubs or organizations: Not on file     Relationship status: Not on file    Intimate partner violence     Fear of current or ex partner: Not on file     Emotionally abused: Not on file     Physically abused: Not on file     Forced sexual activity: Not on file   Other Topics Concern    Not on file   Social History Narrative    CONSUMES 1 CUP OF COFFEE PER DAY     Family History   Problem Relation Age of Onset    Stroke Mother     Stomach cancer Mother 68    Skin cancer Father     No Known Problems Sister     Heart disease Brother     No Known Problems Daughter     No Known Problems Maternal Grandmother     No Known Problems Maternal Grandfather     Breast cancer Paternal Grandmother 48    No Known Problems Paternal Grandfather     No Known Problems Sister     No Known Problems Daughter     Breast cancer Cousin 39    Breast cancer Cousin 39    Breast cancer Cousin 48    No Known Problems Maternal Aunt     No Known Problems Maternal Aunt     No Known Problems Maternal Aunt     No Known Problems Maternal Aunt     No Known Problems Paternal Aunt     No Known Problems Paternal Aunt     No Known Problems Paternal Aunt     No Known Problems Paternal Aunt     No Known Problems Paternal Aunt     Lung cancer Paternal Uncle     Breast cancer Cousin 48     Past Medical History:   Diagnosis Date    Acid reflux     Anxiety     Asthmatic bronchitis     Back pain     Carpal tunnel syndrome     Depression     Depression     Hyperglycemia     Hypertension     Lipoma     Migraine     Miscarriage     Osteoarthritis     PONV (postoperative nausea and vomiting)     Stress incontinence     UTI (urinary tract infection)         Review of Systems   Respiratory: Negative  Cardiovascular: Negative  Gastrointestinal: Negative for constipation and diarrhea  Genitourinary: Negative for difficulty urinating, pelvic pain, vaginal bleeding, vaginal discharge, itching or odor  O:  Blood pressure 134/99, temperature (!) 96 4 °F (35 8 °C), temperature source Tympanic, height 5' 6" (1 676 m), weight 106 kg (233 lb 3 2 oz), not currently breastfeeding  Patient appears well and is not in distress  Neck is supple without masses  Breasts are symmetrical without mass, tenderness, nipple discharge, skin changes or adenopathy     Abdomen is soft and nontender without masses  External genitals are normal without lesions or rashes  Urethral meatus and urethra are normal  Bladder is normal to palpation  Vagina is normal without discharge or bleeding  Cervix is normal without discharge or lesion  Uterus is normal, mobile, nontender without palpable mass  Adnexa are normal, nontender, without palpable mass  A:  Yearly exam      P:   Pap due 2021  Mammo slip given   Colonoscopy due 2025   DEXA due age 70 unless new risk factors develop    RTO one year for yearly exam or sooner as needed

## 2020-09-25 ENCOUNTER — OFFICE VISIT (OUTPATIENT)
Dept: FAMILY MEDICINE CLINIC | Facility: CLINIC | Age: 55
End: 2020-09-25
Payer: COMMERCIAL

## 2020-09-25 VITALS
HEART RATE: 74 BPM | BODY MASS INDEX: 37.28 KG/M2 | SYSTOLIC BLOOD PRESSURE: 146 MMHG | TEMPERATURE: 98 F | HEIGHT: 66 IN | DIASTOLIC BLOOD PRESSURE: 88 MMHG | OXYGEN SATURATION: 97 % | WEIGHT: 232 LBS

## 2020-09-25 DIAGNOSIS — Z82.3 FAMILY HISTORY OF STROKE: ICD-10-CM

## 2020-09-25 DIAGNOSIS — G47.33 OBSTRUCTIVE SLEEP APNEA TREATED WITH BIPAP: ICD-10-CM

## 2020-09-25 DIAGNOSIS — E78.00 HYPERCHOLESTEROLEMIA: ICD-10-CM

## 2020-09-25 DIAGNOSIS — I10 ESSENTIAL HYPERTENSION: Primary | ICD-10-CM

## 2020-09-25 DIAGNOSIS — E66.09 CLASS 2 OBESITY DUE TO EXCESS CALORIES WITHOUT SERIOUS COMORBIDITY WITH BODY MASS INDEX (BMI) OF 37.0 TO 37.9 IN ADULT: ICD-10-CM

## 2020-09-25 PROBLEM — I83.91 ASYMPTOMATIC VARICOSE VEINS OF RIGHT LOWER EXTREMITY: Status: ACTIVE | Noted: 2020-09-25

## 2020-09-25 PROCEDURE — 99214 OFFICE O/P EST MOD 30 MIN: CPT | Performed by: FAMILY MEDICINE

## 2020-09-25 PROCEDURE — 1036F TOBACCO NON-USER: CPT | Performed by: FAMILY MEDICINE

## 2020-09-25 PROCEDURE — 3077F SYST BP >= 140 MM HG: CPT | Performed by: FAMILY MEDICINE

## 2020-09-25 PROCEDURE — 3079F DIAST BP 80-89 MM HG: CPT | Performed by: FAMILY MEDICINE

## 2020-09-25 RX ORDER — AMLODIPINE BESYLATE 2.5 MG/1
2.5 TABLET ORAL DAILY
Qty: 90 TABLET | Refills: 3 | Status: SHIPPED | OUTPATIENT
Start: 2020-09-25 | End: 2021-02-23 | Stop reason: SDUPTHER

## 2020-09-25 NOTE — PATIENT INSTRUCTIONS
We reviewed her family history, brother had stroke recently, mom had a stroke  Blood pressure has been somewhat elevated in the past, she will start medication  I would like her to continue to work at routine exercise, healthy diet, weight loss  Avoid salt  Start amlodipine 2 5 mg every evening, try to get blood pressure readings to 120 -130/70 - 80  Blood pressure check with staff in 6 weeks  Keep rambo romero 2/23/21  Consider EKG at follow-up  She was intolerant BiPAP  Reviewed August blood work, A1c 5 3, BUN/creatinine 13/0 62 with potassium 4 9  Past cholesterol 216, last cholesterol was 182 with HDL 51,   Had EGD/ Colonoscopy  I would like her to redo fasting blood work in mid to late January, consider adding statin with family history

## 2020-09-25 NOTE — PROGRESS NOTES
FAMILY PRACTICE OFFICE VISIT  Emmanuel KEBEDE O  [facility]  [address]  [address], [address]      NAME: Baldwin Homans Reppert  AGE: 60 y o  SEX: female  : 1965   MRN: [id number]    DATE: 2020  TIME: 4:07 PM    Assessment and Plan     Problem List Items Addressed This Visit        Respiratory    Obstructive sleep apnea treated with BiPAP -  felt difficulty tolerating and stopped       Cardiovascular and Mediastinum    Essential hypertension - Primary    Relevant Medications    amLODIPine (NORVASC) 2 5 mg tablet    Other Relevant Orders    Basic metabolic panel    Lipid panel       Other    Class 2 obesity due to excess calories without serious comorbidity with body mass index (BMI) of 37 0 to 37 9 in adult      Other Visit Diagnoses     Family history of stroke        Hypercholesterolemia        Relevant Orders    Basic metabolic panel    Lipid panel          Patient Instructions   We reviewed her family history, brother had stroke recently, mom had a stroke  Blood pressure has been somewhat elevated in the past, she will start medication  I would like her to continue to work at routine exercise, healthy diet, weight loss  Avoid salt  Start amlodipine 2 5 mg every evening, try to get blood pressure readings to 120 -130/70 - 80  Blood pressure check with staff in 6 weeks  Keep appt w me 21  Consider EKG at follow-up  She was intolerant BiPAP  Reviewed August blood work, A1c 5 3, BUN/creatinine 13/0 62 with potassium 4 9  Past cholesterol 216, last cholesterol was 182 with HDL 51,   Had EGD/ Colonoscopy  I would like her to redo fasting blood work in mid to late January, consider adding statin with family history        Chief Complaint     Chief Complaint   Patient presents with    Hypertension       History of Present Illness   César Banda is a 60y o -year-old female who is in to discuss her blood pressure, this was elevated at her gynecologist, previously had been running slightly high, family history hypertension  Her brother who is 11years older than her recently had a stroke  Review of Systems   Review of Systems   Constitutional:        See prior visit, she has no increased fatigue, continues with difficulty losing weight  No fevers or chills  No chest pain or increased shortness of breath, no swelling of ankles, does have noninflamed varicose veins at ankle on right  No change in bowel or bladder, does have occasional acid reflux despite omeprazole 20 milligram daily  No increased headaches, no change in vision, no dizziness  No weakness in arms or legs      No increased anxiety       Active Problem List     Patient Active Problem List   Diagnosis    Abnormal mammogram of right breast    Acid reflux disease    Carpal tunnel syndrome    Colon polyp    Complete tear of right rotator cuff    Depression with anxiety    Disc degeneration, lumbar    HSV-1 (herpes simplex virus 1) infection    Low back pain    Migraine headache    Osteoarthritis    Pap smear abnormality of cervix with ASCUS favoring benign -  f/up normal    Symptomatic menopausal or female climacteric states    Class 2 obesity due to excess calories without serious comorbidity with body mass index (BMI) of 37 0 to 37 9 in adult    Hearing loss, left    Arthralgia    Reactive airway disease without complication    Chronic cough    Obstructive sleep apnea treated with BiPAP -  felt difficulty tolerating and stopped    Pulmonary nodules    Snoring    Essential hypertension    Borderline hyperglycemia    Post-nasal drip    PONV (postoperative nausea and vomiting)    Severe tobacco dependence in sustained remission in controlled environment    Asymptomatic varicose veins of right lower extremity       Past Medical History:  Reviewed    Past Surgical History:  Reviewed    Family History:  Reviewed    Social History:  Reviewed    Objective     Vitals:    09/25/20 1436   BP: 146/88   BP Location: Left arm   Patient Position: Sitting   Cuff Size: Large   Pulse: 74   Temp: 98 °F (36 7 °C)   SpO2: 97%   Weight: 105 kg (232 lb)   Height: 5' 6" (1 676 m)     Body mass index is 37 45 kg/m²  BP Readings from Last 3 Encounters:   09/25/20 146/88   09/24/20 134/99   09/08/20 128/90       Wt Readings from Last 3 Encounters:   09/25/20 105 kg (232 lb)   09/24/20 106 kg (233 lb 3 2 oz)   09/08/20 106 kg (234 lb)       Physical Exam  Constitutional:       Comments: Pleasant overweight female seated no acute distress  Regular rate and rhythm, no murmur  Lungs are clear and equal bilateral   No ankle edema  Neurological:      General: No focal deficit present  Mental Status: She is oriented to person, place, and time  Psychiatric:         Mood and Affect: Mood normal          Behavior: Behavior normal          ALLERGIES:  Allergies   Allergen Reactions    Effexor [Venlafaxine] Other (See Comments)     Hot and sweaty    Levaquin [Levofloxacin] Myalgia     Tendonitis     Wellbutrin [Bupropion] Other (See Comments)     Hot and sweaty    Prempro [Conj Estrog-Medroxyprogest Ace] Rash       Current Medications     Current Outpatient Medications   Medication Sig Dispense Refill    amLODIPine (NORVASC) 2 5 mg tablet Take 1 tablet (2 5 mg total) by mouth daily 90 tablet 3    fluticasone (FLONASE) 50 mcg/act nasal spray 1 spray into each nostril daily      ibuprofen (ADVIL,MOTRIN) 100 MG chewable tablet Chew every 8 (eight) hours as needed for mild pain        LORazepam (ATIVAN) 0 5 mg tablet Take 1 tablet (0 5 mg total) by mouth every 6 (six) hours as needed for anxiety 20 tablet 0    LYSINE PO Take 1 capsule by mouth daily as needed (for cold sore)       multivitamin (THERAGRAN) TABS Take 1 tablet by mouth daily      omeprazole (PriLOSEC) 20 mg delayed release capsule TAKE 1 CAPSULE DAILY AS NEEDED FOR STOMACH AND ACID 90 capsule 3    sertraline (ZOLOFT) 100 mg tablet TAKE 1 TABLET BY MOUTH EVERY DAY 90 tablet 3    valACYclovir (VALTREX) 1,000 mg tablet Take 1,000 mg by mouth 2 (two) times a day      albuterol (2 5 mg/3 mL) 0 083 % nebulizer solution Take 1 vial (2 5 mg total) by nebulization every 4 (four) hours as needed for wheezing (via nebulizer) (Patient not taking: Reported on 9/25/2020) 25 vial 1    albuterol (PROVENTIL HFA,VENTOLIN HFA) 90 mcg/act inhaler Inhale 2 puffs every 4 (four) hours as needed for wheezing (Patient not taking: Reported on 9/25/2020) 1 Inhaler 0    b complex vitamins tablet Take 1 tablet by mouth daily      valACYclovir (VALTREX) 1,000 mg tablet Use 2 pills at onset cold sore, repeat 2 pills 12 hrs later 20 tablet 0     No current facility-administered medications for this visit               Orders Placed This Encounter   Procedures    Basic metabolic panel    Lipid panel         Janel Ott DO

## 2020-10-21 ENCOUNTER — IMMUNIZATIONS (OUTPATIENT)
Dept: FAMILY MEDICINE CLINIC | Facility: CLINIC | Age: 55
End: 2020-10-21
Payer: COMMERCIAL

## 2020-10-21 VITALS — TEMPERATURE: 97.8 F

## 2020-10-21 DIAGNOSIS — Z23 NEED FOR INFLUENZA VACCINATION: Primary | ICD-10-CM

## 2020-10-21 PROCEDURE — 90682 RIV4 VACC RECOMBINANT DNA IM: CPT

## 2020-10-21 PROCEDURE — 90471 IMMUNIZATION ADMIN: CPT

## 2020-11-11 ENCOUNTER — CLINICAL SUPPORT (OUTPATIENT)
Dept: FAMILY MEDICINE CLINIC | Facility: CLINIC | Age: 55
End: 2020-11-11

## 2020-11-11 VITALS — SYSTOLIC BLOOD PRESSURE: 122 MMHG | TEMPERATURE: 98.2 F | DIASTOLIC BLOOD PRESSURE: 84 MMHG

## 2020-11-11 DIAGNOSIS — Z01.30 BP CHECK: Primary | ICD-10-CM

## 2020-12-14 ENCOUNTER — HOSPITAL ENCOUNTER (OUTPATIENT)
Dept: MAMMOGRAPHY | Facility: CLINIC | Age: 55
Discharge: HOME | End: 2020-12-14
Payer: COMMERCIAL

## 2020-12-14 ENCOUNTER — HOSPITAL ENCOUNTER (OUTPATIENT)
Dept: ULTRASOUND IMAGING | Facility: CLINIC | Age: 55
Discharge: HOME | End: 2020-12-14
Payer: COMMERCIAL

## 2020-12-14 VITALS — WEIGHT: 223 LBS | BODY MASS INDEX: 35.84 KG/M2 | HEIGHT: 66 IN

## 2020-12-14 DIAGNOSIS — Z12.31 ENCOUNTER FOR SCREENING MAMMOGRAM FOR MALIGNANT NEOPLASM OF BREAST: ICD-10-CM

## 2020-12-14 DIAGNOSIS — R92.8 ABNORMAL MAMMOGRAM: ICD-10-CM

## 2020-12-14 PROCEDURE — 77066 DX MAMMO INCL CAD BI: CPT

## 2020-12-14 PROCEDURE — 76642 ULTRASOUND BREAST LIMITED: CPT

## 2020-12-14 PROCEDURE — G0279 TOMOSYNTHESIS, MAMMO: HCPCS

## 2021-01-11 ENCOUNTER — HOSPITAL ENCOUNTER (OUTPATIENT)
Dept: CT IMAGING | Facility: HOSPITAL | Age: 56
Discharge: HOME | End: 2021-01-11
Attending: INTERNAL MEDICINE
Payer: COMMERCIAL

## 2021-01-11 DIAGNOSIS — F17.201 SEVERE TOBACCO DEPENDENCE IN SUSTAINED REMISSION IN CONTROLLED ENVIRONMENT: ICD-10-CM

## 2021-01-11 PROCEDURE — 71271 CT THORAX LUNG CANCER SCR C-: CPT

## 2021-01-19 ENCOUNTER — TELEPHONE (OUTPATIENT)
Dept: PULMONOLOGY | Facility: CLINIC | Age: 56
End: 2021-01-19

## 2021-01-19 DIAGNOSIS — F17.211 CIGARETTE NICOTINE DEPENDENCE IN REMISSION: Primary | ICD-10-CM

## 2021-01-19 NOTE — TELEPHONE ENCOUNTER
L/m informing pt next CT will need to be done around 01/2022, the order has been mailed to her with the number to call central scheduling

## 2021-01-19 NOTE — TELEPHONE ENCOUNTER
Called and left message letting pt know her CT had no concerning findings  Next CT chest in one year  We will schedule and call her with details

## 2021-01-21 DIAGNOSIS — N95.1 SYMPTOMATIC MENOPAUSAL OR FEMALE CLIMACTERIC STATES: ICD-10-CM

## 2021-01-21 RX ORDER — SERTRALINE HYDROCHLORIDE 100 MG/1
TABLET, FILM COATED ORAL
Qty: 90 TABLET | Refills: 3 | Status: SHIPPED | OUTPATIENT
Start: 2021-01-21 | End: 2022-01-12

## 2021-01-29 ENCOUNTER — OFFICE VISIT (OUTPATIENT)
Dept: FAMILY MEDICINE CLINIC | Facility: CLINIC | Age: 56
End: 2021-01-29
Payer: COMMERCIAL

## 2021-01-29 VITALS
HEIGHT: 66 IN | TEMPERATURE: 97.6 F | WEIGHT: 232 LBS | DIASTOLIC BLOOD PRESSURE: 90 MMHG | HEART RATE: 74 BPM | SYSTOLIC BLOOD PRESSURE: 152 MMHG | BODY MASS INDEX: 37.28 KG/M2 | OXYGEN SATURATION: 99 %

## 2021-01-29 DIAGNOSIS — M54.41 CHRONIC BILATERAL LOW BACK PAIN WITH RIGHT-SIDED SCIATICA: Primary | ICD-10-CM

## 2021-01-29 DIAGNOSIS — S39.012S STRAIN OF LUMBAR REGION, SEQUELA: ICD-10-CM

## 2021-01-29 DIAGNOSIS — M25.561 RIGHT KNEE PAIN, UNSPECIFIED CHRONICITY: ICD-10-CM

## 2021-01-29 DIAGNOSIS — G89.29 CHRONIC BILATERAL LOW BACK PAIN WITH RIGHT-SIDED SCIATICA: Primary | ICD-10-CM

## 2021-01-29 PROCEDURE — 99214 OFFICE O/P EST MOD 30 MIN: CPT | Performed by: FAMILY MEDICINE

## 2021-01-29 RX ORDER — RIBOFLAVIN (VITAMIN B2) 100 MG
100 TABLET ORAL DAILY
COMMUNITY
End: 2021-07-15 | Stop reason: ALTCHOICE

## 2021-01-29 RX ORDER — MELATONIN
1000 DAILY
COMMUNITY
End: 2021-12-14

## 2021-01-29 RX ORDER — PREDNISONE 10 MG/1
TABLET ORAL
Qty: 30 TABLET | Refills: 0 | Status: SHIPPED | OUTPATIENT
Start: 2021-01-29 | End: 2021-02-10 | Stop reason: ALTCHOICE

## 2021-01-29 RX ORDER — IBUPROFEN 200 MG
TABLET ORAL EVERY 6 HOURS PRN
COMMUNITY
End: 2021-11-09 | Stop reason: ALTCHOICE

## 2021-01-29 NOTE — PROGRESS NOTES
FAMILY PRACTICE OFFICE VISIT  Mica KEBEDE O  [facility]  [address]   [address], [address]      NAME: Carly Mcmullen  AGE: 60 y o  SEX: female  : 1965   MRN: [id number]    DATE: 2021  TIME: 5:40 PM    Assessment and Plan     Problem List Items Addressed This Visit        Nervous and Auditory    Chronic bilateral low back pain with right-sided sciatica - Primary    Relevant Medications    predniSONE 10 mg tablet    Other Relevant Orders    XR spine lumbar minimum 4 views non injury    Ambulatory referral to Physical Therapy      Other Visit Diagnoses     Strain of lumbar region, sequela        Relevant Orders    Ambulatory referral to Physical Therapy    Right knee pain, unspecified chronicity              Patient Instructions   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  No other new trauma  Had been thrown from horse back in her 25s  Has been using ibuprofen, can use as sparingly with food watching for stomach upset  She will set up/start physical therapy  We will obtain x-rays lumbar spine with radicular pain  She will use course of prednisone  She had seen pain management back in  with injections, doctor no longer in area  Consider re-evaluation with pain management, may need MRI  She will do home stretches  Also with degree right knee pain, appears has osteoarthritis, past history surgery left knee  She does have some incontinence, she did see Gynecology  At visit in September we had added amlodipine 2 5 mg daily regarding blood pressure, blood pressure 152/90 here today, note she is in pain  She will keep her appointment , re-evaluate then        Chief Complaint     Chief Complaint   Patient presents with    Back Pain     Lower back pain, start worsening on end of 2019       History of Present Illness   Kurtis Islas is a 60y o -year-old female who   Is in to discuss increased low back pain, has been worse for the past few days, radiating into right buttocks, down leg, right foot tingles at times  Also has right knee pain  No acute injury, 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, 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  Reminds me today she had undergone injection back in 2005 with Dr Grace Sweet  Review of Systems   Review of Systems   Constitutional: Negative for appetite change, fever and unexpected weight change  HENT: Negative for sore throat and trouble swallowing  Respiratory: Negative for cough, chest tightness and shortness of breath  Has not been requiring rescue inhaler   Cardiovascular: Negative for chest pain, palpitations and leg swelling  Gastrointestinal: Negative for abdominal pain, blood in stool, nausea and vomiting  No acid reflux     No change in bowel   Genitourinary: Negative for dysuria and hematuria  Sees gyn, does note some incontinence   Musculoskeletal: Positive for arthralgias and back pain  Neurological: Negative for dizziness, syncope, light-headedness and headaches  Psychiatric/Behavioral: Negative for behavioral problems and confusion  The patient is nervous/anxious (Stable)          Active Problem List     Patient Active Problem List   Diagnosis    Abnormal mammogram of right breast    Acid reflux disease    Carpal tunnel syndrome    Colon polyp    Complete tear of right rotator cuff    Depression with anxiety    Disc degeneration, lumbar    HSV-1 (herpes simplex virus 1) infection    Chronic bilateral low back pain with right-sided sciatica    Migraine headache    Osteoarthritis    Pap smear abnormality of cervix with ASCUS favoring benign -  f/up normal    Symptomatic menopausal or female climacteric states    Class 2 obesity due to excess calories without serious comorbidity with body mass index (BMI) of 37 0 to 37 9 in adult    Hearing loss, left    Arthralgia    Reactive airway disease without complication    Chronic cough    Obstructive sleep apnea treated with BiPAP -  felt difficulty tolerating and stopped    Pulmonary nodules    Snoring    Essential hypertension    Borderline hyperglycemia    Post-nasal drip    PONV (postoperative nausea and vomiting)    Severe tobacco dependence in sustained remission in controlled environment    Asymptomatic varicose veins of right lower extremity       Past Medical History:  Reviewed    Past Surgical History:  Reviewed    Family History:  Reviewed    Social History:  Reviewed    Objective     Vitals:    01/29/21 1003   BP: 152/90   BP Location: Left arm   Patient Position: Sitting   Cuff Size: Standard   Pulse: 74   Temp: 97 6 °F (36 4 °C)   SpO2: 99%   Weight: 105 kg (232 lb)   Height: 5' 6" (1 676 m)     Body mass index is 37 45 kg/m²  BP Readings from Last 3 Encounters:   01/29/21 152/90   11/11/20 122/84   09/25/20 146/88       Wt Readings from Last 3 Encounters:   01/29/21 105 kg (232 lb)   12/14/20 101 kg (223 lb)   09/25/20 105 kg (232 lb)       Physical Exam  Constitutional:       Appearance: She is not ill-appearing  Comments: Pleasant overweight female, comfortable at rest but does favor low back, right knee with movement  Mild decreased range of motion lumbar spine due to pain  Straight leg raising at 90° on rate pulls low back  No hip findings  Does have some degenerative changes in knee but nontender here today the most part  She has no ankle edema  No calf tenderness  No vertebral tenderness, is tender right sacroiliac area  Musculoskeletal:      Right lower leg: No edema  Left lower leg: No edema  Comments: See constitutional above   Skin:     Coloration: Skin is not jaundiced     Neurological:      Mental Status: She is alert and oriented to person, place, and time  Motor: No weakness (No leg weakness)  Deep Tendon Reflexes: Reflexes normal (Patellar, Achilles intact)           ALLERGIES:  Allergies   Allergen Reactions    Effexor [Venlafaxine] Other (See Comments)     Hot and sweaty    Levaquin [Levofloxacin] Myalgia     Tendonitis     Wellbutrin [Bupropion] Other (See Comments)     Hot and sweaty    Prempro [Conj Estrog-Medroxyprogest Ace] Rash       Current Medications     Current Outpatient Medications   Medication Sig Dispense Refill    amLODIPine (NORVASC) 2 5 mg tablet Take 1 tablet (2 5 mg total) by mouth daily 90 tablet 3    Ascorbic Acid (vitamin C) 100 MG tablet Take 100 mg by mouth daily      cholecalciferol (VITAMIN D3) 1,000 units tablet Take 1,000 Units by mouth daily      ibuprofen (MOTRIN) 200 mg tablet Take by mouth every 6 (six) hours as needed for mild pain Uses up to 800 mg at times otc      LORazepam (ATIVAN) 0 5 mg tablet Take 1 tablet (0 5 mg total) by mouth every 6 (six) hours as needed for anxiety 20 tablet 0    LYSINE PO Take 1 capsule by mouth daily as needed (for cold sore)       multivitamin (THERAGRAN) TABS Take 1 tablet by mouth daily      omeprazole (PriLOSEC) 20 mg delayed release capsule TAKE 1 CAPSULE DAILY AS NEEDED FOR STOMACH AND ACID 90 capsule 3    sertraline (ZOLOFT) 100 mg tablet TAKE 1 TABLET BY MOUTH EVERY DAY 90 tablet 3    valACYclovir (VALTREX) 1,000 mg tablet Take 1,000 mg by mouth 2 (two) times a day      Zinc Sulfate (ZINC-220 PO) Take by mouth      albuterol (2 5 mg/3 mL) 0 083 % nebulizer solution Take 1 vial (2 5 mg total) by nebulization every 4 (four) hours as needed for wheezing (via nebulizer) (Patient not taking: Reported on 9/25/2020) 25 vial 1    albuterol (PROVENTIL HFA,VENTOLIN HFA) 90 mcg/act inhaler Inhale 2 puffs every 4 (four) hours as needed for wheezing (Patient not taking: Reported on 9/25/2020) 1 Inhaler 0    b complex vitamins tablet Take 1 tablet by mouth daily      fluticasone (FLONASE) 50 mcg/act nasal spray 1 spray into each nostril daily      predniSONE 10 mg tablet Use 40mg x 3 days, 30 mg x 3 days, 20 mg x 3 days, 10 mg x 3 days then stop 30 tablet 0     No current facility-administered medications for this visit               Orders Placed This Encounter   Procedures    XR spine lumbar minimum 4 views non injury    Ambulatory referral to Physical Therapy         Elisabet Moeller DO

## 2021-01-29 NOTE — PATIENT INSTRUCTIONS
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  No other new trauma  Had been thrown from horse back in her 25s  Has been using ibuprofen, can use as sparingly with food watching for stomach upset  She will set up/start physical therapy  We will obtain x-rays lumbar spine with radicular pain  She will use course of prednisone  She had seen pain management back in 2005 with injections, doctor no longer in area  Consider re-evaluation with pain management, may need MRI  She will do home stretches  Also with degree right knee pain, appears has osteoarthritis, past history surgery left knee  She does have some incontinence, she did see Gynecology  At visit in September we had added amlodipine 2 5 mg daily regarding blood pressure, blood pressure 152/90 here today, note she is in pain  She will keep her appointment February 23rd, re-evaluate then

## 2021-02-09 ENCOUNTER — DOCUMENTATION (OUTPATIENT)
Dept: FAMILY MEDICINE CLINIC | Facility: CLINIC | Age: 56
End: 2021-02-09

## 2021-02-09 PROBLEM — U07.1 COVID-19 VIRUS INFECTION: Status: ACTIVE | Noted: 2021-02-04

## 2021-02-09 NOTE — PROGRESS NOTES
Her  had a telemedicine visit with me today, he tested positive for COVID on February 3rd, she tested positive on the 4th  She has mild disease, no increased cough or shortness of breath    She will observe and call us if anything worsens  She was diagnosed at [facility]

## 2021-02-18 DIAGNOSIS — F41.8 DEPRESSION WITH ANXIETY: ICD-10-CM

## 2021-02-18 RX ORDER — LORAZEPAM 0.5 MG/1
0.5 TABLET ORAL EVERY 6 HOURS PRN
Qty: 20 TABLET | Refills: 0 | Status: SHIPPED | OUTPATIENT
Start: 2021-02-18 | End: 2021-07-15 | Stop reason: SDUPTHER

## 2021-02-18 NOTE — PROGRESS NOTES
I spoke to her today, she had been positive for COVID back on February 4th  Still has some malaise, headache, nausea are much improved  Does have increased anxiety, difficulty sleeping, had used prednisone starting January 29th for 12 days regarding back pain, had improved but back pain has recurred  She can use lorazepam before bedtime for the next 3 nights  She does have a visit with me February 23rd, she will keep that appointment  She has not yet done x-ray, considering physical therapy

## 2021-02-23 ENCOUNTER — OFFICE VISIT (OUTPATIENT)
Dept: FAMILY MEDICINE CLINIC | Facility: CLINIC | Age: 56
End: 2021-02-23
Payer: COMMERCIAL

## 2021-02-23 ENCOUNTER — HOSPITAL ENCOUNTER (OUTPATIENT)
Dept: RADIOLOGY | Facility: HOSPITAL | Age: 56
Discharge: HOME | End: 2021-02-23
Payer: COMMERCIAL

## 2021-02-23 VITALS
SYSTOLIC BLOOD PRESSURE: 142 MMHG | TEMPERATURE: 97.3 F | BODY MASS INDEX: 37.93 KG/M2 | DIASTOLIC BLOOD PRESSURE: 94 MMHG | WEIGHT: 236 LBS | HEART RATE: 80 BPM | OXYGEN SATURATION: 97 % | HEIGHT: 66 IN

## 2021-02-23 DIAGNOSIS — M54.41 CHRONIC BILATERAL LOW BACK PAIN WITH RIGHT-SIDED SCIATICA: ICD-10-CM

## 2021-02-23 DIAGNOSIS — I10 ESSENTIAL HYPERTENSION: ICD-10-CM

## 2021-02-23 DIAGNOSIS — Z00.00 ENCOUNTER FOR ANNUAL PHYSICAL EXAM: Primary | ICD-10-CM

## 2021-02-23 DIAGNOSIS — G89.29 CHRONIC BILATERAL LOW BACK PAIN WITH RIGHT-SIDED SCIATICA: ICD-10-CM

## 2021-02-23 PROBLEM — Z87.891 FORMER SMOKER: Status: ACTIVE | Noted: 2020-09-08

## 2021-02-23 PROBLEM — R09.82 POST-NASAL DRIP: Status: RESOLVED | Noted: 2020-02-20 | Resolved: 2021-02-23

## 2021-02-23 PROCEDURE — 1036F TOBACCO NON-USER: CPT | Performed by: FAMILY MEDICINE

## 2021-02-23 PROCEDURE — 72110 X-RAY EXAM L-2 SPINE 4/>VWS: CPT

## 2021-02-23 PROCEDURE — 3008F BODY MASS INDEX DOCD: CPT | Performed by: FAMILY MEDICINE

## 2021-02-23 PROCEDURE — 99396 PREV VISIT EST AGE 40-64: CPT | Performed by: FAMILY MEDICINE

## 2021-02-23 RX ORDER — AMLODIPINE BESYLATE 5 MG/1
5 TABLET ORAL DAILY
Qty: 90 TABLET | Refills: 1 | Status: SHIPPED | OUTPATIENT
Start: 2021-02-23 | End: 2021-08-30

## 2021-02-23 NOTE — PATIENT INSTRUCTIONS
She continues to recover from COVID ( Sx 2/3 -  Tested positive 2/4)  Has not required rescue inhaler  Blood pressure not ideal here today, she will increase amlodipine to 5 mg daily, I would like her to check blood pressure readings at home and call us with those numbers in 1 month  We did review previous blood work, she does have a slip to redo fasting lipids along with BMP  re Lipid screening  Await lipids   She is up to date with Diabetes screening  A1C 5 3 in August   TSH in December of 2019 was 2 34  She is status post prednisone from late January regarding low back pain, chronic issue  Had temporary relief with prednisone, she is going for x-ray lumbar spine today, she does plan to start physical therapy, consider pain management  May require MRI  Continue with sertraline 100 mg daily, she does have lorazepam to use very sparingly  Immunization History   Administered Date(s) Administered    INFLUENZA 11/16/2007, 10/15/2008, 09/24/2009, 09/23/2010, 11/09/2018    Influenza Quadrivalent Preservative Free 3 years and older IM 10/09/2014, 09/25/2017    Influenza, recombinant, quadrivalent,injectable, preservative free 11/09/2018, 12/05/2019, 10/21/2020    Influenza, seasonal, injectable 11/01/2010    Pneumococcal Polysaccharide PPV23 12/05/2019    Td (adult), adsorbed 06/12/2017    Tdap 06/01/2017    Tuberculin Skin Test-PPD Intradermal 08/27/2001     She does do yearly Flu shot  Tdap/tetanus shot is up to date  (done every 10 yrs for superficial cuts, every 5 yrs for deep wounds)   Can also look into coverage for new shingles shot, Shingrix  Can do that here or at pharmacy  She did receive Pneumovax December 2019  Is a former smoker, quit many years ago -  she had seen pulmonology in the past, last CT scanning showed nodules which were stable in January of 2020, pulmonary has ordered another screening CT of lung  Chronic cough has been much improved       Regarding Colon Cancer screening,   Screening is up to date  She had EGD along with colonoscopy back in September of 2020, had polyp, redo colonoscopy 5 years  She does continue on omeprazole regarding acid reflux  She does see her Gynecologist routinely  She does see gyn with [facility]  Discussed screening Mammogram, this is up-to-date  She had mammogram/ultrasound in December which was stable, she plans to redo those in June  Hepatitis C Screening indicated for 'baby boomers' -  after discussion she will not do Hepatitis C screen  previously perfomed and was negative  Continue to try to watch healthy diet, exercise routinely  Her weight is back to the same as last January, she will work at portion sizes, limit carbohydrates  Exercise has been limited due to back pain      We will see her again in 6 months,  (sooner as needed re low back )

## 2021-02-23 NOTE — PROGRESS NOTES
BMI Counseling: Body mass index is 38 09 kg/m²  The BMI is above normal  Nutrition recommendations include encouraging healthy choices of fruits and vegetables and moderation in carbohydrate intake  Exercise recommendations include exercising 3-5 times per week  FAMILY PRACTICE OFFICE VISIT  [facility]  [address] [facility], [address]      NAME: Satish Mcmullen  AGE: 60 y o  SEX: female  : 1965   MRN: [id number]    DATE: 2021  TIME: 8:35 AM    Assessment and Plan     Problem List Items Addressed This Visit        Cardiovascular and Mediastinum    Essential hypertension    Relevant Medications    amLODIPine (NORVASC) 5 mg tablet      Other Visit Diagnoses     Encounter for annual physical exam    -  Primary          Patient Instructions     She continues to recover from COVID ( Sx 2/3 -  Tested positive )  Has not required rescue inhaler  Blood pressure not ideal here today, she will increase amlodipine to 5 mg daily, I would like her to check blood pressure readings at home and call us with those numbers in 1 month  We did review previous blood work, she does have a slip to redo fasting lipids along with BMP  re Lipid screening  Await lipids   She is up to date with Diabetes screening  A1C 5 3 in August   TSH in 2019 was 2 34  She is status post prednisone from late January regarding low back pain, chronic issue  Had temporary relief with prednisone, she is going for x-ray lumbar spine today, she does plan to start physical therapy, consider pain management  May require MRI  Continue with sertraline 100 mg daily, she does have lorazepam to use very sparingly      Immunization History   Administered Date(s) Administered    INFLUENZA 2007, 10/15/2008, 2009, 2010, 2018    Influenza Quadrivalent Preservative Free 3 years and older IM 10/09/2014, 09/25/2017    Influenza, recombinant, quadrivalent,injectable, preservative free 11/09/2018, 12/05/2019, 10/21/2020    Influenza, seasonal, injectable 11/01/2010    Pneumococcal Polysaccharide PPV23 12/05/2019    Td (adult), adsorbed 06/12/2017    Tdap 06/01/2017    Tuberculin Skin Test-PPD Intradermal 08/27/2001     She does do yearly Flu shot  Tdap/tetanus shot is up to date  (done every 10 yrs for superficial cuts, every 5 yrs for deep wounds)   Can also look into coverage for new shingles shot, Shingrix  Can do that here or at pharmacy  She did receive Pneumovax December 2019  Is a former smoker, quit many years ago -  she had seen pulmonology in the past, last CT scanning showed nodules which were stable in January of 2020, pulmonary has ordered another screening CT of lung  Chronic cough has been much improved  Regarding Colon Cancer screening,   Screening is up to date  She had EGD along with colonoscopy back in September of 2020, had polyp, redo colonoscopy 5 years  She does continue on omeprazole regarding acid reflux  She does see her Gynecologist routinely  She does see gyn with [facility]  Discussed screening Mammogram, this is up-to-date  She had mammogram/ultrasound in December which was stable, she plans to redo those in June  Hepatitis C Screening indicated for 'baby boomers' -  after discussion she will not do Hepatitis C screen  previously perfomed and was negative  Continue to try to watch healthy diet, exercise routinely  Her weight is back to the same as last January, she will work at portion sizes, limit carbohydrates  Exercise has been limited due to back pain      We will see her again in 6 months,  (sooner as needed re low back )           Chief Complaint     Chief Complaint   Patient presents with    Physical Exam       History of Present Illness   Ilya Victoria is a 60y o -year-old female who In today for a recheck visit/routine physical   She continues to recover from James, tested positive February 4th  Has had no shortness of breath, no wheezing or need for rescue treatment  History chronic cough, minimal cough recently due to postnasal drainage  Had use prednisone late January, had temporary relief regarding low back pain, low back pain has been a chronic issue, radiates into right leg at times  Pain has recurred, she does plan to go for x-ray lumbar spine today, plans to start physical therapy  Years ago had injections low back  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  She is using amlodipine 2 5 mg daily  Continues on sertraline 100 mg daily, prednisone had caused some issues with sleep, had use lorazepam very sparingly at night  Review of Systems   Review of Systems   Constitutional: Positive for fatigue  Negative for activity change, appetite change, chills, fever and unexpected weight change (She has gained weight back, last year had used weight watchers, weight is the same as January of last year )  HENT: Positive for postnasal drip  Negative for mouth sores, sore throat and trouble swallowing  Eyes: Negative for visual disturbance  Respiratory: Negative for cough, choking, chest tightness and shortness of breath  Cardiovascular: Negative for chest pain, palpitations and leg swelling  Gastrointestinal: Negative for abdominal pain, blood in stool, constipation, diarrhea, nausea (Much improved) and vomiting  No acid reflux With using omeprazole, had EGD / colonoscopy back in September    No change in bowel   Genitourinary: Negative for dysuria and hematuria  Musculoskeletal: Positive for back pain  Neurological: Negative for dizziness, syncope, light-headedness and headaches  Hematological: Does not bruise/bleed easily  Psychiatric/Behavioral: Negative for behavioral problems, confusion (Mild cognitive impairment as before) and sleep disturbance         Active Problem List Patient Active Problem List   Diagnosis    Abnormal mammogram of right breast    Acid reflux disease    Carpal tunnel syndrome    Colon polyp    Complete tear of right rotator cuff    Depression with anxiety    Disc degeneration, lumbar    HSV-1 (herpes simplex virus 1) infection    Chronic bilateral low back pain with right-sided sciatica    Migraine headache    Osteoarthritis    Pap smear abnormality of cervix with ASCUS favoring benign -  f/up normal    Symptomatic menopausal or female climacteric states    Class 2 obesity due to excess calories without serious comorbidity with body mass index (BMI) of 37 0 to 37 9 in adult    Hearing loss, left    Arthralgia    Reactive airway disease without complication    Chronic cough    Obstructive sleep apnea treated with BiPAP -  felt difficulty tolerating and stopped    Pulmonary nodules    Snoring    Essential hypertension    Borderline hyperglycemia    Former smoker    Asymptomatic varicose veins of right lower extremity    COVID-19 virus infection       Past Medical History:  Reviewed    Past Surgical History:  Reviewed    Family History:  Reviewed    Social History:  Reviewed    Objective     Vitals:    02/23/21 0803   BP: 142/94   BP Location: Left arm   Patient Position: Sitting   Cuff Size: Adult   Pulse: 80   Temp: (!) 97 3 °F (36 3 °C)   TempSrc: Temporal   SpO2: 97%   Weight: 107 kg (236 lb)   Height: 5' 6" (1 676 m)     Body mass index is 38 09 kg/m²  BP Readings from Last 3 Encounters:   02/23/21 142/94   01/29/21 152/90   11/11/20 122/84       Wt Readings from Last 3 Encounters:   02/23/21 107 kg (236 lb)   01/29/21 105 kg (232 lb)   12/14/20 101 kg (223 lb)       Physical Exam  Constitutional:       General: She is not in acute distress  Appearance: She is well-developed  She is obese  She is not ill-appearing  Eyes:      General: No scleral icterus       Conjunctiva/sclera: Conjunctivae normal    Neck: Musculoskeletal: Neck supple  Vascular: No carotid bruit  Cardiovascular:      Rate and Rhythm: Normal rate and regular rhythm  Heart sounds: Normal heart sounds  No murmur  Comments: No carotid bruit  Pulmonary:      Effort: Pulmonary effort is normal  No respiratory distress  Breath sounds: Normal breath sounds  No wheezing or rales  Abdominal:      Palpations: Abdomen is soft  Tenderness: There is no abdominal tenderness  Musculoskeletal:      Right lower leg: No edema  Left lower leg: No edema  Lymphadenopathy:      Cervical: No cervical adenopathy  Skin:     Coloration: Skin is not jaundiced  Neurological:      Mental Status: She is alert and oriented to person, place, and time     Psychiatric:         Mood and Affect: Mood normal          Behavior: Behavior normal          ALLERGIES:  Allergies   Allergen Reactions    Effexor [Venlafaxine] Other (See Comments)     Hot and sweaty    Levaquin [Levofloxacin] Myalgia     Tendonitis     Wellbutrin [Bupropion] Other (See Comments)     Hot and sweaty    Prempro [Conj Estrog-Medroxyprogest Ace] Rash       Current Medications     Current Outpatient Medications   Medication Sig Dispense Refill    amLODIPine (NORVASC) 5 mg tablet Take 1 tablet (5 mg total) by mouth daily 90 tablet 1    Ascorbic Acid (vitamin C) 100 MG tablet Take 100 mg by mouth daily      b complex vitamins tablet Take 1 tablet by mouth daily      cholecalciferol (VITAMIN D3) 1,000 units tablet Take 1,000 Units by mouth daily      fluticasone (FLONASE) 50 mcg/act nasal spray 1 spray into each nostril daily      LYSINE PO Take 1 capsule by mouth daily as needed (for cold sore)       multivitamin (THERAGRAN) TABS Take 1 tablet by mouth daily      omeprazole (PriLOSEC) 20 mg delayed release capsule TAKE 1 CAPSULE DAILY AS NEEDED FOR STOMACH AND ACID 90 capsule 3    sertraline (ZOLOFT) 100 mg tablet TAKE 1 TABLET BY MOUTH EVERY DAY 90 tablet 3    albuterol (2 5 mg/3 mL) 0 083 % nebulizer solution Take 1 vial (2 5 mg total) by nebulization every 4 (four) hours as needed for wheezing (via nebulizer) (Patient not taking: Reported on 9/25/2020) 25 vial 1    albuterol (PROVENTIL HFA,VENTOLIN HFA) 90 mcg/act inhaler Inhale 2 puffs every 4 (four) hours as needed for wheezing (Patient not taking: Reported on 9/25/2020) 1 Inhaler 0    ibuprofen (MOTRIN) 200 mg tablet Take by mouth every 6 (six) hours as needed for mild pain Uses up to 800 mg at times otc      LORazepam (ATIVAN) 0 5 mg tablet Take 1 tablet (0 5 mg total) by mouth every 6 (six) hours as needed for anxiety 20 tablet 0    valACYclovir (VALTREX) 1,000 mg tablet Take 1,000 mg by mouth 2 (two) times a day      Zinc Sulfate (ZINC-220 PO) Take by mouth       No current facility-administered medications for this visit  No orders of the defined types were placed in this encounter          Nic Cooper, DO

## 2021-02-26 LAB
BUN SERPL-MCNC: 15 MG/DL (ref 7–25)
BUN/CREAT SERPL: NORMAL (CALC) (ref 6–22)
CALCIUM SERPL-MCNC: 9.3 MG/DL (ref 8.6–10.4)
CHLORIDE SERPL-SCNC: 104 MMOL/L (ref 98–110)
CHOLEST SERPL-MCNC: 235 MG/DL
CHOLEST/HDLC SERPL: 4.9 (CALC)
CO2 SERPL-SCNC: 29 MMOL/L (ref 20–32)
CREAT SERPL-MCNC: 0.74 MG/DL (ref 0.5–1.05)
GLUCOSE SERPL-MCNC: 97 MG/DL (ref 65–99)
HDLC SERPL-MCNC: 48 MG/DL
LDLC SERPL CALC-MCNC: 153 MG/DL (CALC)
NONHDLC SERPL-MCNC: 187 MG/DL (CALC)
POTASSIUM SERPL-SCNC: 4.8 MMOL/L (ref 3.5–5.3)
SL AMB EGFR AFRICAN AMERICAN: 104 ML/MIN/1.73M2
SL AMB EGFR NON AFRICAN AMERICAN: 90 ML/MIN/1.73M2
SODIUM SERPL-SCNC: 141 MMOL/L (ref 135–146)
TRIGL SERPL-MCNC: 196 MG/DL

## 2021-02-28 PROBLEM — E78.2 MIXED HYPERLIPIDEMIA: Status: ACTIVE | Noted: 2021-02-28

## 2021-03-11 ENCOUNTER — VBI (OUTPATIENT)
Dept: ADMINISTRATIVE | Facility: OTHER | Age: 56
End: 2021-03-11

## 2021-04-18 DIAGNOSIS — K21.9 GASTROESOPHAGEAL REFLUX DISEASE WITHOUT ESOPHAGITIS: ICD-10-CM

## 2021-04-18 RX ORDER — OMEPRAZOLE 20 MG/1
CAPSULE, DELAYED RELEASE ORAL
Qty: 90 CAPSULE | Refills: 3 | Status: SHIPPED | OUTPATIENT
Start: 2021-04-18 | End: 2022-04-13

## 2021-04-22 ENCOUNTER — VBI (OUTPATIENT)
Dept: ADMINISTRATIVE | Facility: OTHER | Age: 56
End: 2021-04-22

## 2021-04-26 ENCOUNTER — TELEPHONE (OUTPATIENT)
Dept: PULMONOLOGY | Facility: CLINIC | Age: 56
End: 2021-04-26

## 2021-04-26 NOTE — TELEPHONE ENCOUNTER
LEFT MESSAGE FOR PT TO SCHEDULE CT AND TO CALL US BACK SO WE COULD SCHEDULE 1YR FU WITH DOSTAL IN SEPT

## 2021-07-15 ENCOUNTER — OFFICE VISIT (OUTPATIENT)
Dept: FAMILY MEDICINE CLINIC | Facility: CLINIC | Age: 56
End: 2021-07-15
Payer: COMMERCIAL

## 2021-07-15 VITALS
HEART RATE: 78 BPM | OXYGEN SATURATION: 97 % | HEIGHT: 66 IN | DIASTOLIC BLOOD PRESSURE: 82 MMHG | TEMPERATURE: 97.6 F | SYSTOLIC BLOOD PRESSURE: 134 MMHG | BODY MASS INDEX: 38.25 KG/M2 | WEIGHT: 238 LBS

## 2021-07-15 DIAGNOSIS — I10 ESSENTIAL HYPERTENSION: ICD-10-CM

## 2021-07-15 DIAGNOSIS — G56.00 CARPAL TUNNEL SYNDROME, UNSPECIFIED LATERALITY: ICD-10-CM

## 2021-07-15 DIAGNOSIS — M79.89 SWELLING OF RIGHT LOWER EXTREMITY: ICD-10-CM

## 2021-07-15 DIAGNOSIS — F41.8 DEPRESSION WITH ANXIETY: ICD-10-CM

## 2021-07-15 DIAGNOSIS — R73.9 BORDERLINE HYPERGLYCEMIA: ICD-10-CM

## 2021-07-15 DIAGNOSIS — K21.9 GASTROESOPHAGEAL REFLUX DISEASE WITHOUT ESOPHAGITIS: ICD-10-CM

## 2021-07-15 DIAGNOSIS — M25.532 LEFT WRIST PAIN: ICD-10-CM

## 2021-07-15 DIAGNOSIS — I83.811 VARICOSE VEINS OF LOWER EXTREMITY WITH PAIN, RIGHT: Primary | ICD-10-CM

## 2021-07-15 DIAGNOSIS — E78.2 MIXED HYPERLIPIDEMIA: ICD-10-CM

## 2021-07-15 PROCEDURE — 99214 OFFICE O/P EST MOD 30 MIN: CPT | Performed by: FAMILY MEDICINE

## 2021-07-15 RX ORDER — LORAZEPAM 0.5 MG/1
0.5 TABLET ORAL EVERY 6 HOURS PRN
Qty: 20 TABLET | Refills: 0 | Status: SHIPPED | OUTPATIENT
Start: 2021-07-15 | End: 2021-10-04 | Stop reason: SDUPTHER

## 2021-07-15 NOTE — PATIENT INSTRUCTIONS
Tendinitis left wrist, may have small underlying ganglion, has improved recently with wrist band, use as needed, just observe  Has significant varicose vein medial right ankle, recently with some inflammation, burning  No current sign thrombophlebitis  Did have some recent swelling, she will check venous duplex right lower extremity  Could see vascular for opinion  Continue with compression  I did refill lorazepam, uses that sparingly, PDMP was checked, last filled back in February 20  Continue other medication as is  Continues on sertraline  Last TSH in December of 2019 2 34  Blood pressure here today after sitting is 134/82 - try to check blood pressure outside of office  Continue omeprazole as is  She did have COVID back in February, I would do COVID vaccine, she relates she will consider it in the fall  Back in February she had cholesterol elevated to 235, HDL 48,    6% 10 year cardiovascular risk  Glucose at that time was 97, continue to work on healthy diet, redo blood work early next year    We will see her again in 6 months, she can cancel August appointment  Call us sooner if needed    She does see Pulmonary also, CT scanning was stable in January, plans to redo that in January 2022

## 2021-07-15 NOTE — PROGRESS NOTES
FAMILY PRACTICE OFFICE VISIT  [facility]  [address]  [address] [place], [place], [address]      NAME: Timur [facility] Mcmullen  AGE: 60 y o  SEX: female  : 1965   MRN: [id number]    DATE: 7/15/2021  TIME: 12:50 PM    Assessment and Plan     Problem List Items Addressed This Visit        Digestive    Acid reflux disease       Cardiovascular and Mediastinum    Essential hypertension    Varicose veins of lower extremity with pain, right ankle - Primary    Relevant Orders    VAS lower limb venous duplex study, unilateral/limited       Nervous and Auditory    Carpal tunnel syndrome       Other    Depression with anxiety    Relevant Medications    LORazepam (ATIVAN) 0 5 mg tablet    Borderline hyperglycemia    Mixed hyperlipidemia      Other Visit Diagnoses     Left wrist pain        Swelling of right lower extremity - resolved        Relevant Orders    VAS lower limb venous duplex study, unilateral/limited          Patient Instructions   Tendinitis left wrist, may have small underlying ganglion, has improved recently with wrist band, use as needed, just observe  Has significant varicose vein medial right ankle, recently with some inflammation, burning  No current sign thrombophlebitis  Did have some recent swelling, she will check venous duplex right lower extremity  Could see vascular for opinion  Continue with compression  I did refill lorazepam, uses that sparingly, PDMP was checked, last filled back in   Continue other medication as is  Continues on sertraline  Last TSH in 2019 2 34  Blood pressure here today after sitting is 134/82 - try to check blood pressure outside of office  Continue omeprazole as is  She did have COVID back in February, I would do COVID vaccine, she relates she will consider it in the fall      Back in February she had cholesterol elevated to 235, HDL 48,    6% 10 year cardiovascular risk  Glucose at that time was 97, continue to work on healthy diet, redo blood work early next year    We will see her again in 6 months, she can cancel August appointment  Call us sooner if needed  She does see Pulmonary also, CT scanning was stable in January, plans to redo that in January 2022      Chief Complaint     Chief Complaint   Patient presents with    Wrist Pain     left hand wrist pain     Ankle Pain     right ankle pain , swelling vein worsening, burning pain       History of Present Illness   Areaclive Albarado is a 60y o -year-old female who is in today complaining of some burning/inflammation at varicose vein medial right ankle  Varicose vein has been present for quite some time, recently was somewhat bothersome, noted some swelling right lower extremity, that has resolved  No redness, no warmth  Also has some tenderness left wrist she wanted to get checked, she had been using a wrist band for pain, pain has improved  Other than this she feels about the same as at baseline, is using sertraline, uses lorazepam sparingly, is requesting a refill  Received 20 pills back in February  Overall anxiety/depression no worse than baseline  Chronic low back pain, used prednisone back in January  Did have COVID back in February  Has not yet received vaccine  She has no increased shortness of breath  Otherwise she feels as at baseline, using medication as directed      Review of Systems   Review of Systems   Constitutional: Negative for appetite change, fatigue (Chronic baseline), fever and unexpected weight change  HENT: Negative for sore throat and trouble swallowing  Respiratory: Negative for cough, chest tightness and shortness of breath  Cardiovascular: Negative for chest pain, palpitations and leg swelling  Gastrointestinal: Negative for abdominal pain, blood in stool, nausea and vomiting          Baseline acid reflux   -uses omeprazole  No change in bowel Genitourinary: Negative for dysuria and hematuria  Neurological: Negative for dizziness, syncope, light-headedness and headaches  Psychiatric/Behavioral: Negative for behavioral problems and confusion  Active Problem List     Patient Active Problem List   Diagnosis    Abnormal mammogram of right breast    Acid reflux disease    Carpal tunnel syndrome    Colon polyp    Complete tear of right rotator cuff    Depression with anxiety    Disc degeneration, lumbar    HSV-1 (herpes simplex virus 1) infection    Chronic bilateral low back pain with right-sided sciatica    Migraine headache    Osteoarthritis    Pap smear abnormality of cervix with ASCUS favoring benign -  f/up normal    Symptomatic menopausal or female climacteric states    Class 2 obesity due to excess calories without serious comorbidity with body mass index (BMI) of 37 0 to 37 9 in adult    Hearing loss, left    Arthralgia    Reactive airway disease without complication    Chronic cough    Obstructive sleep apnea treated with BiPAP -  felt difficulty tolerating and stopped    Pulmonary nodules    Snoring    Essential hypertension    Borderline hyperglycemia    Former smoker    Asymptomatic varicose veins of right lower extremity    COVID-19 virus infection    Mixed hyperlipidemia    Varicose veins of lower extremity with pain, right ankle       Past Medical History:  Reviewed    Past Surgical History:  Reviewed    Family History:  Reviewed    Social History:  Reviewed    Objective     Vitals:    07/15/21 1047   BP: 134/82   BP Location: Right arm   Patient Position: Sitting   Cuff Size: Standard   Pulse: 78   Temp: 97 6 °F (36 4 °C)   SpO2: 97%   Weight: 108 kg (238 lb)   Height: 5' 6" (1 676 m)     Body mass index is 38 41 kg/m²      BP Readings from Last 3 Encounters:   07/15/21 134/82   02/23/21 142/94   01/29/21 152/90       Wt Readings from Last 3 Encounters:   07/15/21 108 kg (238 lb)   02/23/21 107 kg (236 lb) 01/29/21 105 kg (232 lb)       Physical Exam  Constitutional:       General: She is not in acute distress  Appearance: Normal appearance  She is well-developed  She is not ill-appearing  Eyes:      General: No scleral icterus  Cardiovascular:      Rate and Rhythm: Normal rate and regular rhythm  Heart sounds: Normal heart sounds  No murmur heard  Pulmonary:      Effort: Pulmonary effort is normal  No respiratory distress  Breath sounds: Normal breath sounds  Musculoskeletal:      Right lower leg: No edema  Left lower leg: No edema  Comments: Very prominent varicosity right ankle, medial   No inflammation noted, slightly tender at area  No ankle swelling  No calf tenderness  Left wrist with mild tenderness volar/radial aspect, no palpable mass, good range of motion  No redness or warmth   Lymphadenopathy:      Cervical: No cervical adenopathy  Neurological:      Mental Status: She is alert and oriented to person, place, and time     Psychiatric:         Mood and Affect: Mood normal          Behavior: Behavior normal          ALLERGIES:  Allergies   Allergen Reactions    Effexor [Venlafaxine] Other (See Comments)     Hot and sweaty    Levaquin [Levofloxacin] Myalgia     Tendonitis     Wellbutrin [Bupropion] Other (See Comments)     Hot and sweaty    Prempro [Conj Estrog-Medroxyprogest Ace] Rash       Current Medications     Current Outpatient Medications   Medication Sig Dispense Refill    amLODIPine (NORVASC) 5 mg tablet Take 1 tablet (5 mg total) by mouth daily 90 tablet 1    cholecalciferol (VITAMIN D3) 1,000 units tablet Take 1,000 Units by mouth daily      fluticasone (FLONASE) 50 mcg/act nasal spray 1 spray into each nostril daily      ibuprofen (MOTRIN) 200 mg tablet Take by mouth every 6 (six) hours as needed for mild pain Uses up to 800 mg at times otc      LORazepam (ATIVAN) 0 5 mg tablet Take 1 tablet (0 5 mg total) by mouth every 6 (six) hours as needed for anxiety 20 tablet 0    LYSINE PO Take 1 capsule by mouth daily as needed (for cold sore)       multivitamin (THERAGRAN) TABS Take 1 tablet by mouth daily      omeprazole (PriLOSEC) 20 mg delayed release capsule TAKE 1 CAPSULE DAILY AS NEEDED FOR STOMACH AND ACID 90 capsule 3    sertraline (ZOLOFT) 100 mg tablet TAKE 1 TABLET BY MOUTH EVERY DAY 90 tablet 3    Zinc Sulfate (ZINC-220 PO) Take by mouth      albuterol (2 5 mg/3 mL) 0 083 % nebulizer solution Take 1 vial (2 5 mg total) by nebulization every 4 (four) hours as needed for wheezing (via nebulizer) (Patient not taking: Reported on 9/25/2020) 25 vial 1    albuterol (PROVENTIL HFA,VENTOLIN HFA) 90 mcg/act inhaler Inhale 2 puffs every 4 (four) hours as needed for wheezing (Patient not taking: Reported on 9/25/2020) 1 Inhaler 0    b complex vitamins tablet Take 1 tablet by mouth daily (Patient not taking: Reported on 7/15/2021)      valACYclovir (VALTREX) 1,000 mg tablet Take 1,000 mg by mouth 2 (two) times a day (Patient not taking: Reported on 7/15/2021)       No current facility-administered medications for this visit  No orders of the defined types were placed in this encounter          Mishel Cardenas DO

## 2021-07-23 ENCOUNTER — TELEPHONE (OUTPATIENT)
Dept: FAMILY MEDICINE CLINIC | Facility: CLINIC | Age: 56
End: 2021-07-23

## 2021-07-23 DIAGNOSIS — G89.29 CHRONIC BILATERAL LOW BACK PAIN WITH RIGHT-SIDED SCIATICA: Primary | ICD-10-CM

## 2021-07-23 DIAGNOSIS — M54.41 CHRONIC BILATERAL LOW BACK PAIN WITH RIGHT-SIDED SCIATICA: Primary | ICD-10-CM

## 2021-07-23 RX ORDER — LIDOCAINE 50 MG/G
PATCH TOPICAL
Qty: 30 PATCH | Refills: 1 | Status: SHIPPED | OUTPATIENT
Start: 2021-07-23 | End: 2021-10-03 | Stop reason: ALTCHOICE

## 2021-07-23 NOTE — TELEPHONE ENCOUNTER
Patient called the office requesting lidoderm patches for her back  She would like it sent to [facility] on [address]

## 2021-07-23 NOTE — TELEPHONE ENCOUNTER
Pt was informed on message below and she will get it over the counter if its not covered by insurance

## 2021-07-23 NOTE — TELEPHONE ENCOUNTER
Let know I sent rx for Lido patch -  This may not be covered by her insurance, she may need to buy OTC patch w/ similar product such as Aspercream

## 2021-08-12 ENCOUNTER — VBI (OUTPATIENT)
Dept: ADMINISTRATIVE | Facility: OTHER | Age: 56
End: 2021-08-12

## 2021-08-19 ENCOUNTER — TELEPHONE (OUTPATIENT)
Dept: FAMILY MEDICINE CLINIC | Facility: CLINIC | Age: 56
End: 2021-08-19

## 2021-08-30 DIAGNOSIS — I10 ESSENTIAL HYPERTENSION: ICD-10-CM

## 2021-08-30 RX ORDER — AMLODIPINE BESYLATE 5 MG/1
TABLET ORAL
Qty: 90 TABLET | Refills: 1 | Status: SHIPPED | OUTPATIENT
Start: 2021-08-30 | End: 2021-09-02 | Stop reason: SDUPTHER

## 2021-09-01 DIAGNOSIS — I10 ESSENTIAL HYPERTENSION: ICD-10-CM

## 2021-09-01 RX ORDER — AMLODIPINE BESYLATE 5 MG/1
TABLET ORAL
Qty: 90 TABLET | Refills: 1 | OUTPATIENT
Start: 2021-09-01

## 2021-09-02 DIAGNOSIS — I10 ESSENTIAL HYPERTENSION: ICD-10-CM

## 2021-09-02 RX ORDER — AMLODIPINE BESYLATE 5 MG/1
5 TABLET ORAL DAILY
Qty: 90 TABLET | Refills: 3 | Status: SHIPPED | OUTPATIENT
Start: 2021-09-02

## 2021-09-04 ENCOUNTER — IMMUNIZATIONS (OUTPATIENT)
Dept: FAMILY MEDICINE CLINIC | Facility: HOSPITAL | Age: 56
End: 2021-09-04

## 2021-09-04 DIAGNOSIS — Z23 ENCOUNTER FOR IMMUNIZATION: Primary | ICD-10-CM

## 2021-09-04 PROCEDURE — 0001A SARS-COV-2 / COVID-19 MRNA VACCINE (PFIZER-BIONTECH) 30 MCG: CPT | Performed by: NURSE PRACTITIONER

## 2021-09-04 PROCEDURE — 91300 SARS-COV-2 / COVID-19 MRNA VACCINE (PFIZER-BIONTECH) 30 MCG: CPT | Performed by: NURSE PRACTITIONER

## 2021-10-01 ENCOUNTER — VBI (OUTPATIENT)
Dept: ADMINISTRATIVE | Facility: OTHER | Age: 56
End: 2021-10-01

## 2021-10-04 ENCOUNTER — TELEMEDICINE (OUTPATIENT)
Dept: FAMILY MEDICINE CLINIC | Facility: CLINIC | Age: 56
End: 2021-10-04
Payer: COMMERCIAL

## 2021-10-04 VITALS
BODY MASS INDEX: 38.25 KG/M2 | DIASTOLIC BLOOD PRESSURE: 95 MMHG | TEMPERATURE: 98.2 F | WEIGHT: 238 LBS | HEIGHT: 66 IN | SYSTOLIC BLOOD PRESSURE: 138 MMHG

## 2021-10-04 DIAGNOSIS — F41.8 DEPRESSION WITH ANXIETY: ICD-10-CM

## 2021-10-04 DIAGNOSIS — J01.90 ACUTE SINUSITIS, RECURRENCE NOT SPECIFIED, UNSPECIFIED LOCATION: Primary | ICD-10-CM

## 2021-10-04 DIAGNOSIS — B00.1 COLD SORE: ICD-10-CM

## 2021-10-04 PROCEDURE — 1036F TOBACCO NON-USER: CPT | Performed by: FAMILY MEDICINE

## 2021-10-04 PROCEDURE — 99213 OFFICE O/P EST LOW 20 MIN: CPT | Performed by: FAMILY MEDICINE

## 2021-10-04 PROCEDURE — 3008F BODY MASS INDEX DOCD: CPT | Performed by: FAMILY MEDICINE

## 2021-10-04 RX ORDER — AMOXICILLIN AND CLAVULANATE POTASSIUM 875; 125 MG/1; MG/1
1 TABLET, FILM COATED ORAL EVERY 12 HOURS SCHEDULED
Qty: 20 TABLET | Refills: 0 | Status: SHIPPED | OUTPATIENT
Start: 2021-10-04 | End: 2021-10-14

## 2021-10-04 RX ORDER — LORAZEPAM 0.5 MG/1
0.5 TABLET ORAL EVERY 6 HOURS PRN
Qty: 20 TABLET | Refills: 0 | Status: SHIPPED | OUTPATIENT
Start: 2021-10-04 | End: 2022-03-28 | Stop reason: SDUPTHER

## 2021-11-09 ENCOUNTER — APPOINTMENT (OUTPATIENT)
Dept: RADIOLOGY | Facility: MEDICAL CENTER | Age: 56
End: 2021-11-09
Payer: COMMERCIAL

## 2021-11-09 ENCOUNTER — OFFICE VISIT (OUTPATIENT)
Dept: FAMILY MEDICINE CLINIC | Facility: CLINIC | Age: 56
End: 2021-11-09
Payer: COMMERCIAL

## 2021-11-09 VITALS
HEIGHT: 66 IN | HEART RATE: 71 BPM | BODY MASS INDEX: 38.57 KG/M2 | SYSTOLIC BLOOD PRESSURE: 138 MMHG | TEMPERATURE: 98.8 F | WEIGHT: 240 LBS | DIASTOLIC BLOOD PRESSURE: 80 MMHG | OXYGEN SATURATION: 98 %

## 2021-11-09 DIAGNOSIS — F41.8 DEPRESSION WITH ANXIETY: ICD-10-CM

## 2021-11-09 DIAGNOSIS — M79.642 PAIN IN BOTH HANDS: ICD-10-CM

## 2021-11-09 DIAGNOSIS — M79.641 PAIN IN BOTH HANDS: ICD-10-CM

## 2021-11-09 DIAGNOSIS — G89.29 CHRONIC BILATERAL LOW BACK PAIN WITH RIGHT-SIDED SCIATICA: ICD-10-CM

## 2021-11-09 DIAGNOSIS — M25.50 ARTHRALGIA, UNSPECIFIED JOINT: ICD-10-CM

## 2021-11-09 DIAGNOSIS — M17.11 OSTEOARTHRITIS OF RIGHT KNEE, UNSPECIFIED OSTEOARTHRITIS TYPE: Primary | ICD-10-CM

## 2021-11-09 DIAGNOSIS — M17.11 OSTEOARTHRITIS OF RIGHT KNEE, UNSPECIFIED OSTEOARTHRITIS TYPE: ICD-10-CM

## 2021-11-09 DIAGNOSIS — F43.21 GRIEF: ICD-10-CM

## 2021-11-09 DIAGNOSIS — M54.41 CHRONIC BILATERAL LOW BACK PAIN WITH RIGHT-SIDED SCIATICA: ICD-10-CM

## 2021-11-09 PROCEDURE — 1036F TOBACCO NON-USER: CPT | Performed by: FAMILY MEDICINE

## 2021-11-09 PROCEDURE — 99214 OFFICE O/P EST MOD 30 MIN: CPT | Performed by: FAMILY MEDICINE

## 2021-11-09 PROCEDURE — 3008F BODY MASS INDEX DOCD: CPT | Performed by: FAMILY MEDICINE

## 2021-11-09 PROCEDURE — 73562 X-RAY EXAM OF KNEE 3: CPT

## 2021-11-09 PROCEDURE — 73130 X-RAY EXAM OF HAND: CPT

## 2021-11-09 RX ORDER — MELOXICAM 15 MG/1
15 TABLET ORAL DAILY PRN
Qty: 30 TABLET | Refills: 1 | Status: SHIPPED | OUTPATIENT
Start: 2021-11-09 | End: 2022-01-04

## 2021-11-11 LAB
ALBUMIN SERPL-MCNC: 4.4 G/DL (ref 3.6–5.1)
ALBUMIN/GLOB SERPL: 1.8 (CALC) (ref 1–2.5)
ALP SERPL-CCNC: 70 U/L (ref 37–153)
ALT SERPL-CCNC: 15 U/L (ref 6–29)
AST SERPL-CCNC: 19 U/L (ref 10–35)
B BURGDOR AB SER IA-ACNC: <0.9 INDEX
BASOPHILS # BLD AUTO: 57 CELLS/UL (ref 0–200)
BASOPHILS NFR BLD AUTO: 1.1 %
BILIRUB SERPL-MCNC: 0.5 MG/DL (ref 0.2–1.2)
BUN SERPL-MCNC: 12 MG/DL (ref 7–25)
BUN/CREAT SERPL: NORMAL (CALC) (ref 6–22)
CALCIUM SERPL-MCNC: 9.5 MG/DL (ref 8.6–10.4)
CHLORIDE SERPL-SCNC: 104 MMOL/L (ref 98–110)
CO2 SERPL-SCNC: 26 MMOL/L (ref 20–32)
CREAT SERPL-MCNC: 0.59 MG/DL (ref 0.5–1.05)
CRP SERPL-MCNC: 5.6 MG/L
EOSINOPHIL # BLD AUTO: 109 CELLS/UL (ref 15–500)
EOSINOPHIL NFR BLD AUTO: 2.1 %
ERYTHROCYTE [DISTWIDTH] IN BLOOD BY AUTOMATED COUNT: 13.6 % (ref 11–15)
GLOBULIN SER CALC-MCNC: 2.5 G/DL (CALC) (ref 1.9–3.7)
GLUCOSE SERPL-MCNC: 123 MG/DL (ref 65–139)
HCT VFR BLD AUTO: 37.9 % (ref 35–45)
HGB BLD-MCNC: 12.3 G/DL (ref 11.7–15.5)
LYMPHOCYTES # BLD AUTO: 2548 CELLS/UL (ref 850–3900)
LYMPHOCYTES NFR BLD AUTO: 49 %
MCH RBC QN AUTO: 28.2 PG (ref 27–33)
MCHC RBC AUTO-ENTMCNC: 32.5 G/DL (ref 32–36)
MCV RBC AUTO: 86.9 FL (ref 80–100)
MONOCYTES # BLD AUTO: 177 CELLS/UL (ref 200–950)
MONOCYTES NFR BLD AUTO: 3.4 %
NEUTROPHILS # BLD AUTO: 2309 CELLS/UL (ref 1500–7800)
NEUTROPHILS NFR BLD AUTO: 44.4 %
PLATELET # BLD AUTO: 241 THOUSAND/UL (ref 140–400)
PMV BLD REES-ECKER: 12.5 FL (ref 7.5–12.5)
POTASSIUM SERPL-SCNC: 4.2 MMOL/L (ref 3.5–5.3)
PROT SERPL-MCNC: 6.9 G/DL (ref 6.1–8.1)
RBC # BLD AUTO: 4.36 MILLION/UL (ref 3.8–5.1)
RHEUMATOID FACT SERPL-ACNC: <14 IU/ML
SL AMB EGFR AFRICAN AMERICAN: 117 ML/MIN/1.73M2
SL AMB EGFR NON AFRICAN AMERICAN: 101 ML/MIN/1.73M2
SODIUM SERPL-SCNC: 140 MMOL/L (ref 135–146)
TSH SERPL-ACNC: 2.28 MIU/L (ref 0.4–4.5)
WBC # BLD AUTO: 5.2 THOUSAND/UL (ref 3.8–10.8)

## 2021-12-14 ENCOUNTER — ANNUAL EXAM (OUTPATIENT)
Dept: OBGYN CLINIC | Facility: CLINIC | Age: 56
End: 2021-12-14
Payer: COMMERCIAL

## 2021-12-14 VITALS
WEIGHT: 236 LBS | BODY MASS INDEX: 39.32 KG/M2 | HEIGHT: 65 IN | SYSTOLIC BLOOD PRESSURE: 136 MMHG | DIASTOLIC BLOOD PRESSURE: 74 MMHG

## 2021-12-14 DIAGNOSIS — Z01.419 ENCOUNTER FOR GYNECOLOGICAL EXAMINATION (GENERAL) (ROUTINE) WITHOUT ABNORMAL FINDINGS: Primary | ICD-10-CM

## 2021-12-14 DIAGNOSIS — R92.8 ABNORMAL MAMMOGRAM: ICD-10-CM

## 2021-12-14 PROCEDURE — 3008F BODY MASS INDEX DOCD: CPT | Performed by: FAMILY MEDICINE

## 2021-12-14 PROCEDURE — S0612 ANNUAL GYNECOLOGICAL EXAMINA: HCPCS | Performed by: OBSTETRICS & GYNECOLOGY

## 2021-12-16 ENCOUNTER — VBI (OUTPATIENT)
Dept: ADMINISTRATIVE | Facility: OTHER | Age: 56
End: 2021-12-16

## 2021-12-19 ENCOUNTER — OFFICE VISIT (OUTPATIENT)
Dept: URGENT CARE | Facility: MEDICAL CENTER | Age: 56
End: 2021-12-19
Payer: COMMERCIAL

## 2021-12-19 ENCOUNTER — APPOINTMENT (OUTPATIENT)
Dept: RADIOLOGY | Facility: MEDICAL CENTER | Age: 56
End: 2021-12-19
Payer: COMMERCIAL

## 2021-12-19 VITALS
BODY MASS INDEX: 37.28 KG/M2 | WEIGHT: 232 LBS | HEART RATE: 89 BPM | HEIGHT: 66 IN | OXYGEN SATURATION: 99 % | TEMPERATURE: 99.9 F

## 2021-12-19 DIAGNOSIS — R05.9 COUGH: ICD-10-CM

## 2021-12-19 DIAGNOSIS — J20.9 ACUTE BRONCHITIS, UNSPECIFIED ORGANISM: Primary | ICD-10-CM

## 2021-12-19 PROCEDURE — 71046 X-RAY EXAM CHEST 2 VIEWS: CPT

## 2021-12-19 PROCEDURE — 87636 SARSCOV2 & INF A&B AMP PRB: CPT | Performed by: PHYSICIAN ASSISTANT

## 2021-12-19 PROCEDURE — 99213 OFFICE O/P EST LOW 20 MIN: CPT | Performed by: PHYSICIAN ASSISTANT

## 2021-12-19 RX ORDER — ALBUTEROL SULFATE 90 UG/1
2 AEROSOL, METERED RESPIRATORY (INHALATION) EVERY 6 HOURS PRN
Qty: 18 G | Refills: 0 | Status: SHIPPED | OUTPATIENT
Start: 2021-12-19

## 2021-12-19 RX ORDER — DOXYCYCLINE 100 MG/1
100 CAPSULE ORAL 2 TIMES DAILY
Qty: 20 CAPSULE | Refills: 0 | Status: SHIPPED | OUTPATIENT
Start: 2021-12-19 | End: 2021-12-29

## 2021-12-19 RX ORDER — BENZONATATE 200 MG/1
200 CAPSULE ORAL 3 TIMES DAILY PRN
Qty: 20 CAPSULE | Refills: 0 | Status: SHIPPED | OUTPATIENT
Start: 2021-12-19 | End: 2021-12-19 | Stop reason: SDUPTHER

## 2021-12-19 RX ORDER — BENZONATATE 200 MG/1
200 CAPSULE ORAL 3 TIMES DAILY PRN
Qty: 20 CAPSULE | Refills: 0 | Status: SHIPPED | OUTPATIENT
Start: 2021-12-19 | End: 2021-12-23 | Stop reason: SDUPTHER

## 2021-12-19 RX ORDER — ALBUTEROL SULFATE 90 UG/1
2 AEROSOL, METERED RESPIRATORY (INHALATION) EVERY 6 HOURS PRN
Qty: 18 G | Refills: 0 | Status: SHIPPED | OUTPATIENT
Start: 2021-12-19 | End: 2021-12-19 | Stop reason: SDUPTHER

## 2021-12-19 RX ORDER — DOXYCYCLINE 100 MG/1
100 CAPSULE ORAL 2 TIMES DAILY
Qty: 20 CAPSULE | Refills: 0 | Status: SHIPPED | OUTPATIENT
Start: 2021-12-19 | End: 2021-12-19 | Stop reason: SDUPTHER

## 2021-12-19 NOTE — LETTER
December 19, 2021     Patient: Jayy Brownlee   YOB: 1965   Date of Visit: 12/19/2021       To Whom it May Concern:    Kiara Knutson was seen in my clinic on 12/19/2021  She has been instructed to self isolate until further notice  If you have any questions or concerns, please don't hesitate to call           Sincerely,          Shavon Elizabeth PA-C        CC: No Recipients

## 2021-12-19 NOTE — PROGRESS NOTES
[facility]        NAME: Jose Cota is a 60 y o  female  : 1965    MRN: [id number]  DATE: 2021  TIME: 3:59 PM    Assessment and Plan   Acute bronchitis, unspecified organism [J20 9]  1  Acute bronchitis, unspecified organism  doxycycline monohydrate (MONODOX) 100 mg capsule    albuterol (PROVENTIL HFA,VENTOLIN HFA) 90 mcg/act inhaler    DISCONTINUED: doxycycline monohydrate (MONODOX) 100 mg capsule    DISCONTINUED: albuterol (PROVENTIL HFA,VENTOLIN HFA) 90 mcg/act inhaler   2  Cough  COVID/FLU- 24 hour TAT    COVID/FLU- 24 hour TAT    XR chest pa & lateral    DISCONTINUED: benzonatate (TESSALON) 200 MG capsule    DISCONTINUED: benzonatate (TESSALON) 200 MG capsule         Patient Instructions     Discussed condition with patient  He/she has recent onset of URI/flu-like symptoms with/without direct exposure to COVID-19  He/she will be swabbed for COVID-19 today and quarantine instructions were given  Will be notified of result once obtained  She appears to have acute bronchitis  X-rays negative for pneumonia or any other active disease  I will prescribe her combination of doxycycline as well as Tessalon Perles and a rescue inhaler and recommended hydration, rest, discussed OTC cough/cold meds, fever management, and close observation  Should be reevaluated if condition persists/worsens  Follow up with PCP in 3-5 days  Proceed to  ER if symptoms worsen  Chief Complaint     Chief Complaint   Patient presents with    Cold Like Symptoms     Cough, Fever, Body aches, Pain left ear with head and chest congestion x5 days  One of dose Covid vaccine, no flu shot   had a cold a week ago that is now resolved  History of Present Illness       Patient presents with 5 day history of head congestion, left ear pain, PND, chest congestion, cough, chest tightness, fever, myalgias  Denies N/V/D, or known direct COVID exposure but her  was recently sick    She has not been vaccinated for flu and only had 1 dose of the COVID vaccine  Has been managing symptoms conservatively with OTC meds  She has history of reactive airway issues  She is a former smoker  Review of Systems   Review of Systems   Constitutional: Positive for fever  HENT: Positive for congestion and ear pain (Left)  Respiratory: Positive for cough and chest tightness  Cardiovascular: Negative  Gastrointestinal: Negative  Genitourinary: Negative  Musculoskeletal: Positive for myalgias  Neurological: Positive for headaches           Current Medications       Current Outpatient Medications:     amLODIPine (NORVASC) 5 mg tablet, Take 1 tablet (5 mg total) by mouth daily, Disp: 90 tablet, Rfl: 3    Calcium 500 + D3 250-500 MG-UNIT CHEW, Chew, Disp: , Rfl:     fluticasone (FLONASE) 50 mcg/act nasal spray, 1 spray into each nostril daily  , Disp: , Rfl:     LYSINE PO, Take 1 capsule by mouth daily as needed (for cold sore) , Disp: , Rfl:     multivitamin (THERAGRAN) TABS, Take 1 tablet by mouth daily, Disp: , Rfl:     omeprazole (PriLOSEC) 20 mg delayed release capsule, TAKE 1 CAPSULE DAILY AS NEEDED FOR STOMACH AND ACID, Disp: 90 capsule, Rfl: 3    sertraline (ZOLOFT) 100 mg tablet, TAKE 1 TABLET BY MOUTH EVERY DAY, Disp: 90 tablet, Rfl: 3    Zinc Sulfate (ZINC-220 PO), Take by mouth, Disp: , Rfl:     albuterol (2 5 mg/3 mL) 0 083 % nebulizer solution, Take 1 vial (2 5 mg total) by nebulization every 4 (four) hours as needed for wheezing (via nebulizer) (Patient not taking: Reported on 9/25/2020), Disp: 25 vial, Rfl: 1    albuterol (PROVENTIL HFA,VENTOLIN HFA) 90 mcg/act inhaler, Inhale 2 puffs every 6 (six) hours as needed for wheezing, Disp: 18 g, Rfl: 0    benzonatate (TESSALON) 200 MG capsule, Take 1 capsule (200 mg total) by mouth 3 (three) times a day as needed for cough, Disp: 20 capsule, Rfl: 0    LORazepam (ATIVAN) 0 5 mg tablet, Take 1 tablet (0 5 mg total) by mouth every 6 (six) hours as needed for anxiety (Patient not taking: Reported on 12/14/2021 ), Disp: 20 tablet, Rfl: 0    meloxicam (MOBIC) 15 mg tablet, TAKE 1 TABLET (15 MG TOTAL) BY MOUTH DAILY AS NEEDED (ARTHRITIS PAIN), Disp: 30 tablet, Rfl: 1    predniSONE 10 mg tablet, Use 40mg x 3 days, 30 mg x 3 days, 20 mg x 3 days, 10 mg x 3 days then stop, Disp: 30 tablet, Rfl: 0    valACYclovir (VALTREX) 1,000 mg tablet, Take 1,000 mg by mouth 2 (two) times a day  (Patient not taking: Reported on 12/14/2021 ), Disp: , Rfl:     Current Allergies     Allergies as of 12/19/2021 - Reviewed 12/19/2021   Allergen Reaction Noted    Effexor [venlafaxine] Other (See Comments) 04/06/2016    Levaquin [levofloxacin] Myalgia 12/05/2019    Wellbutrin [bupropion] Other (See Comments) 04/06/2016    Prempro [conj estrog-medroxyprogest ace] Rash 04/06/2016            The following portions of the patient's history were reviewed and updated as appropriate: allergies, current medications, past family history, past medical history, past social history, past surgical history and problem list      Past Medical History:   Diagnosis Date    Acid reflux     Anxiety     Asthmatic bronchitis     Back pain     Carpal tunnel syndrome     COVID-19     Depression     Depression     Hyperglycemia     Hypertension     Lipoma     Migraine     Miscarriage     Osteoarthritis     PONV (postoperative nausea and vomiting)     Stress incontinence     UTI (urinary tract infection)        Past Surgical History:   Procedure Laterality Date    APPENDECTOMY      BREAST EXCISIONAL BIOPSY Left 03/2001    benign    COLONOSCOPY N/A 4/8/2016    Procedure: COLONOSCOPY;  Surgeon: Giselle De Santiago MD;  Location: [facility];   Service:     KNEE ARTHROSCOPY      OK EXC SKIN BENIG <0 5 CM TRUNK,ARM,LEG Left 5/19/2016    Procedure: EXCISION LIPOMA SHOULDER ;  Surgeon: Giselle De Santiago MD;  Location: [facility] OR;  Service: General    TUBAL LIGATION      WISDOM TOOTH EXTRACTION         Family History   Problem Relation Age of Onset    Stroke Mother     Stomach cancer Mother 68    Skin cancer Father     Alzheimer's disease Father     No Known Problems Sister     Stroke Brother     No Known Problems Daughter     Heart disease Maternal Grandmother     No Known Problems Maternal Grandfather     Breast cancer Paternal Grandmother 48    No Known Problems Paternal Grandfather     No Known Problems Sister     No Known Problems Daughter     Breast cancer Cousin 39    Breast cancer Cousin 39    Breast cancer Cousin 48    No Known Problems Maternal Aunt     No Known Problems Maternal Aunt     No Known Problems Maternal Aunt     No Known Problems Maternal Aunt     No Known Problems Paternal Aunt     No Known Problems Paternal Aunt     No Known Problems Paternal Aunt     No Known Problems Paternal Aunt     No Known Problems Paternal Aunt     Lung cancer Paternal Uncle     Breast cancer Cousin 50         Medications have been verified  Objective   Pulse 89   Temp 99 9 °F (37 7 °C)   Ht 5' 6" (1 676 m)   Wt 105 kg (232 lb)   LMP  (LMP Unknown)   SpO2 99%   BMI 37 45 kg/m²   No LMP recorded (lmp unknown)  Patient is postmenopausal        Physical Exam     Physical Exam  Vitals reviewed  Constitutional:       General: She is not in acute distress  Appearance: She is well-developed  HENT:      Right Ear: Hearing, tympanic membrane, ear canal and external ear normal       Left Ear: Hearing, ear canal and external ear normal       Ears:      Comments: Left TM dull     Nose: Mucosal edema (B/L boggy turbinates) and congestion present  Mouth/Throat:      Mouth: Mucous membranes are moist       Pharynx: Posterior oropharyngeal erythema (PND) present  No oropharyngeal exudate  Tonsils: No tonsillar exudate  Cardiovascular:      Rate and Rhythm: Normal rate and regular rhythm  Pulses: Normal pulses        Heart sounds: Normal heart sounds  No murmur heard  Pulmonary:      Effort: Pulmonary effort is normal  No respiratory distress  Breath sounds: Rhonchi (Bilateral diffuse coarse rhonchi heard throughout) present  No wheezing  Musculoskeletal:      Cervical back: Neck supple  Lymphadenopathy:      Cervical: No cervical adenopathy  Neurological:      Mental Status: She is alert and oriented to person, place, and time

## 2021-12-19 NOTE — PATIENT INSTRUCTIONS
Acute Bronchitis   WHAT YOU NEED TO KNOW:   Acute bronchitis is swelling and irritation in your lungs  It is usually caused by a virus and most often happens in the winter  Bronchitis may also be caused by bacteria or by a chemical irritant, such as smoke  DISCHARGE INSTRUCTIONS:   Return to the emergency department if:   · You cough up blood  · Your lips or fingernails turn blue  · You feel like you are not getting enough air when you breathe  Call your doctor if:   · Your symptoms do not go away or get worse, even after treatment  · Your cough does not get better within 4 weeks  · You have questions or concerns about your condition or care  Medicines: You may  need any of the following:  · Cough suppressants  decrease your urge to cough  · Decongestants  help loosen mucus in your lungs and make it easier to cough up  This can help you breathe easier  · Inhalers  may be given  Your healthcare provider may give you one or more inhalers to help you breathe easier and cough less  An inhaler gives your medicine to open your airways  Ask your healthcare provider to show you how to use your inhaler correctly  · Antibiotics  may be given for up to 5 days if your bronchitis is caused by bacteria  · Acetaminophen  decreases pain and fever  It is available without a doctor's order  Ask how much to take and how often to take it  Follow directions  Read the labels of all other medicines you are using to see if they also contain acetaminophen, or ask your doctor or pharmacist  Acetaminophen can cause liver damage if not taken correctly  Do not use more than 4 grams (4,000 milligrams) total of acetaminophen in one day  · NSAIDs  help decrease swelling and pain or fever  This medicine is available with or without a doctor's order  NSAIDs can cause stomach bleeding or kidney problems in certain people   If you take blood thinner medicine, always ask your healthcare provider if NSAIDs are safe for you  Always read the medicine label and follow directions  · Take your medicine as directed  Contact your healthcare provider if you think your medicine is not helping or if you have side effects  Tell him of her if you are allergic to any medicine  Keep a list of the medicines, vitamins, and herbs you take  Include the amounts, and when and why you take them  Bring the list or the pill bottles to follow-up visits  Carry your medicine list with you in case of an emergency  Self-care:   · Drink liquids as directed  You may need to drink more liquids than usual to stay hydrated  Ask how much liquid to drink each day and which liquids are best for you  · Use a cool mist humidifier  to increase air moisture in your home  This may make it easier for you to breathe and help decrease your cough  · Get more rest   Rest helps your body to heal  Slowly start to do more each day  Rest when you feel it is needed  · Avoid irritants in the air  Avoid chemicals, fumes, and dust  Wear a face mask if you must work around dust or fumes  Stay inside on days when air pollution levels are high  If you have allergies, stay inside when pollen counts are high  Do not use aerosol products, such as spray-on deodorant, bug spray, and hair spray  · Do not smoke or be around others who are smoking  Nicotine and other chemicals in cigarettes and cigars can cause lung damage  Ask your healthcare provider for information if you currently smoke and need help to quit  E-cigarettes or smokeless tobacco still contain nicotine  Talk to your healthcare provider before you use these products  Prevent acute bronchitis:       · Ask about vaccines you may need  Get a flu vaccine each year as soon as recommended, usually in September or October  Ask your healthcare provider if you should also get a pneumonia or COVID-19 vaccine   Your healthcare provider can tell you if you should also get other vaccines, and when to get them     · Prevent the spread of germs  You can decrease your risk for acute bronchitis and other illnesses by doing the following:     ? Wash your hands often with soap and water  Carry germ-killing hand lotion or gel with you  You can use the lotion or gel to clean your hands when soap and water are not available  ? Do not touch your eyes, nose, or mouth unless you have washed your hands first     ? Always cover your mouth when you cough to prevent the spread of germs  It is best to cough into a tissue or your shirt sleeve instead of into your hand  Ask those around you to cover their mouths when they cough  ? Try to avoid people who have a cold or the flu  If you are sick, stay away from others as much as possible  Follow up with your doctor as directed:  Write down questions you have so you will remember to ask them during your follow-up visits  © Copyright [employer] 2021 Information is for End User's use only and may not be sold, redistributed or otherwise used for commercial purposes  All illustrations and images included in CareNotes® are the copyrighted property of A D A M , Inc  or Tony Barker  The above information is an  only  It is not intended as medical advice for individual conditions or treatments  Talk to your doctor, nurse or pharmacist before following any medical regimen to see if it is safe and effective for you

## 2021-12-21 LAB
FLUAV RNA RESP QL NAA+PROBE: NEGATIVE
FLUBV RNA RESP QL NAA+PROBE: NEGATIVE
SARS-COV-2 RNA RESP QL NAA+PROBE: NEGATIVE

## 2022-01-04 DIAGNOSIS — M79.642 PAIN IN BOTH HANDS: ICD-10-CM

## 2022-01-04 DIAGNOSIS — M25.50 ARTHRALGIA, UNSPECIFIED JOINT: ICD-10-CM

## 2022-01-04 DIAGNOSIS — M79.641 PAIN IN BOTH HANDS: ICD-10-CM

## 2022-01-04 DIAGNOSIS — M17.11 OSTEOARTHRITIS OF RIGHT KNEE, UNSPECIFIED OSTEOARTHRITIS TYPE: ICD-10-CM

## 2022-01-04 RX ORDER — MELOXICAM 15 MG/1
15 TABLET ORAL DAILY PRN
Qty: 30 TABLET | Refills: 1 | Status: SHIPPED | OUTPATIENT
Start: 2022-01-04 | End: 2022-02-09

## 2022-01-12 DIAGNOSIS — N95.1 SYMPTOMATIC MENOPAUSAL OR FEMALE CLIMACTERIC STATES: ICD-10-CM

## 2022-01-12 RX ORDER — SERTRALINE HYDROCHLORIDE 100 MG/1
TABLET, FILM COATED ORAL
Qty: 90 TABLET | Refills: 3 | Status: SHIPPED | OUTPATIENT
Start: 2022-01-12

## 2022-01-14 ENCOUNTER — RA CDI HCC (OUTPATIENT)
Dept: OTHER | Facility: HOSPITAL | Age: 57
End: 2022-01-14

## 2022-01-14 NOTE — PROGRESS NOTES
[facility]  coding opportunities      Chart Reviewed * (Number of) Inbasket suggestions sent to Provider: 1           Patients insurance company: [employer] (Medicare Advantage and Commercial)     Visit status: Patient canceled the appointment        [facility]  coding opportunities       Chart Reviewed * (Number of) Inbasket suggestions sent to Provider: 1           Patients insurance company: [employer] (Medicare Advantage and Commercial)        Appt on 1/21/22    Depression with Anxiety - can Depression be separately coded - please review if it can it be further classified with any one of the codes from F32 0 thru F32 5 [facility]) or F33 0 thru F33 9 ([facility] )    Note F41 8: Other specified anxiety disorders    F32 0 major depressive disorder, single episode, mild (HCC) OR   F32 1 major depressive disorder, single episode, moderate ([address] )  OR   F32 2 major depressive disorder, single episode, severe without psychotic features ([facility] )    OR   F33 0  Major depressive disorder, recurrent, mild ([address] ) OR   F33 1: Major depressive disorder, recurrent, moderate ([address] )  OR  F33 2  Major depressive disorder, recurrent, severe without psychotic features ([facility] ) OR   F33 40: Major depressive disorder, recurrent, in remission, unspecified ([address] ) OR  F33 41:  Major depressive disorder, recurrent, in partial remission (HCC) OR  F33 42   Major depressive disorder, recurrent, in full remission ([address] ) OR  F33 8   Other recurrent depressive disorders ([address] ) OR  F33 9   Major depressive disorder, recurrent, unspecified ([facility] )

## 2022-01-18 ENCOUNTER — OFFICE VISIT (OUTPATIENT)
Dept: FAMILY MEDICINE CLINIC | Facility: CLINIC | Age: 57
End: 2022-01-18
Payer: COMMERCIAL

## 2022-01-18 VITALS
HEART RATE: 85 BPM | HEIGHT: 66 IN | BODY MASS INDEX: 38.41 KG/M2 | WEIGHT: 239 LBS | TEMPERATURE: 97.5 F | SYSTOLIC BLOOD PRESSURE: 118 MMHG | DIASTOLIC BLOOD PRESSURE: 78 MMHG | OXYGEN SATURATION: 98 %

## 2022-01-18 DIAGNOSIS — Z23 NEED FOR INFLUENZA VACCINATION: ICD-10-CM

## 2022-01-18 DIAGNOSIS — M75.121 COMPLETE TEAR OF RIGHT ROTATOR CUFF, UNSPECIFIED WHETHER TRAUMATIC: ICD-10-CM

## 2022-01-18 DIAGNOSIS — M54.12 RIGHT CERVICAL RADICULOPATHY: Primary | ICD-10-CM

## 2022-01-18 PROCEDURE — 90471 IMMUNIZATION ADMIN: CPT

## 2022-01-18 PROCEDURE — 99214 OFFICE O/P EST MOD 30 MIN: CPT | Performed by: FAMILY MEDICINE

## 2022-01-18 PROCEDURE — 90682 RIV4 VACC RECOMBINANT DNA IM: CPT

## 2022-01-18 PROCEDURE — 3008F BODY MASS INDEX DOCD: CPT | Performed by: FAMILY MEDICINE

## 2022-01-18 PROCEDURE — 1036F TOBACCO NON-USER: CPT | Performed by: FAMILY MEDICINE

## 2022-01-18 RX ORDER — TRAMADOL HYDROCHLORIDE 50 MG/1
TABLET ORAL
Qty: 20 TABLET | Refills: 0 | Status: SHIPPED | OUTPATIENT
Start: 2022-01-18 | End: 2022-02-09

## 2022-01-18 RX ORDER — PREDNISONE 10 MG/1
TABLET ORAL
Qty: 30 TABLET | Refills: 0 | Status: SHIPPED | OUTPATIENT
Start: 2022-01-18 | End: 2022-02-09

## 2022-01-18 NOTE — PROGRESS NOTES
FAMILY PRACTICE OFFICE VISIT  [facility]  [address]  [address], [place], [address]      NAME: [facility] Lewis Mcmullen  AGE: 60 y o  SEX: female  : 1965   MRN: [id number]    DATE: 2022  TIME: 1:28 PM    Assessment and Plan     Problem List Items Addressed This Visit     Complete tear of right rotator cuff      Other Visit Diagnoses     Right cervical radiculopathy    -  Primary    Relevant Medications    traMADol (ULTRAM) 50 mg tablet    predniSONE 10 mg tablet          Patient Instructions   She has acute right cervical radiculopathy, pain into her right arm  Atraumatic  She does have known rotator cuff tear, head MRI back in   She does have good range of motion at shoulder, pain appears separate from that  She had used prednisone for respiratory infection/cough in December, she does request something for inflammation, after discussion of pros, cons she will use prednisone course again  She can use tramadol at home for acute pain, we did discuss controlled substance  Do not mix this with lorazepam   She has been using ibuprofen 800 mg twice daily, she can use that short-term with food, watch for stomach upset, stay on omeprazole  Not using Meloxicam   If not improving few days we can add gabapentin 300 mg at night  May need re-evaluation/therapy/imaging cervical spine, no prior issues with neck  She will cancel appointment here in 3 days, blood pressure is fine  She did have blood work back in November, TSH, CBC, CMP were okay other than mild elevation of glucose  We will see her again in 3 months with a routine physical, plan A1c, lipids at that time          Chief Complaint     Chief Complaint   Patient presents with    Shoulder Pain     right       History of Present Illness   Betty Boone is a 60y o -year-old female who has noted right cervical, suprascapular pain which radiates to right upper arm/elbow for the past few days, severe  No new trauma  Has longstanding issue with rotator cuff tear right shoulder, remains with good range of motion right shoulder, she feels this pain is different  Has no weakness in hand  No rash  She did use prednisone back in December for bronchial infection, responded well  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  Has been using ibuprofen 800 twice daily      Review of Systems   Review of Systems   Constitutional: Positive for fatigue (Chronic)  Negative for appetite change, fever and unexpected weight change  HENT: Negative for sore throat and trouble swallowing  Respiratory:        Cough, shortness of breath at baseline   Cardiovascular: Negative for chest pain, palpitations and leg swelling  Gastrointestinal: Negative for abdominal pain, blood in stool, nausea and vomiting  No increased acid reflux -uses omeprazole routinely  No change in bowel   Genitourinary: Negative for dysuria and hematuria  Neurological: Negative for dizziness, syncope, light-headedness and headaches  Psychiatric/Behavioral: Negative for behavioral problems and confusion         Active Problem List     Patient Active Problem List   Diagnosis    Abnormal mammogram of right breast    Acid reflux disease    Carpal tunnel syndrome    Colon polyp    Complete tear of right rotator cuff    Depression with anxiety    Disc degeneration, lumbar    HSV-1 (herpes simplex virus 1) infection    Chronic bilateral low back pain with right-sided sciatica    Migraine headache    Osteoarthritis    Pap smear abnormality of cervix with ASCUS favoring benign -  f/up normal    Symptomatic menopausal or female climacteric states    Class 2 obesity due to excess calories without serious comorbidity with body mass index (BMI) of 37 0 to 37 9 in adult    Hearing loss, left    Arthralgia    Reactive airway disease without complication    Chronic cough    Obstructive sleep apnea treated with BiPAP -  felt difficulty tolerating and stopped    Pulmonary nodules    Snoring    Essential hypertension    Borderline hyperglycemia    Former smoker    Asymptomatic varicose veins of right lower extremity    COVID-19 virus infection    Mixed hyperlipidemia    Varicose veins of lower extremity with pain, right ankle       Past Medical History:  Reviewed    Past Surgical History:  Reviewed    Family History:  Reviewed    Social History:   Reviewed    Objective     Vitals:    01/18/22 1301   BP: 118/78   BP Location: Left arm   Patient Position: Sitting   Cuff Size: Large   Pulse: 85   Temp: 97 5 °F (36 4 °C)   SpO2: 98%   Weight: 108 kg (239 lb)   Height: 5' 6" (1 676 m)     Body mass index is 38 58 kg/m²  BP Readings from Last 3 Encounters:   01/18/22 118/78   12/14/21 136/74   11/09/21 138/80       Wt Readings from Last 3 Encounters:   01/18/22 108 kg (239 lb)   12/19/21 105 kg (232 lb)   12/14/21 107 kg (236 lb)       Physical Exam  Constitutional:       Comments: She appears uncomfortable regarding right cervical, suprascapular pain  She does have surprisingly good range of motion right shoulder, only mild increased pain with range of motion shoulder  She is very tender right suprascapular, with compression cervical spine she does note increased pain right suprascapular into right upper arm  She has no weakness right upper extremity, good fine motor  She has no rash should area    No spinal/vertebral tenderness other than right-sided cervical spine         ALLERGIES:  Allergies   Allergen Reactions    Effexor [Venlafaxine] Other (See Comments)     Hot and sweaty    Levaquin [Levofloxacin] Myalgia     Tendonitis     Wellbutrin [Bupropion] Other (See Comments)     Hot and sweaty    Prempro [Conj Estrog-Medroxyprogest Ace] Rash       Current Medications     Current Outpatient Medications   Medication Sig Dispense Refill    amLODIPine (NORVASC) 5 mg tablet Take 1 tablet (5 mg total) by mouth daily 90 tablet 3    Calcium 500 + D3 250-500 MG-UNIT CHEW Chew      LYSINE PO Take 1 capsule by mouth daily as needed (for cold sore)       multivitamin (THERAGRAN) TABS Take 1 tablet by mouth daily      omeprazole (PriLOSEC) 20 mg delayed release capsule TAKE 1 CAPSULE DAILY AS NEEDED FOR STOMACH AND ACID 90 capsule 3    sertraline (ZOLOFT) 100 mg tablet TAKE 1 TABLET BY MOUTH EVERY DAY 90 tablet 3    valACYclovir (VALTREX) 1,000 mg tablet Take 1,000 mg by mouth 2 (two) times a day        Zinc Sulfate (ZINC-220 PO) Take by mouth      albuterol (2 5 mg/3 mL) 0 083 % nebulizer solution Take 1 vial (2 5 mg total) by nebulization every 4 (four) hours as needed for wheezing (via nebulizer) (Patient not taking: Reported on 9/25/2020) 25 vial 1    albuterol (PROVENTIL HFA,VENTOLIN HFA) 90 mcg/act inhaler Inhale 2 puffs every 6 (six) hours as needed for wheezing (Patient not taking: Reported on 1/18/2022 ) 18 g 0    fluticasone (FLONASE) 50 mcg/act nasal spray 1 spray into each nostril daily   (Patient not taking: Reported on 1/18/2022 )      LORazepam (ATIVAN) 0 5 mg tablet Take 1 tablet (0 5 mg total) by mouth every 6 (six) hours as needed for anxiety 20 tablet 0    meloxicam (MOBIC) 15 mg tablet TAKE 1 TABLET (15 MG TOTAL) BY MOUTH DAILY AS NEEDED (ARTHRITIS PAIN) 30 tablet 1    predniSONE 10 mg tablet Use 40mg x 3 days, 30 mg x 3 days, 20 mg x 3 days, 10 mg x 3 days then stop 30 tablet 0    traMADol (ULTRAM) 50 mg tablet Use every 4 hrs as needed for neck and arm pain 20 tablet 0     No current facility-administered medications for this visit  No orders of the defined types were placed in this encounter          Audrey Roberson DO

## 2022-01-18 NOTE — PATIENT INSTRUCTIONS
She has acute right cervical radiculopathy, pain into her right arm  Atraumatic  She does have known rotator cuff tear, head MRI back in 2017  She does have good range of motion at shoulder, pain appears separate from that  She had used prednisone for respiratory infection/cough in December, she does request something for inflammation, after discussion of pros, cons she will use prednisone course again  She can use tramadol at home for acute pain, we did discuss controlled substance  Do not mix this with lorazepam   She has been using ibuprofen 800 mg twice daily, she can use that short-term with food, watch for stomach upset, stay on omeprazole  Not using Meloxicam   If not improving few days we can add gabapentin 300 mg at night  May need re-evaluation/therapy/imaging cervical spine, no prior issues with neck  She will cancel appointment here in 3 days, blood pressure is fine  She did have blood work back in November, TSH, CBC, CMP were okay other than mild elevation of glucose  We will see her again in 3 months with a routine physical, plan A1c, lipids at that time

## 2022-01-19 PROBLEM — M54.12 CERVICAL RADICULAR PAIN: Status: ACTIVE | Noted: 2022-01-19

## 2022-01-21 ENCOUNTER — PATIENT MESSAGE (OUTPATIENT)
Dept: FAMILY MEDICINE CLINIC | Facility: CLINIC | Age: 57
End: 2022-01-21

## 2022-01-31 ENCOUNTER — HOSPITAL ENCOUNTER (OUTPATIENT)
Dept: MAMMOGRAPHY | Facility: CLINIC | Age: 57
Discharge: HOME | End: 2022-01-31
Payer: COMMERCIAL

## 2022-01-31 ENCOUNTER — HOSPITAL ENCOUNTER (OUTPATIENT)
Dept: ULTRASOUND IMAGING | Facility: CLINIC | Age: 57
Discharge: HOME | End: 2022-01-31
Payer: COMMERCIAL

## 2022-01-31 VITALS — BODY MASS INDEX: 37.28 KG/M2 | HEIGHT: 66 IN | WEIGHT: 232 LBS

## 2022-01-31 DIAGNOSIS — R92.8 ABNORMAL MAMMOGRAM: ICD-10-CM

## 2022-01-31 PROCEDURE — G0279 TOMOSYNTHESIS, MAMMO: HCPCS

## 2022-01-31 PROCEDURE — 76642 ULTRASOUND BREAST LIMITED: CPT

## 2022-01-31 PROCEDURE — 77066 DX MAMMO INCL CAD BI: CPT

## 2022-02-02 ENCOUNTER — OFFICE VISIT (OUTPATIENT)
Dept: OBGYN CLINIC | Facility: MEDICAL CENTER | Age: 57
End: 2022-02-02
Payer: COMMERCIAL

## 2022-02-02 ENCOUNTER — APPOINTMENT (OUTPATIENT)
Dept: RADIOLOGY | Facility: MEDICAL CENTER | Age: 57
End: 2022-02-02
Payer: COMMERCIAL

## 2022-02-02 VITALS
HEIGHT: 66 IN | WEIGHT: 235.6 LBS | TEMPERATURE: 98.7 F | SYSTOLIC BLOOD PRESSURE: 131 MMHG | HEART RATE: 90 BPM | DIASTOLIC BLOOD PRESSURE: 84 MMHG | BODY MASS INDEX: 37.86 KG/M2

## 2022-02-02 DIAGNOSIS — M54.2 NECK PAIN: ICD-10-CM

## 2022-02-02 DIAGNOSIS — M54.12 CERVICAL RADICULAR PAIN: Primary | ICD-10-CM

## 2022-02-02 DIAGNOSIS — M47.812 CERVICAL SPONDYLOSIS: ICD-10-CM

## 2022-02-02 PROCEDURE — 72040 X-RAY EXAM NECK SPINE 2-3 VW: CPT

## 2022-02-02 PROCEDURE — 3008F BODY MASS INDEX DOCD: CPT | Performed by: STUDENT IN AN ORGANIZED HEALTH CARE EDUCATION/TRAINING PROGRAM

## 2022-02-02 PROCEDURE — 1036F TOBACCO NON-USER: CPT | Performed by: STUDENT IN AN ORGANIZED HEALTH CARE EDUCATION/TRAINING PROGRAM

## 2022-02-02 PROCEDURE — 99244 OFF/OP CNSLTJ NEW/EST MOD 40: CPT | Performed by: STUDENT IN AN ORGANIZED HEALTH CARE EDUCATION/TRAINING PROGRAM

## 2022-02-02 NOTE — LETTER
February 9, 2022     [place],   [address]  [address]  [place]   [address]  [id number]    Patient: Tano Servin   YOB: 1965   Date of Visit: 2/2/2022       Dear Dr Armando George: Thank you for referring Alban Carmona to me for evaluation  Below are my notes for this consultation  If you have questions, please do not hesitate to call me  I look forward to following your patient along with you  Sincerely,        Henna Bhakta MD        CC: No Recipients  Henna Bhakta MD  2/9/2022  9:46 AM  Signed  1  Cervical radicular pain ( right )  Ambulatory Referral to Orthopedic Surgery   2  Neck pain  XR spine cervical 2 or 3 vw injury   3  Cervical spondylosis  Ambulatory Referral to Orthopedic Surgery     Orders Placed This Encounter   Procedures    XR spine cervical 2 or 3 vw injury        Imaging Studies (I personally reviewed images in PACS):     X-ray cervical spine 2/2/22: Moderate cervical spondylosis as prominent at C5-6 and mild at C4-5  Otherwise, no acute osseous abnormalities or malalignment  PLAN:     Clinical exam and radiographic imaging reviewed with patient today, with impression as per above  I have discussed with the patient the pathophysiology of this diagnosis and reviewed how the examination correlates with this diagnosis   Imaging obtained of her cervical spine today as noted above  I will follow-up official radiology interpretation   I counseled patient that her symptoms seem to be more consistent with radicular pain rather than rotator cuff pain  I discussed that in order to prevent recurrence of this pain in the future that attending formal physical therapy to facilitate recovery is recommended  Patient prefers to do home exercise at this time which were provided today  I counseled daily adherence to home exercises   In regards to pain control recommended p r n  Use of NSAIDs, acetaminophen  She can continue use of gabapentin 300 mg p o  CARMEN Sahu Proivy Return if symptoms worsen or fail to improve  Portions of the record may have been created with voice recognition software  Occasional wrong word or "sound a like" substitutions may have occurred due to the inherent limitations of voice recognition software  Read the chart carefully and recognize, using context, where substitutions have occurred  CHIEF COMPLAINT:  Chief Complaint   Patient presents with    Neck - Pain         HPI:  César Banda is a 60 y o  female  who presents in regards to referral from Dr Lucrecia Tran for       Visit 2/2/2022 :  Initial evaluation of neck and right arm pain:  Patient reports has been ongoing issue for the past 2 and half weeks without a precipitating injury  She describes that the pain radiates from her neck down her right arm to her elbow  She also reports tingling/numbness sensation of her right upper extremity intermittently  She describes the pain as burning  She denies any skin color changes/rashes, swelling  In regards to pain control she has been on a prednisone taper along with gabapentin, nsaids (mobic) as well as tramadol which provided some relief  Currently she feels that the pain is intermittent and is mainly aggravated when her arms are hanging by her side  She states she had gone to the chiropractor and had relief from the manipulation session sessions  However, she feels that the pain is starting to recur again and is interested in other methods of prevent any recurrence of this pain she states financially she cannot regularly go to the chiropractor  She states she has not seen a formal physical therapist for this issue before  She denies inadvertently dropping objects with her right hand  Denies prior injury/surgeries of her neck in the past   Denies fevers/chills          Medical, Surgical, Family, and Social History    Past Medical History:   Diagnosis Date    Acid reflux     Anxiety     Asthmatic bronchitis     Back pain     Carpal tunnel syndrome     COVID-19     Depression     Depression     Hyperglycemia     Hypertension     Lipoma     Migraine     Miscarriage     Osteoarthritis     PONV (postoperative nausea and vomiting)     Stress incontinence     UTI (urinary tract infection)      Past Surgical History:   Procedure Laterality Date    APPENDECTOMY      BREAST EXCISIONAL BIOPSY Left 03/2001    benign    COLONOSCOPY N/A 4/8/2016    Procedure: COLONOSCOPY;  Surgeon: Ema Lo MD;  [facility];   Service:     KNEE ARTHROSCOPY      [place] EXC SKIN BENIG <0 5 CM TRUNK,ARM,LEG Left 5/19/2016    Procedure: EXCISION LIPOMA SHOULDER ;  Surgeon: Ema Lo MD;  Location[facility];  Service: General    TUBAL LIGATION      WISDOM TOOTH EXTRACTION       Social History   Social History     Substance and Sexual Activity   Alcohol Use Yes    Comment: rarely/socially 1-2x/year     Social History     Substance and Sexual Activity   Drug Use No     Social History     Tobacco Use   Smoking Status Former Smoker    Packs/day: 0 50    Years: 15 00    Pack years: 7 50    Types: Cigarettes    Start date: 12    Quit date: 5/17/2006    Years since quitting: 15 7   Smokeless Tobacco Never Used     Family History   Problem Relation Age of Onset    Stroke Mother     Stomach cancer Mother 68    Skin cancer Father     Alzheimer's disease Father     No Known Problems Sister     Stroke Brother     No Known Problems Daughter     Heart disease Maternal Grandmother     No Known Problems Maternal Grandfather     Breast cancer Paternal Grandmother 48    No Known Problems Paternal Grandfather     No Known Problems Sister     No Known Problems Daughter     Breast cancer Cousin 39    Breast cancer Cousin 39    Breast cancer Cousin 48    No Known Problems Maternal Aunt     No Known Problems Maternal Aunt     No Known Problems Maternal Aunt     No Known Problems Maternal Aunt     No Known Problems Paternal Aunt     No Known Problems Paternal Aunt     No Known Problems Paternal Aunt     No Known Problems Paternal Aunt     No Known Problems Paternal Aunt     Lung cancer Paternal Uncle     Breast cancer Cousin 48     Allergies   Allergen Reactions    Effexor [Venlafaxine] Other (See Comments)     Hot and sweaty    Levaquin [Levofloxacin] Myalgia     Tendonitis     Wellbutrin [Bupropion] Other (See Comments)     Hot and sweaty    Prempro [Conj Estrog-Medroxyprogest Ace] Rash          Physical Exam  /84   Pulse 90   Temp 98 7 °F (37 1 °C)   Ht 5' 6" (1 676 m)   Wt 107 kg (235 lb 9 6 oz)   LMP  (LMP Unknown)   BMI 38 03 kg/m²     Constitutional:  see vital signs  Gen: obese, normocephalic/atraumatic, well-groomed  Eyes: No inflammation or discharge of conjunctiva or lids; sclera clear   Pharynx: no inflammation, lesion, or mass of lips  Neck: supple, no masses, non-distended  MSK: no inflammation, lesion, mass, or clubbing of nails and digits except for other than mentioned below  SKIN: no visible rashes or skin lesions  Pulmonary/Chest: Effort normal  No respiratory distress       Ortho Exam  Cervical  ROM: intact but limited in regards to lateral flexion, extension, and rotation due to reported pain  Midline spinous process tenderness: +C5/6  Muscular Tenderness: +right paracervical  Sensation UE Bilateral:  C5: normal  C6: normal  C7: normal  C8: normal  T1: normal  Strength UE: 5/5 elbow, wrist, fingers bilateral  Reflexes: 1+ bicipital/tricpital/brachioradilias  Spurlings: negative (aggravates parcervical neck pain)       Shoulder exam:       RIGHT    Inspection Erythema None     Swelling None     Increased Warmth None    Rotator cuff ER 5/5     IR 5/5     Abduction 5/5    ROM      Abduction 150     ER0 60     ER90 90     IR90 40     IRb T7    TTP:  none    Special Tests:       Cross body Adduction Negative     Speeds  Negative     Yergason's Negative     Drop arm Negative     Neer Negative     Ackerman Negative        UE NV Exam: +2 Radial pulses  Ok sign (median nerve): intact  Froment sign (ulnar nerve): intact  Thumb extension (radial nerve): 5/5 and intact            Procedures

## 2022-02-02 NOTE — PROGRESS NOTES
1  Cervical radicular pain ( right )  Ambulatory Referral to Orthopedic Surgery   2  Neck pain  XR spine cervical 2 or 3 vw injury   3  Cervical spondylosis  Ambulatory Referral to Orthopedic Surgery     Orders Placed This Encounter   Procedures    XR spine cervical 2 or 3 vw injury        Imaging Studies (I personally reviewed images in PACS):     X-ray cervical spine 2/2/22: Moderate cervical spondylosis as prominent at C5-6 and mild at C4-5  Otherwise, no acute osseous abnormalities or malalignment  PLAN:     Clinical exam and radiographic imaging reviewed with patient today, with impression as per above  I have discussed with the patient the pathophysiology of this diagnosis and reviewed how the examination correlates with this diagnosis   Imaging obtained of her cervical spine today as noted above  I will follow-up official radiology interpretation   I counseled patient that her symptoms seem to be more consistent with radicular pain rather than rotator cuff pain  I discussed that in order to prevent recurrence of this pain in the future that attending formal physical therapy to facilitate recovery is recommended  Patient prefers to do home exercise at this time which were provided today  I counseled daily adherence to home exercises   In regards to pain control recommended p r n  Use of NSAIDs, acetaminophen  She can continue use of gabapentin 300 mg p o  B i d  Return if symptoms worsen or fail to improve  Portions of the record may have been created with voice recognition software  Occasional wrong word or "sound a like" substitutions may have occurred due to the inherent limitations of voice recognition software  Read the chart carefully and recognize, using context, where substitutions have occurred       CHIEF COMPLAINT:  Chief Complaint   Patient presents with    Neck - Pain         HPI:  Bhanu Sender is a 60 y o  female  who presents in regards to referral from Dr Stephon Pope for       Visit 2/2/2022 :  Initial evaluation of neck and right arm pain:  Patient reports has been ongoing issue for the past 2 and half weeks without a precipitating injury  She describes that the pain radiates from her neck down her right arm to her elbow  She also reports tingling/numbness sensation of her right upper extremity intermittently  She describes the pain as burning  She denies any skin color changes/rashes, swelling  In regards to pain control she has been on a prednisone taper along with gabapentin, nsaids (mobic) as well as tramadol which provided some relief  Currently she feels that the pain is intermittent and is mainly aggravated when her arms are hanging by her side  She states she had gone to the chiropractor and had relief from the manipulation session sessions  However, she feels that the pain is starting to recur again and is interested in other methods of prevent any recurrence of this pain she states financially she cannot regularly go to the chiropractor  She states she has not seen a formal physical therapist for this issue before  She denies inadvertently dropping objects with her right hand  Denies prior injury/surgeries of her neck in the past   Denies fevers/chills  Medical, Surgical, Family, and Social History    Past Medical History:   Diagnosis Date    Acid reflux     Anxiety     Asthmatic bronchitis     Back pain     Carpal tunnel syndrome     COVID-19     Depression     Depression     Hyperglycemia     Hypertension     Lipoma     Migraine     Miscarriage     Osteoarthritis     PONV (postoperative nausea and vomiting)     Stress incontinence     UTI (urinary tract infection)      Past Surgical History:   Procedure Laterality Date    APPENDECTOMY      BREAST EXCISIONAL BIOPSY Left 03/2001    benign    COLONOSCOPY N/A 4/8/2016    Procedure: COLONOSCOPY;  Surgeon: Aaron River MD;  Location: [facility];   Service:     KNEE ARTHROSCOPY  TX EXC SKIN BENIG <0 5 CM TRUNK,ARM,LEG Left 5/19/2016    Procedure: EXCISION LIPOMA SHOULDER ;  Surgeon: Neva Rodriguez MD;  Location: [facility];  Service: General    TUBAL LIGATION      WISDOM TOOTH EXTRACTION       Social History   Social History     Substance and Sexual Activity   Alcohol Use Yes    Comment: rarely/socially 1-2x/year     Social History     Substance and Sexual Activity   Drug Use No     Social History     Tobacco Use   Smoking Status Former Smoker    Packs/day: 0 50    Years: 15 00    Pack years: 7 50    Types: Cigarettes    Start date: 12    Quit date: 5/17/2006    Years since quitting: 15 7   Smokeless Tobacco Never Used     Family History   Problem Relation Age of Onset    Stroke Mother     Stomach cancer Mother 68    Skin cancer Father     Alzheimer's disease Father     No Known Problems Sister     Stroke Brother     No Known Problems Daughter     Heart disease Maternal Grandmother     No Known Problems Maternal Grandfather     Breast cancer Paternal Grandmother 48    No Known Problems Paternal Grandfather     No Known Problems Sister     No Known Problems Daughter     Breast cancer Cousin 39    Breast cancer Cousin 39    Breast cancer Cousin 48    No Known Problems Maternal Aunt     No Known Problems Maternal Aunt     No Known Problems Maternal Aunt     No Known Problems Maternal Aunt     No Known Problems Paternal Aunt     No Known Problems Paternal Aunt     No Known Problems Paternal Aunt     No Known Problems Paternal Aunt     No Known Problems Paternal Aunt     Lung cancer Paternal Uncle     Breast cancer Cousin 50     Allergies   Allergen Reactions    Effexor [Venlafaxine] Other (See Comments)     Hot and sweaty    Levaquin [Levofloxacin] Myalgia     Tendonitis     Wellbutrin [Bupropion] Other (See Comments)     Hot and sweaty    Prempro [Conj Estrog-Medroxyprogest Ace] Rash          Physical Exam  /84   Pulse 90   Temp 98 7 °F (37 1 °C)   Ht 5' 6" (1 676 m)   Wt 107 kg (235 lb 9 6 oz)   LMP  (LMP Unknown)   BMI 38 03 kg/m²     Constitutional:  see vital signs  Gen: obese, normocephalic/atraumatic, well-groomed  Eyes: No inflammation or discharge of conjunctiva or lids; sclera clear   Pharynx: no inflammation, lesion, or mass of lips  Neck: supple, no masses, non-distended  MSK: no inflammation, lesion, mass, or clubbing of nails and digits except for other than mentioned below  SKIN: no visible rashes or skin lesions  Pulmonary/Chest: Effort normal  No respiratory distress       Ortho Exam  Cervical  ROM: intact but limited in regards to lateral flexion, extension, and rotation due to reported pain  Midline spinous process tenderness: +C5/6  Muscular Tenderness: +right paracervical  Sensation UE Bilateral:  C5: normal  C6: normal  C7: normal  C8: normal  T1: normal  Strength UE: 5/5 elbow, wrist, fingers bilateral  Reflexes: 1+ bicipital/tricpital/brachioradilias  Spurlings: negative (aggravates parcervical neck pain)       Shoulder exam:       RIGHT    Inspection Erythema None     Swelling None     Increased Warmth None    Rotator cuff ER 5/5     IR 5/5     Abduction 5/5    ROM      Abduction 150     ER0 60     ER90 90     IR90 40     IRb T7    TTP:  none    Special Tests:       Cross body Adduction Negative     Speeds  Negative     Yergason's Negative     Drop arm Negative     Neer Negative     Ackerman Negative        UE NV Exam: +2 Radial pulses  Ok sign (median nerve): intact  Froment sign (ulnar nerve): intact  Thumb extension (radial nerve): 5/5 and intact            Procedures

## 2022-03-04 ENCOUNTER — VBI (OUTPATIENT)
Dept: ADMINISTRATIVE | Facility: OTHER | Age: 57
End: 2022-03-04

## 2022-03-28 DIAGNOSIS — J30.9 ALLERGIC RHINITIS WITH POSTNASAL DRIP: ICD-10-CM

## 2022-03-28 DIAGNOSIS — R05.3 CHRONIC COUGH: Primary | ICD-10-CM

## 2022-03-28 DIAGNOSIS — R09.82 ALLERGIC RHINITIS WITH POSTNASAL DRIP: ICD-10-CM

## 2022-03-28 DIAGNOSIS — F41.8 DEPRESSION WITH ANXIETY: ICD-10-CM

## 2022-03-29 RX ORDER — FLUTICASONE PROPIONATE 50 MCG
1 SPRAY, SUSPENSION (ML) NASAL DAILY
Qty: 16 G | Refills: 5 | Status: SHIPPED | OUTPATIENT
Start: 2022-03-29

## 2022-03-29 RX ORDER — LORAZEPAM 0.5 MG/1
0.5 TABLET ORAL EVERY 6 HOURS PRN
Qty: 20 TABLET | Refills: 0 | Status: SHIPPED | OUTPATIENT
Start: 2022-03-29

## 2022-04-05 ENCOUNTER — TELEPHONE (OUTPATIENT)
Dept: OBGYN CLINIC | Facility: CLINIC | Age: 57
End: 2022-04-05

## 2022-04-05 NOTE — TELEPHONE ENCOUNTER
Pt is getting a bill from [address] [place] [address] for $250 for breast ultrasound done on 1/31/22  She is stating this is the 3rd time she has called and that Dr Jacinta Benton always orders her the ultrasound with her fabienne  She is being told this will go to collections April 20

## 2022-04-11 ENCOUNTER — RA CDI HCC (OUTPATIENT)
Dept: OTHER | Facility: HOSPITAL | Age: 57
End: 2022-04-11

## 2022-04-11 NOTE — PROGRESS NOTES
[facility]  coding opportunities          Chart Reviewed number of suggestions sent to Provider: 1     Patients Insurance        Commercial Insurance: [employer] on 4/18/22    Please review if this is correct and assess and document if applicable       E66 01: Morbid (severe) obesity due to excess calories (HCC)     Per CMS/ICD 10 coding guidelines, to be used when BMI > 35 & <40 with one or more comorbidity (DM, HTN, GERD or PRAVEENA)

## 2022-04-13 ENCOUNTER — TELEPHONE (OUTPATIENT)
Dept: OBGYN CLINIC | Facility: CLINIC | Age: 57
End: 2022-04-13

## 2022-04-13 DIAGNOSIS — K21.9 GASTROESOPHAGEAL REFLUX DISEASE WITHOUT ESOPHAGITIS: ICD-10-CM

## 2022-04-13 RX ORDER — OMEPRAZOLE 20 MG/1
CAPSULE, DELAYED RELEASE ORAL
Qty: 90 CAPSULE | Refills: 3 | Status: SHIPPED | OUTPATIENT
Start: 2022-04-13

## 2022-04-13 NOTE — TELEPHONE ENCOUNTER
----- Message from [id number] [facility]  Reppert sent at 2022  3:31 PM EDT -----  Regardin [address]! I just wanted to thank you for taking care of this for me! I appreciate your hard work! I need to fill out a comment card! Daria Chandler has a [employer] in you!    Thanks again  [employer]

## 2022-04-17 PROBLEM — U07.1 COVID-19 VIRUS INFECTION: Status: RESOLVED | Noted: 2021-02-04 | Resolved: 2022-04-17

## 2022-04-17 NOTE — PATIENT INSTRUCTIONS
Reviewed health history along with medications  Her blood pressure today is double, stay on amlodipine 5 mg daily as is  We did review previous blood work, I would like her to redo fasting lipids, BMP along with A1c  She is up to date with Lipid screening  Cholesterol 1 year ago was 235 with HDL 48, , triglyceride 196  Await redo   She is up to date with Diabetes screening  TSH in November was fine at 2 3   CBC, CMP otherwise unremarkable in November  She will continue to see pulmonology, had CT scan in January that showed stable nodules  Regarding apnea she has been intolerant BiPAP  She did see orthopedist in February, had x-ray of cervical spine showing spondylosis-has chronic back issues, neck issues  I had prescribed prednisone back in January regarding cervical radicular complaints, she had seen her chiropractor, symptoms did improve  She does continue with chronic arthritic pain, does have issues with sleep, I would like her to try gabapentin 300 mg at bedtime  She had been off of that  Avoid using lorazepam if can  She does continue on sertraline 100 mg daily  Also had used prednisone course in December regarding cough, try to avoid steroids if can  Previous rheumatoid factor was negative, CRP 5 6, negative Lyme testing    Immunization History   Administered Date(s) Administered    COVID-19 PFIZER VACCINE 0 3 ML IM 09/04/2021    INFLUENZA 11/16/2007, 10/15/2008, 09/24/2009, 09/23/2010, 11/09/2018    Influenza Quadrivalent Preservative Free 3 years and older IM 10/09/2014, 09/25/2017    Influenza, recombinant, quadrivalent,injectable, preservative free 11/09/2018, 12/05/2019, 10/21/2020, 01/18/2022    Influenza, seasonal, injectable 11/01/2010    Pneumococcal Polysaccharide PPV23 12/05/2019    Td (adult), adsorbed 06/12/2017    Tdap 06/01/2017    Tuberculin Skin Test-PPD Intradermal 08/27/2001     She should do yearly Flu shot     Tdap/tetanus shot is up to date  (done every 10 yrs for superficial cuts, every 5 yrs for deep wounds)   Can also look into coverage for new shingles shot, Shingrix  Can do that here or at pharmacy  Covid vaccine rcvd x 1 -  Consider redo -   Had covid in past     Regarding Colon Cancer screening, she had undergone EGD along with colonoscopy in September of 2020, redo colonoscopy 2025  Call if increased acid reflux, does use omeprazole daily-   No inc issues    She does see her Gynecologist routinely  She saw them in December  Discussed screening Mammogram, this is up-to-date  She had mammogram along with ultrasound right breast in January  Regarding Hepatitis C Screening - she will not do Hepatitis C screen  low risk  She thinks she had neg in past     Continue to try to watch healthy diet, exercise routinely  We will see her again in 6 months, sooner as needed

## 2022-04-18 ENCOUNTER — OFFICE VISIT (OUTPATIENT)
Dept: FAMILY MEDICINE CLINIC | Facility: CLINIC | Age: 57
End: 2022-04-18
Payer: COMMERCIAL

## 2022-04-18 VITALS
DIASTOLIC BLOOD PRESSURE: 78 MMHG | HEART RATE: 72 BPM | BODY MASS INDEX: 37.93 KG/M2 | HEIGHT: 66 IN | TEMPERATURE: 98.2 F | OXYGEN SATURATION: 98 % | SYSTOLIC BLOOD PRESSURE: 134 MMHG | WEIGHT: 236 LBS

## 2022-04-18 DIAGNOSIS — G43.909 MIGRAINE WITHOUT STATUS MIGRAINOSUS, NOT INTRACTABLE, UNSPECIFIED MIGRAINE TYPE: ICD-10-CM

## 2022-04-18 DIAGNOSIS — I10 ESSENTIAL HYPERTENSION: ICD-10-CM

## 2022-04-18 DIAGNOSIS — J45.20 MILD INTERMITTENT REACTIVE AIRWAY DISEASE WITHOUT COMPLICATION: ICD-10-CM

## 2022-04-18 DIAGNOSIS — E78.2 MIXED HYPERLIPIDEMIA: ICD-10-CM

## 2022-04-18 DIAGNOSIS — K21.9 GASTROESOPHAGEAL REFLUX DISEASE WITHOUT ESOPHAGITIS: ICD-10-CM

## 2022-04-18 DIAGNOSIS — R73.9 BORDERLINE HYPERGLYCEMIA: ICD-10-CM

## 2022-04-18 DIAGNOSIS — E66.01 CLASS 2 SEVERE OBESITY DUE TO EXCESS CALORIES WITH SERIOUS COMORBIDITY AND BODY MASS INDEX (BMI) OF 38.0 TO 38.9 IN ADULT (HCC): ICD-10-CM

## 2022-04-18 DIAGNOSIS — Z00.00 ENCOUNTER FOR ANNUAL PHYSICAL EXAM: Primary | ICD-10-CM

## 2022-04-18 DIAGNOSIS — M54.12 CERVICAL RADICULAR PAIN: ICD-10-CM

## 2022-04-18 DIAGNOSIS — M54.41 CHRONIC BILATERAL LOW BACK PAIN WITH RIGHT-SIDED SCIATICA: ICD-10-CM

## 2022-04-18 DIAGNOSIS — R05.3 CHRONIC COUGH: ICD-10-CM

## 2022-04-18 DIAGNOSIS — G89.29 CHRONIC BILATERAL LOW BACK PAIN WITH RIGHT-SIDED SCIATICA: ICD-10-CM

## 2022-04-18 DIAGNOSIS — G47.33 OBSTRUCTIVE SLEEP APNEA TREATED WITH BIPAP: ICD-10-CM

## 2022-04-18 DIAGNOSIS — F41.8 DEPRESSION WITH ANXIETY: ICD-10-CM

## 2022-04-18 PROCEDURE — 1036F TOBACCO NON-USER: CPT | Performed by: FAMILY MEDICINE

## 2022-04-18 PROCEDURE — 3008F BODY MASS INDEX DOCD: CPT | Performed by: FAMILY MEDICINE

## 2022-04-18 PROCEDURE — 99396 PREV VISIT EST AGE 40-64: CPT | Performed by: FAMILY MEDICINE

## 2022-04-18 RX ORDER — GABAPENTIN 300 MG/1
300 CAPSULE ORAL
Qty: 60 CAPSULE | Refills: 0
Start: 2022-04-18

## 2022-04-18 NOTE — PROGRESS NOTES
BMI Counseling: Body mass index is 38 09 kg/m²  The BMI is above normal  Nutrition recommendations include decreasing portion sizes, encouraging healthy choices of fruits and vegetables and moderation in carbohydrate intake  Exercise recommendations include exercising 3-5 times per week  Rationale for BMI follow-up plan is due to patient being overweight or obese  FAMILY PRACTICE OFFICE VISIT  Brian KEBEDE O  [facility]  [address]  [address], [place], [facility]      NAME: Bailey Mcmullen  AGE: 59 y o  SEX: female  : 1965   MRN: [id number]    DATE: 2022  TIME: 9:12 AM    Assessment and Plan     Problem List Items Addressed This Visit     Acid reflux disease    Depression with anxiety    Chronic bilateral low back pain with right-sided sciatica    Relevant Medications    gabapentin (Neurontin) 300 mg capsule    Migraine headache    Relevant Medications    gabapentin (Neurontin) 300 mg capsule    Class 2 obesity due to excess calories without serious comorbidity with body mass index (BMI) of 37 0 to 37 9 in adult    Reactive airway disease without complication    Chronic cough    Obstructive sleep apnea treated with BiPAP -  felt difficulty tolerating and stopped    Essential hypertension    Relevant Orders    Basic metabolic panel    Borderline hyperglycemia    Relevant Orders    Hemoglobin A1c (w/out EAG)    Mixed hyperlipidemia    Relevant Orders    Lipid panel    Cervical radicular pain ( right )    Relevant Medications    gabapentin (Neurontin) 300 mg capsule      Other Visit Diagnoses     Encounter for annual physical exam    -  Primary          Patient Instructions     Reviewed health history along with medications  Her blood pressure today is double, stay on amlodipine 5 mg daily as is  We did review previous blood work, I would like her to redo fasting lipids, BMP along with A1c  She is up to date with Lipid screening  Cholesterol 1 year ago was 235 with HDL 48, , triglyceride 196  Await redo   She is up to date with Diabetes screening  TSH in November was fine at 2 3   CBC, CMP otherwise unremarkable in November  She will continue to see pulmonology, had CT scan in January that showed stable nodules  Regarding apnea she has been intolerant BiPAP  She did see orthopedist in February, had x-ray of cervical spine showing spondylosis-has chronic back issues, neck issues  I had prescribed prednisone back in January regarding cervical radicular complaints, she had seen her chiropractor, symptoms did improve  She does continue with chronic arthritic pain, does have issues with sleep, I would like her to try gabapentin 300 mg at bedtime  She had been off of that  Avoid using lorazepam if can  She does continue on sertraline 100 mg daily  Also had used prednisone course in December regarding cough, try to avoid steroids if can  Previous rheumatoid factor was negative, CRP 5 6, negative Lyme testing    Immunization History   Administered Date(s) Administered    COVID-19 PFIZER VACCINE 0 3 ML IM 09/04/2021    INFLUENZA 11/16/2007, 10/15/2008, 09/24/2009, 09/23/2010, 11/09/2018    Influenza Quadrivalent Preservative Free 3 years and older IM 10/09/2014, 09/25/2017    Influenza, recombinant, quadrivalent,injectable, preservative free 11/09/2018, 12/05/2019, 10/21/2020, 01/18/2022    Influenza, seasonal, injectable 11/01/2010    Pneumococcal Polysaccharide PPV23 12/05/2019    Td (adult), adsorbed 06/12/2017    Tdap 06/01/2017    Tuberculin Skin Test-PPD Intradermal 08/27/2001     She should do yearly Flu shot  Tdap/tetanus shot is up to date  (done every 10 yrs for superficial cuts, every 5 yrs for deep wounds)   Can also look into coverage for new shingles shot, Shingrix  Can do that here or at pharmacy    Covid vaccine rcvd x 1 -  Consider redo -   Had covid in past     Regarding Colon Cancer screening, she had undergone EGD along with colonoscopy in September of 2020, redo colonoscopy 2025  Call if increased acid reflux, does use omeprazole daily-   No inc issues    She does see her Gynecologist routinely  She saw them in December  Discussed screening Mammogram, this is up-to-date  She had mammogram along with ultrasound right breast in January  Regarding Hepatitis C Screening - she will not do Hepatitis C screen  low risk  She thinks she had neg in past     Continue to try to watch healthy diet, exercise routinely  We will see her again in 6 months, sooner as needed  Chief Complaint     Chief Complaint   Patient presents with    Physical Exam       History of Present Illness   Gale Monson is a 59y o -year-old female who is in today for a routine follow-up/physical, overall she has been doing okay other than ongoing aches and pains  She did see her chiropractor, neck pain improved after visit  Does continue with pains especially in low back, feet, hands  Does try to remain active  Has not required rescue inhaler, is using other meds as directed      Review of Systems   Review of Systems   Constitutional: Positive for fatigue (up and down)  Negative for appetite change, fever and unexpected weight change  HENT: Negative for sore throat and trouble swallowing  Some allergy sx   Respiratory: Negative for cough (ok recently -  no need Albuterol), chest tightness, shortness of breath and wheezing  Cardiovascular: Negative for chest pain, palpitations and leg swelling  Gastrointestinal: Negative for abdominal pain, blood in stool, nausea and vomiting  No inc acid reflux   -   On PPI QD  No change in bowel   Genitourinary: Negative for dysuria and hematuria  Musculoskeletal: Positive for arthralgias, back pain, neck pain and neck stiffness  Neurological: Negative for dizziness, syncope, light-headedness and headaches (occ -  no recent migraines)     Hematological: Does not bruise/bleed easily  Psychiatric/Behavioral: Negative for behavioral problems and confusion  Active Problem List     Patient Active Problem List   Diagnosis    Abnormal mammogram of right breast    Acid reflux disease    Carpal tunnel syndrome    Colon polyp    Complete tear of right rotator cuff    Depression with anxiety    Disc degeneration, lumbar    HSV-1 (herpes simplex virus 1) infection    Chronic bilateral low back pain with right-sided sciatica    Migraine headache    Osteoarthritis    Pap smear abnormality of cervix with ASCUS favoring benign -  f/up normal    Symptomatic menopausal or female climacteric states    Class 2 obesity due to excess calories without serious comorbidity with body mass index (BMI) of 37 0 to 37 9 in adult    Hearing loss, left    Arthralgia    Reactive airway disease without complication    Chronic cough    Obstructive sleep apnea treated with BiPAP -  felt difficulty tolerating and stopped    Pulmonary nodules    Snoring    Essential hypertension    Borderline hyperglycemia    Former smoker    Asymptomatic varicose veins of right lower extremity    Mixed hyperlipidemia    Varicose veins of lower extremity with pain, right ankle    Cervical radicular pain ( right )       Past Medical History:  Reviewed    Past Surgical History:  Reviewed    Family History:  Reviewed    Social History:  Reviewed    Objective     Vitals:    04/18/22 0817   BP: 134/78   BP Location: Left arm   Patient Position: Sitting   Cuff Size: Large   Pulse: 72   Temp: 98 2 °F (36 8 °C)   SpO2: 98%   Weight: 107 kg (236 lb)   Height: 5' 6" (1 676 m)     Body mass index is 38 09 kg/m²      BP Readings from Last 3 Encounters:   04/18/22 134/78   02/02/22 131/84   01/18/22 118/78       Wt Readings from Last 3 Encounters:   04/18/22 107 kg (236 lb)   02/02/22 107 kg (235 lb 9 6 oz)   01/31/22 105 kg (232 lb)       Physical Exam  Constitutional:       General: She is not in acute distress  Appearance: Normal appearance  She is well-developed  She is not ill-appearing  HENT:      Right Ear: Tympanic membrane normal       Left Ear: Tympanic membrane normal    Eyes:      General: No scleral icterus  Neck:      Vascular: No carotid bruit  Cardiovascular:      Rate and Rhythm: Normal rate and regular rhythm  Heart sounds: Normal heart sounds  No murmur heard  Pulmonary:      Effort: Pulmonary effort is normal  No respiratory distress  Breath sounds: Normal breath sounds  No wheezing, rhonchi or rales  Abdominal:      Palpations: Abdomen is soft  Tenderness: There is no abdominal tenderness  Musculoskeletal:      Right lower leg: No edema  Left lower leg: No edema  Comments: ( notes some swelling R ankle as day progresses ) -  Danyelle Alivia now   Lymphadenopathy:      Cervical: No cervical adenopathy  Skin:     Coloration: Skin is not jaundiced  Neurological:      Mental Status: She is alert and oriented to person, place, and time     Psychiatric:         Mood and Affect: Mood normal          Behavior: Behavior normal          ALLERGIES:  Allergies   Allergen Reactions    Effexor [Venlafaxine] Other (See Comments)     Hot and sweaty    Levaquin [Levofloxacin] Myalgia     Tendonitis     Wellbutrin [Bupropion] Other (See Comments)     Hot and sweaty    Prempro [Conj Estrog-Medroxyprogest Ace] Rash       Current Medications     Current Outpatient Medications   Medication Sig Dispense Refill    amLODIPine (NORVASC) 5 mg tablet Take 1 tablet (5 mg total) by mouth daily 90 tablet 3    Calcium 500 + D3 250-500 MG-UNIT CHEW Chew      fluticasone (FLONASE) 50 mcg/act nasal spray 1 spray into each nostril daily 16 g 5    gabapentin (Neurontin) 300 mg capsule Take 1 capsule (300 mg total) by mouth daily at bedtime 60 capsule 0    multivitamin (THERAGRAN) TABS Take 1 tablet by mouth daily      omeprazole (PriLOSEC) 20 mg delayed release capsule TAKE 1 CAPSULE DAILY AS NEEDED FOR STOMACH AND ACID 90 capsule 3    sertraline (ZOLOFT) 100 mg tablet TAKE 1 TABLET BY MOUTH EVERY DAY 90 tablet 3    Zinc Sulfate (ZINC-220 PO) Take by mouth      albuterol (2 5 mg/3 mL) 0 083 % nebulizer solution Take 1 vial (2 5 mg total) by nebulization every 4 (four) hours as needed for wheezing (via nebulizer) (Patient not taking: Reported on 9/25/2020) 25 vial 1    albuterol (PROVENTIL HFA,VENTOLIN HFA) 90 mcg/act inhaler Inhale 2 puffs every 6 (six) hours as needed for wheezing 18 g 0    LORazepam (ATIVAN) 0 5 mg tablet Take 1 tablet (0 5 mg total) by mouth every 6 (six) hours as needed for anxiety 20 tablet 0    LYSINE PO Take 1 capsule by mouth daily as needed (for cold sore)  (Patient not taking: Reported on 4/18/2022 )      valACYclovir (VALTREX) 1,000 mg tablet Take 1,000 mg by mouth 2 (two) times a day   (Patient not taking: Reported on 4/18/2022 )       No current facility-administered medications for this visit              Orders Placed This Encounter   Procedures    Basic metabolic panel    Lipid panel    Hemoglobin A1c (w/out EAG)         Bambi Torres DO

## 2022-07-01 LAB
BUN SERPL-MCNC: 17 MG/DL (ref 7–25)
BUN/CREAT SERPL: ABNORMAL (CALC) (ref 6–22)
CALCIUM SERPL-MCNC: 9.6 MG/DL (ref 8.6–10.4)
CHLORIDE SERPL-SCNC: 102 MMOL/L (ref 98–110)
CHOLEST SERPL-MCNC: 233 MG/DL
CHOLEST/HDLC SERPL: 4.4 (CALC)
CO2 SERPL-SCNC: 27 MMOL/L (ref 20–32)
CREAT SERPL-MCNC: 0.64 MG/DL (ref 0.5–1.05)
GLUCOSE SERPL-MCNC: 108 MG/DL (ref 65–99)
HBA1C MFR BLD: 5.3 % OF TOTAL HGB
HDLC SERPL-MCNC: 53 MG/DL
LDLC SERPL CALC-MCNC: 149 MG/DL (CALC)
NONHDLC SERPL-MCNC: 180 MG/DL (CALC)
POTASSIUM SERPL-SCNC: 4.4 MMOL/L (ref 3.5–5.3)
SL AMB EGFR AFRICAN AMERICAN: 113 ML/MIN/1.73M2
SL AMB EGFR NON AFRICAN AMERICAN: 98 ML/MIN/1.73M2
SODIUM SERPL-SCNC: 138 MMOL/L (ref 135–146)
TRIGL SERPL-MCNC: 168 MG/DL

## 2022-08-11 ENCOUNTER — VBI (OUTPATIENT)
Dept: ADMINISTRATIVE | Facility: OTHER | Age: 57
End: 2022-08-11

## 2022-08-12 DIAGNOSIS — I10 ESSENTIAL HYPERTENSION: ICD-10-CM

## 2022-08-12 DIAGNOSIS — M54.41 CHRONIC BILATERAL LOW BACK PAIN WITH RIGHT-SIDED SCIATICA: ICD-10-CM

## 2022-08-12 DIAGNOSIS — M54.12 CERVICAL RADICULAR PAIN: ICD-10-CM

## 2022-08-12 DIAGNOSIS — G89.29 CHRONIC BILATERAL LOW BACK PAIN WITH RIGHT-SIDED SCIATICA: ICD-10-CM

## 2022-08-12 DIAGNOSIS — F41.8 DEPRESSION WITH ANXIETY: ICD-10-CM

## 2022-08-12 RX ORDER — AMLODIPINE BESYLATE 5 MG/1
TABLET ORAL
Qty: 90 TABLET | Refills: 3 | Status: SHIPPED | OUTPATIENT
Start: 2022-08-12 | End: 2022-08-12 | Stop reason: SDUPTHER

## 2022-08-12 RX ORDER — LORAZEPAM 0.5 MG/1
0.5 TABLET ORAL EVERY 6 HOURS PRN
Qty: 20 TABLET | Refills: 0 | Status: SHIPPED | OUTPATIENT
Start: 2022-08-12

## 2022-08-12 RX ORDER — AMLODIPINE BESYLATE 5 MG/1
5 TABLET ORAL DAILY
Qty: 90 TABLET | Refills: 3 | Status: SHIPPED | OUTPATIENT
Start: 2022-08-12

## 2022-08-12 RX ORDER — GABAPENTIN 300 MG/1
300 CAPSULE ORAL
Qty: 90 CAPSULE | Refills: 1 | Status: SHIPPED | OUTPATIENT
Start: 2022-08-12 | End: 2023-02-11

## 2022-08-12 RX ORDER — GABAPENTIN 300 MG/1
300 CAPSULE ORAL
Qty: 60 CAPSULE | Refills: 0 | Status: SHIPPED | OUTPATIENT
Start: 2022-08-12 | End: 2022-08-12 | Stop reason: SDUPTHER

## 2022-08-12 NOTE — TELEPHONE ENCOUNTER
Medication:Ativan 0 5mgs  Medication failed [employer] protocol  Please forward to your office staff for further review as this medication was reviewed by a [employer] RN

## 2022-09-27 DIAGNOSIS — R09.82 ALLERGIC RHINITIS WITH POSTNASAL DRIP: ICD-10-CM

## 2022-09-27 DIAGNOSIS — R05.3 CHRONIC COUGH: ICD-10-CM

## 2022-09-27 DIAGNOSIS — J30.9 ALLERGIC RHINITIS WITH POSTNASAL DRIP: ICD-10-CM

## 2022-09-27 RX ORDER — FLUTICASONE PROPIONATE 50 MCG
SPRAY, SUSPENSION (ML) NASAL
Qty: 48 ML | Refills: 1 | Status: SHIPPED | OUTPATIENT
Start: 2022-09-27

## 2022-10-12 ENCOUNTER — RA CDI HCC (OUTPATIENT)
Dept: OTHER | Facility: HOSPITAL | Age: 57
End: 2022-10-12

## 2022-10-12 NOTE — PROGRESS NOTES
[facility]  [facility]       Chart reviewed, no opportunity found: CHART REVIEWED, NO OPPORTUNITY FOUND        Patients Insurance        Commercial Insurance: [address] [place] [address]

## 2022-10-18 ENCOUNTER — OFFICE VISIT (OUTPATIENT)
Dept: FAMILY MEDICINE CLINIC | Facility: CLINIC | Age: 57
End: 2022-10-18
Payer: COMMERCIAL

## 2022-10-18 VITALS
HEART RATE: 82 BPM | TEMPERATURE: 97.8 F | OXYGEN SATURATION: 98 % | WEIGHT: 242 LBS | BODY MASS INDEX: 38.89 KG/M2 | DIASTOLIC BLOOD PRESSURE: 84 MMHG | SYSTOLIC BLOOD PRESSURE: 130 MMHG | HEIGHT: 66 IN

## 2022-10-18 DIAGNOSIS — R73.9 BORDERLINE HYPERGLYCEMIA: ICD-10-CM

## 2022-10-18 DIAGNOSIS — K21.9 GASTROESOPHAGEAL REFLUX DISEASE WITHOUT ESOPHAGITIS: ICD-10-CM

## 2022-10-18 DIAGNOSIS — G89.29 CHRONIC BILATERAL LOW BACK PAIN WITH RIGHT-SIDED SCIATICA: ICD-10-CM

## 2022-10-18 DIAGNOSIS — G43.909 MIGRAINE WITHOUT STATUS MIGRAINOSUS, NOT INTRACTABLE, UNSPECIFIED MIGRAINE TYPE: ICD-10-CM

## 2022-10-18 DIAGNOSIS — M54.12 CERVICAL RADICULAR PAIN: ICD-10-CM

## 2022-10-18 DIAGNOSIS — G47.33 OBSTRUCTIVE SLEEP APNEA TREATED WITH BIPAP: ICD-10-CM

## 2022-10-18 DIAGNOSIS — M54.41 CHRONIC BILATERAL LOW BACK PAIN WITH RIGHT-SIDED SCIATICA: ICD-10-CM

## 2022-10-18 DIAGNOSIS — F41.8 DEPRESSION WITH ANXIETY: ICD-10-CM

## 2022-10-18 DIAGNOSIS — Z23 NEED FOR INFLUENZA VACCINATION: ICD-10-CM

## 2022-10-18 DIAGNOSIS — I10 ESSENTIAL HYPERTENSION: Primary | ICD-10-CM

## 2022-10-18 DIAGNOSIS — E78.2 MIXED HYPERLIPIDEMIA: ICD-10-CM

## 2022-10-18 PROCEDURE — 99214 OFFICE O/P EST MOD 30 MIN: CPT | Performed by: FAMILY MEDICINE

## 2022-10-18 PROCEDURE — 90471 IMMUNIZATION ADMIN: CPT

## 2022-10-18 PROCEDURE — 90682 RIV4 VACC RECOMBINANT DNA IM: CPT

## 2022-10-18 NOTE — PATIENT INSTRUCTIONS
Her blood pressure after sitting is 130/84, at home she has been running around 134/72  She will continue amlodipine 5 mg daily  Continue sertraline 100 mg daily, rarely uses lorazepam     Doing well with omeprazole daily, can continue as is  She will be seeing Pulmonary again in January with redo CT scan  As before she is intolerant BiPAP  Does continue with back pain, shoulder pain, has been having pain in feet  Can see orthopedist, chiropractor, Podiatry as needed  Try to avoid excess anti-inflammatories  Can use Tylenol, does use Biofreeze, lidocaine patch  Uses proper footwear  Continue to try to exercise as tolerated, has gained few lb, fortunately back in July A1c was only 5 3  Glucose 108  In July cholesterol was 233 with HDL 53, , she does have 5 to 6% 10 year cardiovascular risk, redo lipids next year, consider future statin  TSH 1 year ago was okay at 2 3, CBC was okay at that time  She has not required albuterol recently      We will see her again in 6 months with a regular physical, redo blood work at that time, call sooner if needed

## 2022-10-18 NOTE — PROGRESS NOTES
FAMILY PRACTICE OFFICE VISIT  [facility]  [address]  [address], [place], [address]      NAME: Joe Mcmullen  AGE: 59 y o  SEX: female  : 1965   MRN: [id number]    DATE: 10/18/2022  TIME: 12:40 PM    Assessment and Plan     Problem List Items Addressed This Visit     Borderline hyperglycemia    Chronic bilateral low back pain with right-sided sciatica    Cervical radicular pain ( right )    Acid reflux disease    Essential hypertension - Primary    Obstructive sleep apnea treated with BiPAP -  felt difficulty tolerating and stopped    Migraine headache    Depression with anxiety    Mixed hyperlipidemia      Other Visit Diagnoses     Need for influenza vaccination        Relevant Orders    influenza vaccine, quadrivalent, recombinant, PF, 0 5 mL, for patients 18 yr+ (FLUBLOK) (Completed)          Patient Instructions   Her blood pressure after sitting is 130/84, at home she has been running around 134/72  She will continue amlodipine 5 mg daily  Continue sertraline 100 mg daily, rarely uses lorazepam     Doing well with omeprazole daily, can continue as is  She will be seeing Pulmonary again in January with redo CT scan  As before she is intolerant BiPAP  Does continue with back pain, shoulder pain, has been having pain in feet  Can see orthopedist, chiropractor, Podiatry as needed  Try to avoid excess anti-inflammatories  Can use Tylenol, does use Biofreeze, lidocaine patch  Uses proper footwear  Continue to try to exercise as tolerated, has gained few lb, fortunately back in July A1c was only 5 3  Glucose 108  In July cholesterol was 233 with HDL 53, , she does have 5 to 6% 10 year cardiovascular risk, redo lipids next year, consider future statin  TSH 1 year ago was okay at 2 3, CBC was okay at that time  She has not required albuterol recently      We will see her again in 6 months with a regular physical, redo blood work at that time, call sooner if needed      Chief Complaint     Chief Complaint   Patient presents with   • Follow-up       History of Present Illness   Mikayla Torres is a 59y o -year-old female who is in for routine follow-up, overall she has been medically stable, continues with her pains in shoulder, back, 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  Still uses over-the-counter anti-inflammatories, has no increased stomach issues  Review of Systems   Review of Systems   Constitutional: Positive for fatigue (Baseline)  Negative for appetite change, fever and unexpected weight change  HENT: Negative for sore throat and trouble swallowing  Chronic sinuses, no worse than baseline   Respiratory: Negative for cough, chest tightness, shortness of breath and wheezing  Has not required rescue inhaler recently   Cardiovascular: Negative for chest pain, palpitations and leg swelling  Gastrointestinal: Negative for abdominal pain, blood in stool, nausea and vomiting  No increased acid reflux     No change in bowel   Genitourinary: Negative for dysuria and hematuria  She does see her gynecologist   Musculoskeletal: Positive for arthralgias and back pain  Neurological: Negative for dizziness, syncope, light-headedness and headaches  Psychiatric/Behavioral: Negative for behavioral problems and confusion          Doing okay with sertraline       Active Problem List     Patient Active Problem List   Diagnosis   • Abnormal mammogram of right breast   • Acid reflux disease   • Carpal tunnel syndrome   • Colon polyp   • Complete tear of right rotator cuff   • Depression with anxiety   • Disc degeneration, lumbar   • HSV-1 (herpes simplex virus 1) infection   • Chronic bilateral low back pain with right-sided sciatica   • Migraine headache   • Osteoarthritis   • Pap smear abnormality of cervix with ASCUS favoring benign -  f/up normal   • Symptomatic menopausal or female climacteric states   • Class 2 obesity due to excess calories without serious comorbidity with body mass index (BMI) of 37 0 to 37 9 in adult   • Hearing loss, left   • Arthralgia   • Reactive airway disease without complication   • Chronic cough   • Obstructive sleep apnea treated with BiPAP -  felt difficulty tolerating and stopped   • Pulmonary nodules   • Snoring   • Essential hypertension   • Borderline hyperglycemia   • Former smoker   • Asymptomatic varicose veins of right lower extremity   • Mixed hyperlipidemia   • Varicose veins of lower extremity with pain, right ankle   • Cervical radicular pain ( right )       Past Medical History:  Reviewed    Past Surgical History:  Reviewed    Family History:  Reviewed    Social History:  Reviewed    Objective     Vitals:    10/18/22 0803   BP: 130/84   BP Location: Left arm   Patient Position: Sitting   Cuff Size: Large   Pulse: 82   Temp: 97 8 °F (36 6 °C)   SpO2: 98%   Weight: 110 kg (242 lb)   Height: 5' 6" (1 676 m)     Body mass index is 39 06 kg/m²  BP Readings from Last 3 Encounters:   10/18/22 130/84   04/18/22 134/78   02/02/22 131/84       Wt Readings from Last 3 Encounters:   10/18/22 110 kg (242 lb)   04/18/22 107 kg (236 lb)   02/02/22 107 kg (235 lb 9 6 oz)       Physical Exam  Constitutional:       General: She is not in acute distress  Appearance: Normal appearance  She is well-developed  She is not ill-appearing  Eyes:      General: No scleral icterus  Neck:      Vascular: No carotid bruit  Cardiovascular:      Rate and Rhythm: Normal rate and regular rhythm  Heart sounds: Normal heart sounds  No murmur heard  Pulmonary:      Effort: Pulmonary effort is normal  No respiratory distress  Breath sounds: Normal breath sounds  No wheezing, rhonchi or rales  Musculoskeletal:      Right lower leg: No edema  Left lower leg: No edema  Lymphadenopathy:      Cervical: No cervical adenopathy     Skin:     Coloration: Skin is not jaundiced  Neurological:      General: No focal deficit present  Mental Status: She is alert and oriented to person, place, and time     Psychiatric:         Mood and Affect: Mood normal          Behavior: Behavior normal          ALLERGIES:  Allergies   Allergen Reactions   • Effexor [Venlafaxine] Other (See Comments)     Hot and sweaty   • Levaquin [Levofloxacin] Myalgia     Tendonitis    • Wellbutrin [Bupropion] Other (See Comments)     Hot and sweaty   • Prempro [Conj Estrog-Medroxyprogest Ace] Rash       Current Medications     Current Outpatient Medications   Medication Sig Dispense Refill   • amLODIPine (NORVASC) 5 mg tablet Take 1 tablet (5 mg total) by mouth daily 90 tablet 3   • Calcium 500 + D3 250-500 MG-UNIT CHEW Chew     • fluticasone (FLONASE) 50 mcg/act nasal spray SPRAY 1 SPRAY INTO EACH NOSTRIL EVERY DAY 48 mL 1   • gabapentin (Neurontin) 300 mg capsule Take 1 capsule (300 mg total) by mouth daily at bedtime (Patient taking differently: Take 300 mg by mouth daily at bedtime ( Uses 3 x a week )) 90 capsule 1   • LYSINE PO Take 1 capsule by mouth daily as needed (for cold sore)     • multivitamin (THERAGRAN) TABS Take 1 tablet by mouth daily     • omeprazole (PriLOSEC) 20 mg delayed release capsule TAKE 1 CAPSULE DAILY AS NEEDED FOR STOMACH AND ACID 90 capsule 3   • sertraline (ZOLOFT) 100 mg tablet TAKE 1 TABLET BY MOUTH EVERY DAY 90 tablet 3   • Zinc Sulfate (ZINC-220 PO) Take by mouth     • albuterol (2 5 mg/3 mL) 0 083 % nebulizer solution Take 1 vial (2 5 mg total) by nebulization every 4 (four) hours as needed for wheezing (via nebulizer) (Patient not taking: Reported on 10/18/2022) 25 vial 1   • albuterol (PROVENTIL HFA,VENTOLIN HFA) 90 mcg/act inhaler Inhale 2 puffs every 6 (six) hours as needed for wheezing (Patient not taking: Reported on 10/18/2022) 18 g 0   • LORazepam (ATIVAN) 0 5 mg tablet Take 1 tablet (0 5 mg total) by mouth every 6 (six) hours as needed for anxiety (Patient not taking: Reported on 10/18/2022) 20 tablet 0   • valACYclovir (VALTREX) 1,000 mg tablet Take 1,000 mg by mouth 2 (two) times a day       No current facility-administered medications for this visit              Orders Placed This Encounter   Procedures   • influenza vaccine, quadrivalent, recombinant, PF, 0 5 mL, for patients 18 yr+ (FLUBLOK)         Jody Bang

## 2022-10-27 ENCOUNTER — VBI (OUTPATIENT)
Dept: ADMINISTRATIVE | Facility: OTHER | Age: 57
End: 2022-10-27

## 2022-11-30 ENCOUNTER — TELEMEDICINE (OUTPATIENT)
Dept: FAMILY MEDICINE CLINIC | Facility: CLINIC | Age: 57
End: 2022-11-30

## 2022-11-30 DIAGNOSIS — U07.1 COVID-19: Primary | ICD-10-CM

## 2022-11-30 RX ORDER — NIRMATRELVIR AND RITONAVIR 300-100 MG
3 KIT ORAL 2 TIMES DAILY
Qty: 30 TABLET | Refills: 0 | Status: SHIPPED | OUTPATIENT
Start: 2022-11-30 | End: 2022-12-05

## 2022-11-30 NOTE — PROGRESS NOTES
COVID-19 Outpatient Progress Note    Assessment/Plan:    Problem List Items Addressed This Visit    None  Visit Diagnoses     COVID-19    -  Primary    Relevant Medications    nirmatrelvir & ritonavir (Paxlovid, 300/100,) tablet therapy pack       1  COVID-19  COVID, postive this AM, start paxlovid, rest, go the ER or seek further care right away for chest pain or shortness of breath  - nirmatrelvir & ritonavir (Paxlovid, 300/100,) tablet therapy pack; Take 3 tablets by mouth 2 (two) times a day for 5 days Take 2 nirmatrelvir tablets + 1 ritonavir tablet together per dose  Dispense: 30 tablet; Refill: 0    Disposition:     Discussed symptom directed medication options with patient  Patient meets criteria for PAXLOVID and they have been counseled appropriately according to EUA documentation released by the FDA  After discussion, patient agrees to treatment  [address] is an investigational medicine used to treat mild-to-moderate COVID-19 in adults and children (15years of age and older weighing at least 80 pounds (40 kg)) with positive results of direct SARS-CoV-2 viral testing, and who are at high risk for progression to severe COVID-19, including hospitalization or death  PAXLOVID is investigational because it is still being studied  There is limited information about the safety and effectiveness of using PAXLOVID to treat people with mild-to-moderate COVID-19  The FDA has authorized the emergency use of PAXLOVID for the treatment of mild-tomoderate COVID-19 in adults and children (15years of age and older weighing at least 80 pounds (40 kg)) with a positive test for the virus that causes COVID-19, and who are at high risk for progression to severe COVID-19, including hospitalization or death, under an EUA  What should I tell my healthcare provider before I take PAXLOVID?     Tell your healthcare provider if you:  - Have any allergies  - Have liver or kidney disease  - Are pregnant or plan to become pregnant  - Are breastfeeding a child  - Have any serious illnesses    Tell your healthcare provider about all the medicines you take, including prescription and over-the-counter medicines, vitamins, and herbal supplements  Some medicines may interact with PAXLOVID and may cause serious side effects  Keep a list of your medicines to show your healthcare provider and pharmacist when you get a new medicine  You can ask your healthcare provider or pharmacist for a list of medicines that interact with PAXLOVID  Do not start taking a new medicine without telling your healthcare provider  Your healthcare provider can tell you if it is safe to take PAXLOVID with other medicines  Tell your healthcare provider if you are taking combined hormonal contraceptive  PAXLOVID may affect how your birth control pills work  Females who are able to become pregnant should use another effective alternative form of contraception or an additional barrier method of contraception  Talk to your healthcare provider if you have any questions about contraceptive methods that might be right for you  How do I take PAXLOVID? PAXLOVID consists of 2 medicines: nirmatrelvir and ritonavir  - Take 2 pink tablets of nirmatrelvir with 1 white tablet of ritonavir by mouth 2 times each day (in the morning and in the evening) for 5 days  For each dose, take all 3 tablets at the same time  - If you have kidney disease, talk to your healthcare provider  You may need a different dose  - Swallow the tablets whole  Do not chew, break, or crush the tablets  - Take PAXLOVID with or without food  - Do not stop taking PAXLOVID without talking to your healthcare provider, even if you feel better  - If you miss a dose of PAXLOVID within 8 hours of the time it is usually taken, take it as soon as you remember  If you miss a dose by more than 8 hours, skip the missed dose and take the next dose at your regular time   Do not take 2 doses of PAXLOVID at the same time  - If you take too much PAXLOVID, call your healthcare provider or go to the nearest hospital emergency room right away  - If you are taking a ritonavir- or cobicistat-containing medicine to treat hepatitis C or Human Immunodeficiency Virus (HIV), you should continue to take your medicine as prescribed by your healthcare provider   - Talk to your healthcare provider if you do not feel better or if you feel worse after 5 days  Who should generally not take PAXLOVID? Do not take PAXLOVID if:  You are allergic to nirmatrelvir, ritonavir, or any of the ingredients in PAXLOVID  You are taking any of the following medicines:  - Alfuzosin  - Pethidine, piroxicam, propoxyphene  - Ranolazine  - Amiodarone, dronedarone, flecainide, propafenone, quinidine  - Colchicine  - Lurasidone, pimozide, clozapine  - Dihydroergotamine, ergotamine, methylergonovine  - Lovastatin, simvastatin  - Sildenafil (Revatio®) for pulmonary arterial hypertension (PAH)  - Triazolam, oral midazolam  - Apalutamide  - Carbamazepine, phenobarbital, phenytoin  - Rifampin  - St  César’s Wort (hypericum perforatum)    What are the important possible side effects of PAXLOVID? Possible side effects of PAXLOVID are:  - Liver Problems  Tell your healthcare provider right away if you have any of these signs and symptoms of liver problems: loss of appetite, yellowing of your skin and the whites of eyes (jaundice), dark-colored urine, pale colored stools and itchy skin, stomach area (abdominal) pain  - Resistance to HIV Medicines  If you have untreated HIV infection, PAXLOVID may lead to some HIV medicines not working as well in the future  - Other possible side effects include: altered sense of taste, diarrhea, high blood pressure, or muscle aches    These are not all the possible side effects of PAXLOVID  Not many people have taken PAXLOVID  Serious and unexpected side effects may happen   Juan David Lares is still being studied, so it is possible that all of the risks are not known at this time  What other treatment choices are there? Like Jimi Del Angel may allow for the emergency use of other medicines to treat people with COVID-19  Go to https://SalesPortal/ for information on the emergency use of other medicines that are authorized by FDA to treat people with COVID-19  Your healthcare provider may talk with you about clinical trials for which you may be eligible  It is your choice to be treated or not to be treated with PAXLOVID  Should you decide not to receive it or for your child not to receive it, it will not change your standard medical care  What if I am pregnant or breastfeeding? There is no experience treating pregnant women or breastfeeding mothers with PAXLOVID  For a mother and unborn baby, the benefit of taking PAXLOVID may be greater than the risk from the treatment  If you are pregnant, discuss your options and specific situation with your healthcare provider  It is recommended that you use effective barrier contraception or do not have sexual activity while taking PAXLOVID  If you are breastfeeding, discuss your options and specific situation with your healthcare provider  How do I report side effects with PAXLOVID? Contact your healthcare provider if you have any side effects that bother you or do not go away  Report side effects to FDA MedWatch at www fda gov/medwatch or call 6-305-GNA9663 or you can report side effects to [facility]  at the contact information provided below  Website Fax number Telephone number   [employer] [phone]     How should I store [address]? Store PAXLOVID tablets at room temperature between 68°F to 77°F (20°C to 25°C)      Full fact sheet for patients, parents, and caregivers can be found at: [employer]    I have spent 10 minutes directly with the patient  Greater than 50% of this time was spent in counseling/coordination of care regarding: diagnostic results, risks and benefits of treatment options and instructions for management  Encounter provider: Terry Falcon MD     Provider located at: [facility]  [facility]  [address] [facility] [place] [address] [phone]     Recent Visits  No visits were found meeting these conditions  Showing recent visits within past 7 days and meeting all other requirements  Today's Visits  Date Type Provider Dept   11/30/22 Telemedicine [facility]   Showing today's visits and meeting all other requirements  Future Appointments  No visits were found meeting these conditions  Showing future appointments within next 150 days and meeting all other requirements     This virtual check-in was done via [address] and patient was informed that this is a secure, HIPAA-compliant platform  She agrees to proceed  Patient agrees to participate in a virtual check in via telephone or video visit instead of presenting to the office to address urgent/immediate medical needs  Patient is aware this is a billable service  She acknowledged consent and understanding of privacy and security of the video platform  The patient has agreed to participate and understands they can discontinue the visit at any time  After connecting through [facility], the patient was identified by name and date of birth  Reilly Liu was informed that this was a telemedicine visit and that the exam was being conducted confidentially over secure lines  Reilly Liu acknowledged consent and understanding of privacy and security of the telemedicine visit   I informed the patient that I have reviewed her record in Epic and presented the opportunity for her to ask any questions regarding the visit today  The patient agreed to participate  Verification of patient location:  Patient is located in the following state in which I hold an active license: [place]    Subjective:   Nayeli Roberts is a 59 y o  female who is concerned about COVID-19  Patient's symptoms include fatigue, nasal congestion and cough  Patient denies fever, chills, shortness of breath, chest tightness, abdominal pain, nausea and diarrhea  - Date of symptom onset: 11/29/2022      COVID-19 vaccination status: Partially vaccinated    Lab Results   Component Value Date    SARSCOV2 Negative 12/19/2021    SARSCOV2 Positive (A) 02/04/2021       Review of Systems   Constitutional: Positive for fatigue  Negative for chills and fever  HENT: Positive for congestion  Respiratory: Positive for cough  Negative for chest tightness and shortness of breath  Cardiovascular: Negative for chest pain and palpitations  Gastrointestinal: Negative for abdominal pain, diarrhea and nausea       Current Outpatient Medications on File Prior to Visit   Medication Sig   • albuterol (2 5 mg/3 mL) 0 083 % nebulizer solution Take 1 vial (2 5 mg total) by nebulization every 4 (four) hours as needed for wheezing (via nebulizer) (Patient not taking: Reported on 10/18/2022)   • albuterol (PROVENTIL HFA,VENTOLIN HFA) 90 mcg/act inhaler Inhale 2 puffs every 6 (six) hours as needed for wheezing (Patient not taking: Reported on 10/18/2022)   • amLODIPine (NORVASC) 5 mg tablet Take 1 tablet (5 mg total) by mouth daily   • Calcium 500 + D3 250-500 MG-UNIT CHEW Chew   • fluticasone (FLONASE) 50 mcg/act nasal spray SPRAY 1 SPRAY INTO EACH NOSTRIL EVERY DAY   • gabapentin (Neurontin) 300 mg capsule Take 1 capsule (300 mg total) by mouth daily at bedtime (Patient taking differently: Take 300 mg by mouth daily at bedtime ( Uses 3 x a week ))   • LORazepam (ATIVAN) 0 5 mg tablet Take 1 tablet (0 5 mg total) by mouth every 6 (six) hours as needed for anxiety (Patient not taking: Reported on 10/18/2022)   • LYSINE PO Take 1 capsule by mouth daily as needed (for cold sore)   • multivitamin (THERAGRAN) TABS Take 1 tablet by mouth daily   • omeprazole (PriLOSEC) 20 mg delayed release capsule TAKE 1 CAPSULE DAILY AS NEEDED FOR STOMACH AND ACID   • sertraline (ZOLOFT) 100 mg tablet TAKE 1 TABLET BY MOUTH EVERY DAY   • valACYclovir (VALTREX) 1,000 mg tablet Take 1,000 mg by mouth 2 (two) times a day   • Zinc Sulfate (ZINC-220 PO) Take by mouth       Objective:    LMP  (LMP Unknown)      Physical Exam  Constitutional:       Comments: No acute distress or increased work of breathing noted on video   Neurological:      Mental Status: She is alert         Valentina Fountain MD

## 2022-11-30 NOTE — PATIENT INSTRUCTIONS
1  COVID-19  -     nirmatrelvir & ritonavir (Paxlovid, 300/100,) tablet therapy pack;  Take 3 tablets by mouth 2 (two) times a day for 5 days Take 2 nirmatrelvir tablets + 1 ritonavir tablet together per dose

## 2022-12-21 ENCOUNTER — ANNUAL EXAM (OUTPATIENT)
Dept: OBGYN CLINIC | Facility: CLINIC | Age: 57
End: 2022-12-21

## 2022-12-21 VITALS
HEIGHT: 66 IN | SYSTOLIC BLOOD PRESSURE: 130 MMHG | WEIGHT: 242 LBS | BODY MASS INDEX: 38.89 KG/M2 | DIASTOLIC BLOOD PRESSURE: 70 MMHG

## 2022-12-21 DIAGNOSIS — Z11.51 SCREENING FOR HUMAN PAPILLOMAVIRUS (HPV): ICD-10-CM

## 2022-12-21 DIAGNOSIS — Z12.31 ENCOUNTER FOR SCREENING MAMMOGRAM FOR MALIGNANT NEOPLASM OF BREAST: ICD-10-CM

## 2022-12-21 DIAGNOSIS — Z01.419 ENCNTR FOR GYN EXAM (GENERAL) (ROUTINE) W/O ABN FINDINGS: Primary | ICD-10-CM

## 2022-12-21 NOTE — PROGRESS NOTES
Sandroyosef Sahara FARIA Reppert   1965    CC:  Yearly exam    S:  59 y o  female here for yearly exam  She is postmenopausal and has had no vaginal bleeding  She denies vaginal discharge, itching, odor or dryness  She lost her father last October  She has lost some others close to her this year, so she continues to have sadness/stress in her life  She would like to lose weight and feels that the stress is working against her  Support offered  Sexual activity: She is sexually active without pain, bleeding or dryness  Last Pap: 12/21/2016 - normal/negative HPV  Last Mammo: 1/31/2022 - BIRAD-1  Last Colonoscopy: 9/1/2020 - polyp; 5 years  Last DEXA: never    We reviewed [facility] guidelines for Pap testing         Current Outpatient Medications:   •  albuterol (2 5 mg/3 mL) 0 083 % nebulizer solution, Take 1 vial (2 5 mg total) by nebulization every 4 (four) hours as needed for wheezing (via nebulizer), Disp: 25 vial, Rfl: 1  •  albuterol (PROVENTIL HFA,VENTOLIN HFA) 90 mcg/act inhaler, Inhale 2 puffs every 6 (six) hours as needed for wheezing, Disp: 18 g, Rfl: 0  •  amLODIPine (NORVASC) 5 mg tablet, Take 1 tablet (5 mg total) by mouth daily, Disp: 90 tablet, Rfl: 3  •  fluticasone (FLONASE) 50 mcg/act nasal spray, SPRAY 1 SPRAY INTO EACH NOSTRIL EVERY DAY, Disp: 48 mL, Rfl: 1  •  gabapentin (Neurontin) 300 mg capsule, Take 1 capsule (300 mg total) by mouth daily at bedtime (Patient taking differently: Take 300 mg by mouth daily at bedtime ( Uses 3 x a week )), Disp: 90 capsule, Rfl: 1  •  LORazepam (ATIVAN) 0 5 mg tablet, Take 1 tablet (0 5 mg total) by mouth every 6 (six) hours as needed for anxiety, Disp: 20 tablet, Rfl: 0  •  multivitamin (THERAGRAN) TABS, Take 1 tablet by mouth daily, Disp: , Rfl:   •  omeprazole (PriLOSEC) 20 mg delayed release capsule, TAKE 1 CAPSULE DAILY AS NEEDED FOR STOMACH AND ACID, Disp: 90 capsule, Rfl: 3  •  sertraline (ZOLOFT) 100 mg tablet, TAKE 1 TABLET BY MOUTH EVERY DAY, Disp: 90 tablet, Rfl: 3  •  Zinc Sulfate (ZINC-220 PO), Take by mouth, Disp: , Rfl:   •  valACYclovir (VALTREX) 1,000 mg tablet, Take 1,000 mg by mouth 2 (two) times a day (Patient not taking: Reported on 2022), Disp: , Rfl:   Social History     Socioeconomic History   • Marital status: /Civil Union     Spouse name: Not on file   • Number of children: Not on file   • Years of education: Not on file   • Highest education level: Not on file   Occupational History   • Not on file   Tobacco Use   • Smoking status: Former     Packs/day: 0 50     Years: 15 00     Pack years: 7 50     Types: Cigarettes     Start date: 10     Quit date: 2006     Years since quittin 6   • Smokeless tobacco: Never   Vaping Use   • Vaping Use: Never used   Substance and Sexual Activity   • Alcohol use: Yes     Comment: rarely/socially 1-2x/year   • Drug use: No   • Sexual activity: Not Currently     Birth control/protection: Post-menopausal, Abstinence   Other Topics Concern   • Not on file   Social History Narrative    CONSUMES 1 CUP OF COFFEE PER DAY     Social Determinants of Health     Financial Resource Strain: Not on file   Food Insecurity: Not on file   Transportation Needs: Not on file   Physical Activity: Not on file   Stress: Not on file   Social Connections: Not on file   Intimate Partner Violence: Not on file   Housing Stability: Not on file     Family History   Problem Relation Age of Onset   • Stroke Mother    • Stomach cancer Mother 68   • Skin cancer Father    • Alzheimer's disease Father    • No Known Problems Sister    • Stroke Brother    • No Known Problems Daughter    • Heart disease Maternal Grandmother    • No Known Problems Maternal Grandfather    • Breast cancer Paternal Grandmother 48   • No Known Problems Paternal Grandfather    • No Known Problems Sister    • No Known Problems Daughter    • Breast cancer Cousin 39   • Breast cancer Cousin 39   • Breast cancer Cousin 48   • No Known Problems Maternal Aunt    • No Known Problems Maternal Aunt    • No Known Problems Maternal Aunt    • No Known Problems Maternal Aunt    • No Known Problems Paternal Aunt    • No Known Problems Paternal Aunt    • No Known Problems Paternal Aunt    • No Known Problems Paternal Aunt    • No Known Problems Paternal Aunt    • Lung cancer Paternal Uncle    • Breast cancer Cousin 48     Past Medical History:   Diagnosis Date   • Acid reflux    • Anxiety    • Asthmatic bronchitis    • Back pain    • Carpal tunnel syndrome    • COVID-19    • COVID-19 virus infection 2/4/2021    mild   • Depression    • Depression    • Hyperglycemia    • Hypertension    • Lipoma    • Migraine    • Miscarriage    • Osteoarthritis    • PONV (postoperative nausea and vomiting)    • Stress incontinence    • UTI (urinary tract infection)         Review of Systems   Respiratory: Negative  Cardiovascular: Negative  Gastrointestinal: Negative for constipation and diarrhea  Genitourinary: Negative for difficulty urinating, pelvic pain, vaginal bleeding, vaginal discharge, itching or odor  O:  Blood pressure 130/70, height 5' 5 5" (1 664 m), weight 110 kg (242 lb), not currently breastfeeding  Patient appears well and is not in distress  Neck is supple without masses  Breasts are symmetrical without mass, tenderness, nipple discharge, skin changes or adenopathy  Abdomen is soft and nontender without masses  External genitals are normal without lesions or rashes  Urethral meatus and urethra are normal  Bladder is normal to palpation  Vagina is normal without discharge or bleeding  Cervix is normal without discharge or lesion  Uterus is normal, mobile, nontender without palpable mass  Exam limited by patient body habitus  Adnexa are normal, nontender, without palpable mass  Exam limited by patient body habitus       A:   Yearly exam      P:   Pap with HPV done - will call with results  Mammo slip given   Colonoscopy due 2025   DEXA due age 70    RTO one year for yearly exam or sooner as needed

## 2022-12-22 LAB
HPV HR 12 DNA CVX QL NAA+PROBE: NEGATIVE
HPV16 DNA CVX QL NAA+PROBE: NEGATIVE
HPV18 DNA CVX QL NAA+PROBE: NEGATIVE

## 2022-12-28 LAB
LAB AP GYN PRIMARY INTERPRETATION: ABNORMAL
Lab: ABNORMAL
PATH INTERP SPEC-IMP: ABNORMAL

## 2023-01-09 DIAGNOSIS — N95.1 SYMPTOMATIC MENOPAUSAL OR FEMALE CLIMACTERIC STATES: ICD-10-CM

## 2023-01-09 RX ORDER — SERTRALINE HYDROCHLORIDE 100 MG/1
TABLET, FILM COATED ORAL
Qty: 90 TABLET | Refills: 3 | Status: SHIPPED | OUTPATIENT
Start: 2023-01-09

## 2023-01-11 ENCOUNTER — VBI (OUTPATIENT)
Dept: ADMINISTRATIVE | Facility: OTHER | Age: 58
End: 2023-01-11

## 2023-02-11 DIAGNOSIS — M54.12 CERVICAL RADICULAR PAIN: ICD-10-CM

## 2023-02-11 DIAGNOSIS — M54.41 CHRONIC BILATERAL LOW BACK PAIN WITH RIGHT-SIDED SCIATICA: ICD-10-CM

## 2023-02-11 DIAGNOSIS — G89.29 CHRONIC BILATERAL LOW BACK PAIN WITH RIGHT-SIDED SCIATICA: ICD-10-CM

## 2023-02-11 RX ORDER — GABAPENTIN 300 MG/1
CAPSULE ORAL
Qty: 90 CAPSULE | Refills: 1 | Status: SHIPPED | OUTPATIENT
Start: 2023-02-11

## 2023-03-30 DIAGNOSIS — R05.3 CHRONIC COUGH: ICD-10-CM

## 2023-03-30 DIAGNOSIS — J30.9 ALLERGIC RHINITIS WITH POSTNASAL DRIP: ICD-10-CM

## 2023-03-30 DIAGNOSIS — R09.82 ALLERGIC RHINITIS WITH POSTNASAL DRIP: ICD-10-CM

## 2023-03-30 RX ORDER — FLUTICASONE PROPIONATE 50 MCG
SPRAY, SUSPENSION (ML) NASAL
Qty: 48 ML | Refills: 1 | Status: SHIPPED | OUTPATIENT
Start: 2023-03-30

## 2023-04-06 ENCOUNTER — RA CDI HCC (OUTPATIENT)
Dept: OTHER | Facility: HOSPITAL | Age: 58
End: 2023-04-06

## 2023-04-06 NOTE — PROGRESS NOTES
[address]  coding opportunities          Chart Reviewed number of suggestions sent to Provider: 1     Patients Insurance        Commercial Insurance: [facility]       J45 20:  Mild intermittent reactive airway disease without complication [[id number]]    Found in active problem list - please review and assess and document per MEAT if applicable for [id number]

## 2023-04-20 ENCOUNTER — APPOINTMENT (OUTPATIENT)
Dept: RADIOLOGY | Facility: MEDICAL CENTER | Age: 58
End: 2023-04-20

## 2023-04-20 DIAGNOSIS — G89.29 CHRONIC BILATERAL LOW BACK PAIN WITH RIGHT-SIDED SCIATICA: ICD-10-CM

## 2023-04-20 DIAGNOSIS — M54.41 CHRONIC BILATERAL LOW BACK PAIN WITH RIGHT-SIDED SCIATICA: ICD-10-CM

## 2023-05-19 ENCOUNTER — HOSPITAL ENCOUNTER (OUTPATIENT)
Dept: MAMMOGRAPHY | Facility: CLINIC | Age: 58
Discharge: HOME | End: 2023-05-19

## 2023-05-19 VITALS — BODY MASS INDEX: 38.73 KG/M2 | WEIGHT: 241 LBS | HEIGHT: 66 IN

## 2023-05-19 DIAGNOSIS — Z12.31 ENCOUNTER FOR SCREENING MAMMOGRAM FOR MALIGNANT NEOPLASM OF BREAST: ICD-10-CM

## 2023-05-23 RX ORDER — VALACYCLOVIR HYDROCHLORIDE 1 G/1
1000 TABLET, FILM COATED ORAL 2 TIMES DAILY
Status: CANCELLED | OUTPATIENT
Start: 2023-05-23

## 2023-05-24 DIAGNOSIS — B00.1 COLD SORE: Primary | ICD-10-CM

## 2023-05-24 RX ORDER — VALACYCLOVIR HYDROCHLORIDE 1 G/1
2000 TABLET, FILM COATED ORAL EVERY 12 HOURS
Qty: 120 TABLET | Refills: 12 | Status: SHIPPED | OUTPATIENT
Start: 2023-05-24 | End: 2024-05-25

## 2023-08-14 DIAGNOSIS — I10 ESSENTIAL HYPERTENSION: ICD-10-CM

## 2023-08-14 RX ORDER — AMLODIPINE BESYLATE 5 MG/1
5 TABLET ORAL DAILY
Qty: 90 TABLET | Refills: 3 | Status: SHIPPED | OUTPATIENT
Start: 2023-08-14

## 2023-08-18 ENCOUNTER — VBI (OUTPATIENT)
Dept: ADMINISTRATIVE | Facility: OTHER | Age: 58
End: 2023-08-18

## 2023-09-11 NOTE — PROGRESS NOTES
I have reviewed the notes, assessments, and/or proceduresI concur with her/his documentation of Glen Mcmullen  Labs are still pending

## 2023-10-13 ENCOUNTER — RA CDI HCC (OUTPATIENT)
Dept: OTHER | Facility: HOSPITAL | Age: 58
End: 2023-10-13

## 2023-10-13 NOTE — PROGRESS NOTES
[address] [facility] coding opportunities       Chart reviewed, no opportunity found: CHART REVIEWED, NO OPPORTUNITY FOUND        Patients Insurance        Commercial Insurance: Jann Altman

## 2023-11-13 ENCOUNTER — CLINICAL SUPPORT (OUTPATIENT)
Dept: FAMILY MEDICINE CLINIC | Facility: CLINIC | Age: 58
End: 2023-11-13
Payer: COMMERCIAL

## 2023-11-13 DIAGNOSIS — Z23 ENCOUNTER FOR IMMUNIZATION: Primary | ICD-10-CM

## 2023-11-13 PROCEDURE — 90471 IMMUNIZATION ADMIN: CPT | Performed by: FAMILY MEDICINE

## 2023-11-13 PROCEDURE — 90686 IIV4 VACC NO PRSV 0.5 ML IM: CPT | Performed by: FAMILY MEDICINE

## 2023-12-11 PROBLEM — R92.8 ABNORMAL MAMMOGRAM OF RIGHT BREAST: Status: RESOLVED | Noted: 2018-02-09 | Resolved: 2023-12-11

## 2023-12-12 ENCOUNTER — OFFICE VISIT (OUTPATIENT)
Dept: FAMILY MEDICINE CLINIC | Facility: CLINIC | Age: 58
End: 2023-12-12
Payer: COMMERCIAL

## 2023-12-12 VITALS
HEIGHT: 66 IN | OXYGEN SATURATION: 99 % | HEART RATE: 79 BPM | SYSTOLIC BLOOD PRESSURE: 128 MMHG | TEMPERATURE: 99 F | DIASTOLIC BLOOD PRESSURE: 90 MMHG | BODY MASS INDEX: 38.57 KG/M2 | WEIGHT: 240 LBS

## 2023-12-12 DIAGNOSIS — R05.3 CHRONIC COUGH: ICD-10-CM

## 2023-12-12 DIAGNOSIS — F41.8 DEPRESSION WITH ANXIETY: ICD-10-CM

## 2023-12-12 DIAGNOSIS — J45.20 MILD INTERMITTENT REACTIVE AIRWAY DISEASE WITHOUT COMPLICATION: ICD-10-CM

## 2023-12-12 DIAGNOSIS — R91.8 PULMONARY NODULES: ICD-10-CM

## 2023-12-12 DIAGNOSIS — E78.2 MIXED HYPERLIPIDEMIA: ICD-10-CM

## 2023-12-12 DIAGNOSIS — Z51.81 ENCOUNTER FOR MONITORING CHRONIC NSAID THERAPY: ICD-10-CM

## 2023-12-12 DIAGNOSIS — E66.09 CLASS 2 OBESITY DUE TO EXCESS CALORIES WITHOUT SERIOUS COMORBIDITY WITH BODY MASS INDEX (BMI) OF 38.0 TO 38.9 IN ADULT: ICD-10-CM

## 2023-12-12 DIAGNOSIS — I10 ESSENTIAL HYPERTENSION: Primary | ICD-10-CM

## 2023-12-12 DIAGNOSIS — Z79.1 ENCOUNTER FOR MONITORING CHRONIC NSAID THERAPY: ICD-10-CM

## 2023-12-12 DIAGNOSIS — K21.9 GASTROESOPHAGEAL REFLUX DISEASE WITHOUT ESOPHAGITIS: ICD-10-CM

## 2023-12-12 DIAGNOSIS — G47.33 OBSTRUCTIVE SLEEP APNEA TREATED WITH BIPAP: ICD-10-CM

## 2023-12-12 PROCEDURE — 99214 OFFICE O/P EST MOD 30 MIN: CPT | Performed by: FAMILY MEDICINE

## 2023-12-12 RX ORDER — IBUPROFEN 200 MG
TABLET ORAL
COMMUNITY

## 2023-12-12 NOTE — PATIENT INSTRUCTIONS
Reviewed health history along with meds, blood pressure today after sitting 128/90, she will continue amlodipine 5 mg daily, she will check blood pressure readings when relaxed at home, systolic reading of 944 is fine, 90 is borderline. Reevaluate at physical in 6 months, consider adding second medication. She does have untreated sleep apnea, intolerant BiPAP, BMI remains at 38 range, weight remains around 240 pounds despite her remaining active, trying to watch healthy diet. Fortunately she is not diabetic, A1c back in April was 5.4. She can evaluate with weight management, she saw them years ago, does not want to pursue surgical approach which I am fine with, continue to work on healthy diet, nutrition, consider injectable weekly. She will continue sertraline 100 mg daily as is, rarely uses lorazepam.  Does have chronic pain with back, neck, shoulders, now has left shoulder strain/rotator involvement in addition to right, plans to see orthopedist after the new year to discuss injections. She has good range of motion she is holding off on surgery regarding torn rotator cuffs. She can continue gabapentin 300 mg at bedtime. Chronic cough, no worse than baseline, no increased acid reflux. She will stay on omeprazole, had EGD 2020, also had colonoscopy in 2020, redo in 5 years was advised. She has not required albuterol recently. Last CT scan through pulmonology was in 2021, can redo CT of chest-pulm nodules, hx chronic cough, quit smoking 2006     She does see GYN, mammogram was okay at May. Last blood work was in April, she will redo fasting blood work in April 2024. Monitor lipid panel, 5 to 6% 10-year cardiovascular risk    Will see her in 6 months with an annual wellness.

## 2023-12-12 NOTE — PROGRESS NOTES
FAMILY PRACTICE OFFICE VISIT  Reece Coffey D.O. [address] [facility]  [address] [place] [address].  [place], [place], [address]      NAME: Ember Mcmullen  AGE: 59 y.o. SEX: female  : 1965   MRN: [id number]    DATE: 2023  TIME: 12:54 PM    Assessment and Plan     Problem List Items Addressed This Visit       Acid reflux disease    Relevant Orders    CBC    Chronic cough    Relevant Orders    CT chest wo contrast    Class 2 obesity due to excess calories without serious comorbidity in adult    Relevant Orders    TSH, 3rd generation with Free T4 reflex    Ambulatory Referral to Bariatric Surgery    Depression with anxiety    Relevant Orders    TSH, 3rd generation with Free T4 reflex    Essential hypertension - Primary    Relevant Orders    TSH, 3rd generation with Free T4 reflex    Comprehensive metabolic panel    Urinalysis with microscopic    Ambulatory Referral to Bariatric Surgery    Mixed hyperlipidemia    Relevant Orders    TSH, 3rd generation with Free T4 reflex    Lipid panel    Obstructive sleep apnea treated with BiPAP -  felt difficulty tolerating and stopped    Relevant Orders    TSH, 3rd generation with Free T4 reflex    Ambulatory Referral to Bariatric Surgery    Pulmonary nodules    Relevant Orders    CT chest wo contrast    Reactive airway disease without complication     Other Visit Diagnoses       Encounter for monitoring chronic NSAID therapy                Patient Instructions   Reviewed health history along with meds, blood pressure today after sitting 128/90, she will continue amlodipine 5 mg daily, she will check blood pressure readings when relaxed at home, systolic reading of 568 is fine, 90 is borderline. Reevaluate at physical in 6 months, consider adding second medication.     She does have untreated sleep apnea, intolerant BiPAP, BMI remains at 38 range, weight remains around 240 pounds despite her remaining active, trying to watch healthy diet.  Fortunately she is not diabetic, A1c back in April was 5.4. She can evaluate with weight management, she saw them years ago, does not want to pursue surgical approach which I am fine with, continue to work on healthy diet, nutrition, consider injectable weekly. She will continue sertraline 100 mg daily as is, rarely uses lorazepam.  Does have chronic pain with back, neck, shoulders, now has left shoulder strain/rotator involvement in addition to right, plans to see orthopedist after the new year to discuss injections. She has good range of motion she is holding off on surgery regarding torn rotator cuffs. She can continue gabapentin 300 mg at bedtime. Chronic cough, no worse than baseline, no increased acid reflux. She will stay on omeprazole, had EGD 2020, also had colonoscopy in 2020, redo in 5 years was advised. She has not required albuterol recently. Last CT scan through pulmonology was in 2021, can redo CT of chest-pulm nodules, hx chronic cough, quit smoking 2006     She does see GYN, mammogram was okay at May. Last blood work was in April, she will redo fasting blood work in April 2024. Monitor lipid panel, 5 to 6% 10-year cardiovascular risk    Will see her in 6 months with an annual wellness. Chief Complaint     Chief Complaint   Patient presents with    Follow-up    Shoulder Pain     Bilateral       History of Present Illness   Allie Joyce is a 59y.o.-year-old female who is in for routine follow-up, she is feeling about the same as at baseline other than she did strain her left shoulder recently, longstanding issues with rotator cuff. Continues with back, joint pains, does use ibuprofen 800 mg once or twice daily, has no new stomach complaints, does use PPI    Does feel sertraline is helping, does use gabapentin at night which helps somewhat with sleep but does not appear to help her pain. She remains active, frustrated with inability to lose weight.     Chronic cough no worse than baseline, has not required albuterol recently      Review of Systems   Review of Systems   Constitutional:         No fevers or chills, no new chest pain, no increased palpitations, see HPI. She has had no change in bowel or bladder, does see GYN. Active Problem List     Patient Active Problem List   Diagnosis    Acid reflux disease    Carpal tunnel syndrome    Colon polyp    Complete tear of right rotator cuff    Depression with anxiety    Disc degeneration, lumbar    HSV-1 (herpes simplex virus 1) infection    Chronic bilateral low back pain with right-sided sciatica    Migraine headache    Osteoarthritis    Pap smear abnormality of cervix with ASCUS favoring benign -  f/up normal    Symptomatic menopausal or female climacteric states    Class 2 obesity due to excess calories without serious comorbidity in adult    Hearing loss, left    Arthralgia    Reactive airway disease without complication    Chronic cough    Obstructive sleep apnea treated with BiPAP -  felt difficulty tolerating and stopped    Pulmonary nodules    Snoring    Essential hypertension    Borderline hyperglycemia    Former smoker    Asymptomatic varicose veins of right lower extremity    Mixed hyperlipidemia    Cervical radicular pain ( right )       Past Medical History:  Reviewed    Past Surgical History:  Reviewed    Family History:  Reviewed    Social History:  Reviewed    Objective     Vitals:    12/12/23 1103 12/12/23 1106   BP: 130/100 128/90   BP Location: Left arm Right arm   Patient Position: Sitting Sitting   Cuff Size: Large Large   Pulse: 79    Temp: 99 °F (37.2 °C)    TempSrc: Tympanic    SpO2: 99%    Weight: 109 kg (240 lb)    Height: 5' 6" (1.676 m)      Body mass index is 38.74 kg/m².     BP Readings from Last 3 Encounters:   12/12/23 128/90   04/20/23 132/82   12/21/22 130/70       Wt Readings from Last 3 Encounters:   12/12/23 109 kg (240 lb)   05/19/23 109 kg (241 lb)   04/20/23 109 kg (241 lb)       Physical Exam  Constitutional:       General: She is not in acute distress. Appearance: Normal appearance. She is well-developed. She is not ill-appearing. Eyes:      General: No scleral icterus. Cardiovascular:      Rate and Rhythm: Normal rate and regular rhythm. Heart sounds: Normal heart sounds. No murmur heard. Pulmonary:      Effort: Pulmonary effort is normal. No respiratory distress. Breath sounds: Normal breath sounds. No wheezing, rhonchi or rales. Abdominal:      Tenderness: There is no abdominal tenderness. Musculoskeletal:      Right lower leg: No edema. Left lower leg: No edema. Comments: Good range of motion shoulders   Skin:     Coloration: Skin is not jaundiced. Neurological:      Mental Status: She is alert and oriented to person, place, and time. Mental status is at baseline. Psychiatric:         Mood and Affect: Mood normal.         Behavior: Behavior normal.         ALLERGIES:  Allergies   Allergen Reactions    Effexor [Venlafaxine] Other (See Comments)     Hot and sweaty    Levaquin [Levofloxacin] Myalgia     Tendonitis     Wellbutrin [Bupropion] Other (See Comments)     Hot and sweaty    Prempro [Conj Estrog-Medroxyprogest Ace] Rash       Current Medications     Current Outpatient Medications   Medication Sig Dispense Refill    amLODIPine (NORVASC) 5 mg tablet TAKE 1 TABLET (5 MG TOTAL) BY MOUTH DAILY.  90 tablet 3    Calcium Carbonate-Vit D-Min (Calcium 1200) 1292-0413 MG-UNIT CHEW Chew      fluticasone (FLONASE) 50 mcg/act nasal spray SPRAY 1 SPRAY INTO EACH NOSTRIL EVERY DAY 48 mL 1    gabapentin (NEURONTIN) 300 mg capsule TAKE 1 CAPSULE BY MOUTH AT BEDTIME 90 capsule 1    ibuprofen (MOTRIN) 200 mg tablet Uses 800 mg once or twice daily      multivitamin (THERAGRAN) TABS Take 1 tablet by mouth daily      omeprazole (PriLOSEC) 20 mg delayed release capsule TAKE 1 CAPSULE DAILY AS NEEDED FOR STOMACH AND ACID 90 capsule 3    sertraline (ZOLOFT) 100 mg tablet TAKE 1 TABLET BY MOUTH EVERY DAY 90 tablet 3    valACYclovir (VALTREX) 1,000 mg tablet Take 2 tablets (2,000 mg total) by mouth every 12 (twelve) hours Use 2 pills at onset cold sore and repeat 2 pills once in 12 hrs  then hold (Patient not taking: Reported on 12/12/2023) 120 tablet 12    Zinc Sulfate (ZINC-220 PO) Take by mouth      albuterol (2.5 mg/3 mL) 0.083 % nebulizer solution Take 3 mL (2.5 mg total) by nebulization every 4 (four) hours as needed for wheezing (via nebulizer) (Patient not taking: Reported on 12/12/2023) 120 mL 0    albuterol (PROVENTIL HFA,VENTOLIN HFA) 90 mcg/act inhaler Inhale 2 puffs every 6 (six) hours as needed for wheezing (Patient not taking: Reported on 12/12/2023) 18 g 0    LORazepam (ATIVAN) 0.5 mg tablet Take 1 tablet (0.5 mg total) by mouth every 6 (six) hours as needed for anxiety (Patient not taking: Reported on 12/12/2023) 20 tablet 0     No current facility-administered medications for this visit.             Orders Placed This Encounter   Procedures    CT chest wo contrast    TSH, 3rd generation with Free T4 reflex    CBC    Comprehensive metabolic panel    Lipid panel    Urinalysis with microscopic    Ambulatory Referral to Bariatric Surgery         Joe Pulido DO

## 2023-12-28 ENCOUNTER — ANNUAL EXAM (OUTPATIENT)
Dept: OBGYN CLINIC | Facility: CLINIC | Age: 58
End: 2023-12-28
Payer: COMMERCIAL

## 2023-12-28 VITALS
DIASTOLIC BLOOD PRESSURE: 84 MMHG | SYSTOLIC BLOOD PRESSURE: 150 MMHG | WEIGHT: 245 LBS | HEIGHT: 66 IN | BODY MASS INDEX: 39.37 KG/M2

## 2023-12-28 DIAGNOSIS — Z12.31 ENCOUNTER FOR SCREENING MAMMOGRAM FOR MALIGNANT NEOPLASM OF BREAST: ICD-10-CM

## 2023-12-28 DIAGNOSIS — Z01.419 ENCOUNTER FOR GYNECOLOGICAL EXAMINATION (GENERAL) (ROUTINE) WITHOUT ABNORMAL FINDINGS: Primary | ICD-10-CM

## 2023-12-28 PROCEDURE — S0612 ANNUAL GYNECOLOGICAL EXAMINA: HCPCS | Performed by: OBSTETRICS & GYNECOLOGY

## 2023-12-28 NOTE — PROGRESS NOTES
Shandra BIENVENIDO Reppert   1965    CC:  Yearly exam    S:  58 y.o. female here for yearly exam. She is postmenopausal and has had no vaginal bleeding.  She denies vaginal discharge, itching, odor or dryness.     Sexual activity: She is sexually active without pain, bleeding or dryness.     Last Pap: 12/21/2022 - ASCUS, negative HPV; repeat 3 years  Last Mammo: 5/19/2023 - BIRAD-1  Last Colonoscopy: 9/1/2020 - polyp; 5 years  Last DEXA: never    We reviewed ASC guidelines for Pap testing.       Current Outpatient Medications:     amLODIPine (NORVASC) 5 mg tablet, TAKE 1 TABLET (5 MG TOTAL) BY MOUTH DAILY., Disp: 90 tablet, Rfl: 3    Calcium Carbonate-Vit D-Min (Calcium 1200) 4254-7876 MG-UNIT CHEW, Chew, Disp: , Rfl:     fluticasone (FLONASE) 50 mcg/act nasal spray, SPRAY 1 SPRAY INTO EACH NOSTRIL EVERY DAY, Disp: 48 mL, Rfl: 1    gabapentin (NEURONTIN) 300 mg capsule, TAKE 1 CAPSULE BY MOUTH AT BEDTIME, Disp: 90 capsule, Rfl: 1    ibuprofen (MOTRIN) 200 mg tablet, Uses 800 mg once or twice daily, Disp: , Rfl:     LORazepam (ATIVAN) 0.5 mg tablet, Take 1 tablet (0.5 mg total) by mouth every 6 (six) hours as needed for anxiety, Disp: 20 tablet, Rfl: 0    multivitamin (THERAGRAN) TABS, Take 1 tablet by mouth daily, Disp: , Rfl:     omeprazole (PriLOSEC) 20 mg delayed release capsule, TAKE 1 CAPSULE DAILY AS NEEDED FOR STOMACH AND ACID, Disp: 90 capsule, Rfl: 3    sertraline (ZOLOFT) 100 mg tablet, TAKE 1 TABLET BY MOUTH EVERY DAY, Disp: 90 tablet, Rfl: 3    valACYclovir (VALTREX) 1,000 mg tablet, Take 2 tablets (2,000 mg total) by mouth every 12 (twelve) hours Use 2 pills at onset cold sore and repeat 2 pills once in 12 hrs  then hold (Patient taking differently: Take 2,000 mg by mouth every 12 (twelve) hours Use 2 pills at onset cold sore and repeat 2 pills once in 12 hrs  then hold), Disp: 120 tablet, Rfl: 12    Zinc Sulfate (ZINC-220 PO), Take by mouth, Disp: , Rfl:     albuterol (2.5 mg/3 mL) 0.083 % nebulizer  solution, Take 3 mL (2.5 mg total) by nebulization every 4 (four) hours as needed for wheezing (via nebulizer) (Patient not taking: Reported on 2023), Disp: 120 mL, Rfl: 0    albuterol (PROVENTIL HFA,VENTOLIN HFA) 90 mcg/act inhaler, Inhale 2 puffs every 6 (six) hours as needed for wheezing (Patient not taking: Reported on 2023), Disp: 18 g, Rfl: 0  Social History     Socioeconomic History    Marital status: /Civil Union     Spouse name: Not on file    Number of children: Not on file    Years of education: Not on file    Highest education level: Not on file   Occupational History    Not on file   Tobacco Use    Smoking status: Former     Current packs/day: 0.00     Average packs/day: 0.5 packs/day for 15.4 years (7.7 ttl pk-yrs)     Types: Cigarettes     Start date: 1991     Quit date: 2006     Years since quittin.6    Smokeless tobacco: Never   Vaping Use    Vaping status: Never Used   Substance and Sexual Activity    Alcohol use: Yes     Comment: rarely/socially 1-2x/year    Drug use: No    Sexual activity: Not Currently     Partners: Male     Birth control/protection: Post-menopausal   Other Topics Concern    Not on file   Social History Narrative    CONSUMES 1 CUP OF COFFEE PER DAY     Social Determinants of Health     Financial Resource Strain: Not on file   Food Insecurity: Not on file   Transportation Needs: Not on file   Physical Activity: Not on file   Stress: Not on file   Social Connections: Not on file   Intimate Partner Violence: Not on file   Housing Stability: Not on file     Family History   Problem Relation Age of Onset    Stroke Mother     Stomach cancer Mother 73    Asthma Mother     Cancer Mother     Skin cancer Father     Alzheimer's disease Father     No Known Problems Sister     No Known Problems Sister     Stroke Brother     No Known Problems Daughter     No Known Problems Daughter     Heart disease Maternal Grandmother     No Known Problems Maternal  "Grandfather     Breast cancer Paternal Grandmother 50        Grammy    No Known Problems Paternal Grandfather     No Known Problems Maternal Aunt     No Known Problems Maternal Aunt     No Known Problems Maternal Aunt     No Known Problems Maternal Aunt     Lung cancer Paternal Uncle     Breast cancer Cousin 45    Breast cancer Cousin 45    Breast cancer Cousin 50    Breast cancer Cousin 50     Past Medical History:   Diagnosis Date    Abnormal mammogram of right breast 02/09/2018    Acid reflux     Anxiety     Asthmatic bronchitis     Back pain     Carpal tunnel syndrome     COVID-19     COVID-19 virus infection 02/04/2021    mild    Depression     Depression     Hyperglycemia     Hypertension     Lipoma     Migraine     Miscarriage     Osteoarthritis     PONV (postoperative nausea and vomiting)     Stress incontinence     UTI (urinary tract infection)         Review of Systems   Respiratory: Negative.    Cardiovascular: Negative.    Gastrointestinal: Negative for constipation and diarrhea.   Genitourinary: Negative for difficulty urinating, pelvic pain, vaginal bleeding, vaginal discharge, itching or odor.    O:  Blood pressure 150/84, height 5' 6\" (1.676 m), weight 111 kg (245 lb), not currently breastfeeding.    Patient appears well and is not in distress  Neck is supple without masses  Breasts are symmetrical without mass, tenderness, nipple discharge, skin changes or adenopathy.   Abdomen is soft and nontender without masses.   External genitals are normal without lesions or rashes.  Urethral meatus and urethra are normal  Bladder is normal to palpation  Vagina is normal without discharge or bleeding.   Cervix is normal without discharge or lesion.   Uterus is normal, mobile, nontender without palpable mass.  Adnexa are normal, nontender, without palpable mass.     A:   Yearly exam.     P:   Pap due 2025  Mammo slip given   Colonoscopy due 2025   DEXA due age 63 unless risk factors develop    RTO one year for " yearly exam or sooner as needed.

## 2024-01-06 DIAGNOSIS — N95.1 SYMPTOMATIC MENOPAUSAL OR FEMALE CLIMACTERIC STATES: ICD-10-CM

## 2024-01-06 RX ORDER — SERTRALINE HYDROCHLORIDE 100 MG/1
TABLET, FILM COATED ORAL
Qty: 90 TABLET | Refills: 3 | Status: SHIPPED | OUTPATIENT
Start: 2024-01-06

## 2024-03-07 DIAGNOSIS — M54.41 CHRONIC BILATERAL LOW BACK PAIN WITH RIGHT-SIDED SCIATICA: ICD-10-CM

## 2024-03-07 DIAGNOSIS — B00.1 COLD SORE: ICD-10-CM

## 2024-03-07 DIAGNOSIS — G89.29 CHRONIC BILATERAL LOW BACK PAIN WITH RIGHT-SIDED SCIATICA: ICD-10-CM

## 2024-03-07 DIAGNOSIS — M54.12 CERVICAL RADICULAR PAIN: ICD-10-CM

## 2024-03-08 RX ORDER — GABAPENTIN 300 MG/1
300 CAPSULE ORAL
Qty: 90 CAPSULE | Refills: 0 | Status: SHIPPED | OUTPATIENT
Start: 2024-03-08 | End: 2024-06-06

## 2024-03-08 RX ORDER — VALACYCLOVIR HYDROCHLORIDE 1 G/1
2000 TABLET, FILM COATED ORAL EVERY 12 HOURS
Qty: 120 TABLET | Refills: 0 | Status: SHIPPED | OUTPATIENT
Start: 2024-03-08 | End: 2025-03-10

## 2024-04-02 DIAGNOSIS — K21.9 GASTROESOPHAGEAL REFLUX DISEASE WITHOUT ESOPHAGITIS: ICD-10-CM

## 2024-04-02 RX ORDER — OMEPRAZOLE 20 MG/1
CAPSULE, DELAYED RELEASE ORAL
Qty: 90 CAPSULE | Refills: 1 | Status: SHIPPED | OUTPATIENT
Start: 2024-04-02

## 2024-04-05 DIAGNOSIS — J30.9 ALLERGIC RHINITIS WITH POSTNASAL DRIP: ICD-10-CM

## 2024-04-05 DIAGNOSIS — R05.3 CHRONIC COUGH: ICD-10-CM

## 2024-04-05 DIAGNOSIS — R09.82 ALLERGIC RHINITIS WITH POSTNASAL DRIP: ICD-10-CM

## 2024-04-05 RX ORDER — FLUTICASONE PROPIONATE 50 MCG
SPRAY, SUSPENSION (ML) NASAL
Qty: 16 ML | Refills: 5 | Status: SHIPPED | OUTPATIENT
Start: 2024-04-05

## 2024-04-29 LAB
ALBUMIN SERPL-MCNC: 4.6 G/DL (ref 3.6–5.1)
ALBUMIN/GLOB SERPL: 1.7 (CALC) (ref 1–2.5)
ALP SERPL-CCNC: 65 U/L (ref 37–153)
ALT SERPL-CCNC: 17 U/L (ref 6–29)
APPEARANCE UR: CLEAR
AST SERPL-CCNC: 21 U/L (ref 10–35)
BACTERIA UR QL AUTO: NORMAL /HPF
BILIRUB SERPL-MCNC: 0.7 MG/DL (ref 0.2–1.2)
BILIRUB UR QL STRIP: NEGATIVE
BUN SERPL-MCNC: 15 MG/DL (ref 7–25)
BUN/CREAT SERPL: ABNORMAL (CALC) (ref 6–22)
CALCIUM SERPL-MCNC: 9.4 MG/DL (ref 8.6–10.4)
CHLORIDE SERPL-SCNC: 105 MMOL/L (ref 98–110)
CHOLEST SERPL-MCNC: 224 MG/DL
CHOLEST/HDLC SERPL: 4.3 (CALC)
CO2 SERPL-SCNC: 26 MMOL/L (ref 20–32)
COLOR UR: YELLOW
CREAT SERPL-MCNC: 0.72 MG/DL (ref 0.5–1.05)
ERYTHROCYTE [DISTWIDTH] IN BLOOD BY AUTOMATED COUNT: 14.1 % (ref 11–15)
GFR/BSA.PRED SERPLBLD CYS-BASED-ARV: 95 ML/MIN/1.73M2
GLOBULIN SER CALC-MCNC: 2.7 G/DL (CALC) (ref 1.9–3.7)
GLUCOSE SERPL-MCNC: 110 MG/DL (ref 65–99)
GLUCOSE UR QL STRIP: NEGATIVE
HCT VFR BLD AUTO: 38.3 % (ref 35–45)
HDLC SERPL-MCNC: 52 MG/DL
HGB BLD-MCNC: 12.4 G/DL (ref 11.7–15.5)
HGB UR QL STRIP: NEGATIVE
HYALINE CASTS #/AREA URNS LPF: NORMAL /LPF
KETONES UR QL STRIP: NEGATIVE
LDLC SERPL CALC-MCNC: 142 MG/DL (CALC)
LEUKOCYTE ESTERASE UR QL STRIP: NEGATIVE
MCH RBC QN AUTO: 28.3 PG (ref 27–33)
MCHC RBC AUTO-ENTMCNC: 32.4 G/DL (ref 32–36)
MCV RBC AUTO: 87.4 FL (ref 80–100)
NITRITE UR QL STRIP: NEGATIVE
NONHDLC SERPL-MCNC: 172 MG/DL (CALC)
PH UR STRIP: 5.5 [PH] (ref 5–8)
PLATELET # BLD AUTO: 222 THOUSAND/UL (ref 140–400)
PMV BLD REES-ECKER: 11.9 FL (ref 7.5–12.5)
POTASSIUM SERPL-SCNC: 4.3 MMOL/L (ref 3.5–5.3)
PROT SERPL-MCNC: 7.3 G/DL (ref 6.1–8.1)
PROT UR QL STRIP: NEGATIVE
RBC # BLD AUTO: 4.38 MILLION/UL (ref 3.8–5.1)
RBC #/AREA URNS HPF: NORMAL /HPF
SODIUM SERPL-SCNC: 140 MMOL/L (ref 135–146)
SP GR UR STRIP: 1.02 (ref 1–1.03)
SQUAMOUS #/AREA URNS HPF: NORMAL /HPF
TRIGL SERPL-MCNC: 163 MG/DL
TSH SERPL-ACNC: 3.1 MIU/L (ref 0.4–4.5)
WBC # BLD AUTO: 4.2 THOUSAND/UL (ref 3.8–10.8)
WBC #/AREA URNS HPF: NORMAL /HPF

## 2024-05-01 ENCOUNTER — CONSULT (OUTPATIENT)
Dept: BARIATRICS | Facility: CLINIC | Age: 59
End: 2024-05-01
Payer: COMMERCIAL

## 2024-05-01 VITALS
SYSTOLIC BLOOD PRESSURE: 134 MMHG | HEIGHT: 66 IN | OXYGEN SATURATION: 100 % | HEART RATE: 85 BPM | BODY MASS INDEX: 39.92 KG/M2 | RESPIRATION RATE: 18 BRPM | DIASTOLIC BLOOD PRESSURE: 74 MMHG | WEIGHT: 248.4 LBS | TEMPERATURE: 98.2 F

## 2024-05-01 DIAGNOSIS — K21.9 GASTROESOPHAGEAL REFLUX DISEASE WITHOUT ESOPHAGITIS: ICD-10-CM

## 2024-05-01 DIAGNOSIS — R73.9 BORDERLINE HYPERGLYCEMIA: ICD-10-CM

## 2024-05-01 DIAGNOSIS — G47.33 OBSTRUCTIVE SLEEP APNEA TREATED WITH BIPAP: ICD-10-CM

## 2024-05-01 DIAGNOSIS — R73.01 ELEVATED FASTING GLUCOSE: ICD-10-CM

## 2024-05-01 DIAGNOSIS — I10 ESSENTIAL HYPERTENSION: ICD-10-CM

## 2024-05-01 DIAGNOSIS — E66.09 CLASS 2 OBESITY DUE TO EXCESS CALORIES WITHOUT SERIOUS COMORBIDITY WITH BODY MASS INDEX (BMI) OF 38.0 TO 38.9 IN ADULT: ICD-10-CM

## 2024-05-01 DIAGNOSIS — E66.01 OBESITY, CLASS III, BMI 40-49.9 (MORBID OBESITY) (HCC): Primary | ICD-10-CM

## 2024-05-01 PROBLEM — E66.813 OBESITY, CLASS III, BMI 40-49.9 (MORBID OBESITY): Status: ACTIVE | Noted: 2024-05-01

## 2024-05-01 PROCEDURE — 99244 OFF/OP CNSLTJ NEW/EST MOD 40: CPT | Performed by: NURSE PRACTITIONER

## 2024-05-01 NOTE — ASSESSMENT & PLAN NOTE
- Risks of untreated sleep apnea reviewed, including increased risk for myocardial infarction and stroke, increased difficulty with weight loss, and risk of sudden cardiac death due to arrhythmia.  - Advised to follow-up with pulmonary.

## 2024-05-01 NOTE — ASSESSMENT & PLAN NOTE
- Discussed options of HealthyCORE-Intensive Lifestyle Intervention Program, Very Low Calorie Diet-VLCD, Conservative Program, Marshall-En-Y Gastric Bypass, and Vertical Sleeve Gastrectomy and the role of weight loss medications.  - Not interested in weight loss surgery.  - Explained the importance of making lifestyle changes with anti-obesity medications.  - Patient is interested in pursuing Conservative Program  - Initial weight loss goal of 5-10% weight loss for improved health  - Weight loss can improve patient's co-morbid conditions and/or prevent weight-related complications.  - Interested in weight loss medication to help with appetite and cravings.   - Postmenopausal.  - Medication contract signed 5/1/2024.   - FDA approved weight loss medications reviewed: Wegovy, Saxenda, Zepbound, Qsymia, Contrave, and Phentermine. Wegovy, Zepbound and Saxenda shortage discussed. Off label use of medications discussed.   - She is not interested in an injectable medication.   - Wellbutrin in the past - felt hot and sweaty, but unsure if those symptoms were related to Wellbutrin. Denies any significant side effects.  - Patient denies history of kidney stones, gallstones, seizures, or glaucoma.   - Will obtain some additional blood work first before starting weight loss medication.    - Discussed  visit for boredom eating and she was agreeable.   - Labs reviewed: Lipid, CMP, CBC, and TSH 4/29/2024. Chol, trig, LDL, and glucose elevated, which will likely improve with weight loss. Remainder of the blood work within acceptable range.  - Check A1C and fasting insulin.       Goals:  Do not skip meals.  Food log (ie.) www.Fieldoo.com,sparkpeople.com,loseit.com,calorieking.com,etc. baritastic (use skinnytaste.com, dietdoctor.com or smartphone susana [employer] for recipes)  No sugary beverages. At least 64oz of water daily.  Increase physical activity by 10 minutes daily. Gradually increase physical activity to a goal  of 5 days per week for 30 minutes of MODERATE intensity PLUS 2 days per week of FULL BODY resistance training (use smartphone apps FitON, Home Workout, etc.)  Start food logging, weighing, and measuring food.   1500 calories per day. Sample menu given.   Increase water intake to at least 64 oz daily  Start exercise, such as walking 2 days per week for 10 minutes and gradually increase to goal of 5 days per week for 30 minutes.

## 2024-05-01 NOTE — ASSESSMENT & PLAN NOTE
- Taking omeprazole. May improve with weight loss and lifestyle modification. Continue management with prescribing provider.

## 2024-05-13 ENCOUNTER — TELEPHONE (OUTPATIENT)
Dept: BARIATRICS | Facility: CLINIC | Age: 59
End: 2024-05-13

## 2024-05-13 NOTE — TELEPHONE ENCOUNTER
Pt will call back to r/s 5/13/24 bj spangler appt she had a tragedy happen and unable to think straight right now

## 2024-05-24 LAB
HBA1C MFR BLD: 5.8 % OF TOTAL HGB
INSULIN SERPL-ACNC: 12.4 UIU/ML

## 2024-05-28 ENCOUNTER — PATIENT MESSAGE (OUTPATIENT)
Dept: BARIATRICS | Facility: CLINIC | Age: 59
End: 2024-05-28

## 2024-05-28 DIAGNOSIS — E66.9 OBESITY, CLASS II, BMI 35-39.9: Primary | ICD-10-CM

## 2024-06-05 ENCOUNTER — TELEPHONE (OUTPATIENT)
Dept: FAMILY MEDICINE CLINIC | Facility: CLINIC | Age: 59
End: 2024-06-05

## 2024-06-05 NOTE — TELEPHONE ENCOUNTER
BIJALM for pt to call back to reschedule her June 13, 2024 at 10:00 AM appt with Dr. Coffey due to him being out of the office that day.

## 2024-06-07 ENCOUNTER — RA CDI HCC (OUTPATIENT)
Dept: OTHER | Facility: HOSPITAL | Age: 59
End: 2024-06-07

## 2024-06-07 NOTE — PROGRESS NOTES
HCC coding opportunities          Chart Reviewed number of suggestions sent to Provider: 1     Patients Insurance        Commercial Insurance: [employer]       J45.20: Mild intermittent reactive airway disease without complication     Last assessed on 12/12/2023 - please review and assess and document per MEAT if applicable for 2024

## 2024-06-11 PROBLEM — E66.01 OBESITY, CLASS III, BMI 40-49.9 (MORBID OBESITY) (HCC): Status: ACTIVE | Noted: 2018-11-09

## 2024-06-11 PROBLEM — E66.813 OBESITY, CLASS III, BMI 40-49.9 (MORBID OBESITY): Status: ACTIVE | Noted: 2018-11-09

## 2024-06-12 ENCOUNTER — TELEPHONE (OUTPATIENT)
Dept: BARIATRICS | Facility: CLINIC | Age: 59
End: 2024-06-12

## 2024-06-12 ENCOUNTER — OFFICE VISIT (OUTPATIENT)
Dept: FAMILY MEDICINE CLINIC | Facility: CLINIC | Age: 59
End: 2024-06-12
Payer: COMMERCIAL

## 2024-06-12 VITALS
HEIGHT: 66 IN | HEART RATE: 75 BPM | OXYGEN SATURATION: 98 % | WEIGHT: 240 LBS | BODY MASS INDEX: 38.57 KG/M2 | DIASTOLIC BLOOD PRESSURE: 84 MMHG | RESPIRATION RATE: 12 BRPM | SYSTOLIC BLOOD PRESSURE: 120 MMHG

## 2024-06-12 DIAGNOSIS — K21.9 GASTROESOPHAGEAL REFLUX DISEASE WITHOUT ESOPHAGITIS: ICD-10-CM

## 2024-06-12 DIAGNOSIS — M54.41 CHRONIC BILATERAL LOW BACK PAIN WITH RIGHT-SIDED SCIATICA: ICD-10-CM

## 2024-06-12 DIAGNOSIS — Z00.00 ENCOUNTER FOR ANNUAL PHYSICAL EXAM: Primary | ICD-10-CM

## 2024-06-12 DIAGNOSIS — M19.90 OSTEOARTHRITIS, UNSPECIFIED OSTEOARTHRITIS TYPE, UNSPECIFIED SITE: ICD-10-CM

## 2024-06-12 DIAGNOSIS — F41.8 DEPRESSION WITH ANXIETY: ICD-10-CM

## 2024-06-12 DIAGNOSIS — R91.8 PULMONARY NODULES: ICD-10-CM

## 2024-06-12 DIAGNOSIS — E66.01 OBESITY, CLASS III, BMI 40-49.9 (MORBID OBESITY) (HCC): ICD-10-CM

## 2024-06-12 DIAGNOSIS — I10 ESSENTIAL HYPERTENSION: ICD-10-CM

## 2024-06-12 DIAGNOSIS — G89.29 CHRONIC BILATERAL LOW BACK PAIN WITH RIGHT-SIDED SCIATICA: ICD-10-CM

## 2024-06-12 DIAGNOSIS — G47.33 OBSTRUCTIVE SLEEP APNEA TREATED WITH BIPAP: ICD-10-CM

## 2024-06-12 DIAGNOSIS — E78.2 MIXED HYPERLIPIDEMIA: ICD-10-CM

## 2024-06-12 DIAGNOSIS — J45.20 MILD INTERMITTENT REACTIVE AIRWAY DISEASE WITHOUT COMPLICATION: ICD-10-CM

## 2024-06-12 DIAGNOSIS — R05.3 CHRONIC COUGH: ICD-10-CM

## 2024-06-12 DIAGNOSIS — R73.9 BORDERLINE HYPERGLYCEMIA: ICD-10-CM

## 2024-06-12 PROCEDURE — 99396 PREV VISIT EST AGE 40-64: CPT | Performed by: FAMILY MEDICINE

## 2024-06-12 PROCEDURE — 93000 ELECTROCARDIOGRAM COMPLETE: CPT | Performed by: FAMILY MEDICINE

## 2024-06-12 RX ORDER — PHENTERMINE AND TOPIRAMATE 7.5; 46 MG/1; MG/1
1 CAPSULE, EXTENDED RELEASE ORAL DAILY
Qty: 30 CAPSULE | Refills: 0 | Status: SHIPPED | OUTPATIENT
Start: 2024-06-12

## 2024-06-12 RX ORDER — LORAZEPAM 0.5 MG/1
0.5 TABLET ORAL EVERY 6 HOURS PRN
Qty: 20 TABLET | Refills: 0 | Status: SHIPPED | OUTPATIENT
Start: 2024-06-12

## 2024-06-12 RX ORDER — PHENTERMINE AND TOPIRAMATE 3.75; 23 MG/1; MG/1
1 CAPSULE, EXTENDED RELEASE ORAL DAILY
Qty: 30 CAPSULE | Refills: 0 | Status: SHIPPED | OUTPATIENT
Start: 2024-06-12

## 2024-06-12 NOTE — ASSESSMENT & PLAN NOTE
She does continue with ibuprofen, no GI complaints.  Urinalysis showed no protein, creatinine has been fine.

## 2024-06-12 NOTE — ASSESSMENT & PLAN NOTE
Currently living with pain, could see pain management and follow-up.  Continue with routine stretching, exercises.

## 2024-06-12 NOTE — ASSESSMENT & PLAN NOTE
Her blood pressure was okay after sitting at 120/84 she will continue amlodipine 5 mg daily    EKG run here today was normal sinus rhythm at 65 bpm/normal EKG

## 2024-06-12 NOTE — ASSESSMENT & PLAN NOTE
She will continue sertraline 100 mg daily.  Can use lorazepam sparingly for acute anxiety.  I would like her to look into talk therapy.

## 2024-06-12 NOTE — ASSESSMENT & PLAN NOTE
Her cholesterol last month was 224 with HDL 52, , she does have less than 6% 10-year cardiovascular risk, hold off on adding statin, work on healthy diet.

## 2024-06-12 NOTE — ASSESSMENT & PLAN NOTE
A1c 5.8 last month, continue to work on healthy diet, we reviewed bariatric consult, she does plan to use oral medication, wants to hold off on injectable.  Requesting EKG today so that she can start medication, staff will run that.

## 2024-06-12 NOTE — PROGRESS NOTES
FAMILY PRACTICE OFFICE VISIT  Meño Coffey D.O.    [facility]  [facility]  [address]  [address]  [place], [address]      NAME: Shandra Mcmullen  AGE: 59 y.o. SEX: female  : 1965   MRN: [id number]    DATE: 2024  TIME: 12:48 PM      Assessment and Plan         Patient Instructions   1. Encounter for annual physical exam  2. Essential hypertension  Assessment & Plan:  Her blood pressure was okay after sitting at 120/84 she will continue amlodipine 5 mg daily    EKG run here today was normal sinus rhythm at 65 bpm/normal EKG  Orders:  -     POCT ECG  3. Mixed hyperlipidemia  Assessment & Plan:  Her cholesterol last month was 224 with HDL 52, , she does have less than 6% 10-year cardiovascular risk, hold off on adding statin, work on healthy diet.  4. Mild intermittent reactive airway disease without complication  Assessment & Plan:  She has not required albuterol recently  5. Pulmonary nodules  Assessment & Plan:  She will be doing CT of chest again this month.  Plans to see lung doctor in follow-up  6. Depression with anxiety  Assessment & Plan:  She will continue sertraline 100 mg daily.  Can use lorazepam sparingly for acute anxiety.  I would like her to look into talk therapy.  Orders:  -     LORazepam (ATIVAN) 0.5 mg tablet; Take 1 tablet (0.5 mg total) by mouth every 6 (six) hours as needed for anxiety  7. Obstructive sleep apnea treated with BiPAP -  felt difficulty tolerating and stopped  Assessment & Plan:  She felt intolerant PAP, could reevaluate with sleep specialist.  Did see bariatrics, plans to work on weight loss  8. Chronic cough  Assessment & Plan:  Improved recently.  9. Chronic bilateral low back pain with right-sided sciatica  Assessment & Plan:  Currently living with pain, could see pain management and follow-up.  Continue with routine stretching, exercises.  10. Osteoarthritis  Assessment & Plan:  She does continue with ibuprofen, no  GI complaints.  Urinalysis showed no protein, creatinine has been fine.  11. Gastroesophageal reflux disease without esophagitis  Assessment & Plan:  She had an EGD back in 2020, she can continue on omeprazole as is  12. Borderline hyperglycemia  Assessment & Plan:  A1c 5.8 last month, continue to work on healthy diet, we reviewed bariatric consult, she does plan to use oral medication, wants to hold off on injectable.  Requesting EKG today so that she can start medication, staff will run that.  13. Obesity, Class III, BMI 40-49.9 (morbid obesity) (HCC)            We did review previous blood work, last month CMP was okay other than slightly elevated glucose at 110, TSH was okay at 3.1, CBC was okay, urinalysis was clear.  Lipid screening up-to-date   She is up to date with Diabetes screening.      Regarding Colon Cancer screening, Screening is up to date.   Colonoscopy was done in 9/2020 and was advised to redo 9/2025 re hx polyps.  Had EGD 9/2020, can use PPI/ Omeprazole as is, call if reflux worsens.     Is a former smoker, quit 2007 -   had seen Pulm in past, has RAD w frequent issues cough -   last CT chest was 1/2021 -  stable 4mm nodule with other smaller nodules, await redo CT scan.       Immunization History   Administered Date(s) Administered    COVID-19 PFIZER VACCINE 0.3 ML IM 09/04/2021    INFLUENZA 11/16/2007, 10/15/2008, 09/24/2009, 09/23/2010, 11/09/2018, 10/18/2022    Influenza Quadrivalent Preservative Free 3 years and older IM 10/09/2014, 09/25/2017    Influenza, injectable, quadrivalent, preservative free 0.5 mL 11/13/2023    Influenza, recombinant, quadrivalent,injectable, preservative free 11/09/2018, 12/05/2019, 10/21/2020, 01/18/2022, 10/18/2022    Influenza, seasonal, injectable 11/01/2010    Pneumococcal Polysaccharide PPV23 12/05/2019    Td (adult), adsorbed 06/12/2017    Tdap 06/01/2017    Tuberculin Skin Test-PPD Intradermal 08/27/2001         She does do yearly Flu shot.   Rcvd Pneumo  2019  Tdap/tetanus shot is up to date  (done every 10 yrs for superficial cuts, every 5 yrs for deep wounds)   Can also look into coverage for 'shingles' shot, Shingrix. Can do that here or at pharmacy.  Covid vaccine rcvd x 1 -   has had Covid few times- last was 2022.    She does see her Gynecologist routinely.   Discussed screening Mammogram, last was 2023.  She will be doing mammogram again in August.     Regarding Hepatitis C Screening -  previously perfomed and was negative.    Continue to try to watch healthy diet, exercise routinely as joints allow,  see Ortho, Chiro and Podiatry as needed.  Plans to see podiatry regarding cystic mass versus small ganglion dorsum right foot    We will see her again in 6 months, sooner as needed.    Chief Complaint     Chief Complaint   Patient presents with    Follow-up     6 month follow up        History of Present Illness   Shandra Mcmullen is a 59 y.o.-year-old female who is in for routine follow-up/regular physical.  She has had no increased shortness of breath, no chest pain.  Significant life stressors including the death of her nephew who  after being hit by a truck.  Some family situations.  Is using sertraline along with other meds.    She did see weight management group, considering starting oral medication, relates they requested an EKG.    Does have ongoing back pain, about the same as at baseline, remains active.      Review of Systems   Review of Systems   Constitutional:  Positive for fatigue. Negative for appetite change, fever and unexpected weight change.   HENT:  Negative for sore throat and trouble swallowing.    Respiratory:  Negative for cough, chest tightness, shortness of breath and wheezing.         No need Albuterol   Cardiovascular:  Negative for chest pain, palpitations and leg swelling.   Gastrointestinal:  Negative for abdominal pain, blood in stool, nausea and vomiting.        No inc acid reflux     No change in bowel  "  Genitourinary:  Negative for dysuria and hematuria.   Musculoskeletal:  Positive for arthralgias.   Neurological:  Negative for dizziness, syncope, light-headedness and headaches.   Hematological:  Does not bruise/bleed easily.   Psychiatric/Behavioral:  Positive for sleep disturbance (apnea-  intol cpap). Negative for confusion. The patient is nervous/anxious.        Active Problem List     Patient Active Problem List   Diagnosis    Acid reflux disease    Carpal tunnel syndrome    Colon polyp    Complete tear of right rotator cuff    Depression with anxiety    Disc degeneration, lumbar    HSV-1 (herpes simplex virus 1) infection    Chronic bilateral low back pain with right-sided sciatica    Migraine headache    Osteoarthritis    Pap smear abnormality of cervix with ASCUS favoring benign -  f/up normal    Symptomatic menopausal or female climacteric states    Hearing loss, left    Arthralgia    Reactive airway disease without complication    Chronic cough    Obstructive sleep apnea treated with BiPAP -  felt difficulty tolerating and stopped    Pulmonary nodules    Snoring    Essential hypertension    Borderline hyperglycemia    Former smoker    Asymptomatic varicose veins of right lower extremity    Mixed hyperlipidemia    Cervical radicular pain ( right )    Obesity, Class III, BMI 40-49.9 (morbid obesity) (HCC)       Past Medical History:  Reviewed    Past Surgical History:  Reviewed    Family History:  Reviewed    Social History:  Reviewed    Objective     Vitals:    06/12/24 1122   BP: 120/84   BP Location: Left arm   Patient Position: Sitting   Cuff Size: Standard   Pulse: 75   Resp: 12   SpO2: 98%   Weight: 109 kg (240 lb)   Height: 5' 5.5\" (1.664 m)     Body mass index is 39.33 kg/m².    BP Readings from Last 3 Encounters:   06/12/24 120/84   05/01/24 134/74   12/28/23 150/84       Wt Readings from Last 3 Encounters:   06/12/24 109 kg (240 lb)   05/01/24 113 kg (248 lb 6.4 oz)   12/28/23 111 kg (245 lb) "       Physical Exam  Constitutional:       General: She is not in acute distress.     Appearance: Normal appearance. She is well-developed. She is not ill-appearing.      Comments: Occasionally tearful discussing life situations   Eyes:      General: No scleral icterus.  Neck:      Vascular: No carotid bruit.   Cardiovascular:      Rate and Rhythm: Normal rate and regular rhythm.      Heart sounds: Normal heart sounds. No murmur heard.  Pulmonary:      Effort: Pulmonary effort is normal. No respiratory distress.      Breath sounds: Normal breath sounds. No wheezing, rhonchi or rales.   Abdominal:      Tenderness: There is no abdominal tenderness.   Musculoskeletal:      Right lower leg: No edema.      Left lower leg: No edema.      Comments: She has no pitting edema   Skin:     Coloration: Skin is not jaundiced.   Neurological:      Mental Status: She is alert and oriented to person, place, and time. Mental status is at baseline.   Psychiatric:         Mood and Affect: Mood normal.         Behavior: Behavior normal.         ALLERGIES:  Allergies   Allergen Reactions    Effexor [Venlafaxine] Other (See Comments)     Hot and sweaty    Levaquin [Levofloxacin] Myalgia     Tendonitis     Wellbutrin [Bupropion] Other (See Comments)     Hot and sweaty    Prempro [Conj Estrog-Medroxyprogest Ace] Rash       Current Medications     Current Outpatient Medications   Medication Sig Dispense Refill    amLODIPine (NORVASC) 5 mg tablet TAKE 1 TABLET (5 MG TOTAL) BY MOUTH DAILY. 90 tablet 3    Calcium Carbonate-Vit D-Min (Calcium 1200) 5907-3174 MG-UNIT CHEW Chew      fluticasone (FLONASE) 50 mcg/act nasal spray SPRAY 1 SPRAY INTO EACH NOSTRIL EVERY DAY 16 mL 5    ibuprofen (MOTRIN) 200 mg tablet Uses 800 mg once or twice daily      LORazepam (ATIVAN) 0.5 mg tablet Take 1 tablet (0.5 mg total) by mouth every 6 (six) hours as needed for anxiety 20 tablet 0    multivitamin (THERAGRAN) TABS Take 1 tablet by mouth daily      omeprazole  (PriLOSEC) 20 mg delayed release capsule TAKE 1 CAPSULE DAILY AS NEEDED FOR STOMACH AND ACID 90 capsule 1    sertraline (ZOLOFT) 100 mg tablet TAKE 1 TABLET BY MOUTH EVERY DAY 90 tablet 3    valACYclovir (VALTREX) 1,000 mg tablet Take 2 tablets (2,000 mg total) by mouth every 12 (twelve) hours Use 2 pills at onset cold sore and repeat 2 pills once in 12 hrs  then hold 120 tablet 0    Zinc Sulfate (ZINC-220 PO) Take by mouth      albuterol (2.5 mg/3 mL) 0.083 % nebulizer solution Take 3 mL (2.5 mg total) by nebulization every 4 (four) hours as needed for wheezing (via nebulizer) (Patient not taking: Reported on 12/28/2023) 120 mL 0    albuterol (PROVENTIL HFA,VENTOLIN HFA) 90 mcg/act inhaler Inhale 2 puffs every 6 (six) hours as needed for wheezing (Patient not taking: Reported on 12/12/2023) 18 g 0    gabapentin (NEURONTIN) 300 mg capsule Take 1 capsule (300 mg total) by mouth daily at bedtime 90 capsule 0     No current facility-administered medications for this visit.            Orders Placed This Encounter   Procedures    POCT ECG         Meño Coffey DO

## 2024-06-12 NOTE — PATIENT INSTRUCTIONS
1. Encounter for annual physical exam  2. Essential hypertension  Assessment & Plan:  Her blood pressure was okay after sitting at 120/84 she will continue amlodipine 5 mg daily    EKG run here today was normal sinus rhythm at 65 bpm/normal EKG  Orders:  -     POCT ECG  3. Mixed hyperlipidemia  Assessment & Plan:  Her cholesterol last month was 224 with HDL 52, , she does have less than 6% 10-year cardiovascular risk, hold off on adding statin, work on healthy diet.  4. Mild intermittent reactive airway disease without complication  Assessment & Plan:  She has not required albuterol recently  5. Pulmonary nodules  Assessment & Plan:  She will be doing CT of chest again this month.  Plans to see lung doctor in follow-up  6. Depression with anxiety  Assessment & Plan:  She will continue sertraline 100 mg daily.  Can use lorazepam sparingly for acute anxiety.  I would like her to look into talk therapy.  Orders:  -     LORazepam (ATIVAN) 0.5 mg tablet; Take 1 tablet (0.5 mg total) by mouth every 6 (six) hours as needed for anxiety  7. Obstructive sleep apnea treated with BiPAP -  felt difficulty tolerating and stopped  Assessment & Plan:  She felt intolerant PAP, could reevaluate with sleep specialist.  Did see bariatrics, plans to work on weight loss  8. Chronic cough  Assessment & Plan:  Improved recently.  9. Chronic bilateral low back pain with right-sided sciatica  Assessment & Plan:  Currently living with pain, could see pain management and follow-up.  Continue with routine stretching, exercises.  10. Osteoarthritis  Assessment & Plan:  She does continue with ibuprofen, no GI complaints.  Urinalysis showed no protein, creatinine has been fine.  11. Gastroesophageal reflux disease without esophagitis  Assessment & Plan:  She had an EGD back in 2020, she can continue on omeprazole as is  12. Borderline hyperglycemia  Assessment & Plan:  A1c 5.8 last month, continue to work on healthy diet, we reviewed  bariatric consult, she does plan to use oral medication, wants to hold off on injectable.  Requesting EKG today so that she can start medication, staff will run that.  13. Obesity, Class III, BMI 40-49.9 (morbid obesity) (HCC)            We did review previous blood work, last month CMP was okay other than slightly elevated glucose at 110, TSH was okay at 3.1, CBC was okay, urinalysis was clear.  Lipid screening up-to-date   She is up to date with Diabetes screening.      Regarding Colon Cancer screening, Screening is up to date.   Colonoscopy was done in 9/2020 and was advised to redo 9/2025 re hx polyps.  Had EGD 9/2020, can use PPI/ Omeprazole as is, call if reflux worsens.     Is a former smoker, quit 2007 -   had seen Pulm in past, has RAD w frequent issues cough -   last CT chest was 1/2021 -  stable 4mm nodule with other smaller nodules, await redo CT scan.       Immunization History   Administered Date(s) Administered    COVID-19 PFIZER VACCINE 0.3 ML IM 09/04/2021    INFLUENZA 11/16/2007, 10/15/2008, 09/24/2009, 09/23/2010, 11/09/2018, 10/18/2022    Influenza Quadrivalent Preservative Free 3 years and older IM 10/09/2014, 09/25/2017    Influenza, injectable, quadrivalent, preservative free 0.5 mL 11/13/2023    Influenza, recombinant, quadrivalent,injectable, preservative free 11/09/2018, 12/05/2019, 10/21/2020, 01/18/2022, 10/18/2022    Influenza, seasonal, injectable 11/01/2010    Pneumococcal Polysaccharide PPV23 12/05/2019    Td (adult), adsorbed 06/12/2017    Tdap 06/01/2017    Tuberculin Skin Test-PPD Intradermal 08/27/2001         She does do yearly Flu shot.   Rcvd Pneumo 2019  Tdap/tetanus shot is up to date  (done every 10 yrs for superficial cuts, every 5 yrs for deep wounds)   Can also look into coverage for 'shingles' shot, Shingrix. Can do that here or at pharmacy.  Covid vaccine rcvd x 1 -   has had Covid few times- last was 11/2022.    She does see her Gynecologist routinely.   Discussed  screening Mammogram, last was 5/19/2023.  She will be doing mammogram again in August.     Regarding Hepatitis C Screening -  previously perfomed and was negative.    Continue to try to watch healthy diet, exercise routinely as joints allow,  see Ortho, Chiro and Podiatry as needed.  Plans to see podiatry regarding cystic mass versus small ganglion dorsum right foot    We will see her again in 6 months, sooner as needed.

## 2024-06-13 ENCOUNTER — PATIENT MESSAGE (OUTPATIENT)
Dept: BARIATRICS | Facility: CLINIC | Age: 59
End: 2024-06-13

## 2024-06-21 NOTE — TELEPHONE ENCOUNTER
PA for Qsymia 3.75/23    Submitted via    []CMM-KEY    [x][facility]-Case ID #    []Faxed to plan   []Other website    []Phone call Case ID #       Office notes sent, clinical questions answered. Awaiting determination    Turnaround time for your insurance to make a decision on your Prior Authorization can take 7-21 business days.

## 2024-06-21 NOTE — TELEPHONE ENCOUNTER
PA for qsymia 7.5/46    Submitted via    []CMM-KEY    [x][facility]-Case ID # [id number]   []Faxed to plan   []Other website    []Phone call Case ID #      Office notes sent, clinical questions answered. Awaiting determination    Turnaround time for your insurance to make a decision on your Prior Authorization can take 7-21 business days.

## 2024-06-24 ENCOUNTER — HOSPITAL ENCOUNTER (OUTPATIENT)
Dept: CT IMAGING | Facility: HOSPITAL | Age: 59
Discharge: HOME | End: 2024-06-24
Payer: COMMERCIAL

## 2024-06-24 DIAGNOSIS — R05.3 CHRONIC COUGH: ICD-10-CM

## 2024-06-24 DIAGNOSIS — R91.8 PULMONARY NODULES: ICD-10-CM

## 2024-06-24 PROCEDURE — 71250 CT THORAX DX C-: CPT

## 2024-06-24 PROCEDURE — G1004 CDSM NDSC: HCPCS

## 2024-06-25 DIAGNOSIS — M54.41 CHRONIC BILATERAL LOW BACK PAIN WITH RIGHT-SIDED SCIATICA: ICD-10-CM

## 2024-06-25 DIAGNOSIS — G89.29 CHRONIC BILATERAL LOW BACK PAIN WITH RIGHT-SIDED SCIATICA: ICD-10-CM

## 2024-06-25 DIAGNOSIS — I10 ESSENTIAL HYPERTENSION: ICD-10-CM

## 2024-06-25 DIAGNOSIS — M54.12 CERVICAL RADICULAR PAIN: ICD-10-CM

## 2024-06-25 RX ORDER — AMLODIPINE BESYLATE 5 MG/1
5 TABLET ORAL DAILY
Qty: 90 TABLET | Refills: 1 | Status: SHIPPED | OUTPATIENT
Start: 2024-06-25

## 2024-06-25 RX ORDER — GABAPENTIN 300 MG/1
300 CAPSULE ORAL
Qty: 90 CAPSULE | Refills: 1 | Status: SHIPPED | OUTPATIENT
Start: 2024-06-25

## 2024-07-01 ENCOUNTER — TELEPHONE (OUTPATIENT)
Dept: FAMILY MEDICINE CLINIC | Facility: CLINIC | Age: 59
End: 2024-07-01

## 2024-07-15 ENCOUNTER — APPOINTMENT (OUTPATIENT)
Dept: RADIOLOGY | Facility: MEDICAL CENTER | Age: 59
End: 2024-07-15
Payer: COMMERCIAL

## 2024-07-15 ENCOUNTER — OFFICE VISIT (OUTPATIENT)
Dept: OBGYN CLINIC | Facility: MEDICAL CENTER | Age: 59
End: 2024-07-15
Payer: COMMERCIAL

## 2024-07-15 VITALS
HEIGHT: 66 IN | SYSTOLIC BLOOD PRESSURE: 113 MMHG | DIASTOLIC BLOOD PRESSURE: 75 MMHG | HEART RATE: 71 BPM | WEIGHT: 237 LBS | BODY MASS INDEX: 38.09 KG/M2

## 2024-07-15 DIAGNOSIS — M25.512 ACUTE PAIN OF LEFT SHOULDER: ICD-10-CM

## 2024-07-15 DIAGNOSIS — S46.912A SHOULDER STRAIN, LEFT, INITIAL ENCOUNTER: ICD-10-CM

## 2024-07-15 DIAGNOSIS — M19.012 PRIMARY OSTEOARTHRITIS OF LEFT SHOULDER: ICD-10-CM

## 2024-07-15 DIAGNOSIS — M25.512 ACUTE PAIN OF LEFT SHOULDER: Primary | ICD-10-CM

## 2024-07-15 PROCEDURE — 20610 DRAIN/INJ JOINT/BURSA W/O US: CPT | Performed by: EMERGENCY MEDICINE

## 2024-07-15 PROCEDURE — 99214 OFFICE O/P EST MOD 30 MIN: CPT | Performed by: EMERGENCY MEDICINE

## 2024-07-15 PROCEDURE — 73030 X-RAY EXAM OF SHOULDER: CPT

## 2024-07-15 RX ORDER — TRIAMCINOLONE ACETONIDE 40 MG/ML
40 INJECTION, SUSPENSION INTRA-ARTICULAR; INTRAMUSCULAR
Status: COMPLETED | OUTPATIENT
Start: 2024-07-15 | End: 2024-07-15

## 2024-07-15 RX ORDER — ROPIVACAINE HYDROCHLORIDE 2 MG/ML
3 INJECTION, SOLUTION EPIDURAL; INFILTRATION; PERINEURAL
Status: SHIPPED | OUTPATIENT
Start: 2024-07-15

## 2024-07-15 RX ADMIN — TRIAMCINOLONE ACETONIDE 40 MG: 40 INJECTION, SUSPENSION INTRA-ARTICULAR; INTRAMUSCULAR at 15:30

## 2024-07-15 RX ADMIN — ROPIVACAINE HYDROCHLORIDE 3 ML: 2 INJECTION, SOLUTION EPIDURAL; INFILTRATION; PERINEURAL at 16:34

## 2024-07-15 NOTE — PROGRESS NOTES
Assessment/Plan:    Diagnoses and all orders for this visit:    Acute pain of left shoulder  -     XR shoulder 2+ vw left; Future  -     Ambulatory Referral to Physical Therapy; Future  -     Large joint arthrocentesis: L subacromial bursa  -     ropivacaine (NAROPIN) injection 3 mL    Shoulder strain, left, initial encounter  -     XR shoulder 2+ vw left; Future  -     Ambulatory Referral to Physical Therapy; Future  -     Large joint arthrocentesis: L subacromial bursa  -     ropivacaine (NAROPIN) injection 3 mL    Primary osteoarthritis of left shoulder  -     Ambulatory Referral to Physical Therapy; Future    Left subacromial CSI provided today.  X-rays of the left shoulder were obtained and reviewed today.  At this point in time I do not feel that the patient has sustained a significant tear of the rotator cuff nor the pec tendon.  I provided a prescription to physical therapy however she may decide to perform the exercises at home as she does have a list of exercises from previous therapy for the right shoulder.  If no improvement from today's injection to consider fluoroscopic guided glenohumeral injection.  Patient is comfortable treating conservatively as she was previously diagnosed with a right shoulder rotator cuff tear declined surgery at that point in time and had a successful rehab    Return in about 6 weeks (around 8/26/2024).      Subjective:   Patient ID: Shandra Mcmullen is a 59 y.o. female.    Patient new to me presents for Left anterior shoulder pain starting in March when her mother in law pulled on her arm/shoulder.  Denies mechanical symptoms.  Takes IBU PRN  Hx Right shoulder pain s/p eval with Orthopedic surgeon        Review of Systems    The following portions of the patient's chart were reviewed and updated as appropriate:   Allergy:    Allergies   Allergen Reactions    Effexor [Venlafaxine] Other (See Comments)     Hot and sweaty    Levaquin [Levofloxacin] Myalgia     Tendonitis      Wellbutrin [Bupropion] Other (See Comments)     Hot and sweaty    Prempro [Conj Estrog-Medroxyprogest Ace] Rash       Medications:    Current Outpatient Medications:     amLODIPine (NORVASC) 5 mg tablet, TAKE 1 TABLET (5 MG TOTAL) BY MOUTH DAILY., Disp: 90 tablet, Rfl: 1    Calcium Carbonate-Vit D-Min (Calcium 1200) 8931-7250 MG-UNIT CHEW, Chew, Disp: , Rfl:     fluticasone (FLONASE) 50 mcg/act nasal spray, SPRAY 1 SPRAY INTO EACH NOSTRIL EVERY DAY, Disp: 16 mL, Rfl: 5    gabapentin (NEURONTIN) 300 mg capsule, TAKE 1 CAPSULE BY MOUTH AT BEDTIME, Disp: 90 capsule, Rfl: 1    ibuprofen (MOTRIN) 200 mg tablet, Uses 800 mg once or twice daily, Disp: , Rfl:     LORazepam (ATIVAN) 0.5 mg tablet, Take 1 tablet (0.5 mg total) by mouth every 6 (six) hours as needed for anxiety, Disp: 20 tablet, Rfl: 0    multivitamin (THERAGRAN) TABS, Take 1 tablet by mouth daily, Disp: , Rfl:     omeprazole (PriLOSEC) 20 mg delayed release capsule, TAKE 1 CAPSULE DAILY AS NEEDED FOR STOMACH AND ACID, Disp: 90 capsule, Rfl: 1    Phentermine-Topiramate (Qsymia) 3.75-23 MG CP24, Take 1 capsule by mouth in the morning, Disp: 30 capsule, Rfl: 0    Phentermine-Topiramate (Qsymia) 7.5-46 MG CP24, Take 1 capsule by mouth in the morning, Disp: 30 capsule, Rfl: 0    sertraline (ZOLOFT) 100 mg tablet, TAKE 1 TABLET BY MOUTH EVERY DAY, Disp: 90 tablet, Rfl: 3    valACYclovir (VALTREX) 1,000 mg tablet, Take 2 tablets (2,000 mg total) by mouth every 12 (twelve) hours Use 2 pills at onset cold sore and repeat 2 pills once in 12 hrs  then hold, Disp: 120 tablet, Rfl: 0    albuterol (2.5 mg/3 mL) 0.083 % nebulizer solution, Take 3 mL (2.5 mg total) by nebulization every 4 (four) hours as needed for wheezing (via nebulizer) (Patient not taking: Reported on 12/28/2023), Disp: 120 mL, Rfl: 0    albuterol (PROVENTIL HFA,VENTOLIN HFA) 90 mcg/act inhaler, Inhale 2 puffs every 6 (six) hours as needed for wheezing (Patient not taking: Reported on 12/12/2023), Disp:  "18 g, Rfl: 0    Zinc Sulfate (ZINC-220 PO), Take by mouth (Patient not taking: Reported on 7/15/2024), Disp: , Rfl:     Current Facility-Administered Medications:     ropivacaine (NAROPIN) injection 3 mL, 3 mL, Intra-articular, Titrated, , 3 mL at 07/15/24 1634    Patient Active Problem List   Diagnosis    Acid reflux disease    Carpal tunnel syndrome    Colon polyp    Complete tear of right rotator cuff    Depression with anxiety    Disc degeneration, lumbar    HSV-1 (herpes simplex virus 1) infection    Chronic bilateral low back pain with right-sided sciatica    Migraine headache    Osteoarthritis    Pap smear abnormality of cervix with ASCUS favoring benign -  f/up normal    Symptomatic menopausal or female climacteric states    Hearing loss, left    Arthralgia    Reactive airway disease without complication    Chronic cough    Obstructive sleep apnea treated with BiPAP -  felt difficulty tolerating and stopped    Pulmonary nodules    Snoring    Essential hypertension    Borderline hyperglycemia    Former smoker    Asymptomatic varicose veins of right lower extremity    Mixed hyperlipidemia    Cervical radicular pain ( right )    Obesity, Class III, BMI 40-49.9 (morbid obesity) ([facility])       Objective:  /75   Pulse 71   Ht 5' 5.5\" (1.664 m)   Wt 108 kg (237 lb)   LMP  (LMP Unknown)   BMI 38.84 kg/m²     Right Shoulder Exam     Range of Motion   The patient has normal right shoulder ROM.  Active abduction:  normal     Muscle Strength   External rotation: 5/5       Left Shoulder Exam     Tenderness   Left shoulder tenderness location: point ttp lateral chest.    Range of Motion   The patient has normal left shoulder ROM.    Muscle Strength   Internal rotation: 5/5   External rotation: 5/5   Supraspinatus: 5/5     Tests   Drop arm: negative    Other   Erythema: absent             Physical Exam      Neurologic Exam    Large joint arthrocentesis: L subacromial bursa  Universal Protocol:  Consent: Verbal " "consent obtained.  Risks and benefits: risks, benefits and alternatives were discussed  Consent given by: patient  Time out: Immediately prior to procedure a \"time out\" was called to verify the correct patient, procedure, equipment, support staff and site/side marked as required.  Timeout called at: 7/15/2024 4:31 PM.  Patient understanding: patient states understanding of the procedure being performed  Test results: test results available and properly labeled  Site marked: the operative site was marked  Patient identity confirmed: verbally with patient  Supporting Documentation  Indications: pain   Procedure Details  Location: shoulder - L subacromial bursa  Preparation: Patient was prepped and draped in the usual sterile fashion  Needle size: 22 G  Ultrasound guidance: no  Approach: posterolateral  Medications administered: 40 mg triamcinolone acetonide 40 mg/mL    Patient tolerance: patient tolerated the procedure well with no immediate complications  Dressing:  Sterile dressing applied    No erythema of Shoulder(s)        I have personally reviewed pertinent films in PACS and my interpretation is Xrays Left Shoulder: No acute fracture or dislocation there are mild degenerative changes of the glenohumeral joint            Past Medical History:   Diagnosis Date    Abnormal mammogram of right breast 02/09/2018    Acid reflux     Anxiety     Asthmatic bronchitis     Back pain     Carpal tunnel syndrome     COVID-19     COVID-19 virus infection 02/04/2021    mild    Depression     Depression     Hyperglycemia     Hypertension     Lipoma     Migraine     Miscarriage     Osteoarthritis     PONV (postoperative nausea and vomiting)     Stress incontinence     UTI (urinary tract infection)        Past Surgical History:   Procedure Laterality Date    APPENDECTOMY      BREAST EXCISIONAL BIOPSY Left 03/2001    benign    COLONOSCOPY N/A 4/8/2016    Procedure: COLONOSCOPY;  Surgeon: Suellen Abreu MD;  Location: [facility]" LAB;  Service:     KNEE ARTHROSCOPY      MO EXC B9 LESION MRGN XCP SK TG T/A/L 0.5 CM/< Left 2016    Procedure: EXCISION LIPOMA SHOULDER ;  Surgeon: Suellen Abreu MD;  Location: [facility];  Service: General    TUBAL LIGATION      WISDOM TOOTH EXTRACTION         Social History     Socioeconomic History    Marital status: /Civil Union     Spouse name: Not on file    Number of children: Not on file    Years of education: Not on file    Highest education level: Not on file   Occupational History    Not on file   Tobacco Use    Smoking status: Former     Current packs/day: 0.00     Average packs/day: 0.5 packs/day for 15.4 years (7.7 ttl pk-yrs)     Types: Cigarettes     Start date: 1991     Quit date: 2006     Years since quittin.1    Smokeless tobacco: Never   Vaping Use    Vaping status: Never Used   Substance and Sexual Activity    Alcohol use: Yes     Comment: rarely/socially 1-2x/year    Drug use: No    Sexual activity: Not Currently     Partners: Male     Birth control/protection: Post-menopausal   Other Topics Concern    Not on file   Social History Narrative    CONSUMES 1 CUP OF COFFEE PER DAY     Social Determinants of Health     Financial Resource Strain: Not on file   Food Insecurity: Not on file   Transportation Needs: Not on file   Physical Activity: Not on file   Stress: Not on file   Social Connections: Not on file   Intimate Partner Violence: Not on file   Housing Stability: Not on file       Family History   Problem Relation Age of Onset    Stroke Mother     Stomach cancer Mother 73    Asthma Mother     Cancer Mother     Skin cancer Father     Alzheimer's disease Father     No Known Problems Sister     No Known Problems Sister     Stroke Brother     No Known Problems Daughter     No Known Problems Daughter     Heart disease Maternal Grandmother     No Known Problems Maternal Grandfather     Breast cancer Paternal Grandmother 50        Grammy    No Known Problems Paternal  Grandfather     No Known Problems Maternal Aunt     No Known Problems Maternal Aunt     No Known Problems Maternal Aunt     No Known Problems Maternal Aunt     Lung cancer Paternal Uncle     Breast cancer Cousin 45    Breast cancer Cousin 45    Breast cancer Cousin 50    Breast cancer Cousin 50

## 2024-07-25 DIAGNOSIS — E66.9 OBESITY, CLASS II, BMI 35-39.9: ICD-10-CM

## 2024-07-25 RX ORDER — PHENTERMINE AND TOPIRAMATE 7.5; 46 MG/1; MG/1
1 CAPSULE, EXTENDED RELEASE ORAL DAILY
Qty: 30 CAPSULE | Refills: 0 | Status: SHIPPED | OUTPATIENT
Start: 2024-07-25

## 2024-07-31 ENCOUNTER — CLINICAL SUPPORT (OUTPATIENT)
Dept: BARIATRICS | Facility: CLINIC | Age: 59
End: 2024-07-31

## 2024-07-31 VITALS
DIASTOLIC BLOOD PRESSURE: 80 MMHG | WEIGHT: 235 LBS | SYSTOLIC BLOOD PRESSURE: 118 MMHG | RESPIRATION RATE: 17 BRPM | TEMPERATURE: 97.6 F | HEART RATE: 72 BPM | HEIGHT: 66 IN | BODY MASS INDEX: 37.77 KG/M2

## 2024-07-31 DIAGNOSIS — R63.5 ABNORMAL WEIGHT GAIN: Primary | ICD-10-CM

## 2024-07-31 PROCEDURE — RECHECK

## 2024-07-31 NOTE — PROGRESS NOTES
Patient last visit weight: 248lb   Patient current visit weight: 235.0lbs     If you are taking phentermine or other oral weight loss medications, are you experiencing any of the following symptoms:  Headache: YES - takes ibuprofen to relieve but thinks its due to stress   Blurred Vision: NO   Chest Pain: NO   Palpitations: NO   Insomnia: NO   SPECIFY ORAL MEDICATION AND DOSAGE: Qysemia 7.5/46 mg     If you are taking an injectable medication,  are you experiencing any of the following symptoms:  Bloating:   Nausea:  Vomiting:   Constipation:   Diarrhea:  SPECIFY INJECTABLE MEDICATION AND CURRENT DOSAGE:      Vitals:    Is BP less than 100/60? NO   Is BP greater than 140/90? NO   Is HR greater than 100? NO   **If yes to any of the above, have patient relax and repeat in 5-10 minutes**    Repeat values:    Is BP less than 100/60?  Is BP greater than 140/90?  Is HR greater than 100?  **If values remain outside of ranges above, please consult provider for next steps**

## 2024-08-06 ENCOUNTER — HOSPITAL ENCOUNTER (OUTPATIENT)
Dept: MAMMOGRAPHY | Facility: MEDICAL CENTER | Age: 59
Discharge: HOME | End: 2024-08-06
Payer: COMMERCIAL

## 2024-08-06 VITALS — HEIGHT: 66 IN | WEIGHT: 235.89 LBS | BODY MASS INDEX: 37.91 KG/M2

## 2024-08-06 DIAGNOSIS — Z12.31 ENCOUNTER FOR SCREENING MAMMOGRAM FOR MALIGNANT NEOPLASM OF BREAST: ICD-10-CM

## 2024-08-06 PROCEDURE — 77067 SCR MAMMO BI INCL CAD: CPT

## 2024-08-06 PROCEDURE — 77063 BREAST TOMOSYNTHESIS BI: CPT

## 2024-08-22 ENCOUNTER — OFFICE VISIT (OUTPATIENT)
Dept: BARIATRICS | Facility: CLINIC | Age: 59
End: 2024-08-22
Payer: COMMERCIAL

## 2024-08-22 VITALS
BODY MASS INDEX: 37.54 KG/M2 | HEART RATE: 79 BPM | HEIGHT: 66 IN | RESPIRATION RATE: 14 BRPM | TEMPERATURE: 98.1 F | DIASTOLIC BLOOD PRESSURE: 85 MMHG | WEIGHT: 233.6 LBS | SYSTOLIC BLOOD PRESSURE: 127 MMHG

## 2024-08-22 DIAGNOSIS — F41.8 DEPRESSION WITH ANXIETY: ICD-10-CM

## 2024-08-22 DIAGNOSIS — E66.9 OBESITY, CLASS II, BMI 35-39.9: ICD-10-CM

## 2024-08-22 DIAGNOSIS — E66.01 CLASS 2 SEVERE OBESITY DUE TO EXCESS CALORIES WITH SERIOUS COMORBIDITY AND BODY MASS INDEX (BMI) OF 38.0 TO 38.9 IN ADULT (HCC): Primary | ICD-10-CM

## 2024-08-22 DIAGNOSIS — I10 ESSENTIAL HYPERTENSION: ICD-10-CM

## 2024-08-22 PROBLEM — E66.812 CLASS 2 SEVERE OBESITY DUE TO EXCESS CALORIES WITH SERIOUS COMORBIDITY AND BODY MASS INDEX (BMI) OF 38.0 TO 38.9 IN ADULT (HCC): Status: ACTIVE | Noted: 2018-11-09

## 2024-08-22 PROCEDURE — 99214 OFFICE O/P EST MOD 30 MIN: CPT | Performed by: PHYSICIAN ASSISTANT

## 2024-08-22 RX ORDER — PHENTERMINE AND TOPIRAMATE 7.5; 46 MG/1; MG/1
1 CAPSULE, EXTENDED RELEASE ORAL DAILY
Qty: 30 CAPSULE | Refills: 1 | Status: SHIPPED | OUTPATIENT
Start: 2024-08-22

## 2024-08-22 NOTE — ASSESSMENT & PLAN NOTE
- Patient is interested in Conservative Program  - Not interested in weight loss surgery.  - Explained the importance of making lifestyle changes with anti-obesity medications.  - Initial weight loss goal of 5-10% weight loss for improved health  - Weight loss can improve patient's co-morbid conditions and/or prevent weight-related     On qsymia and started at weight of 248.4lb. -15lb. She has noticed slightly more labile mood but is unsure if related to life or medication.  She will continue on current dose and we discussed possible increase in another month. Also intending to talk to a therapist  -Medication contract signed 5/1/2024. - Postmenopausal.  - She is not interested in an injectable medication.   - Wellbutrin in the past - felt hot and sweaty, but unsure if those symptoms were related to Wellbutrin. Denies any significant side effects.    Initial Weight: 248.4  Current Weight:233.6  Change : -14.8lb. lost 3.5 inches from the weight        Goals:  Do not skip meals.-recommend protein shake if skipping lunch  No sugary beverages. At least 64oz of water daily.  Start food logging, weighing, and measuring food-1500 calories per day.

## 2024-08-22 NOTE — PROGRESS NOTES
Assessment/Plan:    Class 2 severe obesity due to excess calories with serious comorbidity and body mass index (BMI) of 38.0 to 38.9 in adult ([facility])  - Patient is interested in Conservative Program  - Not interested in weight loss surgery.  - Explained the importance of making lifestyle changes with anti-obesity medications.  - Initial weight loss goal of 5-10% weight loss for improved health  - Weight loss can improve patient's co-morbid conditions and/or prevent weight-related     On qsymia and started at weight of 248.4lb. -15lb. She has noticed slightly more labile mood but is unsure if related to life or medication.  She will continue on current dose and we discussed possible increase in another month. Also intending to talk to a therapist  -Medication contract signed 5/1/2024. - Postmenopausal.  - She is not interested in an injectable medication.   - Wellbutrin in the past - felt hot and sweaty, but unsure if those symptoms were related to Wellbutrin. Denies any significant side effects.    Initial Weight: 248.4  Current Weight:233.6  Change : -14.8lb. lost 3.5 inches from the weight        Goals:  Do not skip meals.-recommend protein shake if skipping lunch  No sugary beverages. At least 64oz of water daily.  Start food logging, weighing, and measuring food-1500 calories per day.    Essential hypertension  On norvasc  -should improve with weight loss, dietary, and lifestyle changes      Depression with anxiety  On zoloft and prn ativan. Plans to talk to therapist in the future        Return in about 6 months (around 2/22/2025) for 2 month and 4 month nurse visti.       Diagnoses and all orders for this visit:    Class 2 severe obesity due to excess calories with serious comorbidity and body mass index (BMI) of 38.0 to 38.9 in adult ([facility])    Obesity, Class II, BMI 35-39.9  -     Phentermine-Topiramate (Qsymia) 7.5-46 MG CP24; Take 1 capsule by mouth in the morning    Essential hypertension    Depression with  anxiety          Subjective:   Chief Complaint   Patient presents with    Follow-up     MWM- 4 mo F/u; Waist 44.5in        Patient ID: Shandra Mcmullen  is a 59 y.o. female with excess weight/obesity here to pursue weight managment.  Patient is pursuing Conservative Program.     HPI  She is on qysmia.  She feels it is making her a little more emotional and she was already emotional.  Also notes dry mouth.  Appetite is controlled during the day but feels more hungry at night.  Unsure if bored at nigtht  Wt Readings from Last 10 Encounters:   08/22/24 106 kg (233 lb 9.6 oz)   08/06/24 107 kg (235 lb 14.3 oz)   07/31/24 107 kg (235 lb)   07/15/24 108 kg (237 lb)   06/12/24 109 kg (240 lb)   05/01/24 113 kg (248 lb 6.4 oz)   12/28/23 111 kg (245 lb)   12/12/23 109 kg (240 lb)   05/19/23 109 kg (241 lb)   04/20/23 109 kg (241 lb)       Food logging:no   Increased appetite/cravings:sometimes more hungry at night  Exercise:trying to stay more active  Hydration:at least 40 oz water daily    Diet recall:  Blueberries and yogurt  Sometimes skips lunch   D: protein, vegetable and sometimes starch  S: pretzel, smart popcorn individual bag, veggie straws       The following portions of the patient's history were reviewed and updated as appropriate: She  has a past medical history of Abnormal mammogram of right breast (02/09/2018), Acid reflux, Anxiety, Asthmatic bronchitis, Back pain, Carpal tunnel syndrome, COVID-19, COVID-19 virus infection (02/04/2021), Depression, Depression, Hyperglycemia, Hypertension, Lipoma, Migraine, Miscarriage, Osteoarthritis, PONV (postoperative nausea and vomiting), Stress incontinence, and UTI (urinary tract infection).  She   Patient Active Problem List    Diagnosis Date Noted    Cervical radicular pain ( right ) 01/19/2022    Mixed hyperlipidemia 02/28/2021    Asymptomatic varicose veins of right lower extremity 09/25/2020    Former smoker 09/08/2020    Borderline hyperglycemia 01/17/2020     Essential hypertension 11/05/2019    Pulmonary nodules 08/12/2019    Snoring     Obstructive sleep apnea treated with BiPAP -  felt difficulty tolerating and stopped     Chronic cough 03/08/2019    Reactive airway disease without complication 12/28/2018    Hearing loss, left 11/09/2018    Arthralgia 11/09/2018    Class 2 severe obesity due to excess calories with serious comorbidity and body mass index (BMI) of 38.0 to 38.9 in adult ([facility]) 11/09/2018    Complete tear of right rotator cuff 11/07/2017    Colon polyp 05/01/2017    Chronic bilateral low back pain with right-sided sciatica 05/01/2017    HSV-1 (herpes simplex virus 1) infection 03/09/2017    Pap smear abnormality of cervix with ASCUS favoring benign -  f/up normal 12/21/2016    Symptomatic menopausal or female climacteric states 09/13/2013    Depression with anxiety 07/16/2012    Disc degeneration, lumbar 07/16/2012    Acid reflux disease 07/03/2012    Carpal tunnel syndrome 07/03/2012    Migraine headache 07/03/2012    Osteoarthritis 07/03/2012     She  has a past surgical history that includes Tubal ligation; Appendectomy; Knee arthroscopy; pr exc b9 lesion mrgn xcp sk tg t/a/l 0.5 cm/< (Left, 5/19/2016); Colonoscopy (N/A, 4/8/2016); [place] tooth extraction; and Breast excisional biopsy (Left, 03/2001).  Her family history includes Alzheimer's disease in her father; Asthma in her mother; Breast cancer (age of onset: 45) in her cousin and cousin; Breast cancer (age of onset: 50) in her cousin, cousin, and paternal grandmother; Heart disease in her maternal grandmother; Lung cancer in her paternal uncle; No Known Problems in her daughter, daughter, maternal aunt, maternal aunt, maternal aunt, maternal aunt, maternal grandfather, paternal aunt, paternal aunt, paternal aunt, paternal aunt, paternal aunt, paternal grandfather, sister, and sister; Skin cancer in her father; Stomach cancer (age of onset: 73) in her mother; Stroke in her brother and mother.  She   reports that she quit smoking about 18 years ago. Her smoking use included cigarettes. She started smoking about 33 years ago. She has a 7.7 pack-year smoking history. She has never used smokeless tobacco. She reports current alcohol use. She reports that she does not use drugs.  Current Outpatient Medications   Medication Sig Dispense Refill    albuterol (2.5 mg/3 mL) 0.083 % nebulizer solution Take 3 mL (2.5 mg total) by nebulization every 4 (four) hours as needed for wheezing (via nebulizer) (Patient taking differently: Take 2.5 mg by nebulization if needed for wheezing (via nebulizer)) 120 mL 0    albuterol (PROVENTIL HFA,VENTOLIN HFA) 90 mcg/act inhaler Inhale 2 puffs every 6 (six) hours as needed for wheezing (Patient taking differently: Inhale 2 puffs as needed for wheezing) 18 g 0    amLODIPine (NORVASC) 5 mg tablet TAKE 1 TABLET (5 MG TOTAL) BY MOUTH DAILY. 90 tablet 1    Calcium Carbonate-Vit D-Min (Calcium 1200) 9172-7325 MG-UNIT CHEW Chew      fluticasone (FLONASE) 50 mcg/act nasal spray SPRAY 1 SPRAY INTO EACH NOSTRIL EVERY DAY 16 mL 5    gabapentin (NEURONTIN) 300 mg capsule TAKE 1 CAPSULE BY MOUTH AT BEDTIME 90 capsule 1    ibuprofen (MOTRIN) 200 mg tablet if needed Uses 800 mg once or twice daily      LORazepam (ATIVAN) 0.5 mg tablet Take 1 tablet (0.5 mg total) by mouth every 6 (six) hours as needed for anxiety (Patient taking differently: Take 0.5 mg by mouth if needed for anxiety) 20 tablet 0    multivitamin (THERAGRAN) TABS Take 1 tablet by mouth daily      omeprazole (PriLOSEC) 20 mg delayed release capsule TAKE 1 CAPSULE DAILY AS NEEDED FOR STOMACH AND ACID 90 capsule 1    Phentermine-Topiramate (Qsymia) 7.5-46 MG CP24 Take 1 capsule by mouth in the morning 30 capsule 1    sertraline (ZOLOFT) 100 mg tablet TAKE 1 TABLET BY MOUTH EVERY DAY 90 tablet 3    valACYclovir (VALTREX) 1,000 mg tablet Take 2 tablets (2,000 mg total) by mouth every 12 (twelve) hours Use 2 pills at onset cold sore and  repeat 2 pills once in 12 hrs  then hold (Patient taking differently: Take 2,000 mg by mouth if needed Use 2 pills at onset cold sore and repeat 2 pills once in 12 hrs  then hold) 120 tablet 0    Zinc Sulfate (ZINC-220 PO) Take by mouth (Patient not taking: Reported on 7/15/2024)       Current Facility-Administered Medications   Medication Dose Route Frequency Provider Last Rate Last Admin    ropivacaine (NAROPIN) injection 3 mL  3 mL Intra-articular Titrated    3 mL at 07/15/24 0285     Current Outpatient Medications on File Prior to Visit   Medication Sig    albuterol (2.5 mg/3 mL) 0.083 % nebulizer solution Take 3 mL (2.5 mg total) by nebulization every 4 (four) hours as needed for wheezing (via nebulizer) (Patient taking differently: Take 2.5 mg by nebulization if needed for wheezing (via nebulizer))    albuterol (PROVENTIL HFA,VENTOLIN HFA) 90 mcg/act inhaler Inhale 2 puffs every 6 (six) hours as needed for wheezing (Patient taking differently: Inhale 2 puffs as needed for wheezing)    amLODIPine (NORVASC) 5 mg tablet TAKE 1 TABLET (5 MG TOTAL) BY MOUTH DAILY.    Calcium Carbonate-Vit D-Min (Calcium 1200) 6820-5714 MG-UNIT CHEW Chew    fluticasone (FLONASE) 50 mcg/act nasal spray SPRAY 1 SPRAY INTO EACH NOSTRIL EVERY DAY    gabapentin (NEURONTIN) 300 mg capsule TAKE 1 CAPSULE BY MOUTH AT BEDTIME    ibuprofen (MOTRIN) 200 mg tablet if needed Uses 800 mg once or twice daily    LORazepam (ATIVAN) 0.5 mg tablet Take 1 tablet (0.5 mg total) by mouth every 6 (six) hours as needed for anxiety (Patient taking differently: Take 0.5 mg by mouth if needed for anxiety)    multivitamin (THERAGRAN) TABS Take 1 tablet by mouth daily    omeprazole (PriLOSEC) 20 mg delayed release capsule TAKE 1 CAPSULE DAILY AS NEEDED FOR STOMACH AND ACID    sertraline (ZOLOFT) 100 mg tablet TAKE 1 TABLET BY MOUTH EVERY DAY    valACYclovir (VALTREX) 1,000 mg tablet Take 2 tablets (2,000 mg total) by mouth every 12 (twelve) hours Use 2 pills at  "onset cold sore and repeat 2 pills once in 12 hrs  then hold (Patient taking differently: Take 2,000 mg by mouth if needed Use 2 pills at onset cold sore and repeat 2 pills once in 12 hrs  then hold)    [DISCONTINUED] Phentermine-Topiramate (Qsymia) 7.5-46 MG CP24 Take 1 capsule by mouth in the morning    Zinc Sulfate (ZINC-220 PO) Take by mouth (Patient not taking: Reported on 7/15/2024)     Current Facility-Administered Medications on File Prior to Visit   Medication    ropivacaine (NAROPIN) injection 3 mL     She is allergic to effexor [venlafaxine], levaquin [levofloxacin], wellbutrin [bupropion], and prempro [conj estrog-medroxyprogest ace]..    Review of Systems   Constitutional:  Negative for fever.   Respiratory:  Negative for shortness of breath.    Cardiovascular:  Negative for chest pain and palpitations.   Gastrointestinal:  Negative for abdominal pain, constipation, diarrhea and vomiting.   Genitourinary:  Negative for difficulty urinating.   Musculoskeletal:  Negative for arthralgias and back pain.   Skin:  Negative for rash.   Neurological:  Negative for headaches.   Psychiatric/Behavioral:  Negative for dysphoric mood. The patient is not nervous/anxious.        Objective:    /85 (BP Location: Left arm, Patient Position: Sitting)   Pulse 79   Temp 98.1 °F (36.7 °C) (Tympanic)   Resp 14   Ht 5' 5.5\" (1.664 m)   Wt 106 kg (233 lb 9.6 oz)   LMP  (LMP Unknown)   BMI 38.28 kg/m²      Physical Exam  Vitals and nursing note reviewed.   Constitutional:       General: She is not in acute distress.     Appearance: She is well-developed. She is obese.   HENT:      Head: Normocephalic and atraumatic.   Eyes:      Conjunctiva/sclera: Conjunctivae normal.   Neck:      Thyroid: No thyromegaly.   Pulmonary:      Effort: Pulmonary effort is normal. No respiratory distress.   Skin:     Findings: No rash (visible).   Neurological:      Mental Status: She is alert and oriented to person, place, and time. "   Psychiatric:         Behavior: Behavior normal.

## 2024-08-26 ENCOUNTER — OFFICE VISIT (OUTPATIENT)
Dept: OBGYN CLINIC | Facility: MEDICAL CENTER | Age: 59
End: 2024-08-26
Payer: COMMERCIAL

## 2024-08-26 VITALS
HEART RATE: 72 BPM | DIASTOLIC BLOOD PRESSURE: 76 MMHG | SYSTOLIC BLOOD PRESSURE: 136 MMHG | BODY MASS INDEX: 37.45 KG/M2 | WEIGHT: 233 LBS | HEIGHT: 66 IN

## 2024-08-26 DIAGNOSIS — M25.512 ACUTE PAIN OF LEFT SHOULDER: Primary | ICD-10-CM

## 2024-08-26 DIAGNOSIS — M19.012 PRIMARY OSTEOARTHRITIS OF LEFT SHOULDER: ICD-10-CM

## 2024-08-26 DIAGNOSIS — S46.912A SHOULDER STRAIN, LEFT, INITIAL ENCOUNTER: ICD-10-CM

## 2024-08-26 PROCEDURE — 99213 OFFICE O/P EST LOW 20 MIN: CPT | Performed by: EMERGENCY MEDICINE

## 2024-08-26 NOTE — PATIENT INSTRUCTIONS
You may use Advil (ibuprofen) 400-600mg every 6 hours or at least twice per day (OR Aleve (naproxen) 250-500mg every 12 hours as needed for pain and inflammation).  However do not mix or take other NSAIDs together such as Advil, Motrin, ibuprofen, Celebrex, naproxen, diclofenac or Aleve.    You may also take Tylenol (acetaminophen) together with Advil (ibuprofen) or Aleve (naproxen) as this is safe and can help decrease your pain levels.  The dosing for Tylenol is 500mg every 4-6 hours as needed OR max 1,000mg per dose up to 3 times per day for a total of 3,000mg per day  *Check with your primary care physician to see if these medications are safe to take and to make sure they do not interfere with your other medications and medical issues.          Patient Education     Rotator Cuff Tear Exercises   About this topic   A rotator cuff tear is a problem that can limit how much you can move your shoulder. It can also be painful. The rotator cuff is a group of muscles and tendons in your shoulder. It helps your shoulder move and be steady. These muscles help to rotate and lift the arm. Tendons are strong bands that connect muscles to bones. You can tear a tendon in your shoulder if you fall or move your shoulder too fast and with too much force. Your tendon can also wear out over time and tear. Large tears may require surgery. For small tears, exercises may help make this problem better.  General   Before starting with a program, ask your doctor if you are healthy enough to do these exercises. Your doctor may have you work with a  or physical therapist to make a safe exercise program to meet your needs. You should not do the exercises if they cause sharp pains. You may need to start out with easy exercises at first. Then, once your shoulder is feeling better, you may start the harder exercises.  Passive Exercises:   You use the movement of your body to move your arm. Do NOT use your shoulder muscles to move your  arm.  Pendulum exercises ? Hold onto a table with one hand and bend forward at the waist until your back is level with the table. Let your sore arm hang in front of you. Do each exercise for 1 minute.  Circles ? Move your body in large circles one way. After you move a few times, your arm should start gently moving in circles. Now, move your body in circles the other way until your arm moves the other way.  Swinging side-to-side ? Move your body side to side. After you move a few times, your arm should start gently moving side-to-side.  Swinging back and forth ? Move your body forwards and then backwards. After you move a few times, your arm should start gently moving back and forth.  Stretching Exercises   Stretching exercises keep your muscles flexible. They also stop them from getting tight. Start by doing each of these stretches 2 to 3 times. In order for your body to make changes, you will need to hold these stretches for 20 to 30 seconds. Try to do the stretches 2 to 3 times each day. Do all exercises slowly.  Strengthening Exercises   Strengthening exercises keep your muscles firm and strong. Start by repeating each exercise 2 to 3 times. Work up to doing each exercise 10 times. Try to do the exercises 2 to 3 times each day. Do all exercises slowly.  Shoulder blade squeezes ? Pinch your shoulder blades together on your upper back and hold 3 to 5 seconds. Relax.  Cane rehab exercises:  Overhead flexions ? Lie down and grab the cane with both hands. Start with your arms straight and the cane resting on your upper legs. Keep your arms straight and lift the cane over your head.  Abductions or side-to-side motion ? Stand and grab the cane with both hands. Turn your thumbs to the left to stretch your left shoulder. Start with your arms straight and the cane resting on your upper legs. Push the cane to the left, raising your left arm. Keep your left elbow straight. Keep pushing until you feel a good stretch. Switch  the position of your hands to point your thumbs to the right and push the cane right to stretch your right shoulder.  External rotations or side-to-side rotations ? Lie down and grab the cane with both hands. Start with your elbows bent and touching your body. Put the tip of the cane in the palm of your right hand to stretch your right shoulder. Grab the cane at the other end with your left hand. Push the cane sideways into your right palm until you feel a stretch in your shoulder. Be sure to keep your right elbow close to your body while you are stretching. Switch hands to stretch the left shoulder.  Internal rotations ? Stand up and grab the cane with both hands behind your back. With your palms facing backwards, slide the cane up your back. Go until you feel a good stretch in front of the shoulder.  Shoulder blade exercises ? Lie on your stomach near the edge of a mat or bed or supported on an exercise ball. Start with your arms hanging down in a relaxed position.  Y position ? Raise your arms up and to the sides so your elbows are near your ears and your arms are straight out diagonally like the letter Y. Lower the arms back to the starting position.  T position ? Raise your arms straight out to the sides, even with your shoulders. Lower the arms back to the starting position.  W position ? Raise your arms up and to the sides with your elbows bent. Your hands should be just above your shoulders and looks like a W from above. Lower the arms back to the starting position.  L position ? Keep your elbows at your sides and raise just your lower arms out to the side to make a letter L and a backwards letter L. Lower the arms back to the starting position.  Shoulder exercises ? Tie a knot in an exercise band. Secure the band in a door by shutting it in around waist level. Wrap the band around one hand for a good . Stand away from the door enough to have slight tension in the band. As the exercises get easier, you  "may need to change to a heavier band. Moving closer to, or farther away from, the point where the band is tied down will decrease or increase the tension in the band.  Internal rotations ? Face sideways with the arm holding the band closest to the door. Bend the elbow to 90 degrees or in an \"L\" position. Keeping your elbow by your side and bent, pull the band across your body. Bring it back to the starting position. Repeat with the other arm.  External rotations ? Face sideways with the arm holding the band farthest from the door. Bend the elbow to 90 degrees or in an \"L\" position. Keeping your elbow by your side and bent, pull the band away from your body. Bring it back to the starting position. Repeat with the other arm.  Abductions ? Face sideways with the arm holding the band farthest from the door. Be sure that your thumb is facing upward. Keep your elbow straight and pull the band out to the side and away from your body. Go up to shoulder level. Bring it back to the starting position. Repeat with the other arm.  Flexions ? Face away from the door. Keeping your elbow straight, pull the band straight out in front of you. Bring it back to the starting position. Repeat with the other arm.               What will the results be?   Less pain  More strength  Able to move your arm more  Easier to do daily activities  Shoulder is more stable  Prevent re-injury  Helpful tips   Stay active and work out to keep your muscles strong and flexible.  Eat a healthy diet to keep your muscles healthy.  Be sure you do not hold your breath when exercising. This can raise your blood pressure. If you tend to hold your breath, try counting out loud when exercising. If any exercise bothers you, stop right away.  Always warm up before stretching. Heated muscles stretch much easier than cool muscles. Stretching cool muscles can lead to injury.  Try swinging your arms at an easy pace for a few minutes to warm up your muscles. Do this " again after exercising.  Never bounce when doing stretches.  After exercising, it is a good idea to use ice. Place an ice pack or a bag of frozen peas wrapped in a towel over the painful part. Never put ice right on the skin. Do not leave the ice on more than 10 to 15 minutes at a time. Ice after activity may help decrease pain and swelling. Never ice before stretching.  Doing exercises before a meal may be a good way to get into a routine.  Exercise may be slightly uncomfortable, but you should not have sharp pains. If you do get sharp pains, stop what you are doing. If the sharp pains continue, call your doctor.  Last Reviewed Date   2020-03-10  Consumer Information Use and Disclaimer   This generalized information is a limited summary of diagnosis, treatment, and/or medication information. It is not meant to be comprehensive and should be used as a tool to help the user understand and/or assess potential diagnostic and treatment options. It does NOT include all information about conditions, treatments, medications, side effects, or risks that may apply to a specific patient. It is not intended to be medical advice or a substitute for the medical advice, diagnosis, or treatment of a health care provider based on the health care provider's examination and assessment of a patient’s specific and unique circumstances. Patients must speak with a health care provider for complete information about their health, medical questions, and treatment options, including any risks or benefits regarding use of medications. This information does not endorse any treatments or medications as safe, effective, or approved for treating a specific patient. [employer] and its affiliates disclaim any warranty or liability relating to this information or the use thereof. The use of this information is governed by the Terms of Use, available at https://www.[employer]socializer[employer] CONSULT[employer]uwer.com/en/know/clinical-effectiveness-terms   Copyright   Copyright ©  2024 [employer] and its affiliates and/or licensors. All rights reserved.

## 2024-08-26 NOTE — PROGRESS NOTES
Assessment/Plan:    Diagnoses and all orders for this visit:    Acute pain of left shoulder    Shoulder strain, left, initial encounter    Primary osteoarthritis of left shoulder    No signs of high grade RTC tear, will hold off on MRI  Shandra wishes to continue home exercises  If at next appointment she has continued pain we may repeat CSI, discussed risks  Continue IBU, declines Mobic    Return in about 6 weeks (around 10/7/2024).      Subjective:   Patient ID: Shandra FARIA Reppert is a 59 y.o. female.    Patient returns for left shoulder pain s/p SA CSI last evaluation 7/15.  She noted improvement however 4 days after CSI she fell experienced FOOSH injury and feels she is back to baseline.  Taking IBU    Initial note:  Patient new to me presents for Left anterior shoulder pain starting in March when her mother in law pulled on her arm/shoulder.  Denies mechanical symptoms.  Takes IBU PRN  Hx Right shoulder pain s/p eval with Orthopedic surgeon      Review of Systems    The following portions of the patient's chart were reviewed and updated as appropriate:   Allergy:    Allergies   Allergen Reactions    Effexor [Venlafaxine] Other (See Comments)     Hot and sweaty    Levaquin [Levofloxacin] Myalgia     Tendonitis     Wellbutrin [Bupropion] Other (See Comments)     Hot and sweaty    Prempro [Conj Estrog-Medroxyprogest Ace] Rash       Medications:    Current Outpatient Medications:     albuterol (2.5 mg/3 mL) 0.083 % nebulizer solution, Take 3 mL (2.5 mg total) by nebulization every 4 (four) hours as needed for wheezing (via nebulizer) (Patient taking differently: Take 2.5 mg by nebulization if needed for wheezing (via nebulizer)), Disp: 120 mL, Rfl: 0    albuterol (PROVENTIL HFA,VENTOLIN HFA) 90 mcg/act inhaler, Inhale 2 puffs every 6 (six) hours as needed for wheezing (Patient taking differently: Inhale 2 puffs as needed for wheezing), Disp: 18 g, Rfl: 0    amLODIPine (NORVASC) 5 mg tablet, TAKE 1 TABLET (5 MG TOTAL)  BY MOUTH DAILY., Disp: 90 tablet, Rfl: 1    Calcium Carbonate-Vit D-Min (Calcium 1200) 8019-5174 MG-UNIT CHEW, Chew, Disp: , Rfl:     fluticasone (FLONASE) 50 mcg/act nasal spray, SPRAY 1 SPRAY INTO EACH NOSTRIL EVERY DAY, Disp: 16 mL, Rfl: 5    gabapentin (NEURONTIN) 300 mg capsule, TAKE 1 CAPSULE BY MOUTH AT BEDTIME, Disp: 90 capsule, Rfl: 1    ibuprofen (MOTRIN) 200 mg tablet, if needed Uses 800 mg once or twice daily, Disp: , Rfl:     LORazepam (ATIVAN) 0.5 mg tablet, Take 1 tablet (0.5 mg total) by mouth every 6 (six) hours as needed for anxiety (Patient taking differently: Take 0.5 mg by mouth if needed for anxiety), Disp: 20 tablet, Rfl: 0    multivitamin (THERAGRAN) TABS, Take 1 tablet by mouth daily, Disp: , Rfl:     omeprazole (PriLOSEC) 20 mg delayed release capsule, TAKE 1 CAPSULE DAILY AS NEEDED FOR STOMACH AND ACID, Disp: 90 capsule, Rfl: 1    Phentermine-Topiramate (Qsymia) 7.5-46 MG CP24, Take 1 capsule by mouth in the morning, Disp: 30 capsule, Rfl: 1    sertraline (ZOLOFT) 100 mg tablet, TAKE 1 TABLET BY MOUTH EVERY DAY, Disp: 90 tablet, Rfl: 3    valACYclovir (VALTREX) 1,000 mg tablet, Take 2 tablets (2,000 mg total) by mouth every 12 (twelve) hours Use 2 pills at onset cold sore and repeat 2 pills once in 12 hrs  then hold (Patient taking differently: Take 2,000 mg by mouth if needed Use 2 pills at onset cold sore and repeat 2 pills once in 12 hrs  then hold), Disp: 120 tablet, Rfl: 0    Zinc Sulfate (ZINC-220 PO), Take by mouth (Patient not taking: Reported on 7/15/2024), Disp: , Rfl:     Current Facility-Administered Medications:     ropivacaine (NAROPIN) injection 3 mL, 3 mL, Intra-articular, Titrated, , 3 mL at 07/15/24 1634    Patient Active Problem List   Diagnosis    Acid reflux disease    Carpal tunnel syndrome    Colon polyp    Complete tear of right rotator cuff    Depression with anxiety    Disc degeneration, lumbar    HSV-1 (herpes simplex virus 1) infection    Chronic bilateral low  "back pain with right-sided sciatica    Migraine headache    Osteoarthritis    Pap smear abnormality of cervix with ASCUS favoring benign -  f/up normal    Symptomatic menopausal or female climacteric states    Hearing loss, left    Arthralgia    Reactive airway disease without complication    Chronic cough    Obstructive sleep apnea treated with BiPAP -  felt difficulty tolerating and stopped    Pulmonary nodules    Snoring    Essential hypertension    Borderline hyperglycemia    Former smoker    Asymptomatic varicose veins of right lower extremity    Mixed hyperlipidemia    Cervical radicular pain ( right )    Class 2 severe obesity due to excess calories with serious comorbidity and body mass index (BMI) of 38.0 to 38.9 in adult ([facility])       Objective:  /76   Pulse 72   Ht 5' 5.5\" (1.664 m)   Wt 106 kg (233 lb)   LMP  (LMP Unknown)   BMI 38.18 kg/m²     Left Shoulder Exam     Tenderness   The patient is experiencing no tenderness.     Range of Motion   The patient has normal left shoulder ROM.    Muscle Strength   External rotation: 5/5   Supraspinatus: 5/5     Tests   Drop arm: negative             Physical Exam      Neurologic Exam    Procedures    I have personally reviewed the written report of the pertinent studies.             Past Medical History:   Diagnosis Date    Abnormal mammogram of right breast 02/09/2018    Acid reflux     Anxiety     Asthmatic bronchitis     Back pain     Carpal tunnel syndrome     COVID-19     COVID-19 virus infection 02/04/2021    mild    Depression     Depression     Hyperglycemia     Hypertension     Lipoma     Migraine     Miscarriage     Osteoarthritis     PONV (postoperative nausea and vomiting)     Stress incontinence     UTI (urinary tract infection)        Past Surgical History:   Procedure Laterality Date    APPENDECTOMY      BREAST EXCISIONAL BIOPSY Left 03/2001    benign    COLONOSCOPY N/A 4/8/2016    Procedure: COLONOSCOPY;  Surgeon: Suellen Abreu MD;  " Location: [facility];  Service:     KNEE ARTHROSCOPY      IL EXC B9 LESION MRGN XCP SK TG T/A/L 0.5 CM/< Left 2016    Procedure: EXCISION LIPOMA SHOULDER ;  Surgeon: Suellen Abreu MD;  Location: [facility];  Service: General    TUBAL LIGATION      WISDOM TOOTH EXTRACTION         Social History     Socioeconomic History    Marital status: /Civil Union     Spouse name: Not on file    Number of children: Not on file    Years of education: Not on file    Highest education level: Not on file   Occupational History    Not on file   Tobacco Use    Smoking status: Former     Current packs/day: 0.00     Average packs/day: 0.5 packs/day for 15.4 years (7.7 ttl pk-yrs)     Types: Cigarettes     Start date: 1991     Quit date: 2006     Years since quittin.2    Smokeless tobacco: Never   Vaping Use    Vaping status: Never Used   Substance and Sexual Activity    Alcohol use: Yes     Comment: rarely/socially 1-2x/year    Drug use: No    Sexual activity: Not Currently     Partners: Male     Birth control/protection: Post-menopausal   Other Topics Concern    Not on file   Social History Narrative    CONSUMES 1 CUP OF COFFEE PER DAY     Social Determinants of Health     Financial Resource Strain: Not on file   Food Insecurity: Not on file   Transportation Needs: Not on file   Physical Activity: Not on file   Stress: Not on file   Social Connections: Not on file   Intimate Partner Violence: Not on file   Housing Stability: Not on file       Family History   Problem Relation Age of Onset    Stroke Mother     Stomach cancer Mother 73    Asthma Mother     Skin cancer Father     Alzheimer's disease Father     No Known Problems Sister     No Known Problems Sister     No Known Problems Daughter     No Known Problems Daughter     Heart disease Maternal Grandmother     No Known Problems Maternal Grandfather     Breast cancer Paternal Grandmother 50        Grammy    No Known Problems Paternal Grandfather      Stroke Brother     No Known Problems Maternal Aunt     No Known Problems Maternal Aunt     No Known Problems Maternal Aunt     No Known Problems Maternal Aunt     No Known Problems Paternal Aunt     No Known Problems Paternal Aunt     No Known Problems Paternal Aunt     No Known Problems Paternal Aunt     No Known Problems Paternal Aunt     Lung cancer Paternal Uncle     Breast cancer Cousin 45    Breast cancer Cousin 45    Breast cancer Cousin 50    Breast cancer Cousin 50

## 2024-09-26 DIAGNOSIS — E66.812 OBESITY, CLASS II, BMI 35-39.9: ICD-10-CM

## 2024-09-30 DIAGNOSIS — K21.9 GASTROESOPHAGEAL REFLUX DISEASE WITHOUT ESOPHAGITIS: ICD-10-CM

## 2024-09-30 RX ORDER — PHENTERMINE AND TOPIRAMATE 7.5; 46 MG/1; MG/1
1 CAPSULE, EXTENDED RELEASE ORAL DAILY
Qty: 30 CAPSULE | Refills: 2 | Status: SHIPPED | OUTPATIENT
Start: 2024-09-30

## 2024-10-07 ENCOUNTER — OFFICE VISIT (OUTPATIENT)
Dept: OBGYN CLINIC | Facility: MEDICAL CENTER | Age: 59
End: 2024-10-07
Payer: COMMERCIAL

## 2024-10-07 VITALS
HEIGHT: 66 IN | HEART RATE: 68 BPM | WEIGHT: 233 LBS | SYSTOLIC BLOOD PRESSURE: 132 MMHG | BODY MASS INDEX: 37.45 KG/M2 | DIASTOLIC BLOOD PRESSURE: 84 MMHG

## 2024-10-07 DIAGNOSIS — M25.512 ACUTE PAIN OF LEFT SHOULDER: Primary | ICD-10-CM

## 2024-10-07 DIAGNOSIS — S46.912D SHOULDER STRAIN, LEFT, SUBSEQUENT ENCOUNTER: ICD-10-CM

## 2024-10-07 DIAGNOSIS — M19.012 PRIMARY OSTEOARTHRITIS OF LEFT SHOULDER: ICD-10-CM

## 2024-10-07 PROCEDURE — 20610 DRAIN/INJ JOINT/BURSA W/O US: CPT | Performed by: EMERGENCY MEDICINE

## 2024-10-07 PROCEDURE — 99213 OFFICE O/P EST LOW 20 MIN: CPT | Performed by: EMERGENCY MEDICINE

## 2024-10-07 RX ORDER — ROPIVACAINE HYDROCHLORIDE 2 MG/ML
4 INJECTION, SOLUTION EPIDURAL; INFILTRATION; PERINEURAL ONCE
Status: COMPLETED | OUTPATIENT
Start: 2024-10-07 | End: 2024-10-07

## 2024-10-07 RX ORDER — TRIAMCINOLONE ACETONIDE 40 MG/ML
40 INJECTION, SUSPENSION INTRA-ARTICULAR; INTRAMUSCULAR
Status: COMPLETED | OUTPATIENT
Start: 2024-10-07 | End: 2024-10-07

## 2024-10-07 RX ADMIN — TRIAMCINOLONE ACETONIDE 40 MG: 40 INJECTION, SUSPENSION INTRA-ARTICULAR; INTRAMUSCULAR at 14:45

## 2024-10-07 RX ADMIN — ROPIVACAINE HYDROCHLORIDE 4 ML: 2 INJECTION, SOLUTION EPIDURAL; INFILTRATION; PERINEURAL at 15:14

## 2024-10-07 NOTE — PATIENT INSTRUCTIONS
Patient Education     Rotator Cuff Tear Exercises   About this topic   A rotator cuff tear is a problem that can limit how much you can move your shoulder. It can also be painful. The rotator cuff is a group of muscles and tendons in your shoulder. It helps your shoulder move and be steady. These muscles help to rotate and lift the arm. Tendons are strong bands that connect muscles to bones. You can tear a tendon in your shoulder if you fall or move your shoulder too fast and with too much force. Your tendon can also wear out over time and tear. Large tears may require surgery. For small tears, exercises may help make this problem better.  General   Before starting with a program, ask your doctor if you are healthy enough to do these exercises. Your doctor may have you work with a  or physical therapist to make a safe exercise program to meet your needs. You should not do the exercises if they cause sharp pains. You may need to start out with easy exercises at first. Then, once your shoulder is feeling better, you may start the harder exercises.  Passive Exercises:   You use the movement of your body to move your arm. Do NOT use your shoulder muscles to move your arm.  Pendulum exercises ? Hold onto a table with one hand and bend forward at the waist until your back is level with the table. Let your sore arm hang in front of you. Do each exercise for 1 minute.  Circles ? Move your body in large circles one way. After you move a few times, your arm should start gently moving in circles. Now, move your body in circles the other way until your arm moves the other way.  Swinging side-to-side ? Move your body side to side. After you move a few times, your arm should start gently moving side-to-side.  Swinging back and forth ? Move your body forwards and then backwards. After you move a few times, your arm should start gently moving back and forth.  Stretching Exercises   Stretching exercises keep your muscles  flexible. They also stop them from getting tight. Start by doing each of these stretches 2 to 3 times. In order for your body to make changes, you will need to hold these stretches for 20 to 30 seconds. Try to do the stretches 2 to 3 times each day. Do all exercises slowly.  Strengthening Exercises   Strengthening exercises keep your muscles firm and strong. Start by repeating each exercise 2 to 3 times. Work up to doing each exercise 10 times. Try to do the exercises 2 to 3 times each day. Do all exercises slowly.  Shoulder blade squeezes ? Pinch your shoulder blades together on your upper back and hold 3 to 5 seconds. Relax.  Cane rehab exercises:  Overhead flexions ? Lie down and grab the cane with both hands. Start with your arms straight and the cane resting on your upper legs. Keep your arms straight and lift the cane over your head.  Abductions or side-to-side motion ? Stand and grab the cane with both hands. Turn your thumbs to the left to stretch your left shoulder. Start with your arms straight and the cane resting on your upper legs. Push the cane to the left, raising your left arm. Keep your left elbow straight. Keep pushing until you feel a good stretch. Switch the position of your hands to point your thumbs to the right and push the cane right to stretch your right shoulder.  External rotations or side-to-side rotations ? Lie down and grab the cane with both hands. Start with your elbows bent and touching your body. Put the tip of the cane in the palm of your right hand to stretch your right shoulder. Grab the cane at the other end with your left hand. Push the cane sideways into your right palm until you feel a stretch in your shoulder. Be sure to keep your right elbow close to your body while you are stretching. Switch hands to stretch the left shoulder.  Internal rotations ? Stand up and grab the cane with both hands behind your back. With your palms facing backwards, slide the cane up your back. Go  "until you feel a good stretch in front of the shoulder.  Shoulder blade exercises ? Lie on your stomach near the edge of a mat or bed or supported on an exercise ball. Start with your arms hanging down in a relaxed position.  Y position ? Raise your arms up and to the sides so your elbows are near your ears and your arms are straight out diagonally like the letter Y. Lower the arms back to the starting position.  T position ? Raise your arms straight out to the sides, even with your shoulders. Lower the arms back to the starting position.  W position ? Raise your arms up and to the sides with your elbows bent. Your hands should be just above your shoulders and looks like a W from above. Lower the arms back to the starting position.  L position ? Keep your elbows at your sides and raise just your lower arms out to the side to make a letter L and a backwards letter L. Lower the arms back to the starting position.  Shoulder exercises ? Tie a knot in an exercise band. Secure the band in a door by shutting it in around waist level. Wrap the band around one hand for a good . Stand away from the door enough to have slight tension in the band. As the exercises get easier, you may need to change to a heavier band. Moving closer to, or farther away from, the point where the band is tied down will decrease or increase the tension in the band.  Internal rotations ? Face sideways with the arm holding the band closest to the door. Bend the elbow to 90 degrees or in an \"L\" position. Keeping your elbow by your side and bent, pull the band across your body. Bring it back to the starting position. Repeat with the other arm.  External rotations ? Face sideways with the arm holding the band farthest from the door. Bend the elbow to 90 degrees or in an \"L\" position. Keeping your elbow by your side and bent, pull the band away from your body. Bring it back to the starting position. Repeat with the other arm.  Abductions ? Face " sideways with the arm holding the band farthest from the door. Be sure that your thumb is facing upward. Keep your elbow straight and pull the band out to the side and away from your body. Go up to shoulder level. Bring it back to the starting position. Repeat with the other arm.  Flexions ? Face away from the door. Keeping your elbow straight, pull the band straight out in front of you. Bring it back to the starting position. Repeat with the other arm.               What will the results be?   Less pain  More strength  Able to move your arm more  Easier to do daily activities  Shoulder is more stable  Prevent re-injury  Helpful tips   Stay active and work out to keep your muscles strong and flexible.  Eat a healthy diet to keep your muscles healthy.  Be sure you do not hold your breath when exercising. This can raise your blood pressure. If you tend to hold your breath, try counting out loud when exercising. If any exercise bothers you, stop right away.  Always warm up before stretching. Heated muscles stretch much easier than cool muscles. Stretching cool muscles can lead to injury.  Try swinging your arms at an easy pace for a few minutes to warm up your muscles. Do this again after exercising.  Never bounce when doing stretches.  After exercising, it is a good idea to use ice. Place an ice pack or a bag of frozen peas wrapped in a towel over the painful part. Never put ice right on the skin. Do not leave the ice on more than 10 to 15 minutes at a time. Ice after activity may help decrease pain and swelling. Never ice before stretching.  Doing exercises before a meal may be a good way to get into a routine.  Exercise may be slightly uncomfortable, but you should not have sharp pains. If you do get sharp pains, stop what you are doing. If the sharp pains continue, call your doctor.  Last Reviewed Date   2020-03-10  Consumer Information Use and Disclaimer   This generalized information is a limited summary of  diagnosis, treatment, and/or medication information. It is not meant to be comprehensive and should be used as a tool to help the user understand and/or assess potential diagnostic and treatment options. It does NOT include all information about conditions, treatments, medications, side effects, or risks that may apply to a specific patient. It is not intended to be medical advice or a substitute for the medical advice, diagnosis, or treatment of a health care provider based on the health care provider's examination and assessment of a patient’s specific and unique circumstances. Patients must speak with a health care provider for complete information about their health, medical questions, and treatment options, including any risks or benefits regarding use of medications. This information does not endorse any treatments or medications as safe, effective, or approved for treating a specific patient. [employer] and its affiliates disclaim any warranty or liability relating to this information or the use thereof. The use of this information is governed by the Terms of Use, available at https://www.wolterskluwer.com/en/know/clinical-effectiveness-terms   Copyright   Copyright © 2024 [employer] and its affiliates and/or licensors. All rights reserved.

## 2024-10-07 NOTE — PROGRESS NOTES
Assessment/Plan:    Diagnoses and all orders for this visit:    Acute pain of left shoulder  -     Large joint arthrocentesis: L subacromial bursa  -     ropivacaine (NAROPIN) injection 4 mL    Shoulder strain, left, subsequent encounter  -     Large joint arthrocentesis: L subacromial bursa  -     ropivacaine (NAROPIN) injection 4 mL    Primary osteoarthritis of left shoulder  -     ropivacaine (NAROPIN) injection 4 mL    Repeat left SA CSI provided today.  I provided home exercises for her.  If no improvement or pain returns to consider MRI of the left shoulder to evaluate for rotator cuff tear.  However patient does have a history of full-thickness rotator cuff tear of the right shoulder which was successfully treated conservatively  Her daughter is expecting a child in February     Return in about 4 weeks (around 11/4/2024).      Subjective:   Patient ID: Shandra Mcmullen is a 59 y.o. female.    Shandra returns to the office with continued left shoulder pain which is located on the lateral aspect worse with movements of the shoulder and arm.  She is requesting a repeat corticosteroid injection as the original injection 3 months ago significantly helped her until she sustained a fall.    Previous note:  Patient returns for left shoulder pain s/p SA CSI last evaluation 7/15.  She noted improvement however 4 days after CSI she fell experienced FOOSH injury and feels she is back to baseline.  Taking IBU    Initial note:  Patient new to me presents for Left anterior shoulder pain starting in March when her mother in law pulled on her arm/shoulder.  Denies mechanical symptoms.  Takes IBU PRN  Hx Right shoulder pain s/p eval with Orthopedic surgeon        Review of Systems    The following portions of the patient's chart were reviewed and updated as appropriate:   Allergy:    Allergies   Allergen Reactions    Effexor [Venlafaxine] Other (See Comments)     Hot and sweaty    Levaquin [Levofloxacin] Myalgia     Tendonitis      Wellbutrin [Bupropion] Other (See Comments)     Hot and sweaty    Prempro [Conj Estrog-Medroxyprogest Ace] Rash       Medications:    Current Outpatient Medications:     albuterol (2.5 mg/3 mL) 0.083 % nebulizer solution, Take 3 mL (2.5 mg total) by nebulization every 4 (four) hours as needed for wheezing (via nebulizer) (Patient taking differently: Take 2.5 mg by nebulization if needed for wheezing (via nebulizer)), Disp: 120 mL, Rfl: 0    albuterol (PROVENTIL HFA,VENTOLIN HFA) 90 mcg/act inhaler, Inhale 2 puffs every 6 (six) hours as needed for wheezing (Patient taking differently: Inhale 2 puffs as needed for wheezing), Disp: 18 g, Rfl: 0    amLODIPine (NORVASC) 5 mg tablet, TAKE 1 TABLET (5 MG TOTAL) BY MOUTH DAILY., Disp: 90 tablet, Rfl: 1    Calcium Carbonate-Vit D-Min (Calcium 1200) 6402-6746 MG-UNIT CHEW, Chew, Disp: , Rfl:     fluticasone (FLONASE) 50 mcg/act nasal spray, SPRAY 1 SPRAY INTO EACH NOSTRIL EVERY DAY, Disp: 16 mL, Rfl: 5    gabapentin (NEURONTIN) 300 mg capsule, TAKE 1 CAPSULE BY MOUTH AT BEDTIME, Disp: 90 capsule, Rfl: 1    ibuprofen (MOTRIN) 200 mg tablet, if needed Uses 800 mg once or twice daily, Disp: , Rfl:     LORazepam (ATIVAN) 0.5 mg tablet, Take 1 tablet (0.5 mg total) by mouth every 6 (six) hours as needed for anxiety (Patient taking differently: Take 0.5 mg by mouth if needed for anxiety), Disp: 20 tablet, Rfl: 0    multivitamin (THERAGRAN) TABS, Take 1 tablet by mouth daily, Disp: , Rfl:     omeprazole (PriLOSEC) 20 mg delayed release capsule, TAKE 1 CAPSULE DAILY AS NEEDED FOR STOMACH AND ACID, Disp: 90 capsule, Rfl: 1    Phentermine-Topiramate (Qsymia) 7.5-46 MG CP24, Take 1 capsule by mouth in the morning, Disp: 30 capsule, Rfl: 2    sertraline (ZOLOFT) 100 mg tablet, TAKE 1 TABLET BY MOUTH EVERY DAY, Disp: 90 tablet, Rfl: 3    valACYclovir (VALTREX) 1,000 mg tablet, Take 2 tablets (2,000 mg total) by mouth every 12 (twelve) hours Use 2 pills at onset cold sore and repeat 2  "pills once in 12 hrs  then hold (Patient taking differently: Take 2,000 mg by mouth if needed Use 2 pills at onset cold sore and repeat 2 pills once in 12 hrs  then hold), Disp: 120 tablet, Rfl: 0    Zinc Sulfate (ZINC-220 PO), Take by mouth (Patient not taking: Reported on 10/7/2024), Disp: , Rfl:     Current Facility-Administered Medications:     ropivacaine (NAROPIN) injection 4 mL, 4 mL, Intra-articular, Once,     Patient Active Problem List   Diagnosis    Acid reflux disease    Carpal tunnel syndrome    Colon polyp    Complete tear of right rotator cuff    Depression with anxiety    Disc degeneration, lumbar    HSV-1 (herpes simplex virus 1) infection    Chronic bilateral low back pain with right-sided sciatica    Migraine headache    Osteoarthritis    Pap smear abnormality of cervix with ASCUS favoring benign -  f/up normal    Symptomatic menopausal or female climacteric states    Hearing loss, left    Arthralgia    Reactive airway disease without complication    Chronic cough    Obstructive sleep apnea treated with BiPAP -  felt difficulty tolerating and stopped    Pulmonary nodules    Snoring    Essential hypertension    Borderline hyperglycemia    Former smoker    Asymptomatic varicose veins of right lower extremity    Mixed hyperlipidemia    Cervical radicular pain ( right )    Class 2 severe obesity due to excess calories with serious comorbidity and body mass index (BMI) of 38.0 to 38.9 in adult ([facility])       Objective:  /84   Pulse 68   Ht 5' 5.5\" (1.664 m)   Wt 106 kg (233 lb)   LMP  (LMP Unknown)   BMI 38.18 kg/m²     Left Shoulder Exam     Other   Erythema: absent             Physical Exam  Constitutional:       Appearance: She is well-developed.   HENT:      Head: Normocephalic and atraumatic.   Eyes:      Conjunctiva/sclera: Conjunctivae normal.   Pulmonary:      Effort: Pulmonary effort is normal.   Musculoskeletal:      Cervical back: Neck supple.   Skin:     General: Skin is warm and " "dry.   Neurological:      Mental Status: She is alert and oriented to person, place, and time.   Psychiatric:         Behavior: Behavior normal.           Neurologic Exam     Mental Status   Oriented to person, place, and time.       Large joint arthrocentesis: L subacromial bursa  Universal Protocol:  Consent: Verbal consent obtained.  Risks and benefits: risks, benefits and alternatives were discussed  Consent given by: patient  Time out: Immediately prior to procedure a \"time out\" was called to verify the correct patient, procedure, equipment, support staff and site/side marked as required.  Timeout called at: 10/7/2024 3:03 PM.  Patient understanding: patient states understanding of the procedure being performed  Test results: test results available and properly labeled  Site marked: the operative site was marked  Patient identity confirmed: verbally with patient  Supporting Documentation  Indications: pain   Procedure Details  Location: shoulder - L subacromial bursa  Preparation: Patient was prepped and draped in the usual sterile fashion  Needle size: 22 G  Ultrasound guidance: no  Approach: posterolateral  Medications administered: 40 mg triamcinolone acetonide 40 mg/mL    Patient tolerance: patient tolerated the procedure well with no immediate complications  Dressing:  Sterile dressing applied    No erythema of Shoulder(s)          I have personally reviewed the written report of the pertinent studies.             Past Medical History:   Diagnosis Date    Abnormal mammogram of right breast 02/09/2018    Acid reflux     Anxiety     Asthmatic bronchitis     Back pain     Carpal tunnel syndrome     COVID-19     COVID-19 virus infection 02/04/2021    mild    Depression     Depression     Hyperglycemia     Hypertension     Lipoma     Migraine     Miscarriage     Osteoarthritis     PONV (postoperative nausea and vomiting)     Stress incontinence     UTI (urinary tract infection)        Past Surgical History: "   Procedure Laterality Date    APPENDECTOMY      BREAST EXCISIONAL BIOPSY Left 2001    benign    COLONOSCOPY N/A 2016    Procedure: COLONOSCOPY;  Surgeon: Suellen Abreu MD;  Location: [facility] GI LAB;  Service:     KNEE ARTHROSCOPY      NV EXC B9 LESION MRGN XCP SK TG T/A/L 0.5 CM/< Left 2016    Procedure: EXCISION LIPOMA SHOULDER ;  Surgeon: Suellen Abreu MD;  Location: [facility] OR;  Service: General    TUBAL LIGATION      WISDOM TOOTH EXTRACTION         Social History     Socioeconomic History    Marital status: /Civil Union     Spouse name: Not on file    Number of children: Not on file    Years of education: Not on file    Highest education level: Not on file   Occupational History    Not on file   Tobacco Use    Smoking status: Former     Current packs/day: 0.00     Average packs/day: 0.5 packs/day for 15.4 years (7.7 ttl pk-yrs)     Types: Cigarettes     Start date: 1991     Quit date: 2006     Years since quittin.4    Smokeless tobacco: Never   Vaping Use    Vaping status: Never Used   Substance and Sexual Activity    Alcohol use: Yes     Comment: rarely/socially 1-2x/year    Drug use: No    Sexual activity: Not Currently     Partners: Male     Birth control/protection: Post-menopausal   Other Topics Concern    Not on file   Social History Narrative    CONSUMES 1 CUP OF COFFEE PER DAY     Social Determinants of Health     Financial Resource Strain: Not on file   Food Insecurity: Not on file   Transportation Needs: Not on file   Physical Activity: Not on file   Stress: Not on file   Social Connections: Not on file   Intimate Partner Violence: Not on file   Housing Stability: Not on file       Family History   Problem Relation Age of Onset    Stroke Mother     Stomach cancer Mother 73    Asthma Mother     Skin cancer Father     Alzheimer's disease Father     No Known Problems Sister     No Known Problems Sister     No Known Problems Daughter     No Known Problems Daughter      Heart disease Maternal Grandmother     No Known Problems Maternal Grandfather     Breast cancer Paternal Grandmother 50        Grammy    No Known Problems Paternal Grandfather     Stroke Brother     No Known Problems Maternal Aunt     No Known Problems Maternal Aunt     No Known Problems Maternal Aunt     No Known Problems Maternal Aunt     No Known Problems Paternal Aunt     No Known Problems Paternal Aunt     No Known Problems Paternal Aunt     No Known Problems Paternal Aunt     No Known Problems Paternal Aunt     Lung cancer Paternal Uncle     Breast cancer Cousin 45    Breast cancer Cousin 45    Breast cancer Cousin 50    Breast cancer Cousin 50

## 2024-10-23 ENCOUNTER — CLINICAL SUPPORT (OUTPATIENT)
Dept: BARIATRICS | Facility: CLINIC | Age: 59
End: 2024-10-23

## 2024-10-23 VITALS
HEART RATE: 84 BPM | TEMPERATURE: 97.8 F | RESPIRATION RATE: 17 BRPM | DIASTOLIC BLOOD PRESSURE: 76 MMHG | WEIGHT: 225 LBS | HEIGHT: 66 IN | BODY MASS INDEX: 36.16 KG/M2 | SYSTOLIC BLOOD PRESSURE: 130 MMHG

## 2024-10-23 DIAGNOSIS — R63.5 ABNORMAL WEIGHT GAIN: Primary | ICD-10-CM

## 2024-10-23 PROCEDURE — RECHECK

## 2024-10-23 NOTE — PROGRESS NOTES
Patient last visit weight:233lb  Patient current visit weight:225lb    If you are taking phentermine or other oral weight loss medications, are you experiencing any of the following symptoms:  Headache: NO  Blurred Vision:  NO  Chest Pain:  NO  Palpitations: NO  Insomnia:  NO  SPECIFY ORAL MEDICATION AND DOSAGE: Qsymia 7.5-46 mg  Pt asking if Vertigo is a side affect from the medication, ASKING FOR DOSE INCRESE, losing in a slow paste    If you are taking an injectable medication,  are you experiencing any of the following symptoms:  Bloating:   Nausea:  Vomiting:   Constipation:   Diarrhea:  SPECIFY INJECTABLE MEDICATION AND CURRENT DOSAGE:      Vitals:    Is BP less than 100/60? NO  Is BP greater than 140/90? NO  Is HR greater than 100? NO  **If yes to any of the above, have patient relax and repeat in 5-10 minutes**    Repeat values:    Is BP less than 100/60?  Is BP greater than 140/90?  Is HR greater than 100?  **If values remain outside of ranges above, please consult provider for next steps**

## 2024-10-24 DIAGNOSIS — E66.812 OBESITY, CLASS II, BMI 35-39.9: Primary | ICD-10-CM

## 2024-10-24 RX ORDER — PHENTERMINE AND TOPIRAMATE 11.25; 69 MG/1; MG/1
1 CAPSULE, EXTENDED RELEASE ORAL DAILY
Qty: 30 CAPSULE | Refills: 2 | Status: SHIPPED | OUTPATIENT
Start: 2024-10-24

## 2024-12-12 ENCOUNTER — OFFICE VISIT (OUTPATIENT)
Dept: FAMILY MEDICINE CLINIC | Facility: CLINIC | Age: 59
End: 2024-12-12
Payer: COMMERCIAL

## 2024-12-12 VITALS
WEIGHT: 221.6 LBS | DIASTOLIC BLOOD PRESSURE: 80 MMHG | OXYGEN SATURATION: 98 % | BODY MASS INDEX: 36.32 KG/M2 | SYSTOLIC BLOOD PRESSURE: 132 MMHG | HEART RATE: 71 BPM

## 2024-12-12 DIAGNOSIS — R91.8 PULMONARY NODULES: ICD-10-CM

## 2024-12-12 DIAGNOSIS — K21.9 GASTROESOPHAGEAL REFLUX DISEASE WITHOUT ESOPHAGITIS: ICD-10-CM

## 2024-12-12 DIAGNOSIS — G89.29 CHRONIC BILATERAL LOW BACK PAIN WITH RIGHT-SIDED SCIATICA: ICD-10-CM

## 2024-12-12 DIAGNOSIS — M54.41 CHRONIC BILATERAL LOW BACK PAIN WITH RIGHT-SIDED SCIATICA: ICD-10-CM

## 2024-12-12 DIAGNOSIS — N95.1 SYMPTOMATIC MENOPAUSAL OR FEMALE CLIMACTERIC STATES: ICD-10-CM

## 2024-12-12 DIAGNOSIS — F41.8 DEPRESSION WITH ANXIETY: ICD-10-CM

## 2024-12-12 DIAGNOSIS — E78.2 MIXED HYPERLIPIDEMIA: ICD-10-CM

## 2024-12-12 DIAGNOSIS — R73.9 BORDERLINE HYPERGLYCEMIA: ICD-10-CM

## 2024-12-12 DIAGNOSIS — I10 ESSENTIAL HYPERTENSION: Primary | ICD-10-CM

## 2024-12-12 DIAGNOSIS — G47.33 OBSTRUCTIVE SLEEP APNEA TREATED WITH BIPAP: ICD-10-CM

## 2024-12-12 DIAGNOSIS — J45.20 MILD INTERMITTENT REACTIVE AIRWAY DISEASE WITHOUT COMPLICATION: ICD-10-CM

## 2024-12-12 PROCEDURE — 99214 OFFICE O/P EST MOD 30 MIN: CPT | Performed by: FAMILY MEDICINE

## 2024-12-12 NOTE — ASSESSMENT & PLAN NOTE
Cholesterol earlier this year was 224 with HDL 52, , redo at follow-up.  Less than 6% 10-year cardiovascular risk

## 2024-12-12 NOTE — ASSESSMENT & PLAN NOTE
Here her blood pressure today was 132/80, she will continue amlodipine 5 mg daily.  She is down about 20 pounds, seeing weight management, is using combo pill-phentermine/Topamax.  Watch for low blood pressure readings.    She did blood work back in April and May, CBC, CMP, urinalysis along with TSH were fine other than borderline glucose at 110, A1c was okay at 5.8.    Plan to do blood work again with next physical unless done elsewhere/with weight management-she will be seeing weight management again in a week

## 2024-12-12 NOTE — ASSESSMENT & PLAN NOTE
She does continue to see GYN    Her mammogram was okay in August 2024.      She has not done a flu shot yet, declines today as she has respiratory symptoms, she will return for that.  She also plans to do an updated Tdap as her daughter is delivering in February    Recheck here 6 months with routine physical, call sooner if needed

## 2024-12-12 NOTE — PROGRESS NOTES
Name: Shandra Mcmullen      : 1965      MRN: [id number]  Encounter Provider: Meño Coffey DO  Encounter Date: 2024   Encounter department: [facility]  :  Assessment & Plan  Essential hypertension  Here her blood pressure today was 132/80, she will continue amlodipine 5 mg daily.  She is down about 20 pounds, seeing weight management, is using combo pill-phentermine/Topamax.  Watch for low blood pressure readings.    She did blood work back in April and May, CBC, CMP, urinalysis along with TSH were fine other than borderline glucose at 110, A1c was okay at 5.8.    Plan to do blood work again with next physical unless done elsewhere/with weight management-she will be seeing weight management again in a week       Mild intermittent reactive airway disease with recent URI  She did have recent upper respiratory symptoms, is using nebulizer twice daily, otherwise has not had wheezing.  Continue to monitor, lungs are clear today, call if anything changes.    She did have a CT of her chest back in  that showed stable nodules, she is a former smoker but remains smoke-free for many years       Gastroesophageal reflux disease without esophagitis  She has no increased acid reflux symptoms-continues on omeprazole 20 mg daily, last EGD was in , also had a colonoscopy in , was advised to redo in 5 years       Borderline hyperglycemia  A1c 5.8 back in , has dropped 20 pounds, continue to work on healthy diet.  Redo blood work at follow-up unless done elsewhere       Chronic bilateral low back pain with right-sided sciatica  Does have chronic back pain, shoulder pains, is seeing orthopedist for her shoulders.  She continues on ibuprofen, no GI issues, renal function has been okay.  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    She does continue  on gabapentin 300 mg at bedtime.       Depression with anxiety  She continues on sertraline 100 mg daily, uses lorazepam sparingly.  Her daughter is having a baby boy in February.  Other daughter is getting  in [place] in April    Try to keep routine sleep habits, history apnea, intolerant BiPAP, weight loss helps.  Is on gabapentin at night for chronic pains.           Mixed hyperlipidemia  Cholesterol earlier this year was 224 with HDL 52, , redo at follow-up.  Less than 6% 10-year cardiovascular risk       Obstructive sleep apnea treated with BiPAP -  felt difficulty tolerating and stopped         Pulmonary nodules  Had CT which was stable in June 2024       Symptomatic menopausal or female climacteric states  She does continue to see GYN    Her mammogram was okay in August 2024.      She has not done a flu shot yet, declines today as she has respiratory symptoms, she will return for that.  She also plans to do an updated Tdap as her daughter is delivering in February    Recheck here 6 months with routine physical, call sooner if needed             Depression Screening and Follow-up Plan: Patient's depression screening was positive with a PHQ-9 score of 8. Patient advised to follow-up with PCP for further management.       History of Present Illness     She is in today for a 6-month check, she had a physical back in June.  Overall she has been feeling about the same as at baseline, usual chronic back pains, joint pains.  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  Does see weight management, has dropped about 20 pounds with phentermine/Topamax combo-has no increased anxiety, no increased palpitations, no new chest pains.  No recent issues with cough, no increased shortness of breath.    Has been seeing orthopedist regarding her shoulder pain      Review of Systems   Constitutional:         See HPI       Objective   /80 (BP Location: Left arm, Patient Position: Sitting, Cuff Size: Large)    Pulse 71   Wt 101 kg (221 lb 9.6 oz)   LMP  (LMP Unknown)   SpO2 98%   BMI 36.32 kg/m²      Physical Exam  Constitutional:       General: She is not in acute distress.     Appearance: Normal appearance. She is well-developed. She is not ill-appearing.   Eyes:      General: No scleral icterus.  Neck:      Thyroid: No thyromegaly.      Vascular: No carotid bruit.   Cardiovascular:      Rate and Rhythm: Normal rate and regular rhythm.      Heart sounds: Normal heart sounds. No murmur heard.     Comments: No carotid bruit  Pulmonary:      Effort: Pulmonary effort is normal. No respiratory distress.      Breath sounds: Normal breath sounds. No wheezing, rhonchi or rales.   Abdominal:      Tenderness: There is no abdominal tenderness.   Musculoskeletal:      Right lower leg: No edema.      Left lower leg: No edema.   Lymphadenopathy:      Cervical: No cervical adenopathy.   Skin:     Coloration: Skin is not jaundiced.   Neurological:      Mental Status: She is alert. Mental status is at baseline.   Psychiatric:         Behavior: Behavior normal.       PHQ-2/9 Depression Screening    Little interest or pleasure in doing things: 1 - several days  Feeling down, depressed, or hopeless: 1 - several days  Trouble falling or staying asleep, or sleeping too much: 1 - several days  Feeling tired or having little energy: 1 - several days  Poor appetite or overeatin - several days  Feeling bad about yourself - or that you are a failure or have let yourself or your family down: 1 - several days  Trouble concentrating on things, such as reading the newspaper or watching television: 1 - several days  Moving or speaking so slowly that other people could have noticed. Or the opposite - being so fidgety or restless that you have been moving around a lot more than usual: 1 - several days  Thoughts that you would be better off dead, or of hurting yourself in some way: 0 - not at all  PHQ-9 Score: 8  PHQ-9 Interpretation: Mild  depression

## 2024-12-12 NOTE — ASSESSMENT & PLAN NOTE
Does have chronic back pain, shoulder pains, is seeing orthopedist for her shoulders.  She continues on ibuprofen, no GI issues, renal function has been okay.  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    She does continue on gabapentin 300 mg at bedtime.

## 2024-12-12 NOTE — ASSESSMENT & PLAN NOTE
A1c 5.8 back in springtime, has dropped 20 pounds, continue to work on healthy diet.  Redo blood work at follow-up unless done elsewhere

## 2024-12-12 NOTE — ASSESSMENT & PLAN NOTE
She did have recent upper respiratory symptoms, is using nebulizer twice daily, otherwise has not had wheezing.  Continue to monitor, lungs are clear today, call if anything changes.    She did have a CT of her chest back in June that showed stable nodules, she is a former smoker but remains smoke-free for many years

## 2024-12-12 NOTE — ASSESSMENT & PLAN NOTE
She has no increased acid reflux symptoms-continues on omeprazole 20 mg daily, last EGD was in 2020, also had a colonoscopy in 2020, was advised to redo in 5 years

## 2024-12-12 NOTE — ASSESSMENT & PLAN NOTE
She continues on sertraline 100 mg daily, uses lorazepam sparingly.  Her daughter is having a baby boy in February.  Other daughter is getting  in [place] in April    Try to keep routine sleep habits, history apnea, intolerant BiPAP, weight loss helps.  Is on gabapentin at night for chronic pains.

## 2024-12-19 ENCOUNTER — CLINICAL SUPPORT (OUTPATIENT)
Dept: BARIATRICS | Facility: CLINIC | Age: 59
End: 2024-12-19

## 2024-12-19 VITALS
HEIGHT: 66 IN | HEART RATE: 75 BPM | DIASTOLIC BLOOD PRESSURE: 80 MMHG | TEMPERATURE: 97.8 F | WEIGHT: 220.6 LBS | SYSTOLIC BLOOD PRESSURE: 122 MMHG | RESPIRATION RATE: 16 BRPM | BODY MASS INDEX: 35.45 KG/M2

## 2024-12-19 DIAGNOSIS — R63.5 ABNORMAL WEIGHT GAIN: Primary | ICD-10-CM

## 2024-12-19 PROCEDURE — RECHECK

## 2024-12-19 NOTE — PROGRESS NOTES
Patient last visit weight: 225lbs   Patient current visit weight: 220.6lbs     If you are taking phentermine or other oral weight loss medications, are you experiencing any of the following symptoms:  Headache: NO   Blurred Vision: NO   Chest Pain: NO   Palpitations: NO   Insomnia: NO   SPECIFY ORAL MEDICATION AND DOSAGE: Qsymia       If you are taking an injectable medication,  are you experiencing any of the following symptoms:  Bloating:   Nausea:  Vomiting:   Constipation:   Diarrhea:  SPECIFY INJECTABLE MEDICATION AND CURRENT DOSAGE:  Vitals:    Is BP less than 100/60? NO   Is BP greater than 140/90? NO   Is HR greater than 100? NO   **If yes to any of the above, have patient relax and repeat in 5-10 minutes**    Repeat values:    Is BP less than 100/60?  Is BP greater than 140/90?  Is HR greater than 100?  **If values remain outside of ranges above, please consult provider for next steps**      Date of last injection:    How many injections do you have left:

## 2024-12-27 DIAGNOSIS — N95.1 SYMPTOMATIC MENOPAUSAL OR FEMALE CLIMACTERIC STATES: ICD-10-CM

## 2024-12-27 RX ORDER — SERTRALINE HYDROCHLORIDE 100 MG/1
100 TABLET, FILM COATED ORAL DAILY
Qty: 90 TABLET | Refills: 1 | Status: SHIPPED | OUTPATIENT
Start: 2024-12-27

## 2025-01-07 ENCOUNTER — ANNUAL EXAM (OUTPATIENT)
Dept: OBGYN CLINIC | Facility: CLINIC | Age: 60
End: 2025-01-07
Payer: COMMERCIAL

## 2025-01-07 VITALS
BODY MASS INDEX: 35.36 KG/M2 | HEIGHT: 66 IN | DIASTOLIC BLOOD PRESSURE: 84 MMHG | SYSTOLIC BLOOD PRESSURE: 144 MMHG | WEIGHT: 220 LBS

## 2025-01-07 DIAGNOSIS — Z12.31 ENCOUNTER FOR SCREENING MAMMOGRAM FOR MALIGNANT NEOPLASM OF BREAST: ICD-10-CM

## 2025-01-07 DIAGNOSIS — Z01.419 ENCOUNTER FOR GYNECOLOGICAL EXAMINATION WITHOUT ABNORMAL FINDING: Primary | ICD-10-CM

## 2025-01-07 PROCEDURE — S0612 ANNUAL GYNECOLOGICAL EXAMINA: HCPCS | Performed by: OBSTETRICS & GYNECOLOGY

## 2025-01-07 NOTE — PROGRESS NOTES
Shandra FARIA Reppert   1965    CC:  Yearly exam    S:  59 y.o. female here for yearly exam. She is postmenopausal and has had no vaginal bleeding.  She denies vaginal discharge, itching, odor or dryness.     She had a rough year in 2024 with some amazing highs but some awful lows.  She lost her nephew to a traumatic unexpected death in May, soon after which her daughter became pregnant.  She is looking forward to a new grandchild and her other daughter's wedding this year.      She has been working on weight loss with the weight management [employer] and is losing slowly on Qsymia.      Sexual activity: She is sexually active without pain, bleeding or dryness.     Last Pap: 12/21/2022 - ASCUS, negative HPV; repeat 3 years  Last Mammo: 8/6/2024 - BIRAD-1  Last Colonoscopy: 9/1/2020 - 5 years  Last DEXA: never    We reviewed ASCCP guidelines for Pap testing.       Current Outpatient Medications:     albuterol (2.5 mg/3 mL) 0.083 % nebulizer solution, Take 3 mL (2.5 mg total) by nebulization every 4 (four) hours as needed for wheezing (via nebulizer), Disp: 120 mL, Rfl: 0    albuterol (PROVENTIL HFA,VENTOLIN HFA) 90 mcg/act inhaler, Inhale 2 puffs every 6 (six) hours as needed for wheezing, Disp: 18 g, Rfl: 0    amLODIPine (NORVASC) 5 mg tablet, TAKE 1 TABLET (5 MG TOTAL) BY MOUTH DAILY., Disp: 90 tablet, Rfl: 1    Calcium Carbonate-Vit D-Min (Calcium 1200) 8275-4538 MG-UNIT CHEW, Chew, Disp: , Rfl:     fluticasone (FLONASE) 50 mcg/act nasal spray, SPRAY 1 SPRAY INTO EACH NOSTRIL EVERY DAY, Disp: 16 mL, Rfl: 5    gabapentin (NEURONTIN) 300 mg capsule, TAKE 1 CAPSULE BY MOUTH AT BEDTIME, Disp: 90 capsule, Rfl: 1    ibuprofen (MOTRIN) 200 mg tablet, if needed Uses 800 mg once or twice daily, Disp: , Rfl:     LORazepam (ATIVAN) 0.5 mg tablet, Take 1 tablet (0.5 mg total) by mouth every 6 (six) hours as needed for anxiety, Disp: 20 tablet, Rfl: 0    multivitamin (THERAGRAN) TABS, Take 1 tablet by mouth daily, Disp: , Rfl:      omeprazole (PriLOSEC) 20 mg delayed release capsule, TAKE 1 CAPSULE DAILY AS NEEDED FOR STOMACH AND ACID, Disp: 90 capsule, Rfl: 1    Phentermine-Topiramate (Qsymia) 11.25-69 MG CP24, Take 1 capsule by mouth in the morning, Disp: 30 capsule, Rfl: 2    sertraline (ZOLOFT) 100 mg tablet, TAKE 1 TABLET BY MOUTH EVERY DAY, Disp: 90 tablet, Rfl: 1    valACYclovir (VALTREX) 1,000 mg tablet, Take 2 tablets (2,000 mg total) by mouth every 12 (twelve) hours Use 2 pills at onset cold sore and repeat 2 pills once in 12 hrs  then hold (Patient taking differently: Take 2,000 mg by mouth every 12 (twelve) hours Use 2 pills at onset cold sore and repeat 2 pills once in 12 hrs  then hold), Disp: 120 tablet, Rfl: 0  Social History     Socioeconomic History    Marital status: /Civil Union     Spouse name: Not on file    Number of children: Not on file    Years of education: Not on file    Highest education level: Not on file   Occupational History    Not on file   Tobacco Use    Smoking status: Former     Current packs/day: 0.00     Average packs/day: 0.5 packs/day for 15.4 years (7.7 ttl pk-yrs)     Types: Cigarettes     Start date: 1991     Quit date: 2006     Years since quittin.6    Smokeless tobacco: Never   Vaping Use    Vaping status: Never Used   Substance and Sexual Activity    Alcohol use: Yes     Comment: rarely/socially 1-2x/year    Drug use: No    Sexual activity: Not Currently     Partners: Male     Birth control/protection: Post-menopausal   Other Topics Concern    Not on file   Social History Narrative    CONSUMES 1 CUP OF COFFEE PER DAY     Social Drivers of Health     Financial Resource Strain: Not on file   Food Insecurity: Not on file   Transportation Needs: Not on file   Physical Activity: Not on file   Stress: Not on file   Social Connections: Not on file   Intimate Partner Violence: Not on file   Housing Stability: Not on file     Family History   Problem Relation Age of Onset    Stroke  "Mother     Stomach cancer Mother 73    Asthma Mother     Cancer Mother     COPD Mother     Skin cancer Father     Alzheimer's disease Father     Depression Father     Dementia Father     Arthritis Father     Hearing loss Father     Vision loss Father     Anxiety disorder Father     Anxiety disorder Sister     No Known Problems Sister     No Known Problems Daughter     No Known Problems Daughter     Heart disease Maternal Grandmother     No Known Problems Maternal Grandfather     Breast cancer Paternal Grandmother 50        Grammy    No Known Problems Paternal Grandfather     Stroke Brother     No Known Problems Maternal Aunt     No Known Problems Maternal Aunt     No Known Problems Maternal Aunt     No Known Problems Maternal Aunt     No Known Problems Paternal Aunt     No Known Problems Paternal Aunt     No Known Problems Paternal Aunt     No Known Problems Paternal Aunt     No Known Problems Paternal Aunt     Lung cancer Paternal Uncle     Breast cancer Cousin 45    Breast cancer Cousin 45    Breast cancer Cousin 50    Breast cancer Cousin 50     Past Medical History:   Diagnosis Date    Abnormal mammogram of right breast 02/09/2018    Acid reflux     Anxiety     Arthritis     Asthmatic bronchitis     Back pain     Carpal tunnel syndrome     COVID-19     COVID-19 virus infection 02/04/2021    mild    Depression     Depression     GERD (gastroesophageal reflux disease)     Headache(784.0)     Hyperglycemia     Hypertension     Lipoma     Migraine     Miscarriage     Obesity     Osteoarthritis     PONV (postoperative nausea and vomiting)     Stress incontinence     UTI (urinary tract infection)         Review of Systems   Respiratory: Negative.    Cardiovascular: Negative.    Gastrointestinal: Negative for constipation and diarrhea.   Genitourinary: Negative for difficulty urinating, pelvic pain, vaginal bleeding, vaginal discharge, itching or odor.    O:  Blood pressure 144/84, height 5' 5.5\" (1.664 m), weight 99.8 " kg (220 lb), not currently breastfeeding.    Patient appears well and is not in distress  Neck is supple without masses  Breasts are symmetrical without mass, tenderness, nipple discharge, skin changes or adenopathy.   Abdomen is soft and nontender without masses.   External genitals are normal without lesions or rashes.  Urethral meatus and urethra are normal  Bladder is normal to palpation  Vagina is normal without discharge or bleeding.   Cervix is normal without discharge or lesion.   Uterus is normal, mobile, nontender without palpable mass.  Exam limited by body habitus.   Adnexa are normal, nontender, without palpable mass. Exam limited by body habitus.     A:   Yearly exam.     P:   Pap due 12/2025 - will do at next visit  Mammo ordered   Colonoscopy due 9/2025   DEXA due age 63    RTO one year for yearly exam or sooner as needed.

## 2025-01-10 ENCOUNTER — TELEPHONE (OUTPATIENT)
Dept: BARIATRICS | Facility: CLINIC | Age: 60
End: 2025-01-10

## 2025-01-10 NOTE — TELEPHONE ENCOUNTER
PA for Qysmia SUBMITTED to [employer]    via    [x]CMM-KEY: [id number]  []Surescripts-Case ID #   []Availity-Auth ID # NDC #   []Faxed to plan   []Other website   []Phone call Case ID #     []PA sent as URGENT    All office notes, labs and other pertaining documents and studies sent. Clinical questions answered. Awaiting determination from insurance company.     Turnaround time for your insurance to make a decision on your Prior Authorization can take 7-21 business days.

## 2025-01-10 NOTE — TELEPHONE ENCOUNTER
PA for Oysmia APPROVED     Date(s) approved 12/11/2024 - 1/10/2026    Case #    Patient advised by          [][employer] Message  [x]Phone call   []LMOM  []L/M to call office as no active Communication consent on file  []Unable to leave detailed message as VM not approved on Communication consent       Pharmacy advised by    [x]Fax  []Phone call    Approval letter scanned into [employer] - from covermymeds

## 2025-01-29 DIAGNOSIS — G89.29 CHRONIC BILATERAL LOW BACK PAIN WITH RIGHT-SIDED SCIATICA: ICD-10-CM

## 2025-01-29 DIAGNOSIS — M54.12 CERVICAL RADICULAR PAIN: ICD-10-CM

## 2025-01-29 DIAGNOSIS — M54.41 CHRONIC BILATERAL LOW BACK PAIN WITH RIGHT-SIDED SCIATICA: ICD-10-CM

## 2025-01-29 DIAGNOSIS — E66.812 OBESITY, CLASS II, BMI 35-39.9: ICD-10-CM

## 2025-01-29 DIAGNOSIS — I10 ESSENTIAL HYPERTENSION: ICD-10-CM

## 2025-01-29 RX ORDER — AMLODIPINE BESYLATE 5 MG/1
5 TABLET ORAL DAILY
Qty: 90 TABLET | Refills: 1 | Status: SHIPPED | OUTPATIENT
Start: 2025-01-29

## 2025-01-29 RX ORDER — PHENTERMINE AND TOPIRAMATE 11.25; 69 MG/1; MG/1
1 CAPSULE, EXTENDED RELEASE ORAL EVERY MORNING
Qty: 30 CAPSULE | Refills: 2 | Status: SHIPPED | OUTPATIENT
Start: 2025-01-29

## 2025-01-29 NOTE — TELEPHONE ENCOUNTER
01/13/2025 10/24/2024 Qsymia (Capsule, Extended Release) 30.0 30 11.25 MG-69 MG NA EMILY, PICA [facility] Commercial Insurance 2 / 2 PA    1 [id number] 11/29/2024 10/24/2024 Qsymia (Capsule, Extended Release) 30.0 30 11.25 MG-69 MG NA EMILY, PICA

## 2025-01-30 RX ORDER — GABAPENTIN 300 MG/1
300 CAPSULE ORAL
Qty: 90 CAPSULE | Refills: 1 | Status: SHIPPED | OUTPATIENT
Start: 2025-01-30

## 2025-02-19 ENCOUNTER — HOSPITAL ENCOUNTER (EMERGENCY)
Facility: HOSPITAL | Age: 60
Discharge: HOME | End: 2025-02-19
Attending: EMERGENCY MEDICINE | Admitting: EMERGENCY MEDICINE
Payer: COMMERCIAL

## 2025-02-19 ENCOUNTER — APPOINTMENT (EMERGENCY)
Dept: CT IMAGING | Facility: HOSPITAL | Age: 60
End: 2025-02-19
Payer: COMMERCIAL

## 2025-02-19 VITALS
SYSTOLIC BLOOD PRESSURE: 166 MMHG | HEART RATE: 63 BPM | DIASTOLIC BLOOD PRESSURE: 93 MMHG | TEMPERATURE: 98.1 F | OXYGEN SATURATION: 97 % | RESPIRATION RATE: 20 BRPM

## 2025-02-19 DIAGNOSIS — F41.9 ANXIETY: ICD-10-CM

## 2025-02-19 DIAGNOSIS — M54.9 BACK PAIN: ICD-10-CM

## 2025-02-19 DIAGNOSIS — R07.9 CHEST PAIN: Primary | ICD-10-CM

## 2025-02-19 DIAGNOSIS — R42 VERTIGO: ICD-10-CM

## 2025-02-19 LAB
ALBUMIN SERPL BCG-MCNC: 4.5 G/DL (ref 3.5–5)
ALP SERPL-CCNC: 54 U/L (ref 34–104)
ALT SERPL W P-5'-P-CCNC: 12 U/L (ref 7–52)
ANION GAP SERPL CALCULATED.3IONS-SCNC: 7 MMOL/L (ref 4–13)
AST SERPL W P-5'-P-CCNC: 17 U/L (ref 13–39)
ATRIAL RATE: 77 BPM
BASOPHILS # BLD AUTO: 0.04 THOUSANDS/ΜL (ref 0–0.1)
BASOPHILS NFR BLD AUTO: 1 % (ref 0–1)
BILIRUB SERPL-MCNC: 0.57 MG/DL (ref 0.2–1)
BUN SERPL-MCNC: 17 MG/DL (ref 5–25)
CALCIUM SERPL-MCNC: 9.3 MG/DL (ref 8.4–10.2)
CARDIAC TROPONIN I PNL SERPL HS: 3 NG/L (ref ?–50)
CHLORIDE SERPL-SCNC: 109 MMOL/L (ref 96–108)
CO2 SERPL-SCNC: 21 MMOL/L (ref 21–32)
CREAT SERPL-MCNC: 0.72 MG/DL (ref 0.6–1.3)
EOSINOPHIL # BLD AUTO: 0.04 THOUSAND/ΜL (ref 0–0.61)
EOSINOPHIL NFR BLD AUTO: 1 % (ref 0–6)
ERYTHROCYTE [DISTWIDTH] IN BLOOD BY AUTOMATED COUNT: 14.9 % (ref 11.6–15.1)
GFR SERPL CREATININE-BSD FRML MDRD: 90 ML/MIN/1.73SQ M
GLUCOSE SERPL-MCNC: 95 MG/DL (ref 65–140)
HCT VFR BLD AUTO: 39.6 % (ref 34.8–46.1)
HGB BLD-MCNC: 12.7 G/DL (ref 11.5–15.4)
IMM GRANULOCYTES # BLD AUTO: 0.01 THOUSAND/UL (ref 0–0.2)
IMM GRANULOCYTES NFR BLD AUTO: 0 % (ref 0–2)
LYMPHOCYTES # BLD AUTO: 2.26 THOUSANDS/ΜL (ref 0.6–4.47)
LYMPHOCYTES NFR BLD AUTO: 51 % (ref 14–44)
MAGNESIUM SERPL-MCNC: 1.8 MG/DL (ref 1.9–2.7)
MCH RBC QN AUTO: 28.7 PG (ref 26.8–34.3)
MCHC RBC AUTO-ENTMCNC: 32.1 G/DL (ref 31.4–37.4)
MCV RBC AUTO: 90 FL (ref 82–98)
MONOCYTES # BLD AUTO: 0.18 THOUSAND/ΜL (ref 0.17–1.22)
MONOCYTES NFR BLD AUTO: 4 % (ref 4–12)
NEUTROPHILS # BLD AUTO: 1.87 THOUSANDS/ΜL (ref 1.85–7.62)
NEUTS SEG NFR BLD AUTO: 43 % (ref 43–75)
NRBC BLD AUTO-RTO: 0 /100 WBCS
P AXIS: 67 DEGREES
PLATELET # BLD AUTO: 199 THOUSANDS/UL (ref 149–390)
PMV BLD AUTO: 11.8 FL (ref 8.9–12.7)
POTASSIUM SERPL-SCNC: 3.4 MMOL/L (ref 3.5–5.3)
PR INTERVAL: 130 MS
PROT SERPL-MCNC: 7.1 G/DL (ref 6.4–8.4)
QRS AXIS: 46 DEGREES
QRSD INTERVAL: 76 MS
QT INTERVAL: 364 MS
QTC INTERVAL: 411 MS
RBC # BLD AUTO: 4.42 MILLION/UL (ref 3.81–5.12)
SODIUM SERPL-SCNC: 137 MMOL/L (ref 135–147)
T WAVE AXIS: 63 DEGREES
TSH SERPL DL<=0.05 MIU/L-ACNC: 2.3 UIU/ML (ref 0.45–4.5)
VENTRICULAR RATE: 77 BPM
WBC # BLD AUTO: 4.4 THOUSAND/UL (ref 4.31–10.16)

## 2025-02-19 PROCEDURE — 85025 COMPLETE CBC W/AUTO DIFF WBC: CPT

## 2025-02-19 PROCEDURE — 71260 CT THORAX DX C+: CPT

## 2025-02-19 PROCEDURE — 99285 EMERGENCY DEPT VISIT HI MDM: CPT

## 2025-02-19 PROCEDURE — 70498 CT ANGIOGRAPHY NECK: CPT

## 2025-02-19 PROCEDURE — 84443 ASSAY THYROID STIM HORMONE: CPT

## 2025-02-19 PROCEDURE — 93005 ELECTROCARDIOGRAM TRACING: CPT

## 2025-02-19 PROCEDURE — 80053 COMPREHEN METABOLIC PANEL: CPT

## 2025-02-19 PROCEDURE — 84484 ASSAY OF TROPONIN QUANT: CPT

## 2025-02-19 PROCEDURE — 93010 ELECTROCARDIOGRAM REPORT: CPT | Performed by: INTERNAL MEDICINE

## 2025-02-19 PROCEDURE — 36415 COLL VENOUS BLD VENIPUNCTURE: CPT

## 2025-02-19 PROCEDURE — 70496 CT ANGIOGRAPHY HEAD: CPT

## 2025-02-19 PROCEDURE — 83735 ASSAY OF MAGNESIUM: CPT

## 2025-02-19 RX ADMIN — IOHEXOL 100 ML: 350 INJECTION, SOLUTION INTRAVENOUS at 11:47

## 2025-02-19 NOTE — Clinical Note
Shandra Mcmullen was seen and treated in our emergency department on 2/19/2025.    No restrictions    No sports until cleared by Family Doctor/Orthopedics        Diagnosis:     Shandra  .    She may return on this date:          If you have any questions or concerns, please don't hesitate to call.      Du Griffith MD    ______________________________           _______________          _______________  Hospital Representative                              Date                                Time

## 2025-02-19 NOTE — ED PROVIDER NOTES
Time reflects when diagnosis was documented in both MDM as applicable and the Disposition within this note       Time User Action Codes Description Comment    2/19/2025 12:28 PM EanKe cisse Add [R42] Vertigo     2/19/2025 12:28 PM EanKaron cisseilen Add [R07.9] Chest pain     2/19/2025 12:28 PM EanKaronilen Modify [R42] Vertigo     2/19/2025 12:28 PM EanKaron cisseilen Modify [R07.9] Chest pain     2/19/2025 12:28 PM Ean, Kailen Add [M54.9] Back pain     2/19/2025 12:32 PM Ean, Karonilen Add [F41.9] Anxiety           ED Disposition       ED Disposition   Discharge    Condition   Stable    Date/Time   Wed Feb 19, 2025 12:28 PM    Comment   Shandra Mcmullen discharge to home/self care.                   Assessment & Plan       Medical Decision Making  DDx including but not limited to: metabolic abnormality, cardiac abnormality, thyroid disease, intracranial process    CMP normal. Mag normal TSH normal. ECG non ischemic and trop was 3, did not need 2 or 4 hour trop. Heart score. CTA head/neck normal. CT chest showed stable lung nodules since 2018, no follow up needed. I do think a lot of her symptoms are anxiety/stress vs msk. Recommended PCP follow up    I have discussed the plan to discharge pt from ED. The patient was discharged in stable condition.  Patient ambulated off the department.  Extensive return to emergency department precautions were discussed.  Follow up with appropriate providers including primary care physician was discussed.  Patient and/or their  primary decision maker expressed understanding.  Patient remained stable during entire emergency department stay.            Amount and/or Complexity of Data Reviewed  Labs: ordered.  Radiology: ordered. Decision-making details documented in ED Course.    Risk  Prescription drug management.        ED Course as of 02/19/25 1237   Wed Feb 19, 2025   1225 CTA head and neck with and without contrast  CT Brain:  No acute intracranial abnormality.     CT Angiography:   Unremarkable CTA neck and brain.        1226 CT chest with contrast  No acute pulmonary disease.     Stable tiny lung nodules since 2018, benign. No follow-up required.         Medications   iohexol (OMNIPAQUE) 350 MG/ML injection (SINGLE-DOSE) 100 mL (100 mL Intravenous Given 2/19/25 1147)       ED Risk Strat Scores                            SBIRT 20yo+      Flowsheet Row Most Recent Value   Initial Alcohol Screen: US AUDIT-C     1. How often do you have a drink containing alcohol? 0 Filed at: 02/19/2025 1113   2. How many drinks containing alcohol do you have on a typical day you are drinking?  0 Filed at: 02/19/2025 1113   3a. Male UNDER 65: How often do you have five or more drinks on one occasion? 0 Filed at: 02/19/2025 1113   3b. FEMALE Any Age, or MALE 65+: How often do you have 4 or more drinks on one occassion? 0 Filed at: 02/19/2025 1113   Audit-C Score 0 Filed at: 02/19/2025 1113   MANJU: How many times in the past year have you...    Used an illegal drug or used a prescription medication for non-medical reasons? Never Filed at: 02/19/2025 1113                            History of Present Illness       Chief Complaint   Patient presents with    Dizziness     Pt reports dizziness, left arm tingling and chest pain on/off for two weeks.        Past Medical History:   Diagnosis Date    Abnormal mammogram of right breast 02/09/2018    Acid reflux     Anxiety     Arthritis     Asthmatic bronchitis     Back pain     Carpal tunnel syndrome     COVID-19     COVID-19 virus infection 02/04/2021    mild    Depression     Depression     GERD (gastroesophageal reflux disease)     Headache(784.0)     Hyperglycemia     Hypertension     Lipoma     Migraine     Miscarriage     Obesity     Osteoarthritis     PONV (postoperative nausea and vomiting)     Stress incontinence     UTI (urinary tract infection)       Past Surgical History:   Procedure Laterality Date    APPENDECTOMY      BREAST EXCISIONAL BIOPSY Left 03/2001     benign    COLONOSCOPY N/A 2016    Procedure: COLONOSCOPY;  Surgeon: Suellen Abreu MD;  Location: [facility] GI LAB;  Service:     KNEE ARTHROSCOPY      PA EXC B9 LESION MRGN XCP SK TG T/A/L 0.5 CM/< Left 2016    Procedure: EXCISION LIPOMA SHOULDER ;  Surgeon: Suellen Abreu MD;  Location: [facility] OR;  Service: General    TUBAL LIGATION      WISDOM TOOTH EXTRACTION        Family History   Problem Relation Age of Onset    Stroke Mother     Stomach cancer Mother 73    Asthma Mother     Cancer Mother     COPD Mother     Skin cancer Father     Alzheimer's disease Father     Depression Father     Dementia Father     Arthritis Father     Hearing loss Father     Vision loss Father     Anxiety disorder Father     Anxiety disorder Sister     No Known Problems Sister     No Known Problems Daughter     No Known Problems Daughter     Heart disease Maternal Grandmother     No Known Problems Maternal Grandfather     Breast cancer Paternal Grandmother 50        Grammy    No Known Problems Paternal Grandfather     Stroke Brother     No Known Problems Maternal Aunt     No Known Problems Maternal Aunt     No Known Problems Maternal Aunt     No Known Problems Maternal Aunt     No Known Problems Paternal Aunt     No Known Problems Paternal Aunt     No Known Problems Paternal Aunt     No Known Problems Paternal Aunt     No Known Problems Paternal Aunt     Lung cancer Paternal Uncle     Breast cancer Cousin 45    Breast cancer Cousin 45    Breast cancer Cousin 50    Breast cancer Cousin 50      Social History     Tobacco Use    Smoking status: Former     Current packs/day: 0.00     Average packs/day: 0.5 packs/day for 15.4 years (7.7 ttl pk-yrs)     Types: Cigarettes     Start date: 1991     Quit date: 2006     Years since quittin.7    Smokeless tobacco: Never   Vaping Use    Vaping status: Never Used   Substance Use Topics    Alcohol use: Yes     Comment: rarely/socially 1-2x/year    Drug use: No       E-Cigarette/Vaping    E-Cigarette Use Never User       E-Cigarette/Vaping Substances    Nicotine No     THC No     CBD No     Flavoring No     Other No     Unknown No       I have reviewed and agree with the history as documented.     59 YOF with PMH anxiety, GERD, HTN presents today with multiple complaints.     Left upper back pain radiating to left chest x2 weeks   Comes and goes  Happens at rest and during exertion   Cold/heat is not helping   No medications taken   Stabbing pain, lasting 5-10 minutes.     3 weeks ago URI which is getting better     Left arm tingling happened once or twice past few weeks     Light headed x5 months, started new weight loss medication in may   Usually when turning her head   Dramamine takes her symptoms away     Used to be a smoker        Review of Systems   Constitutional:  Negative for chills and fever.   Respiratory:  Negative for cough and shortness of breath.    Cardiovascular:  Positive for chest pain.   Gastrointestinal:  Positive for nausea. Negative for abdominal pain, diarrhea and vomiting.   Genitourinary:  Negative for dysuria, frequency and urgency.   Musculoskeletal:  Positive for back pain.   Neurological:  Positive for dizziness. Negative for weakness and light-headedness.           Objective       ED Triage Vitals [02/19/25 0944]   Temperature Pulse Blood Pressure Respirations SpO2 Patient Position - Orthostatic VS   98.1 °F (36.7 °C) 88 (!) 171/89 20 98 % --      Temp Source Heart Rate Source BP Location FiO2 (%) Pain Score    Oral Monitor Right arm -- --      Vitals      Date and Time Temp Pulse SpO2 Resp BP Pain Score FACES Pain Rating User   02/19/25 1100 -- 63 97 % 20 166/93 -- -- SS   02/19/25 0944 98.1 °F (36.7 °C) 88 98 % 20 171/89 -- -- HMR            Physical Exam  Vitals and nursing note reviewed.   Constitutional:       General: She is not in acute distress.     Appearance: Normal appearance. She is well-developed.   HENT:      Head: Normocephalic  and atraumatic.   Eyes:      Conjunctiva/sclera: Conjunctivae normal.   Cardiovascular:      Rate and Rhythm: Normal rate and regular rhythm.      Heart sounds: No murmur heard.  Pulmonary:      Effort: Pulmonary effort is normal. No respiratory distress.      Breath sounds: Normal breath sounds. No wheezing, rhonchi or rales.   Abdominal:      Palpations: Abdomen is soft.      Tenderness: There is no abdominal tenderness.   Musculoskeletal:         General: No swelling.      Cervical back: Normal range of motion and neck supple.   Skin:     General: Skin is warm and dry.      Capillary Refill: Capillary refill takes less than 2 seconds.   Neurological:      Mental Status: She is alert.      Sensory: No sensory deficit.      Motor: No weakness.   Psychiatric:         Mood and Affect: Mood normal.         Results Reviewed       Procedure Component Value Units Date/Time    TSH, 3rd generation with Free T4 reflex [[id number]]  (Normal) Collected: 02/19/25 1054    Lab Status: Final result Specimen: Blood from Arm, Right Updated: 02/19/25 1153     TSH 3RD GENERATON 2.299 uIU/mL     HS Troponin 0hr (reflex protocol) [[id number]]  (Normal) Collected: 02/19/25 1054    Lab Status: Final result Specimen: Blood from Arm, Right Updated: 02/19/25 1143     hs TnI 0hr 3 ng/L     Comprehensive metabolic panel [[id number]]  (Abnormal) Collected: 02/19/25 1054    Lab Status: Final result Specimen: Blood from Arm, Right Updated: 02/19/25 1136     Sodium 137 mmol/L      Potassium 3.4 mmol/L      Chloride 109 mmol/L      CO2 21 mmol/L      ANION GAP 7 mmol/L      BUN 17 mg/dL      Creatinine 0.72 mg/dL      Glucose 95 mg/dL      Calcium 9.3 mg/dL      AST 17 U/L      ALT 12 U/L      Alkaline Phosphatase 54 U/L      Total Protein 7.1 g/dL      Albumin 4.5 g/dL      Total Bilirubin 0.57 mg/dL      eGFR 90 ml/min/1.73sq m     Narrative:      National Kidney Disease Foundation guidelines for Chronic Kidney Disease (CKD):     Stage 1 with  normal or high GFR (GFR > 90 mL/min/1.73 square meters)    Stage 2 Mild CKD (GFR = 60-89 mL/min/1.73 square meters)    Stage 3A Moderate CKD (GFR = 45-59 mL/min/1.73 square meters)    Stage 3B Moderate CKD (GFR = 30-44 mL/min/1.73 square meters)    Stage 4 Severe CKD (GFR = 15-29 mL/min/1.73 square meters)    Stage 5 End Stage CKD (GFR <15 mL/min/1.73 square meters)  Note: GFR calculation is accurate only with a steady state creatinine    Magnesium [[id number]]  (Abnormal) Collected: 02/19/25 1054    Lab Status: Final result Specimen: Blood from Arm, Right Updated: 02/19/25 1136     Magnesium 1.8 mg/dL     CBC and differential [[id number]]  (Abnormal) Collected: 02/19/25 1054    Lab Status: Final result Specimen: Blood from Arm, Right Updated: 02/19/25 1125     WBC 4.40 Thousand/uL      RBC 4.42 Million/uL      Hemoglobin 12.7 g/dL      Hematocrit 39.6 %      MCV 90 fL      MCH 28.7 pg      MCHC 32.1 g/dL      RDW 14.9 %      MPV 11.8 fL      Platelets 199 Thousands/uL      nRBC 0 /100 WBCs      Segmented % 43 %      Immature Grans % 0 %      Lymphocytes % 51 %      Monocytes % 4 %      Eosinophils Relative 1 %      Basophils Relative 1 %      Absolute Neutrophils 1.87 Thousands/µL      Absolute Immature Grans 0.01 Thousand/uL      Absolute Lymphocytes 2.26 Thousands/µL      Absolute Monocytes 0.18 Thousand/µL      Eosinophils Absolute 0.04 Thousand/µL      Basophils Absolute 0.04 Thousands/µL             CT chest with contrast   Final Interpretation by Antonia Barrera MD (02/19 1224)      No acute pulmonary disease.      Stable tiny lung nodules since 2018, benign. No follow-up required.         Workstation performed: [id number]         CTA head and neck with and without contrast   Final Interpretation by Morro Devine DO (02/19 1222)      CT Brain:  No acute intracranial abnormality.      CT Angiography:  Unremarkable CTA neck and brain.                  Workstation performed: [id number]              ECG 12 Lead Documentation Only    Date/Time: 2/19/2025 12:30 PM    Performed by: Ke Mckoy PA-C  Authorized by: eK Mckoy PA-C    Indications / Diagnosis:  Chest pain  ECG reviewed by me, the ED Provider: yes    Patient location:  ED  Previous ECG:     Previous ECG:  Compared to current    Similarity:  No change  Interpretation:     Interpretation: normal    Rate:     ECG rate assessment: normal    Rhythm:     Rhythm: sinus rhythm    Ectopy:     Ectopy: none    QRS:     QRS axis:  Normal    QRS intervals:  Normal  Conduction:     Conduction: normal    ST segments:     ST segments:  Normal  T waves:     T waves: normal        ED Medication and Procedure Management   Prior to Admission Medications   Prescriptions Last Dose Informant Patient Reported? Taking?   Calcium Carbonate-Vit D-Min (Calcium 1200) 2573-3954 MG-UNIT CHEW 2/19/2025 Self Yes Yes   Sig: Chew   LORazepam (ATIVAN) 0.5 mg tablet More than a month  No No   Sig: Take 1 tablet (0.5 mg total) by mouth every 6 (six) hours as needed for anxiety   Qsymia 11.25-69 MG CP24 2/19/2025  No Yes   Sig: TAKE 1 CAPSULE BY MOUTH EVERY MORNING   albuterol (2.5 mg/3 mL) 0.083 % nebulizer solution  Self No Yes   Sig: Take 3 mL (2.5 mg total) by nebulization every 4 (four) hours as needed for wheezing (via nebulizer)   albuterol (PROVENTIL HFA,VENTOLIN HFA) 90 mcg/act inhaler  Self No Yes   Sig: Inhale 2 puffs every 6 (six) hours as needed for wheezing   amLODIPine (NORVASC) 5 mg tablet 2/19/2025  No Yes   Sig: TAKE 1 TABLET (5 MG TOTAL) BY MOUTH DAILY.   fluticasone (FLONASE) 50 mcg/act nasal spray 2/18/2025 Self No Yes   Sig: SPRAY 1 SPRAY INTO EACH NOSTRIL EVERY DAY   gabapentin (NEURONTIN) 300 mg capsule 2/18/2025  No Yes   Sig: TAKE 1 CAPSULE BY MOUTH AT BEDTIME   ibuprofen (MOTRIN) 200 mg tablet Past Month Self Yes Yes   Sig: if needed Uses 800 mg once or twice daily   multivitamin (THERAGRAN) TABS 2/19/2025 Self Yes Yes   Sig: Take 1  tablet by mouth daily   omeprazole (PriLOSEC) 20 mg delayed release capsule 2/19/2025  No Yes   Sig: TAKE 1 CAPSULE DAILY AS NEEDED FOR STOMACH AND ACID   sertraline (ZOLOFT) 100 mg tablet 2/19/2025  No Yes   Sig: TAKE 1 TABLET BY MOUTH EVERY DAY   valACYclovir (VALTREX) 1,000 mg tablet More than a month Self No No   Sig: Take 2 tablets (2,000 mg total) by mouth every 12 (twelve) hours Use 2 pills at onset cold sore and repeat 2 pills once in 12 hrs  then hold      Facility-Administered Medications: None     Patient's Medications   Discharge Prescriptions    No medications on file     No discharge procedures on file.  ED SEPSIS DOCUMENTATION   Time reflects when diagnosis was documented in both MDM as applicable and the Disposition within this note       Time User Action Codes Description Comment    2/19/2025 12:28 PM Ke Mckoy Add [R42] Vertigo     2/19/2025 12:28 PM Ke Mckoy Add [R07.9] Chest pain     2/19/2025 12:28 PM Ke Mckoy Modify [R42] Vertigo     2/19/2025 12:28 PM Ke Mckoy Modify [R07.9] Chest pain     2/19/2025 12:28 PM Ke Mckoy Add [M54.9] Back pain     2/19/2025 12:32 PM Ke Mckoy Add [F41.9] Anxiety                  Ke Mckoy PA-C  02/19/25 7412

## 2025-04-01 ENCOUNTER — TELEPHONE (OUTPATIENT)
Dept: FAMILY MEDICINE CLINIC | Facility: CLINIC | Age: 60
End: 2025-04-01

## 2025-04-01 NOTE — TELEPHONE ENCOUNTER
BIJALM for pt to call back to reschedule her July 22, 2025 appointment at 9:00 AM with Dr. Pérez due to him being on vacation.

## 2025-04-02 DIAGNOSIS — K21.9 GASTROESOPHAGEAL REFLUX DISEASE WITHOUT ESOPHAGITIS: ICD-10-CM

## 2025-04-03 RX ORDER — OMEPRAZOLE 20 MG/1
20 CAPSULE, DELAYED RELEASE ORAL DAILY
Qty: 90 CAPSULE | Refills: 0 | Status: SHIPPED | OUTPATIENT
Start: 2025-04-03

## 2025-04-11 DIAGNOSIS — N95.1 SYMPTOMATIC MENOPAUSAL OR FEMALE CLIMACTERIC STATES: ICD-10-CM

## 2025-04-11 RX ORDER — SERTRALINE HYDROCHLORIDE 100 MG/1
100 TABLET, FILM COATED ORAL DAILY
Qty: 90 TABLET | Refills: 1 | Status: SHIPPED | OUTPATIENT
Start: 2025-04-11

## 2025-04-22 ENCOUNTER — OFFICE VISIT (OUTPATIENT)
Dept: BARIATRICS | Facility: CLINIC | Age: 60
End: 2025-04-22
Payer: COMMERCIAL

## 2025-04-22 VITALS
HEIGHT: 66 IN | BODY MASS INDEX: 34.58 KG/M2 | HEART RATE: 93 BPM | TEMPERATURE: 98.2 F | WEIGHT: 215.2 LBS | DIASTOLIC BLOOD PRESSURE: 88 MMHG | SYSTOLIC BLOOD PRESSURE: 138 MMHG

## 2025-04-22 DIAGNOSIS — G47.33 OBSTRUCTIVE SLEEP APNEA TREATED WITH BIPAP: ICD-10-CM

## 2025-04-22 DIAGNOSIS — I10 ESSENTIAL HYPERTENSION: ICD-10-CM

## 2025-04-22 DIAGNOSIS — E66.812 OBESITY, CLASS II, BMI 35-39.9: Primary | ICD-10-CM

## 2025-04-22 DIAGNOSIS — R73.01 ELEVATED FASTING GLUCOSE: ICD-10-CM

## 2025-04-22 PROCEDURE — 99214 OFFICE O/P EST MOD 30 MIN: CPT | Performed by: PHYSICIAN ASSISTANT

## 2025-04-22 RX ORDER — TIRZEPATIDE 2.5 MG/.5ML
2.5 INJECTION, SOLUTION SUBCUTANEOUS WEEKLY
Qty: 2 ML | Refills: 0 | Status: SHIPPED | OUTPATIENT
Start: 2025-04-22 | End: 2025-05-20

## 2025-04-22 NOTE — PROGRESS NOTES
Assessment & Plan  Obesity, Class II, BMI 35-39.9  Discussed medication options-- she feels like she has hit a plateau on qysmia, this may also be contributing to elevated blood pressure and anxiety.   Recommend treatment with Zepbound-- if covered by insurance and tolerating without any signfiicant side effets will plan to D/C Qsymia by reducing to every other day x 1 week then stop.   Discussed expected weight loss of approximately 20% along with lifestyle modifications  Discussed risks/side effects of medication and demonstrated pen device  Recommend small/low fat meals and stop eating when full to avoid side effects.  Discussed importance of adequate protein intake and strength training to reduce risk of muscle loss.   -She declines RD visit at this time, feels comfortable with nutrition paperwork she has had home from prior visits.   Discussed need to stop medication for at least 1 week prior to planned surgery/endoscopy  Denies hx Pancreatitis or FH of Medullary cell Thyroid CA/MEN2 syndrome  Start Zepbound  2.5mg weekly x 4 weeks  Advised to contact office in 2 weeks with update regarding tolerability and at that time will increase to 5mg dose if tolerating medication with no significant side effects.        Essential hypertension  Not at goal today  She rushed in late and feeling anxious, she is scheduled to see PCP soon    Obstructive sleep apnea treated with BiPAP -  felt difficulty tolerating and stopped  Advised her that Zepbound is approved to treat Mod/Severe PRAVEENA which may be beneficial as she was unable to tolerate PAP therapy.   Elevated fasting glucose  Expect improvement with weight loss, will monitor over time.        Return in about 3 months (around 7/22/2025).         Subjective:   Chief Complaint   Patient presents with   • Follow-up     MWM F/u; Waist 41in        Patient ID: Shandra BIENVENIDO Mcmullen  is a 60 y.o. female with excess weight/obesity here for Weight Management Follow up Visit    HPI: Here  for Medical Weight Management Follow Up Visit    Obesity/Excess Weight:  Current BMI: Body mass index is 35.27 kg/m².   Initial Weight: 232  Qsymia starting weight 248.4 lbs  Last visit Weight: 233  Current Weight: 215    Current Weight Management Plan:   Current Medication: Qsymia  Medication Side effects:  Dry mouth    Food Recall:  Breakfast: Yogurt with blueberries/granola  Lunch: sometimes skips  Dinner: usually salad with meat or pasta  Once per week pizza  Bedtime Snack: snacks at night-- chips-- but trying to stay away.  Overall, less chips than before    Lifestyle History:  Exercise: Walks dogs, walks with grandoson  Not walking as much as before.   Occupation: Cleans for a living, takes care of elderly people  Will be travelling to [place] in June for daugther's wedding.     Denies pancreatis  Denies thyroid CA in her family   Has had a lot of tragedy in her life over the past 6 years      Wt Readings from Last 20 Encounters:   04/22/25 97.6 kg (215 lb 3.2 oz)   01/07/25 99.8 kg (220 lb)   12/19/24 100 kg (220 lb 9.6 oz)   12/12/24 101 kg (221 lb 9.6 oz)   10/23/24 102 kg (225 lb)   10/07/24 106 kg (233 lb)   08/26/24 106 kg (233 lb)   08/22/24 106 kg (233 lb 9.6 oz)   08/06/24 107 kg (235 lb 14.3 oz)   07/31/24 107 kg (235 lb)   07/15/24 108 kg (237 lb)   06/12/24 109 kg (240 lb)   05/01/24 113 kg (248 lb 6.4 oz)   12/28/23 111 kg (245 lb)   12/12/23 109 kg (240 lb)   05/19/23 109 kg (241 lb)   04/20/23 109 kg (241 lb)   12/21/22 110 kg (242 lb)   10/18/22 110 kg (242 lb)   04/18/22 107 kg (236 lb)        Review of Systems    Past Medical History:   Diagnosis Date   • Abnormal mammogram of right breast 02/09/2018   • Acid reflux    • Anxiety    • Arthritis    • Asthmatic bronchitis    • Back pain    • Carpal tunnel syndrome    • COVID-19    • COVID-19 virus infection 02/04/2021    mild   • Depression    • Depression    • GERD (gastroesophageal reflux disease)    • Headache(784.0)    • Hyperglycemia    •  "Hypertension    • Lipoma    • Migraine    • Miscarriage    • Obesity    • Osteoarthritis    • PONV (postoperative nausea and vomiting)    • Stress incontinence    • UTI (urinary tract infection)        Past Surgical History:   Procedure Laterality Date   • APPENDECTOMY     • BREAST EXCISIONAL BIOPSY Left 03/2001    benign   • COLONOSCOPY N/A 4/8/2016    Procedure: COLONOSCOPY;  Surgeon: Suellen Abreu MD;  Location: [facility];  Service:    • KNEE ARTHROSCOPY     • FL EXC B9 LESION MRGN XCP SK TG T/A/L 0.5 CM/< Left 5/19/2016    Procedure: EXCISION LIPOMA SHOULDER ;  Surgeon: Suellen Abreu MD;  Location: [facility] OR;  Service: General   • TUBAL LIGATION     • WISDOM TOOTH EXTRACTION          Allergies   Allergen Reactions   • Effexor [Venlafaxine] Other (See Comments)     Hot and sweaty   • Levaquin [Levofloxacin] Myalgia     Tendonitis    • Wellbutrin [Bupropion] Other (See Comments)     Hot and sweaty   • Prempro [Conj Estrog-Medroxyprogest Ace] Rash        Objective:    /88 (BP Location: Left arm, Patient Position: Sitting)   Pulse 93   Temp 98.2 °F (36.8 °C) (Tympanic)   Ht 5' 5.5\" (1.664 m)   Wt 97.6 kg (215 lb 3.2 oz)   LMP  (LMP Unknown)   BMI 35.27 kg/m²     Physical Exam:  Constitutional:  she  appears well-developed and well-nourished. No distress.   HENT:   Head: Normocephalic and atraumatic.   Neck: Normal range of motion.   Pulmonary/Chest: Effort normal.   Musculoskeletal: Normal range of motion.   Neurological: she  is alert and oriented to person, place, and time.   Skin: she  is not diaphoretic.   Psychiatric: she  has a normal mood and affect. her  behavior is normal.   Tearful at times during visit      LABS:    Lab Results   Component Value Date    HGBA1C 5.8 (H) 05/24/2024      Lab Results   Component Value Date     05/03/2017    SODIUM 137 02/19/2025    K 3.4 (L) 02/19/2025     (H) 02/19/2025    CO2 21 02/19/2025    AGAP 7 02/19/2025    BUN 17 02/19/2025    " CREATININE 0.72 02/19/2025    GLUC 95 02/19/2025    CALCIUM 9.3 02/19/2025    AST 17 02/19/2025    ALT 12 02/19/2025    ALKPHOS 54 02/19/2025    PROT 6.4 05/03/2017    TP 7.1 02/19/2025    BILITOT 0.6 05/03/2017    TBILI 0.57 02/19/2025    EGFR 90 02/19/2025 04/29/2024   Lab Results   Component Value Date    WEI7LRDRSFRU 2.299 02/19/2025

## 2025-04-22 NOTE — ASSESSMENT & PLAN NOTE
Advised her that Zepbound is approved to treat Mod/Severe PRAVEENA which may be beneficial as she was unable to tolerate PAP therapy.   
Not at goal today  She rushed in late and feeling anxious, she is scheduled to see PCP soon    
Patient is 18 years or older (and not pregnant)

## 2025-04-29 ENCOUNTER — TELEPHONE (OUTPATIENT)
Dept: BARIATRICS | Facility: CLINIC | Age: 60
End: 2025-04-29

## 2025-04-29 NOTE — TELEPHONE ENCOUNTER
PA for Zepbound 2.5 mg SUBMITTED to [facility]    via    [x]CMM-KEY: [id number]  []Surescripts-Case ID #    []Availity-Auth ID #   NDC #    []Faxed to plan   []Other website    []Phone call Case ID #      [x]PA sent as URGENT    All office notes, labs and other pertaining documents and studies sent. Clinical questions answered. Awaiting determination from insurance company.     Turnaround time for your insurance to make a decision on your Prior Authorization can take 7-21 business days.

## 2025-04-30 NOTE — TELEPHONE ENCOUNTER
PA for Zepbound 2.5mg  APPROVED     Date(s) approved 3/30/25-12/25/25    Case #[id number]    Patient advised by          [x][employer] Message  []Phone call   []LMOM  []L/M to call office as no active Communication consent on file  []Unable to leave detailed message as VM not approved on Communication consent       Pharmacy advised by    []Fax  [x]Phone call  []Secure Chat    Specialty Pharmacy    []      Approval letter scanned into Media No Cover my meds

## 2025-05-01 DIAGNOSIS — E66.812 OBESITY, CLASS II, BMI 35-39.9: ICD-10-CM

## 2025-05-01 RX ORDER — TIRZEPATIDE 2.5 MG/.5ML
2.5 INJECTION, SOLUTION SUBCUTANEOUS WEEKLY
Qty: 2 ML | Refills: 0 | OUTPATIENT
Start: 2025-05-01 | End: 2025-05-29

## 2025-05-06 DIAGNOSIS — J30.9 ALLERGIC RHINITIS WITH POSTNASAL DRIP: ICD-10-CM

## 2025-05-06 DIAGNOSIS — R05.3 CHRONIC COUGH: ICD-10-CM

## 2025-05-06 DIAGNOSIS — R09.82 ALLERGIC RHINITIS WITH POSTNASAL DRIP: ICD-10-CM

## 2025-05-06 NOTE — TELEPHONE ENCOUNTER
Reason for call:   [x] Refill   [] Prior Auth  [] Other:     Office:   [x] PCP/Provider - [facility]  [] Specialty/Provider -     Medication: Flonase    Dose/Frequency: 50 mcg    Quantity: 16 ml     Pharmacy: [address] [facility]   Does the patient have enough for 3 days?   [] Yes   [] No - Send as HP to POD    Mail Away Pharmacy   Does the patient have enough for 10 days?   [] Yes   [] No - Send as HP to POD

## 2025-05-07 DIAGNOSIS — R05.3 CHRONIC COUGH: ICD-10-CM

## 2025-05-07 DIAGNOSIS — J30.9 ALLERGIC RHINITIS WITH POSTNASAL DRIP: ICD-10-CM

## 2025-05-07 DIAGNOSIS — R09.82 ALLERGIC RHINITIS WITH POSTNASAL DRIP: ICD-10-CM

## 2025-05-07 RX ORDER — FLUTICASONE PROPIONATE 50 MCG
2 SPRAY, SUSPENSION (ML) NASAL DAILY
Qty: 16 ML | Refills: 5 | Status: SHIPPED | OUTPATIENT
Start: 2025-05-07

## 2025-05-08 RX ORDER — FLUTICASONE PROPIONATE 50 MCG
1 SPRAY, SUSPENSION (ML) NASAL DAILY
Qty: 16 ML | Refills: 5 | OUTPATIENT
Start: 2025-05-08

## 2025-05-14 DIAGNOSIS — E66.812 OBESITY, CLASS II, BMI 35-39.9: ICD-10-CM

## 2025-05-14 RX ORDER — PHENTERMINE AND TOPIRAMATE 11.25; 69 MG/1; MG/1
1 CAPSULE, EXTENDED RELEASE ORAL EVERY MORNING
Qty: 30 CAPSULE | Refills: 0 | Status: CANCELLED | OUTPATIENT
Start: 2025-05-14

## 2025-05-30 DIAGNOSIS — E66.812 OBESITY, CLASS II, BMI 35-39.9: Primary | ICD-10-CM

## 2025-05-30 RX ORDER — TIRZEPATIDE 5 MG/.5ML
5 INJECTION, SOLUTION SUBCUTANEOUS WEEKLY
Qty: 2 ML | Refills: 2 | Status: SHIPPED | OUTPATIENT
Start: 2025-05-30

## 2025-06-07 DIAGNOSIS — J30.9 ALLERGIC RHINITIS WITH POSTNASAL DRIP: ICD-10-CM

## 2025-06-07 DIAGNOSIS — R05.3 CHRONIC COUGH: ICD-10-CM

## 2025-06-07 DIAGNOSIS — R09.82 ALLERGIC RHINITIS WITH POSTNASAL DRIP: ICD-10-CM

## 2025-06-09 RX ORDER — FLUTICASONE PROPIONATE 50 MCG
SPRAY, SUSPENSION (ML) NASAL
Qty: 48 ML | Refills: 2 | Status: SHIPPED | OUTPATIENT
Start: 2025-06-09

## 2025-06-11 ENCOUNTER — OFFICE VISIT (OUTPATIENT)
Dept: FAMILY MEDICINE CLINIC | Facility: CLINIC | Age: 60
End: 2025-06-11
Payer: COMMERCIAL

## 2025-06-11 VITALS
HEIGHT: 66 IN | DIASTOLIC BLOOD PRESSURE: 88 MMHG | WEIGHT: 214.2 LBS | HEART RATE: 71 BPM | BODY MASS INDEX: 34.42 KG/M2 | SYSTOLIC BLOOD PRESSURE: 128 MMHG | OXYGEN SATURATION: 99 %

## 2025-06-11 DIAGNOSIS — J45.20 MILD INTERMITTENT REACTIVE AIRWAY DISEASE WITHOUT COMPLICATION: ICD-10-CM

## 2025-06-11 DIAGNOSIS — E66.812 CLASS 2 SEVERE OBESITY DUE TO EXCESS CALORIES WITH SERIOUS COMORBIDITY AND BODY MASS INDEX (BMI) OF 38.0 TO 38.9 IN ADULT (HCC): ICD-10-CM

## 2025-06-11 DIAGNOSIS — Z13.29 SCREENING FOR HYPOTHYROIDISM: ICD-10-CM

## 2025-06-11 DIAGNOSIS — Z00.00 ANNUAL PHYSICAL EXAM: Primary | ICD-10-CM

## 2025-06-11 DIAGNOSIS — Z12.11 COLON CANCER SCREENING: ICD-10-CM

## 2025-06-11 DIAGNOSIS — R13.10 SWALLOWING PROBLEM: ICD-10-CM

## 2025-06-11 DIAGNOSIS — H81.12 BENIGN PAROXYSMAL POSITIONAL VERTIGO OF LEFT EAR: ICD-10-CM

## 2025-06-11 DIAGNOSIS — K21.9 GASTROESOPHAGEAL REFLUX DISEASE WITHOUT ESOPHAGITIS: ICD-10-CM

## 2025-06-11 DIAGNOSIS — Z13.0 SCREENING FOR DEFICIENCY ANEMIA: ICD-10-CM

## 2025-06-11 DIAGNOSIS — Z79.899 CHRONIC PRESCRIPTION BENZODIAZEPINE USE: ICD-10-CM

## 2025-06-11 DIAGNOSIS — E55.9 VITAMIN D DEFICIENCY: ICD-10-CM

## 2025-06-11 DIAGNOSIS — E83.42 HYPOMAGNESEMIA: ICD-10-CM

## 2025-06-11 DIAGNOSIS — R73.03 PREDIABETES: ICD-10-CM

## 2025-06-11 DIAGNOSIS — Z13.89 SCREENING FOR BLOOD OR PROTEIN IN URINE: ICD-10-CM

## 2025-06-11 DIAGNOSIS — E78.2 MIXED HYPERLIPIDEMIA: ICD-10-CM

## 2025-06-11 DIAGNOSIS — E66.01 CLASS 2 SEVERE OBESITY DUE TO EXCESS CALORIES WITH SERIOUS COMORBIDITY AND BODY MASS INDEX (BMI) OF 38.0 TO 38.9 IN ADULT (HCC): ICD-10-CM

## 2025-06-11 DIAGNOSIS — F41.8 DEPRESSION WITH ANXIETY: ICD-10-CM

## 2025-06-11 DIAGNOSIS — I10 ESSENTIAL HYPERTENSION: ICD-10-CM

## 2025-06-11 DIAGNOSIS — G47.33 OBSTRUCTIVE SLEEP APNEA TREATED WITH BIPAP: ICD-10-CM

## 2025-06-11 PROCEDURE — 99214 OFFICE O/P EST MOD 30 MIN: CPT | Performed by: INTERNAL MEDICINE

## 2025-06-11 PROCEDURE — 99396 PREV VISIT EST AGE 40-64: CPT | Performed by: INTERNAL MEDICINE

## 2025-06-11 RX ORDER — LORAZEPAM 0.5 MG/1
0.5 TABLET ORAL EVERY 6 HOURS PRN
Qty: 20 TABLET | Refills: 0 | Status: SHIPPED | OUTPATIENT
Start: 2025-06-11

## 2025-06-11 RX ORDER — OMEPRAZOLE 20 MG/1
20 CAPSULE, DELAYED RELEASE ORAL DAILY
Qty: 90 CAPSULE | Refills: 0 | Status: SHIPPED | OUTPATIENT
Start: 2025-06-11

## 2025-06-11 NOTE — ASSESSMENT & PLAN NOTE
She uses lorazepam sparingly.  Side effects discussed.  Orders:    LORazepam (ATIVAN) 0.5 mg tablet; Take 1 tablet (0.5 mg total) by mouth every 6 (six) hours as needed for anxiety

## 2025-06-11 NOTE — ASSESSMENT & PLAN NOTE
Recheck lipid panel with the next blood work.  Orders:    Comprehensive metabolic panel; Future    Lipid panel; Future

## 2025-06-11 NOTE — PATIENT INSTRUCTIONS
Please master Epley maneuver    Blood work within the next 6 months before next appointment.  Please call gastroenterologist for the appointment somewhere after September.  Mammogram is ordered.

## 2025-06-11 NOTE — PROGRESS NOTES
Adult Annual Physical  Name: Shandra Mcmullen      : 1965      MRN: [id number]  Encounter Provider: Sanya Pérez MD  Encounter Date: 2025   Encounter department: [facility] PRIMARY CARE    :  Assessment & Plan  Essential hypertension  Blood pressure is overall very acceptable, continue current dose of amlodipine.       Obstructive sleep apnea treated with BiPAP -  felt difficulty tolerating and stopped         Mixed hyperlipidemia  Recheck lipid panel with the next blood work.  Orders:    Comprehensive metabolic panel; Future    Lipid panel; Future    Chronic prescription benzodiazepine use  She uses lorazepam very sparingly, denies any side effects.  Urine drug screen collected, controlled substance agreement signed.  Orders:    [facility] All Prescribed Meds and Special Instructions    Amphetamines, Methamphetamines    Butalbital    Phenobarbital    Secobarbital    Alprazolam    Clonazepam    Diazepam    Lorazepam    Gabapentin    Pregabalin    Cocaine    Heroin    Buprenorphine    Levorphanol    Meperidine    Naltrexone    Fentanyl    Methadone    Oxycodone    Tapentadol    THC    Tramadol    Codeine, Hydrocodone, Hydropmorphone, Morphine    Bath Salts    Ethyl Glucuronide/Ethyl Sulfate    Kratom    Spice    Methylphenidate    Phentermine    Validity Oxidant    Validity Creatinine    Validity pH    Validity Specific    Xylazine Definitive Test    Colon cancer screening    Orders:    Ambulatory Referral to Gastroenterology; Future    Swallowing problem  She is a former smoker, denies problems with swallowing, will refer to gastroenterology to see if they can do EGD together with colonoscopy that she will be due in September.  Orders:    Ambulatory Referral to Gastroenterology; Future    Prediabetes  Recheck hemoglobin A1c, continue with low-carb diet.  Orders:    Hemoglobin A1C; Future    Hypomagnesemia    Orders:    Magnesium; Future    Screening for blood or protein in  urine    Orders:    UA (URINE) with reflex to Scope; Future    Vitamin D deficiency    Orders:    Vitamin D 25 hydroxy; Future    Screening for deficiency anemia    Orders:    CBC and differential; Future    Screening for hypothyroidism    Orders:    TSH, 3rd generation with Free T4 reflex; Future    Mild intermittent reactive airway disease without complication         Depression with anxiety    She uses lorazepam sparingly.  Side effects discussed.  Orders:    LORazepam (ATIVAN) 0.5 mg tablet; Take 1 tablet (0.5 mg total) by mouth every 6 (six) hours as needed for anxiety    Gastroesophageal reflux disease without esophagitis    Orders:    omeprazole (PriLOSEC) 20 mg delayed release capsule; Take 1 capsule (20 mg total) by mouth daily    Class 2 severe obesity due to excess calories with serious comorbidity and body mass index (BMI) of 38.0 to 38.9 in adult ([facility])  She follows up with weight management, she is on Zepbound.         Benign paroxysmal positional vertigo of left ear  Positive Concord-Hallpike maneuver, Epley maneuver performed with resolution of the symptoms.       Annual physical exam             Preventive Screenings:    - Cervical cancer screening: screening up-to-date   - Breast cancer screening: screening up-to-date     Immunizations:  - Immunizations due: Prevnar 20 and Zoster (Shingrix)         History of Present Illness     Adult Annual Physical:  Patient presents for annual physical. Patient came today to establish care, for annual checkup, to follow-up on her chronic medical problems and with some new complaints that we addressed during the visit.  She agreed for colonoscopy, she will be due soon, referral was placed.  Her mammogram is scheduled.  Will consider Shingrix in the future.  She tries to eat healthy, she is physically active.  .     Diet and Physical Activity:  - Diet/Nutrition: no special diet.  - Exercise: no formal exercise.    Depression Screening:    - PHQ-9 Score: 4    General  "Health:  - Sleep: 7-8 hours of sleep on average and snores loudly.  - Hearing: decreased hearing left ear.  - Vision: wears glasses.  - Dental: regular dental visits.    /GYN Health:  - Follows with GYN: yes.   - Menopause: postmenopausal.   - History of STDs: yes  - Contraception: menopause.      Advanced Care Planning:  - Has an advanced directive?: yes    - Has a durable medical POA?: yes    - ACP document given to patient?: yes      Review of Systems   Constitutional:  Negative for activity change, appetite change, chills, fatigue and fever.   HENT:  Negative for congestion, ear pain, rhinorrhea and sore throat.    Respiratory:  Negative for cough, shortness of breath and wheezing.    Cardiovascular:  Negative for chest pain, palpitations and leg swelling.   Gastrointestinal:  Negative for abdominal distention, abdominal pain, diarrhea, nausea and vomiting.   Genitourinary:  Negative for difficulty urinating, frequency and pelvic pain.   Musculoskeletal:  Negative for arthralgias, back pain and neck pain.   Skin:  Negative for rash.   Neurological:  Positive for dizziness. Negative for tremors, weakness, numbness and headaches.   Psychiatric/Behavioral:  The patient is nervous/anxious.          Objective   /88 (BP Location: Left arm, Patient Position: Sitting, Cuff Size: Large)   Pulse 71   Ht 5' 5.5\" (1.664 m)   Wt 97.2 kg (214 lb 3.2 oz)   LMP  (LMP Unknown)   SpO2 99%   BMI 35.10 kg/m²     Physical Exam  Constitutional:       General: She is not in acute distress.     Appearance: She is not toxic-appearing.     Cardiovascular:      Heart sounds: No murmur heard.     No gallop.   Pulmonary:      Effort: No respiratory distress.      Breath sounds: No wheezing or rales.     Neurological:      Comments: Positive Thomas-Hallpike on the left   Psychiatric:         Behavior: Behavior normal.         "

## 2025-06-15 LAB

## 2025-06-17 ENCOUNTER — RESULTS FOLLOW-UP (OUTPATIENT)
Dept: FAMILY MEDICINE CLINIC | Facility: CLINIC | Age: 60
End: 2025-06-17

## 2025-07-10 LAB
25(OH)D3 SERPL-MCNC: 55 NG/ML (ref 30–100)
ALBUMIN SERPL-MCNC: 4.5 G/DL (ref 3.6–5.1)
ALBUMIN/GLOB SERPL: 2 (CALC) (ref 1–2.5)
ALP SERPL-CCNC: 57 U/L (ref 37–153)
ALT SERPL-CCNC: 15 U/L (ref 6–29)
APPEARANCE UR: CLEAR
AST SERPL-CCNC: 18 U/L (ref 10–35)
BASOPHILS # BLD AUTO: 41 CELLS/UL (ref 0–200)
BASOPHILS NFR BLD AUTO: 0.8 %
BILIRUB SERPL-MCNC: 0.7 MG/DL (ref 0.2–1.2)
BILIRUB UR QL STRIP: NEGATIVE
BUN SERPL-MCNC: 14 MG/DL (ref 7–25)
BUN/CREAT SERPL: NORMAL (CALC) (ref 6–22)
CALCIUM SERPL-MCNC: 9 MG/DL (ref 8.6–10.4)
CHLORIDE SERPL-SCNC: 105 MMOL/L (ref 98–110)
CHOLEST SERPL-MCNC: 200 MG/DL
CHOLEST/HDLC SERPL: 3.8 (CALC)
CO2 SERPL-SCNC: 27 MMOL/L (ref 20–32)
COLOR UR: YELLOW
CREAT SERPL-MCNC: 0.63 MG/DL (ref 0.5–1.05)
EOSINOPHIL # BLD AUTO: 61 CELLS/UL (ref 15–500)
EOSINOPHIL NFR BLD AUTO: 1.2 %
ERYTHROCYTE [DISTWIDTH] IN BLOOD BY AUTOMATED COUNT: 13.7 % (ref 11–15)
GFR/BSA.PRED SERPLBLD CYS-BASED-ARV: 100 ML/MIN/1.73M2
GLOBULIN SER CALC-MCNC: 2.2 G/DL (CALC) (ref 1.9–3.7)
GLUCOSE SERPL-MCNC: 93 MG/DL (ref 65–99)
GLUCOSE UR QL STRIP: NEGATIVE
HBA1C MFR BLD: 5.3 %
HCT VFR BLD AUTO: 39 % (ref 35–45)
HDLC SERPL-MCNC: 52 MG/DL
HGB BLD-MCNC: 12.7 G/DL (ref 11.7–15.5)
HGB UR QL STRIP: NEGATIVE
KETONES UR QL STRIP: NEGATIVE
LDLC SERPL CALC-MCNC: 124 MG/DL (CALC)
LEUKOCYTE ESTERASE UR QL STRIP: NEGATIVE
LYMPHOCYTES # BLD AUTO: 1862 CELLS/UL (ref 850–3900)
LYMPHOCYTES NFR BLD AUTO: 36.5 %
MAGNESIUM SERPL-MCNC: 2 MG/DL (ref 1.5–2.5)
MCH RBC QN AUTO: 29.1 PG (ref 27–33)
MCHC RBC AUTO-ENTMCNC: 32.6 G/DL (ref 32–36)
MCV RBC AUTO: 89.4 FL (ref 80–100)
MONOCYTES # BLD AUTO: 260 CELLS/UL (ref 200–950)
MONOCYTES NFR BLD AUTO: 5.1 %
NEUTROPHILS # BLD AUTO: 2876 CELLS/UL (ref 1500–7800)
NEUTROPHILS NFR BLD AUTO: 56.4 %
NITRITE UR QL STRIP: NEGATIVE
NONHDLC SERPL-MCNC: 148 MG/DL (CALC)
PH UR STRIP: 5.5 [PH] (ref 5–8)
PLATELET # BLD AUTO: 212 THOUSAND/UL (ref 140–400)
PMV BLD REES-ECKER: 11.9 FL (ref 7.5–12.5)
POTASSIUM SERPL-SCNC: 4.3 MMOL/L (ref 3.5–5.3)
PROT SERPL-MCNC: 6.7 G/DL (ref 6.1–8.1)
PROT UR QL STRIP: NEGATIVE
RBC # BLD AUTO: 4.36 MILLION/UL (ref 3.8–5.1)
SODIUM SERPL-SCNC: 139 MMOL/L (ref 135–146)
SP GR UR STRIP: 1.02 (ref 1–1.03)
TRIGL SERPL-MCNC: 129 MG/DL
TSH SERPL-ACNC: 2.09 MIU/L (ref 0.4–4.5)
WBC # BLD AUTO: 5.1 THOUSAND/UL (ref 3.8–10.8)

## 2025-07-14 ENCOUNTER — OFFICE VISIT (OUTPATIENT)
Dept: FAMILY MEDICINE CLINIC | Facility: CLINIC | Age: 60
End: 2025-07-14
Payer: COMMERCIAL

## 2025-07-14 VITALS
WEIGHT: 210.6 LBS | HEART RATE: 70 BPM | SYSTOLIC BLOOD PRESSURE: 138 MMHG | TEMPERATURE: 97 F | DIASTOLIC BLOOD PRESSURE: 84 MMHG | OXYGEN SATURATION: 98 % | BODY MASS INDEX: 34.51 KG/M2

## 2025-07-14 DIAGNOSIS — J20.9 ACUTE BRONCHITIS, UNSPECIFIED ORGANISM: Primary | ICD-10-CM

## 2025-07-14 PROCEDURE — 99214 OFFICE O/P EST MOD 30 MIN: CPT | Performed by: FAMILY MEDICINE

## 2025-07-14 RX ORDER — DOXYCYCLINE 100 MG/1
100 CAPSULE ORAL EVERY 12 HOURS SCHEDULED
Qty: 14 CAPSULE | Refills: 0 | Status: SHIPPED | OUTPATIENT
Start: 2025-07-14 | End: 2025-07-21

## 2025-07-14 RX ORDER — BENZONATATE 100 MG/1
100 CAPSULE ORAL 3 TIMES DAILY PRN
Qty: 20 CAPSULE | Refills: 0 | Status: SHIPPED | OUTPATIENT
Start: 2025-07-14

## 2025-07-14 NOTE — PATIENT INSTRUCTIONS
Albuterol - every 4 hours while awake until starting to feel better  Robitussin DM as needed  Tessalon perles - up to 3x/day as needed for coughing  Antibiotic   Eat yogurt while taking   Call if not improving/worsening  Drink plenty of fluids.

## 2025-07-14 NOTE — PROGRESS NOTES
Name: Shandra Mcmullen      : 1965      MRN: [id number]  Encounter Provider: Marilou Lucero DO  Encounter Date: 2025   Encounter department: [facility] PR[facility]  :  Assessment & Plan  Acute bronchitis, unspecified organism  Patient Instructions   Albuterol - every 4 hours while awake until starting to feel better  Robitussin DM as needed  Tessalon perles - up to 3x/day as needed for coughing  Antibiotic   Eat yogurt while taking   Call if not improving/worsening  Drink plenty of fluids.      Orders:    doxycycline hyclate (VIBRAMYCIN) 100 mg capsule; Take 1 capsule (100 mg total) by mouth every 12 (twelve) hours for 7 days Eat yogurt while taking    benzonatate (TESSALON PERLES) 100 mg capsule; Take 1 capsule (100 mg total) by mouth 3 (three) times a day as needed for cough           History of Present Illness   Earache   There is pain in the left ear. This is a new problem. The current episode started in the past 7 days. The problem occurs every few hours. The problem has been gradually improving. There has been no fever. The pain is at a severity of 3/10. Associated symptoms include coughing, headaches and rhinorrhea. Pertinent negatives include no abdominal pain, diarrhea, ear discharge, hearing loss, neck pain, rash, sore throat or vomiting.     Here for sick visit  Since end of  - was traveling in [place].  Began with URI symptoms and then coughing  Productive  Has not sought care prior  Started to feel a little better  But then about a week ago - had a sore throat and ear pain - left  And chest congestion and runny nose.  Tried theraflu      Review of Systems   Constitutional:  Positive for fatigue. Negative for fever.   HENT:  Positive for ear pain and rhinorrhea. Negative for ear discharge, hearing loss and sore throat.         Sore throat resolved  Has a sinus headache     Respiratory:  Positive for cough and wheezing. Negative for shortness of breath.         Cough -  productive -w jessica  Has a h/o asthmatic bronchitis  No PRAVEENA  Using albuterol bid thru neb machine  H/o tob use -quit         Cardiovascular:  Negative for chest pain and palpitations.   Gastrointestinal:  Negative for abdominal pain, diarrhea and vomiting.   Musculoskeletal:  Negative for neck pain.   Skin:  Negative for rash.   Neurological:  Positive for headaches.       Objective   /84 (BP Location: Right arm, Patient Position: Sitting, Cuff Size: Standard)   Pulse 70   Temp (!) 97 °F (36.1 °C) (Tympanic)   Wt 95.5 kg (210 lb 9.6 oz)   LMP  (LMP Unknown)   SpO2 98%   BMI 34.51 kg/m²      Physical Exam  Vitals and nursing note reviewed.   Constitutional:       Comments: Appears tired, but nontoxic.     HENT:      Right Ear: Tympanic membrane normal.      Left Ear: Tympanic membrane normal.      Nose: Nose normal. No congestion.      Mouth/Throat:      Mouth: Mucous membranes are moist.      Pharynx: No oropharyngeal exudate.     Eyes:      General: No scleral icterus.     Extraocular Movements: Extraocular movements intact.      Pupils: Pupils are equal, round, and reactive to light.       Cardiovascular:      Rate and Rhythm: Normal rate and regular rhythm.      Heart sounds: Normal heart sounds. No murmur heard.  Pulmonary:      Effort: Pulmonary effort is normal. No respiratory distress.      Breath sounds: No stridor. Rhonchi present. No wheezing or rales.      Comments: Diffuse rhonchi - bilateral  But able to converse without SOB  Cough sounds loose      Musculoskeletal:      Cervical back: Neck supple. No tenderness.   Lymphadenopathy:      Cervical: No cervical adenopathy.     Skin:     General: Skin is warm.      Coloration: Skin is not jaundiced.     Neurological:      Mental Status: She is alert.     Psychiatric:         Mood and Affect: Mood normal.         Behavior: Behavior normal.         Thought Content: Thought content normal.         Judgment: Judgment normal.

## 2025-07-28 DIAGNOSIS — G47.33 OBSTRUCTIVE SLEEP APNEA TREATED WITH BIPAP: ICD-10-CM

## 2025-07-28 DIAGNOSIS — I10 ESSENTIAL HYPERTENSION: ICD-10-CM

## 2025-07-28 DIAGNOSIS — E66.812 OBESITY, CLASS II, BMI 35-39.9: Primary | ICD-10-CM

## 2025-07-28 RX ORDER — TIRZEPATIDE 7.5 MG/.5ML
7.5 INJECTION, SOLUTION SUBCUTANEOUS WEEKLY
Qty: 2 ML | Refills: 1 | Status: SHIPPED | OUTPATIENT
Start: 2025-07-28 | End: 2025-09-22

## 2025-08-01 DIAGNOSIS — I10 ESSENTIAL HYPERTENSION: ICD-10-CM

## 2025-08-01 DIAGNOSIS — G89.29 CHRONIC BILATERAL LOW BACK PAIN WITH RIGHT-SIDED SCIATICA: ICD-10-CM

## 2025-08-01 DIAGNOSIS — M54.12 CERVICAL RADICULAR PAIN: ICD-10-CM

## 2025-08-01 DIAGNOSIS — M54.41 CHRONIC BILATERAL LOW BACK PAIN WITH RIGHT-SIDED SCIATICA: ICD-10-CM

## 2025-08-04 RX ORDER — AMLODIPINE BESYLATE 5 MG/1
5 TABLET ORAL DAILY
Qty: 90 TABLET | Refills: 1 | Status: SHIPPED | OUTPATIENT
Start: 2025-08-04

## 2025-08-04 RX ORDER — GABAPENTIN 300 MG/1
300 CAPSULE ORAL
Qty: 90 CAPSULE | Refills: 1 | Status: SHIPPED | OUTPATIENT
Start: 2025-08-04

## 2025-08-07 ENCOUNTER — HOSPITAL ENCOUNTER (OUTPATIENT)
Dept: MAMMOGRAPHY | Facility: MEDICAL CENTER | Age: 60
Discharge: HOME | End: 2025-08-07
Payer: COMMERCIAL

## 2025-08-07 VITALS — BODY MASS INDEX: 33.75 KG/M2 | WEIGHT: 210 LBS | HEIGHT: 66 IN

## 2025-08-07 DIAGNOSIS — Z12.31 ENCOUNTER FOR SCREENING MAMMOGRAM FOR MALIGNANT NEOPLASM OF BREAST: ICD-10-CM

## 2025-08-07 PROCEDURE — 77067 SCR MAMMO BI INCL CAD: CPT

## 2025-08-07 PROCEDURE — 77063 BREAST TOMOSYNTHESIS BI: CPT

## 2025-08-19 ENCOUNTER — TELEPHONE (OUTPATIENT)
Age: 60
End: 2025-08-19